# Patient Record
Sex: FEMALE | Race: WHITE | NOT HISPANIC OR LATINO
[De-identification: names, ages, dates, MRNs, and addresses within clinical notes are randomized per-mention and may not be internally consistent; named-entity substitution may affect disease eponyms.]

---

## 2017-07-16 ENCOUNTER — TRANSCRIPTION ENCOUNTER (OUTPATIENT)
Age: 72
End: 2017-07-16

## 2017-09-16 ENCOUNTER — TRANSCRIPTION ENCOUNTER (OUTPATIENT)
Age: 72
End: 2017-09-16

## 2017-12-26 ENCOUNTER — TRANSCRIPTION ENCOUNTER (OUTPATIENT)
Age: 72
End: 2017-12-26

## 2018-01-10 ENCOUNTER — TRANSCRIPTION ENCOUNTER (OUTPATIENT)
Age: 73
End: 2018-01-10

## 2018-06-05 ENCOUNTER — APPOINTMENT (OUTPATIENT)
Dept: ORTHOPEDIC SURGERY | Facility: CLINIC | Age: 73
End: 2018-06-05
Payer: MEDICARE

## 2018-06-05 VITALS — BODY MASS INDEX: 28.52 KG/M2 | WEIGHT: 155 LBS | HEIGHT: 62 IN

## 2018-06-05 DIAGNOSIS — Z86.69 PERSONAL HISTORY OF OTHER DISEASES OF THE NERVOUS SYSTEM AND SENSE ORGANS: ICD-10-CM

## 2018-06-05 PROCEDURE — 99213 OFFICE O/P EST LOW 20 MIN: CPT

## 2018-06-05 RX ORDER — ACETAMINOPHEN AND CODEINE 300; 30 MG/1; MG/1
300-30 TABLET ORAL
Qty: 30 | Refills: 0 | Status: DISCONTINUED | COMMUNITY
Start: 2018-04-17

## 2018-06-05 RX ORDER — UBIDECARENONE/VIT E ACET 100MG-5
CAPSULE ORAL
Refills: 0 | Status: ACTIVE | COMMUNITY

## 2018-06-05 RX ORDER — SELENIUM 50 MCG
TABLET ORAL
Refills: 0 | Status: ACTIVE | COMMUNITY

## 2018-06-05 RX ORDER — MULTIVIT-MINS NO.7/FOLIC ACID 1 MG
CAPSULE ORAL
Qty: 30 | Refills: 0 | Status: DISCONTINUED | COMMUNITY
Start: 2017-10-27

## 2018-06-05 RX ORDER — CEFUROXIME AXETIL 250 MG/1
250 TABLET ORAL
Qty: 20 | Refills: 0 | Status: DISCONTINUED | COMMUNITY
Start: 2017-12-13

## 2018-06-05 RX ORDER — MULTIVITAMIN
CAPSULE ORAL
Refills: 0 | Status: ACTIVE | COMMUNITY

## 2018-06-05 RX ORDER — IBUPROFEN 600 MG/1
600 TABLET, FILM COATED ORAL
Qty: 60 | Refills: 0 | Status: DISCONTINUED | COMMUNITY
Start: 2018-04-17

## 2018-06-05 RX ORDER — MULTIVIT-MIN/IRON/FOLIC ACID/K 18-600-40
CAPSULE ORAL
Refills: 0 | Status: ACTIVE | COMMUNITY

## 2018-06-05 RX ORDER — COLD-HOT PACK
EACH MISCELLANEOUS
Refills: 0 | Status: ACTIVE | COMMUNITY

## 2018-06-05 RX ORDER — ROSUVASTATIN CALCIUM 10 MG/1
10 TABLET, FILM COATED ORAL
Qty: 90 | Refills: 0 | Status: DISCONTINUED | COMMUNITY
Start: 2018-03-20

## 2018-06-05 RX ORDER — CARBIDOPA AND LEVODOPA 25; 100 MG/1; MG/1
25-100 TABLET ORAL
Qty: 90 | Refills: 0 | Status: ACTIVE | COMMUNITY
Start: 2018-05-24

## 2018-06-05 RX ORDER — ONDANSETRON 4 MG/1
4 TABLET ORAL
Qty: 30 | Refills: 0 | Status: DISCONTINUED | COMMUNITY
Start: 2018-03-20

## 2018-06-05 RX ORDER — CHROMIUM 200 MCG
TABLET ORAL
Refills: 0 | Status: ACTIVE | COMMUNITY

## 2018-06-05 RX ORDER — DOXYCYCLINE HYCLATE 100 MG/1
100 CAPSULE ORAL
Qty: 14 | Refills: 0 | Status: DISCONTINUED | COMMUNITY
Start: 2017-12-26

## 2018-06-05 RX ORDER — FINASTERIDE 1 MG/1
1 TABLET ORAL
Qty: 30 | Refills: 0 | Status: DISCONTINUED | COMMUNITY
Start: 2018-02-16

## 2018-06-05 RX ORDER — CEPHALEXIN 500 MG/1
500 CAPSULE ORAL
Qty: 20 | Refills: 0 | Status: DISCONTINUED | COMMUNITY
Start: 2018-04-17

## 2018-06-05 RX ORDER — IRON PS COMPLEX/B12/FOLIC ACID 150-25-1
150-25-1 CAPSULE ORAL
Qty: 30 | Refills: 0 | Status: DISCONTINUED | COMMUNITY
Start: 2018-02-09

## 2018-06-05 RX ORDER — BIOTIN 10 MG
TABLET ORAL
Refills: 0 | Status: ACTIVE | COMMUNITY

## 2018-06-05 RX ORDER — SILVER SULFADIAZINE 10 MG/G
1 CREAM TOPICAL
Qty: 50 | Refills: 0 | Status: DISCONTINUED | COMMUNITY
Start: 2018-03-12

## 2018-06-05 RX ORDER — NITROFURANTOIN (MONOHYDRATE/MACROCRYSTALS) 25; 75 MG/1; MG/1
100 CAPSULE ORAL
Qty: 10 | Refills: 0 | Status: DISCONTINUED | COMMUNITY
Start: 2018-02-13

## 2018-12-20 ENCOUNTER — OUTPATIENT (OUTPATIENT)
Dept: OUTPATIENT SERVICES | Facility: HOSPITAL | Age: 73
LOS: 1 days | Discharge: HOME | End: 2018-12-20
Payer: MEDICARE

## 2018-12-20 VITALS
OXYGEN SATURATION: 97 % | HEART RATE: 77 BPM | RESPIRATION RATE: 17 BRPM | HEIGHT: 62 IN | DIASTOLIC BLOOD PRESSURE: 79 MMHG | SYSTOLIC BLOOD PRESSURE: 151 MMHG | WEIGHT: 154.32 LBS | TEMPERATURE: 99 F

## 2018-12-20 VITALS — HEIGHT: 62 IN | WEIGHT: 154.32 LBS

## 2018-12-20 DIAGNOSIS — M17.12 UNILATERAL PRIMARY OSTEOARTHRITIS, LEFT KNEE: ICD-10-CM

## 2018-12-20 DIAGNOSIS — Z01.818 ENCOUNTER FOR OTHER PREPROCEDURAL EXAMINATION: ICD-10-CM

## 2018-12-20 DIAGNOSIS — Z98.890 OTHER SPECIFIED POSTPROCEDURAL STATES: Chronic | ICD-10-CM

## 2018-12-20 DIAGNOSIS — E78.5 HYPERLIPIDEMIA, UNSPECIFIED: ICD-10-CM

## 2018-12-20 DIAGNOSIS — Z98.82 BREAST IMPLANT STATUS: Chronic | ICD-10-CM

## 2018-12-20 DIAGNOSIS — E05.90 THYROTOXICOSIS, UNSPECIFIED WITHOUT THYROTOXIC CRISIS OR STORM: ICD-10-CM

## 2018-12-20 DIAGNOSIS — Z29.9 ENCOUNTER FOR PROPHYLACTIC MEASURES, UNSPECIFIED: ICD-10-CM

## 2018-12-20 LAB
ANION GAP SERPL CALC-SCNC: 11 MMOL/L — SIGNIFICANT CHANGE UP (ref 5–17)
APTT BLD: 27.2 SEC — LOW (ref 28.5–37)
BLD GP AB SCN SERPL QL: SIGNIFICANT CHANGE UP
BUN SERPL-MCNC: 22 MG/DL — SIGNIFICANT CHANGE UP (ref 7–23)
CALCIUM SERPL-MCNC: 9.3 MG/DL — SIGNIFICANT CHANGE UP (ref 8.5–10.1)
CHLORIDE SERPL-SCNC: 106 MMOL/L — SIGNIFICANT CHANGE UP (ref 96–108)
CO2 SERPL-SCNC: 24 MMOL/L — SIGNIFICANT CHANGE UP (ref 22–31)
CREAT SERPL-MCNC: 0.7 MG/DL — SIGNIFICANT CHANGE UP (ref 0.5–1.3)
GLUCOSE SERPL-MCNC: 135 MG/DL — HIGH (ref 70–99)
HBA1C BLD-MCNC: 6.7 % — HIGH (ref 4–5.6)
HCT VFR BLD CALC: 41.2 % — SIGNIFICANT CHANGE UP (ref 34.5–45)
HGB BLD-MCNC: 13.3 G/DL — SIGNIFICANT CHANGE UP (ref 11.5–15.5)
INR BLD: 0.96 RATIO — SIGNIFICANT CHANGE UP (ref 0.88–1.16)
MCHC RBC-ENTMCNC: 30.3 PG — SIGNIFICANT CHANGE UP (ref 27–34)
MCHC RBC-ENTMCNC: 32.3 GM/DL — SIGNIFICANT CHANGE UP (ref 32–36)
MCV RBC AUTO: 93.8 FL — SIGNIFICANT CHANGE UP (ref 80–100)
MRSA PCR RESULT.: SIGNIFICANT CHANGE UP
NRBC # BLD: 0 /100 WBCS — SIGNIFICANT CHANGE UP (ref 0–0)
PLATELET # BLD AUTO: 174 K/UL — SIGNIFICANT CHANGE UP (ref 150–400)
POTASSIUM SERPL-MCNC: 3.6 MMOL/L — SIGNIFICANT CHANGE UP (ref 3.5–5.3)
POTASSIUM SERPL-SCNC: 3.6 MMOL/L — SIGNIFICANT CHANGE UP (ref 3.5–5.3)
PROTHROM AB SERPL-ACNC: 10.7 SEC — SIGNIFICANT CHANGE UP (ref 10–12.9)
RBC # BLD: 4.39 M/UL — SIGNIFICANT CHANGE UP (ref 3.8–5.2)
RBC # FLD: 14.3 % — SIGNIFICANT CHANGE UP (ref 10.3–14.5)
S AUREUS DNA NOSE QL NAA+PROBE: DETECTED
SODIUM SERPL-SCNC: 141 MMOL/L — SIGNIFICANT CHANGE UP (ref 135–145)
TSH SERPL-MCNC: 1.74 UU/ML — SIGNIFICANT CHANGE UP (ref 0.36–3.74)
WBC # BLD: 8.44 K/UL — SIGNIFICANT CHANGE UP (ref 3.8–10.5)
WBC # FLD AUTO: 8.44 K/UL — SIGNIFICANT CHANGE UP (ref 3.8–10.5)

## 2018-12-20 PROCEDURE — 93010 ELECTROCARDIOGRAM REPORT: CPT | Mod: NC

## 2018-12-20 RX ORDER — SODIUM CHLORIDE 9 MG/ML
3 INJECTION INTRAMUSCULAR; INTRAVENOUS; SUBCUTANEOUS EVERY 8 HOURS
Qty: 0 | Refills: 0 | Status: DISCONTINUED | OUTPATIENT
Start: 2019-01-02 | End: 2019-01-03

## 2018-12-20 NOTE — H&P PST ADULT - ASSESSMENT
unilateral primary osteoarthritis, left unilateral primary osteoarthritis, left  CAPRINI SCORE [CLOT]    AGE RELATED RISK FACTORS                                                       MOBILITY RELATED FACTORS  [ ] Age 41-60 years                                            (1 Point)                  [ ] Bed rest                                                        (1 Point)  [x ] Age: 61-74 years                                           (2 Points)                 [ ] Plaster cast                                                   (2 Points)  [ ] Age= 75 years                                              (3 Points)                 [ ] Bed bound for more than 72 hours                 (2 Points)    DISEASE RELATED RISK FACTORS                                               GENDER SPECIFIC FACTORS  [ ] Edema in the lower extremities                       (1 Point)                  [ ] Pregnancy                                                     (1 Point)  [ ] Varicose veins                                               (1 Point)                  [ ] Post-partum < 6 weeks                                   (1 Point)             [x ] BMI > 25 Kg/m2                                            (1 Point)                  [ ] Hormonal therapy  or oral contraception          (1 Point)                 [ ] Sepsis (in the previous month)                        (1 Point)                  [ ] History of pregnancy complications                 (1 point)  [ ] Pneumonia or serious lung disease                                               [ ] Unexplained or recurrent                     (1 Point)           (in the previous month)                               (1 Point)  [ ] Abnormal pulmonary function test                     (1 Point)                 SURGERY RELATED RISK FACTORS  [ ] Acute myocardial infarction                              (1 Point)                 [ ]  Section                                             (1 Point)  [ ] Congestive heart failure (in the previous month)  (1 Point)               [ ] Minor surgery                                                  (1 Point)   [ ] Inflammatory bowel disease                             (1 Point)                 [ ] Arthroscopic surgery                                        (2 Points)  [ ] Central venous access                                      (2 Points)                [ ] General surgery lasting more than 45 minutes   (2 Points)       [ ] Stroke (in the previous month)                          (5 Points)               [x ] Elective arthroplasty                                         (5 Points)                                                                                                                                               HEMATOLOGY RELATED FACTORS                                                 TRAUMA RELATED RISK FACTORS  [ ] Prior episodes of VTE                                     (3 Points)                 [ ] Fracture of the hip, pelvis, or leg                       (5 Points)  [ ] Positive family history for VTE                         (3 Points)                 [ ] Acute spinal cord injury (in the previous month)  (5 Points)  [ ] Prothrombin 12283 A                                     (3 Points)                 [ ] Paralysis  (less than 1 month)                             (5 Points)  [ ] Factor V Leiden                                             (3 Points)                  [ ] Multiple Trauma within 1 month                        (5 Points)  [ ] Lupus anticoagulants                                     (3 Points)                                                           [ ] Anticardiolipin antibodies                               (3 Points)                                                       [ ] High homocysteine in the blood                      (3 Points)                                             [ ] Other congenital or acquired thrombophilia      (3 Points)                                                [ ] Heparin induced thrombocytopenia                  (3 Points)                                          Total Score [    8    ]

## 2018-12-20 NOTE — H&P PST ADULT - HISTORY OF PRESENT ILLNESS
This is a 74 y/o female This is a 74 y/o female c/o left knee pain associated with difficulty walking, seen orthopedics for evaluation, recommended surgery, she presents today for left total knee replacement

## 2018-12-20 NOTE — H&P PST ADULT - FAMILY HISTORY
Father  Still living? No  Family history of heart attack, Age at diagnosis: Age Unknown     Mother  Still living? No  Family history of hypertension, Age at diagnosis: Age Unknown     Sibling  Still living? Yes, Estimated age: Age Unknown  Family history of diabetes mellitus, Age at diagnosis: Age Unknown

## 2018-12-20 NOTE — PHYSICAL THERAPY INITIAL EVALUATION ADULT - ADDITIONAL COMMENTS
Pt lives in an apartment c no steps to get into the building from the front entrance and c 2 steps, matilde rails,  close and able to be reached simultaneously, from the garage. There is no step to negotiate in the building.  Pt has a tub/shower combo and regular toilet seat in . Pt is R handed and drives. Pt has a narrow base quad cane, straight cane and a shower bench. Sister will be available to assist pt in post -op care upon discharge home.

## 2018-12-20 NOTE — PHYSICAL THERAPY INITIAL EVALUATION ADULT - CRITERIA FOR SKILLED THERAPEUTIC INTERVENTIONS
rehab potential/functional limitations in following categories/risk reduction/prevention/anticipated equipment needs at discharge/anticipated discharge recommendation/impairments found/therapy frequency

## 2018-12-20 NOTE — H&P PST ADULT - NSANTHOSAYNRD_GEN_A_CORE
No. JESÚS screening performed.  STOP BANG Legend: 0-2 = LOW Risk; 3-4 = INTERMEDIATE Risk; 5-8 = HIGH Risk

## 2018-12-20 NOTE — OCCUPATIONAL THERAPY INITIAL EVALUATION ADULT - FINE MOTOR COORDINATION, FINE MOTOR COORDINATION TESTS, OT EVAL
Patient-specific activity scoring scheme (Point to one number): 0 -------5-------- 10 (0) =Unable to perform activity (10) -- Able to perform activity at the same level as before injury or problem. Activity: Everything in general_____5______

## 2018-12-20 NOTE — H&P PST ADULT - PMH
GERD (gastroesophageal reflux disease)    HTN (hypertension)    Hyperlipidemia    Hyperthyroidism  monitored  by endocrinologist  Parkinson disease    Spinal stenosis GERD (gastroesophageal reflux disease)    HTN (hypertension)    Hyperlipidemia    Hyperthyroidism  monitored  by endocrinologist  Parkinson disease    Spinal stenosis  pain relieved with steroid injection GERD (gastroesophageal reflux disease)    HTN (hypertension)    Hyperlipidemia    Hyperthyroidism  monitored  by endocrinologist  MVP (mitral valve prolapse)  echo 2018  Parkinson disease    Spinal stenosis  pain relieved with steroid injection

## 2018-12-20 NOTE — OCCUPATIONAL THERAPY INITIAL EVALUATION ADULT - NS ASR OT EQUIP NEEDS DISCH
Recommending raised toilet seat with bars and rolling walker. Patient owns a narrow based cane and a straight cane.

## 2018-12-20 NOTE — H&P PST ADULT - PSH
History of bowel resection  2012  S/P breast augmentation  2014 saline implants  S/P foot surgery, left  5/2/2018  S/P hernia repair  2013

## 2018-12-20 NOTE — PHYSICAL THERAPY INITIAL EVALUATION ADULT - GAIT DEVIATIONS NOTED, PT EVAL
decreased weight-shifting ability/decreased christiano/decreased step length/decreased stride length

## 2018-12-20 NOTE — OCCUPATIONAL THERAPY INITIAL EVALUATION ADULT - ADDITIONAL COMMENTS
Patient lives in a apartment with no steps to enter. Once inside, the patients bedroom and bathroom is on that main level when entering. The patients bathroom has a tub/shower combination with a regular toilet. The patient reports owning a tub bench. The patient ambulates with no device and owns a straight cane and a narrow based quad cane. The patient is R handed, drives and wears glasses for reading. The patient daily pain is 10/10 and the patient takes advil, tylenol with codeine and tramadol for pain management.

## 2018-12-20 NOTE — PHYSICAL THERAPY INITIAL EVALUATION ADULT - ASR EQUIP NEEDS DISCH PT EVAL
rolling walker (5 inch wheels)/raised toilet seat c arms due to small bath room.  Pt has a narrow base quad cane, straight cane and a shower bench.

## 2018-12-21 RX ORDER — MUPIROCIN 20 MG/G
1 OINTMENT TOPICAL
Qty: 1 | Refills: 0
Start: 2018-12-21 | End: 2018-12-25

## 2018-12-31 RX ORDER — DEXTROSE 50 % IN WATER 50 %
12.5 SYRINGE (ML) INTRAVENOUS ONCE
Qty: 0 | Refills: 0 | Status: DISCONTINUED | OUTPATIENT
Start: 2019-01-02 | End: 2019-01-03

## 2018-12-31 RX ORDER — LOSARTAN POTASSIUM 100 MG/1
100 TABLET, FILM COATED ORAL DAILY
Qty: 0 | Refills: 0 | Status: DISCONTINUED | OUTPATIENT
Start: 2019-01-02 | End: 2019-01-03

## 2018-12-31 RX ORDER — SODIUM CHLORIDE 9 MG/ML
1000 INJECTION, SOLUTION INTRAVENOUS
Qty: 0 | Refills: 0 | Status: DISCONTINUED | OUTPATIENT
Start: 2019-01-02 | End: 2019-01-03

## 2018-12-31 RX ORDER — ONDANSETRON 8 MG/1
4 TABLET, FILM COATED ORAL EVERY 6 HOURS
Qty: 0 | Refills: 0 | Status: DISCONTINUED | OUTPATIENT
Start: 2019-01-02 | End: 2019-01-03

## 2018-12-31 RX ORDER — DOCUSATE SODIUM 100 MG
100 CAPSULE ORAL THREE TIMES A DAY
Qty: 0 | Refills: 0 | Status: DISCONTINUED | OUTPATIENT
Start: 2019-01-02 | End: 2019-01-03

## 2018-12-31 RX ORDER — MAGNESIUM HYDROXIDE 400 MG/1
30 TABLET, CHEWABLE ORAL DAILY
Qty: 0 | Refills: 0 | Status: DISCONTINUED | OUTPATIENT
Start: 2019-01-02 | End: 2019-01-03

## 2018-12-31 RX ORDER — HYDROCHLOROTHIAZIDE 25 MG
12.5 TABLET ORAL DAILY
Qty: 0 | Refills: 0 | Status: DISCONTINUED | OUTPATIENT
Start: 2019-01-02 | End: 2019-01-03

## 2018-12-31 RX ORDER — DEXTROSE 50 % IN WATER 50 %
25 SYRINGE (ML) INTRAVENOUS ONCE
Qty: 0 | Refills: 0 | Status: DISCONTINUED | OUTPATIENT
Start: 2019-01-02 | End: 2019-01-03

## 2018-12-31 RX ORDER — DEXTROSE 50 % IN WATER 50 %
15 SYRINGE (ML) INTRAVENOUS ONCE
Qty: 0 | Refills: 0 | Status: DISCONTINUED | OUTPATIENT
Start: 2019-01-02 | End: 2019-01-03

## 2018-12-31 RX ORDER — PANTOPRAZOLE SODIUM 20 MG/1
40 TABLET, DELAYED RELEASE ORAL
Qty: 0 | Refills: 0 | Status: DISCONTINUED | OUTPATIENT
Start: 2019-01-02 | End: 2019-01-03

## 2018-12-31 RX ORDER — ATORVASTATIN CALCIUM 80 MG/1
40 TABLET, FILM COATED ORAL AT BEDTIME
Qty: 0 | Refills: 0 | Status: DISCONTINUED | OUTPATIENT
Start: 2019-01-02 | End: 2019-01-03

## 2018-12-31 RX ORDER — PRAMIPEXOLE DIHYDROCHLORIDE 0.12 MG/1
0.12 TABLET ORAL THREE TIMES A DAY
Qty: 0 | Refills: 0 | Status: DISCONTINUED | OUTPATIENT
Start: 2019-01-02 | End: 2019-01-03

## 2018-12-31 RX ORDER — SENNA PLUS 8.6 MG/1
2 TABLET ORAL AT BEDTIME
Qty: 0 | Refills: 0 | Status: DISCONTINUED | OUTPATIENT
Start: 2019-01-02 | End: 2019-01-03

## 2018-12-31 RX ORDER — METOPROLOL TARTRATE 50 MG
25 TABLET ORAL DAILY
Qty: 0 | Refills: 0 | Status: DISCONTINUED | OUTPATIENT
Start: 2019-01-02 | End: 2019-01-03

## 2018-12-31 RX ORDER — GLUCAGON INJECTION, SOLUTION 0.5 MG/.1ML
1 INJECTION, SOLUTION SUBCUTANEOUS ONCE
Qty: 0 | Refills: 0 | Status: DISCONTINUED | OUTPATIENT
Start: 2019-01-02 | End: 2019-01-03

## 2018-12-31 RX ORDER — ACETAMINOPHEN 500 MG
650 TABLET ORAL EVERY 6 HOURS
Qty: 0 | Refills: 0 | Status: DISCONTINUED | OUTPATIENT
Start: 2019-01-02 | End: 2019-01-03

## 2018-12-31 RX ORDER — POLYETHYLENE GLYCOL 3350 17 G/17G
17 POWDER, FOR SOLUTION ORAL DAILY
Qty: 0 | Refills: 0 | Status: DISCONTINUED | OUTPATIENT
Start: 2019-01-02 | End: 2019-01-03

## 2018-12-31 RX ORDER — MORPHINE SULFATE 50 MG/1
4 CAPSULE, EXTENDED RELEASE ORAL ONCE
Qty: 0 | Refills: 0 | Status: DISCONTINUED | OUTPATIENT
Start: 2019-01-02 | End: 2019-01-03

## 2018-12-31 RX ORDER — CELECOXIB 200 MG/1
200 CAPSULE ORAL
Qty: 0 | Refills: 0 | Status: DISCONTINUED | OUTPATIENT
Start: 2019-01-03 | End: 2019-01-03

## 2018-12-31 RX ORDER — ASPIRIN/CALCIUM CARB/MAGNESIUM 324 MG
325 TABLET ORAL
Qty: 0 | Refills: 0 | Status: DISCONTINUED | OUTPATIENT
Start: 2019-01-03 | End: 2019-01-03

## 2018-12-31 RX ORDER — INSULIN LISPRO 100/ML
VIAL (ML) SUBCUTANEOUS
Qty: 0 | Refills: 0 | Status: DISCONTINUED | OUTPATIENT
Start: 2019-01-02 | End: 2019-01-03

## 2018-12-31 RX ORDER — ASCORBIC ACID 60 MG
500 TABLET,CHEWABLE ORAL
Qty: 0 | Refills: 0 | Status: DISCONTINUED | OUTPATIENT
Start: 2019-01-02 | End: 2019-01-03

## 2018-12-31 RX ORDER — CARBIDOPA AND LEVODOPA 25; 100 MG/1; MG/1
1 TABLET ORAL THREE TIMES A DAY
Qty: 0 | Refills: 0 | Status: DISCONTINUED | OUTPATIENT
Start: 2019-01-02 | End: 2019-01-03

## 2018-12-31 RX ORDER — INSULIN LISPRO 100/ML
VIAL (ML) SUBCUTANEOUS AT BEDTIME
Qty: 0 | Refills: 0 | Status: DISCONTINUED | OUTPATIENT
Start: 2019-01-02 | End: 2019-01-03

## 2019-01-01 ENCOUNTER — TRANSCRIPTION ENCOUNTER (OUTPATIENT)
Age: 74
End: 2019-01-01

## 2019-01-02 ENCOUNTER — RESULT REVIEW (OUTPATIENT)
Age: 74
End: 2019-01-02

## 2019-01-02 ENCOUNTER — TRANSCRIPTION ENCOUNTER (OUTPATIENT)
Age: 74
End: 2019-01-02

## 2019-01-02 ENCOUNTER — INPATIENT (INPATIENT)
Facility: HOSPITAL | Age: 74
LOS: 0 days | Discharge: HOME HEALTH | End: 2019-01-03
Attending: ORTHOPAEDIC SURGERY | Admitting: ORTHOPAEDIC SURGERY
Payer: MEDICARE

## 2019-01-02 VITALS
RESPIRATION RATE: 13 BRPM | OXYGEN SATURATION: 99 % | WEIGHT: 154.32 LBS | HEIGHT: 62 IN | SYSTOLIC BLOOD PRESSURE: 139 MMHG | DIASTOLIC BLOOD PRESSURE: 63 MMHG | HEART RATE: 67 BPM | TEMPERATURE: 99 F

## 2019-01-02 DIAGNOSIS — Z98.890 OTHER SPECIFIED POSTPROCEDURAL STATES: Chronic | ICD-10-CM

## 2019-01-02 DIAGNOSIS — Z98.82 BREAST IMPLANT STATUS: Chronic | ICD-10-CM

## 2019-01-02 LAB
GLUCOSE BLDC GLUCOMTR-MCNC: 119 MG/DL — HIGH (ref 70–99)
GLUCOSE BLDC GLUCOMTR-MCNC: 199 MG/DL — HIGH (ref 70–99)
GLUCOSE BLDC GLUCOMTR-MCNC: 233 MG/DL — HIGH (ref 70–99)
GLUCOSE BLDC GLUCOMTR-MCNC: 259 MG/DL — HIGH (ref 70–99)
HCT VFR BLD CALC: 46.8 % — HIGH (ref 34.5–45)
HGB BLD-MCNC: 14.8 G/DL — SIGNIFICANT CHANGE UP (ref 11.5–15.5)
MCHC RBC-ENTMCNC: 29.9 PG — SIGNIFICANT CHANGE UP (ref 27–34)
MCHC RBC-ENTMCNC: 31.6 GM/DL — LOW (ref 32–36)
MCV RBC AUTO: 94.5 FL — SIGNIFICANT CHANGE UP (ref 80–100)
NRBC # BLD: 0 /100 WBCS — SIGNIFICANT CHANGE UP (ref 0–0)
PLATELET # BLD AUTO: 178 K/UL — SIGNIFICANT CHANGE UP (ref 150–400)
RBC # BLD: 4.95 M/UL — SIGNIFICANT CHANGE UP (ref 3.8–5.2)
RBC # FLD: 14.1 % — SIGNIFICANT CHANGE UP (ref 10.3–14.5)
WBC # BLD: 9.24 K/UL — SIGNIFICANT CHANGE UP (ref 3.8–10.5)
WBC # FLD AUTO: 9.24 K/UL — SIGNIFICANT CHANGE UP (ref 3.8–10.5)

## 2019-01-02 PROCEDURE — 88311 DECALCIFY TISSUE: CPT | Mod: 26

## 2019-01-02 PROCEDURE — 73560 X-RAY EXAM OF KNEE 1 OR 2: CPT | Mod: 26,LT

## 2019-01-02 PROCEDURE — 88305 TISSUE EXAM BY PATHOLOGIST: CPT | Mod: 26

## 2019-01-02 RX ORDER — ACETAMINOPHEN 500 MG
650 TABLET ORAL ONCE
Qty: 0 | Refills: 0 | Status: COMPLETED | OUTPATIENT
Start: 2019-01-02 | End: 2019-01-02

## 2019-01-02 RX ORDER — CELECOXIB 200 MG/1
200 CAPSULE ORAL ONCE
Qty: 0 | Refills: 0 | Status: COMPLETED | OUTPATIENT
Start: 2019-01-02 | End: 2019-01-02

## 2019-01-02 RX ORDER — OXYCODONE HYDROCHLORIDE 5 MG/1
10 TABLET ORAL EVERY 4 HOURS
Qty: 0 | Refills: 0 | Status: DISCONTINUED | OUTPATIENT
Start: 2019-01-02 | End: 2019-01-02

## 2019-01-02 RX ORDER — CEFAZOLIN SODIUM 1 G
2000 VIAL (EA) INJECTION EVERY 8 HOURS
Qty: 0 | Refills: 0 | Status: COMPLETED | OUTPATIENT
Start: 2019-01-02 | End: 2019-01-03

## 2019-01-02 RX ORDER — ACETAMINOPHEN WITH CODEINE 300MG-30MG
1 TABLET ORAL EVERY 4 HOURS
Qty: 0 | Refills: 0 | Status: DISCONTINUED | OUTPATIENT
Start: 2019-01-02 | End: 2019-01-03

## 2019-01-02 RX ORDER — OXYCODONE HYDROCHLORIDE 5 MG/1
5 TABLET ORAL EVERY 4 HOURS
Qty: 0 | Refills: 0 | Status: DISCONTINUED | OUTPATIENT
Start: 2019-01-02 | End: 2019-01-02

## 2019-01-02 RX ORDER — FENTANYL CITRATE 50 UG/ML
25 INJECTION INTRAVENOUS
Qty: 0 | Refills: 0 | Status: DISCONTINUED | OUTPATIENT
Start: 2019-01-02 | End: 2019-01-02

## 2019-01-02 RX ORDER — FENTANYL CITRATE 50 UG/ML
50 INJECTION INTRAVENOUS
Qty: 0 | Refills: 0 | Status: DISCONTINUED | OUTPATIENT
Start: 2019-01-02 | End: 2019-01-02

## 2019-01-02 RX ORDER — OXYCODONE HYDROCHLORIDE 5 MG/1
20 TABLET ORAL ONCE
Qty: 0 | Refills: 0 | Status: DISCONTINUED | OUTPATIENT
Start: 2019-01-02 | End: 2019-01-02

## 2019-01-02 RX ORDER — TRANEXAMIC ACID 100 MG/ML
1000 INJECTION, SOLUTION INTRAVENOUS ONCE
Qty: 0 | Refills: 0 | Status: COMPLETED | OUTPATIENT
Start: 2019-01-02 | End: 2019-01-02

## 2019-01-02 RX ORDER — ONDANSETRON 8 MG/1
4 TABLET, FILM COATED ORAL ONCE
Qty: 0 | Refills: 0 | Status: DISCONTINUED | OUTPATIENT
Start: 2019-01-02 | End: 2019-01-02

## 2019-01-02 RX ADMIN — Medication 1 TABLET(S): at 22:32

## 2019-01-02 RX ADMIN — Medication 3: at 17:14

## 2019-01-02 RX ADMIN — CELECOXIB 200 MILLIGRAM(S): 200 CAPSULE ORAL at 08:21

## 2019-01-02 RX ADMIN — ATORVASTATIN CALCIUM 40 MILLIGRAM(S): 80 TABLET, FILM COATED ORAL at 21:46

## 2019-01-02 RX ADMIN — CARBIDOPA AND LEVODOPA 1 TABLET(S): 25; 100 TABLET ORAL at 21:46

## 2019-01-02 RX ADMIN — SODIUM CHLORIDE 3 MILLILITER(S): 9 INJECTION INTRAMUSCULAR; INTRAVENOUS; SUBCUTANEOUS at 21:46

## 2019-01-02 RX ADMIN — TRANEXAMIC ACID 220 MILLIGRAM(S): 100 INJECTION, SOLUTION INTRAVENOUS at 12:28

## 2019-01-02 RX ADMIN — ONDANSETRON 4 MILLIGRAM(S): 8 TABLET, FILM COATED ORAL at 16:12

## 2019-01-02 RX ADMIN — Medication 500 MILLIGRAM(S): at 17:29

## 2019-01-02 RX ADMIN — PRAMIPEXOLE DIHYDROCHLORIDE 0.12 MILLIGRAM(S): 0.12 TABLET ORAL at 21:45

## 2019-01-02 RX ADMIN — Medication 650 MILLIGRAM(S): at 08:21

## 2019-01-02 RX ADMIN — Medication 1 TABLET(S): at 23:14

## 2019-01-02 RX ADMIN — Medication 1 TABLET(S): at 19:24

## 2019-01-02 RX ADMIN — SODIUM CHLORIDE 100 MILLILITER(S): 9 INJECTION, SOLUTION INTRAVENOUS at 16:50

## 2019-01-02 RX ADMIN — Medication 1 TABLET(S): at 18:24

## 2019-01-02 RX ADMIN — Medication 100 MILLIGRAM(S): at 21:45

## 2019-01-02 RX ADMIN — Medication 100 MILLIGRAM(S): at 18:25

## 2019-01-02 RX ADMIN — SODIUM CHLORIDE 100 MILLILITER(S): 9 INJECTION, SOLUTION INTRAVENOUS at 21:45

## 2019-01-02 RX ADMIN — OXYCODONE HYDROCHLORIDE 20 MILLIGRAM(S): 5 TABLET ORAL at 08:21

## 2019-01-02 RX ADMIN — SODIUM CHLORIDE 3 MILLILITER(S): 9 INJECTION INTRAMUSCULAR; INTRAVENOUS; SUBCUTANEOUS at 14:17

## 2019-01-02 NOTE — DISCHARGE NOTE ADULT - NS AS ACTIVITY OBS
Walking-Indoors allowed/Showering allowed/No Heavy lifting/straining/Do not drive or operate machinery/Stairs allowed/Walking-Outdoors allowed

## 2019-01-02 NOTE — PHYSICAL THERAPY INITIAL EVALUATION ADULT - ADDITIONAL COMMENTS
As per pre-op: Pt lives in an apartment c no steps to get into the building from the front entrance and c 2 steps, matilde rails,  close and able to be reached simultaneously, from the garage. There is no step to negotiate in the building.  Pt has a tub/shower combo and regular toilet seat in . Pt is R handed and drives. Pt has a narrow base quad cane, straight cane and a shower bench. Sister will be available to assist pt in post -op care upon discharge home.

## 2019-01-02 NOTE — DISCHARGE NOTE ADULT - ADDITIONAL INSTRUCTIONS
Please call your MD, if you have new onset of fevers, increased drainage, increased pain or increased redness around the incision site. Please return to the Emergency Department if you have chest pain or shortness of breath, F/U with Labs with your PMD within 1 week

## 2019-01-02 NOTE — PHYSICAL THERAPY INITIAL EVALUATION ADULT - CRITERIA FOR SKILLED THERAPEUTIC INTERVENTIONS
functional limitations in following categories/anticipated discharge recommendation/risk reduction/prevention/rehab potential/therapy frequency/predicted duration of therapy intervention/anticipated equipment needs at discharge/impairments found

## 2019-01-02 NOTE — DISCHARGE NOTE ADULT - CARE PROVIDER_API CALL
Enrico Sanchez), Orthopaedic Surgery  [address] [place] [address]  Phone: [phone]  Fax: [phone]

## 2019-01-02 NOTE — DISCHARGE NOTE ADULT - MEDICATION SUMMARY - MEDICATIONS TO TAKE
I will START or STAY ON the medications listed below when I get home from the hospital:    celecoxib 200 mg oral capsule  -- 1 cap(s) by mouth 2 times a day MDD:2 Tabs  -- Indication: For LEFT TOTAL KNEE REPLACEMENT    acetaminophen-codeine 300 mg-30 mg oral tablet  -- 1 tab(s) by mouth every 4 hours, As needed, Pain 1-10 MDD:6 Tabs  -- Indication: For LEFT TOTAL KNEE REPLACEMENT    aspirin 325 mg oral delayed release tablet  -- 1 tab(s) by mouth 2 times a day MDD:2 Tabs  -- Indication: For LEFT TOTAL KNEE REPLACEMENT    losartan 100 mg oral tablet  -- 1 tab(s) by mouth once a day  -- Indication: For LEFT TOTAL KNEE REPLACEMENT    metFORMIN 1000 mg oral tablet, extended release  -- 1 tab(s) by mouth 2 times a day  -- Indication: For LEFT TOTAL KNEE REPLACEMENT    Victoza  -- 1 dose(s) subcutaneous once a day  -- Indication: For LEFT TOTAL KNEE REPLACEMENT    Crestor 10 mg oral tablet  -- 1 tab(s) by mouth once a day (at bedtime)  -- Indication: For LEFT TOTAL KNEE REPLACEMENT    pramipexole 0.125 mg oral tablet  -- 1 tab(s) by mouth 3 times a day  -- Indication: For LEFT TOTAL KNEE REPLACEMENT    carbidopa-levodopa 25 mg-100 mg oral tablet  -- 1 tab(s) by mouth 3 times a day  -- Indication: For LEFT TOTAL KNEE REPLACEMENT    methIMAzole 5 mg oral tablet  -- 1 tab(s) by mouth once a day  -- Indication: For LEFT TOTAL KNEE REPLACEMENT    Metoprolol Succinate ER 25 mg oral tablet, extended release  -- 1 tab(s) by mouth once a day  -- Indication: For LEFT TOTAL KNEE REPLACEMENT    hydroCHLOROthiazide 12.5 mg oral tablet  -- 1 tab(s) by mouth once a day  -- Indication: For LEFT TOTAL KNEE REPLACEMENT    docusate sodium 100 mg oral capsule  -- 1 cap(s) by mouth 3 times a day  -- Indication: For LEFT TOTAL KNEE REPLACEMENT    omeprazole 40 mg oral delayed release capsule  -- 1 cap(s) by mouth once a day  -- Indication: For LEFT TOTAL KNEE REPLACEMENT    Prempro 0.3 mg-1.5 mg oral tablet  -- 1 tab(s) by mouth once a day (at bedtime)  -- Indication: For LEFT TOTAL KNEE REPLACEMENT    Multiple Vitamins oral tablet  -- 1 tab(s) by mouth once a day  -- Indication: For LEFT TOTAL KNEE REPLACEMENT    ascorbic acid 500 mg oral tablet  -- 1 tab(s) by mouth 2 times a day  -- Indication: For LEFT TOTAL KNEE REPLACEMENT

## 2019-01-02 NOTE — DISCHARGE NOTE ADULT - MEDICATION SUMMARY - MEDICATIONS TO STOP TAKING
I will STOP taking the medications listed below when I get home from the hospital:    aspirin 81 mg oral tablet  -- 1 tab(s) by mouth once a day (at bedtime)    Advil 200 mg oral tablet  -- 1 dose(s) by mouth prn    mupirocin 2% topical ointment  -- Apply on skin to affected area 2 times a day MDD:2  -- For external use only.

## 2019-01-02 NOTE — CONSULT NOTE ADULT - SUBJECTIVE AND OBJECTIVE BOX
CANDIDO IVERSON is a 73y Female s/p LEFT TOTAL KNEE REPLACEMENT    w/ h/o MVP (mitral valve prolapse)  Spinal stenosis  Parkinson disease  GERD (gastroesophageal reflux disease)  Hyperlipidemia  HTN (hypertension)  Hyperthyroidism    denies any chest pain shortness of breath palpitation dizziness lightheadedness nausea vomiting fever or chills    H/O abdominal surgery  S/P breast augmentation  S/P hernia repair  History of bowel resection  S/P foot surgery, left    Family history of diabetes mellitus (Sibling)  Family history of hypertension (Mother)  Family history of heart attack (Father)    SH: doesnot smoke or drink at this time    No Known Allergies    acetaminophen   Tablet .. 650 milliGRAM(s) Oral every 6 hours PRN  acetaminophen 300 mG/codeine 30 mG 1 Tablet(s) Oral every 4 hours PRN  aluminum hydroxide/magnesium hydroxide/simethicone Suspension 30 milliLiter(s) Oral four times a day PRN  ascorbic acid 500 milliGRAM(s) Oral two times a day  atorvastatin 40 milliGRAM(s) Oral at bedtime  carbidopa/levodopa  25/100 1 Tablet(s) Oral three times a day  ceFAZolin   IVPB 2000 milliGRAM(s) IV Intermittent every 8 hours  dextrose 40% Gel 15 Gram(s) Oral once PRN  dextrose 5%. 1000 milliLiter(s) IV Continuous <Continuous>  dextrose 50% Injectable 12.5 Gram(s) IV Push once  dextrose 50% Injectable 25 Gram(s) IV Push once  dextrose 50% Injectable 25 Gram(s) IV Push once  docusate sodium 100 milliGRAM(s) Oral three times a day  glucagon  Injectable 1 milliGRAM(s) IntraMuscular once PRN  hydrochlorothiazide 12.5 milliGRAM(s) Oral daily  insulin lispro (HumaLOG) corrective regimen sliding scale   SubCutaneous three times a day before meals  insulin lispro (HumaLOG) corrective regimen sliding scale   SubCutaneous at bedtime  lactated ringers. 1000 milliLiter(s) IV Continuous <Continuous>  losartan 100 milliGRAM(s) Oral daily  magnesium hydroxide Suspension 30 milliLiter(s) Oral daily PRN  methimazole 5 milliGRAM(s) Oral daily  metoprolol succinate ER 25 milliGRAM(s) Oral daily  morphine  - Injectable 4 milliGRAM(s) IV Push once  multivitamin 1 Tablet(s) Oral daily  ondansetron Injectable 4 milliGRAM(s) IV Push every 6 hours PRN  pantoprazole    Tablet 40 milliGRAM(s) Oral before breakfast  polyethylene glycol 3350 17 Gram(s) Oral daily  pramipexole 0.125 milliGRAM(s) Oral three times a day  senna 2 Tablet(s) Oral at bedtime PRN  sodium chloride 0.9% lock flush 3 milliLiter(s) IV Push every 8 hours    T(C): 36 (01-02-19 @ 15:49), Max: 37 (01-02-19 @ 08:11)  HR: 68 (01-02-19 @ 16:59) (56 - 74)  BP: 138/81 (01-02-19 @ 16:59) (116/68 - 151/93)  RR: 16 (01-02-19 @ 16:59) (13 - 17)  SpO2: 95% (01-02-19 @ 16:59) (94% - 100%)  HEENT unremarkable  neck no JVD or bruit  heart normal S1 S2 RRR no gallops or rubs  chest clear to auscultation  abd sof nontender non distended +bs  ext no calf tenderness    A/P   DVT PX  pain control  bowel regimen   wound care as per ortho  GI PX  antiemetics prn  incentive spirometer  bp control  chol control  sugar control  cont pd meds

## 2019-01-02 NOTE — OCCUPATIONAL THERAPY INITIAL EVALUATION ADULT - ADDITIONAL COMMENTS
Pre op assessment- Patient lives in a apartment with no steps to enter. Once inside, the patients bedroom and bathroom is on that main level when entering. The patients bathroom has a tub/shower combination with a regular toilet. The patient reports owning a tub bench. The patient ambulates with no device and owns a straight cane and a narrow based quad cane. The patient is R handed, drives and wears glasses for reading. The patient daily pain is 10/10 and the patient takes advil, tylenol with codeine and tramadol for pain management

## 2019-01-02 NOTE — DISCHARGE NOTE ADULT - NS AS DC FOLLOWUP STROKE INST
Influenza vaccination (VIS Pub Date: August 7, 2015) Influenza vaccination (VIS Pub Date: 2015)/Take your medications exactly as prescribed. Having your pain under control will help increase activity, improve deep breathing and coughing and prevent complications like pneumonia and blood clots in your legs. Some of the common side effects of pain medications are nausea, vomiting, itching, rash and upset stomach. Contact your doctor if you develop these or any other unusual systoms. Eat a diet rich in fiber and drink plenty of oral fluids. Use other pain management methods like, cold and warm applications, elevation of affected body part, listening to music, watching TV, yoga, etc.Do not take any other medications unless approved by your doctor. Do not drive, operate machinery, drink alcohol, or make any important decisions while taking narcotic pain medications. Store medication in its original bottle, in a locked cabinet away from the reach of children. Dispose of any unused and  medication safely.

## 2019-01-02 NOTE — PHYSICAL THERAPY INITIAL EVALUATION ADULT - IMPAIRMENTS FOUND, PT EVAL
muscle strength/posture/aerobic capacity/endurance/gait, locomotion, and balance/ergonomics and body mechanics

## 2019-01-02 NOTE — PHYSICAL THERAPY INITIAL EVALUATION ADULT - LEVEL OF INDEPENDENCE: GAIT, REHAB EVAL
Your Diagnosis is: COPD exacerbation.    Return to the Emergency Department for worsening cough or shortness of breath, fever greater than 100.4 F, chest pain, feeling dizzy/light-headed, if symptoms worsen or for any other concerns.    Medications:  These are your new prescriptions: prednisone, zpak  These medicines that you take now have been changed: none  Please refer to the medication section for instructions on how to take them.    Major procedures performed during your ED visit: N/A    Additional instructions:    Use your albuterol nebulizer every 4 hours while awake over the next 24-48 hours.  After that, take your albuterol nebulizer every 4 hours just as needed.        supervision

## 2019-01-02 NOTE — DISCHARGE NOTE ADULT - HOSPITAL COURSE
73yFemale with history of Left Knee Pain presenting for Left TKA by Dr. Sanchez on 1/2/19. Risk and benefits of surgery were explained to the patient. The patient understood and agreed to proceed with surgery. Patient underwent the procedure with no intraoperative complications. Pt was brought in stable condition to the PACU. Once stable in PACU, pt was brought to the floor. During hospital stay pt was followed by Medicine, Pt had an uneventful hospital course. Pt is stable for discharge to Home with Home Care Services and PT

## 2019-01-02 NOTE — DISCHARGE NOTE ADULT - CARE PLAN
Principal Discharge DX:	Primary osteoarthritis of left knee  Goal:	Improve Function, Decrease Pain  Assessment and plan of treatment:	Keep INOCENCIO Dressing Clean, Dry and Intact. May shower with INOCENCIO Dressing. Please do not scrub, soak, peel or pick at the INOCENCIO dressing. No creams, lotions, or oils over dressing. May shower and let water run over dressing, no baths. Pat dry once out of shower. Dressing to be removed in 7 days. If dressing is saturated from border to border - may remove and replace with clean dry dressing.

## 2019-01-02 NOTE — DISCHARGE NOTE ADULT - PATIENT PORTAL LINK FT
You can access the [facility] Portal, offered by [facility], by registering with the following website: [email or web address]

## 2019-01-02 NOTE — DISCHARGE NOTE ADULT - PLAN OF CARE
Improve Function, Decrease Pain Keep INOCENCIO Dressing Clean, Dry and Intact. May shower with INOCENCIO Dressing. Please do not scrub, soak, peel or pick at the INOCENCIO dressing. No creams, lotions, or oils over dressing. May shower and let water run over dressing, no baths. Pat dry once out of shower. Dressing to be removed in 7 days. If dressing is saturated from border to border - may remove and replace with clean dry dressing.

## 2019-01-02 NOTE — BRIEF OPERATIVE NOTE - PROCEDURE
<<-----Click on this checkbox to enter Procedure Left total knee arthroplasty  01/02/2019    Active  LALA

## 2019-01-02 NOTE — PROGRESS NOTE ADULT - SUBJECTIVE AND OBJECTIVE BOX
POD#0 S/P Left TKA   73yFemale Patient seen and examined, Pain controlled  Patient Denies SOB, CP, N/V/D       PE: Left Knee/LE: Dressing C/D/I, Sensation/motor intact, DP 2+, FROM ankle/toes   B/L LE: Skin intact. +ROM hip/knee/ankle/toes. Ankle Dorsi/plantarflexion: 5/5. Calf: soft, compressible and nontender. DP/PT 2+ NVI                          14.8   9.24  )-----------( 178      ( 02 Jan 2019 12:32 )             46.8         A: As above   P: Pain Control       DVT Prophylaxis      Incentive spirometry      PT WBAT LLE      Isometric exercises      Discharge Planning      All the above discussed and understood by pt       Ortho to F/U

## 2019-01-03 VITALS
SYSTOLIC BLOOD PRESSURE: 124 MMHG | OXYGEN SATURATION: 97 % | DIASTOLIC BLOOD PRESSURE: 53 MMHG | HEART RATE: 70 BPM | RESPIRATION RATE: 16 BRPM

## 2019-01-03 LAB
ANION GAP SERPL CALC-SCNC: 9 MMOL/L — SIGNIFICANT CHANGE UP (ref 5–17)
BUN SERPL-MCNC: 17 MG/DL — SIGNIFICANT CHANGE UP (ref 7–23)
CALCIUM SERPL-MCNC: 8.4 MG/DL — LOW (ref 8.5–10.1)
CHLORIDE SERPL-SCNC: 108 MMOL/L — SIGNIFICANT CHANGE UP (ref 96–108)
CO2 SERPL-SCNC: 23 MMOL/L — SIGNIFICANT CHANGE UP (ref 22–31)
CREAT SERPL-MCNC: 0.64 MG/DL — SIGNIFICANT CHANGE UP (ref 0.5–1.3)
GLUCOSE BLDC GLUCOMTR-MCNC: 157 MG/DL — HIGH (ref 70–99)
GLUCOSE BLDC GLUCOMTR-MCNC: 159 MG/DL — HIGH (ref 70–99)
GLUCOSE SERPL-MCNC: 149 MG/DL — HIGH (ref 70–99)
HCT VFR BLD CALC: 37.4 % — SIGNIFICANT CHANGE UP (ref 34.5–45)
HGB BLD-MCNC: 12.1 G/DL — SIGNIFICANT CHANGE UP (ref 11.5–15.5)
MCHC RBC-ENTMCNC: 30 PG — SIGNIFICANT CHANGE UP (ref 27–34)
MCHC RBC-ENTMCNC: 32.4 GM/DL — SIGNIFICANT CHANGE UP (ref 32–36)
MCV RBC AUTO: 92.6 FL — SIGNIFICANT CHANGE UP (ref 80–100)
NRBC # BLD: 0 /100 WBCS — SIGNIFICANT CHANGE UP (ref 0–0)
PLATELET # BLD AUTO: 171 K/UL — SIGNIFICANT CHANGE UP (ref 150–400)
POTASSIUM SERPL-MCNC: 3.9 MMOL/L — SIGNIFICANT CHANGE UP (ref 3.5–5.3)
POTASSIUM SERPL-SCNC: 3.9 MMOL/L — SIGNIFICANT CHANGE UP (ref 3.5–5.3)
RBC # BLD: 4.04 M/UL — SIGNIFICANT CHANGE UP (ref 3.8–5.2)
RBC # FLD: 13.9 % — SIGNIFICANT CHANGE UP (ref 10.3–14.5)
SODIUM SERPL-SCNC: 140 MMOL/L — SIGNIFICANT CHANGE UP (ref 135–145)
WBC # BLD: 10.93 K/UL — HIGH (ref 3.8–10.5)
WBC # FLD AUTO: 10.93 K/UL — HIGH (ref 3.8–10.5)

## 2019-01-03 RX ORDER — ASPIRIN/CALCIUM CARB/MAGNESIUM 324 MG
1 TABLET ORAL
Qty: 0 | Refills: 0 | DISCHARGE
Start: 2019-01-03 | End: 2019-02-01

## 2019-01-03 RX ORDER — DOCUSATE SODIUM 100 MG
1 CAPSULE ORAL
Qty: 0 | Refills: 0 | DISCHARGE
Start: 2019-01-03

## 2019-01-03 RX ORDER — ASCORBIC ACID 60 MG
1 TABLET,CHEWABLE ORAL
Qty: 0 | Refills: 0 | DISCHARGE
Start: 2019-01-03

## 2019-01-03 RX ORDER — CELECOXIB 200 MG/1
1 CAPSULE ORAL
Qty: 60 | Refills: 0
Start: 2019-01-03 | End: 2019-02-01

## 2019-01-03 RX ORDER — ASPIRIN/CALCIUM CARB/MAGNESIUM 324 MG
1 TABLET ORAL
Qty: 60 | Refills: 0
Start: 2019-01-03 | End: 2019-02-01

## 2019-01-03 RX ORDER — ACETAMINOPHEN WITH CODEINE 300MG-30MG
1 TABLET ORAL
Qty: 42 | Refills: 0
Start: 2019-01-03 | End: 2019-01-09

## 2019-01-03 RX ORDER — CELECOXIB 200 MG/1
1 CAPSULE ORAL
Qty: 0 | Refills: 0 | DISCHARGE
Start: 2019-01-03 | End: 2019-02-01

## 2019-01-03 RX ADMIN — CELECOXIB 200 MILLIGRAM(S): 200 CAPSULE ORAL at 06:21

## 2019-01-03 RX ADMIN — SODIUM CHLORIDE 3 MILLILITER(S): 9 INJECTION INTRAMUSCULAR; INTRAVENOUS; SUBCUTANEOUS at 06:21

## 2019-01-03 RX ADMIN — Medication 1: at 11:09

## 2019-01-03 RX ADMIN — SODIUM CHLORIDE 100 MILLILITER(S): 9 INJECTION, SOLUTION INTRAVENOUS at 06:19

## 2019-01-03 RX ADMIN — CELECOXIB 200 MILLIGRAM(S): 200 CAPSULE ORAL at 07:23

## 2019-01-03 RX ADMIN — PRAMIPEXOLE DIHYDROCHLORIDE 0.12 MILLIGRAM(S): 0.12 TABLET ORAL at 06:19

## 2019-01-03 RX ADMIN — CARBIDOPA AND LEVODOPA 1 TABLET(S): 25; 100 TABLET ORAL at 06:19

## 2019-01-03 RX ADMIN — Medication 100 MILLIGRAM(S): at 01:55

## 2019-01-03 RX ADMIN — PANTOPRAZOLE SODIUM 40 MILLIGRAM(S): 20 TABLET, DELAYED RELEASE ORAL at 07:29

## 2019-01-03 RX ADMIN — Medication 500 MILLIGRAM(S): at 06:19

## 2019-01-03 RX ADMIN — Medication 12.5 MILLIGRAM(S): at 06:19

## 2019-01-03 RX ADMIN — Medication 1 TABLET(S): at 02:49

## 2019-01-03 RX ADMIN — Medication 100 MILLIGRAM(S): at 06:19

## 2019-01-03 RX ADMIN — Medication 1 TABLET(S): at 11:09

## 2019-01-03 RX ADMIN — POLYETHYLENE GLYCOL 3350 17 GRAM(S): 17 POWDER, FOR SOLUTION ORAL at 11:09

## 2019-01-03 RX ADMIN — LOSARTAN POTASSIUM 100 MILLIGRAM(S): 100 TABLET, FILM COATED ORAL at 06:19

## 2019-01-03 RX ADMIN — Medication 1 TABLET(S): at 08:45

## 2019-01-03 RX ADMIN — Medication 1 TABLET(S): at 03:49

## 2019-01-03 RX ADMIN — Medication 1 TABLET(S): at 07:46

## 2019-01-03 RX ADMIN — Medication 25 MILLIGRAM(S): at 06:19

## 2019-01-03 RX ADMIN — Medication 325 MILLIGRAM(S): at 06:18

## 2019-01-03 NOTE — PROGRESS NOTE ADULT - SUBJECTIVE AND OBJECTIVE BOX
POD#1 S/P Left TKA   73yFemale Patient seen and examined with Dr. Sanchez, Pain controlled  Patient Denies SOB, CP, N/V/D       PE: Left Knee/LE: Dressing C/D/I, Sensation/motor intact, DP 2+, FROM ankle/toes   B/L LE: Skin intact. +ROM hip/knee/ankle/toes. Ankle Dorsi/plantarflexion: 5/5. Calf: soft, compressible and nontender. DP/PT 2+ NVI                          12.1   10.93 )-----------( 171      ( 03 Jan 2019 07:29 )             37.4       01-03    140  |  108  |  17  ----------------------------<  149<H>  3.9   |  23  |  0.64    Ca    8.4<L>      03 Jan 2019 07:29          A: As above   P: Pain Control       DVT Prophylaxis      Incentive spirometry      PT WBAT LLE      Isometric exercises      Discharge Planning      All the above discussed and understood by pt       Ortho to F/U

## 2019-01-03 NOTE — PROGRESS NOTE ADULT - SUBJECTIVE AND OBJECTIVE BOX
CANDIDO IVERSON is a 73y Female s/p LEFT TOTAL KNEE REPLACEMENT      denies any chest pain shortness of breath palpitation dizziness lightheadedness nausea vomiting fever or chills    T(C): 36.8 (01-03-19 @ 07:17), Max: 36.8 (01-03-19 @ 07:17)  HR: 67 (01-03-19 @ 10:00) (56 - 74)  BP: 138/82 (01-03-19 @ 10:00) (116/68 - 151/93)  RR: 16 (01-03-19 @ 07:17) (13 - 17)  SpO2: 97% (01-03-19 @ 10:00) (94% - 100%)  no jvd/bruit  s1 s2 rrr  cta  s/nt/nd  no calf tend                          12.1   10.93 )-----------( 171      ( 03 Jan 2019 07:29 )             37.4   01-03    140  |  108  |  17  ----------------------------<  149<H>  3.9   |  23  |  0.64    Ca    8.4<L>      03 Jan 2019 07:29      cont dvt px  pain control  bowel regimen  antiemetics  incentive spirometer

## 2019-01-04 LAB — SURGICAL PATHOLOGY STUDY: SIGNIFICANT CHANGE UP

## 2019-01-17 DIAGNOSIS — E05.90 THYROTOXICOSIS, UNSPECIFIED WITHOUT THYROTOXIC CRISIS OR STORM: ICD-10-CM

## 2019-01-17 DIAGNOSIS — M17.12 UNILATERAL PRIMARY OSTEOARTHRITIS, LEFT KNEE: ICD-10-CM

## 2019-01-17 DIAGNOSIS — I10 ESSENTIAL (PRIMARY) HYPERTENSION: ICD-10-CM

## 2019-01-17 DIAGNOSIS — G20 PARKINSON'S DISEASE: ICD-10-CM

## 2019-01-17 DIAGNOSIS — Z90.49 ACQUIRED ABSENCE OF OTHER SPECIFIED PARTS OF DIGESTIVE TRACT: ICD-10-CM

## 2019-01-17 DIAGNOSIS — M48.00 SPINAL STENOSIS, SITE UNSPECIFIED: ICD-10-CM

## 2019-01-17 DIAGNOSIS — K21.9 GASTRO-ESOPHAGEAL REFLUX DISEASE WITHOUT ESOPHAGITIS: ICD-10-CM

## 2019-01-17 DIAGNOSIS — E78.5 HYPERLIPIDEMIA, UNSPECIFIED: ICD-10-CM

## 2019-01-28 PROBLEM — E05.90 THYROTOXICOSIS, UNSPECIFIED WITHOUT THYROTOXIC CRISIS OR STORM: Chronic | Status: ACTIVE | Noted: 2018-12-20

## 2019-01-28 PROBLEM — K21.9 GASTRO-ESOPHAGEAL REFLUX DISEASE WITHOUT ESOPHAGITIS: Chronic | Status: ACTIVE | Noted: 2018-12-20

## 2019-01-28 PROBLEM — E78.5 HYPERLIPIDEMIA, UNSPECIFIED: Chronic | Status: ACTIVE | Noted: 2018-12-20

## 2019-01-28 PROBLEM — G20 PARKINSON'S DISEASE: Chronic | Status: ACTIVE | Noted: 2018-12-20

## 2019-01-28 PROBLEM — I10 ESSENTIAL (PRIMARY) HYPERTENSION: Chronic | Status: ACTIVE | Noted: 2018-12-20

## 2019-01-28 PROBLEM — I34.1 NONRHEUMATIC MITRAL (VALVE) PROLAPSE: Chronic | Status: ACTIVE | Noted: 2018-12-21

## 2019-01-28 PROBLEM — M48.00 SPINAL STENOSIS, SITE UNSPECIFIED: Chronic | Status: ACTIVE | Noted: 2018-12-20

## 2019-01-31 ENCOUNTER — OUTPATIENT (OUTPATIENT)
Dept: OUTPATIENT SERVICES | Facility: HOSPITAL | Age: 74
LOS: 1 days | Discharge: HOME | End: 2019-01-31

## 2019-01-31 VITALS
RESPIRATION RATE: 18 BRPM | HEIGHT: 62 IN | HEART RATE: 79 BPM | SYSTOLIC BLOOD PRESSURE: 111 MMHG | OXYGEN SATURATION: 97 % | DIASTOLIC BLOOD PRESSURE: 67 MMHG | WEIGHT: 148.15 LBS | TEMPERATURE: 98 F

## 2019-01-31 DIAGNOSIS — E05.90 THYROTOXICOSIS, UNSPECIFIED WITHOUT THYROTOXIC CRISIS OR STORM: ICD-10-CM

## 2019-01-31 DIAGNOSIS — Z98.82 BREAST IMPLANT STATUS: Chronic | ICD-10-CM

## 2019-01-31 DIAGNOSIS — Z01.818 ENCOUNTER FOR OTHER PREPROCEDURAL EXAMINATION: ICD-10-CM

## 2019-01-31 DIAGNOSIS — Z98.890 OTHER SPECIFIED POSTPROCEDURAL STATES: Chronic | ICD-10-CM

## 2019-01-31 DIAGNOSIS — Z96.659 PRESENCE OF UNSPECIFIED ARTIFICIAL KNEE JOINT: Chronic | ICD-10-CM

## 2019-01-31 DIAGNOSIS — E78.5 HYPERLIPIDEMIA, UNSPECIFIED: ICD-10-CM

## 2019-01-31 DIAGNOSIS — M17.11 UNILATERAL PRIMARY OSTEOARTHRITIS, RIGHT KNEE: ICD-10-CM

## 2019-01-31 DIAGNOSIS — I10 ESSENTIAL (PRIMARY) HYPERTENSION: ICD-10-CM

## 2019-01-31 DIAGNOSIS — G20 PARKINSON'S DISEASE: ICD-10-CM

## 2019-01-31 DIAGNOSIS — Z01.812 ENCOUNTER FOR PREPROCEDURAL LABORATORY EXAMINATION: ICD-10-CM

## 2019-01-31 LAB
ANION GAP SERPL CALC-SCNC: 13 MMOL/L — SIGNIFICANT CHANGE UP (ref 5–17)
APTT BLD: 29.6 SEC — SIGNIFICANT CHANGE UP (ref 27.5–36.3)
BASOPHILS # BLD AUTO: 0.02 K/UL — SIGNIFICANT CHANGE UP (ref 0–0.2)
BASOPHILS NFR BLD AUTO: 0.3 % — SIGNIFICANT CHANGE UP (ref 0–2)
BLD GP AB SCN SERPL QL: SIGNIFICANT CHANGE UP
BUN SERPL-MCNC: 22 MG/DL — SIGNIFICANT CHANGE UP (ref 7–23)
CALCIUM SERPL-MCNC: 9.8 MG/DL — SIGNIFICANT CHANGE UP (ref 8.5–10.1)
CHLORIDE SERPL-SCNC: 103 MMOL/L — SIGNIFICANT CHANGE UP (ref 96–108)
CO2 SERPL-SCNC: 26 MMOL/L — SIGNIFICANT CHANGE UP (ref 22–31)
CREAT SERPL-MCNC: 0.8 MG/DL — SIGNIFICANT CHANGE UP (ref 0.5–1.3)
EOSINOPHIL # BLD AUTO: 0.04 K/UL — SIGNIFICANT CHANGE UP (ref 0–0.5)
EOSINOPHIL NFR BLD AUTO: 0.6 % — SIGNIFICANT CHANGE UP (ref 0–6)
GLUCOSE SERPL-MCNC: 83 MG/DL — SIGNIFICANT CHANGE UP (ref 70–99)
HBA1C BLD-MCNC: 6.5 % — HIGH (ref 4–5.6)
HCT VFR BLD CALC: 43.6 % — SIGNIFICANT CHANGE UP (ref 34.5–45)
HGB BLD-MCNC: 13.9 G/DL — SIGNIFICANT CHANGE UP (ref 11.5–15.5)
IMM GRANULOCYTES NFR BLD AUTO: 0.3 % — SIGNIFICANT CHANGE UP (ref 0–1.5)
INR BLD: 1.03 RATIO — SIGNIFICANT CHANGE UP (ref 0.88–1.16)
LYMPHOCYTES # BLD AUTO: 1.88 K/UL — SIGNIFICANT CHANGE UP (ref 1–3.3)
LYMPHOCYTES # BLD AUTO: 29.4 % — SIGNIFICANT CHANGE UP (ref 13–44)
MCHC RBC-ENTMCNC: 29.9 PG — SIGNIFICANT CHANGE UP (ref 27–34)
MCHC RBC-ENTMCNC: 31.9 GM/DL — LOW (ref 32–36)
MCV RBC AUTO: 93.8 FL — SIGNIFICANT CHANGE UP (ref 80–100)
MONOCYTES # BLD AUTO: 0.56 K/UL — SIGNIFICANT CHANGE UP (ref 0–0.9)
MONOCYTES NFR BLD AUTO: 8.8 % — SIGNIFICANT CHANGE UP (ref 2–14)
NEUTROPHILS # BLD AUTO: 3.88 K/UL — SIGNIFICANT CHANGE UP (ref 1.8–7.4)
NEUTROPHILS NFR BLD AUTO: 60.6 % — SIGNIFICANT CHANGE UP (ref 43–77)
NRBC # BLD: 0 /100 WBCS — SIGNIFICANT CHANGE UP (ref 0–0)
PLATELET # BLD AUTO: 224 K/UL — SIGNIFICANT CHANGE UP (ref 150–400)
POTASSIUM SERPL-MCNC: 3.3 MMOL/L — LOW (ref 3.5–5.3)
POTASSIUM SERPL-SCNC: 3.3 MMOL/L — LOW (ref 3.5–5.3)
PROTHROM AB SERPL-ACNC: 11.5 SEC — SIGNIFICANT CHANGE UP (ref 10–12.9)
RBC # BLD: 4.65 M/UL — SIGNIFICANT CHANGE UP (ref 3.8–5.2)
RBC # FLD: 14.3 % — SIGNIFICANT CHANGE UP (ref 10.3–14.5)
SODIUM SERPL-SCNC: 142 MMOL/L — SIGNIFICANT CHANGE UP (ref 135–145)
WBC # BLD: 6.4 K/UL — SIGNIFICANT CHANGE UP (ref 3.8–10.5)
WBC # FLD AUTO: 6.4 K/UL — SIGNIFICANT CHANGE UP (ref 3.8–10.5)

## 2019-01-31 RX ORDER — SODIUM CHLORIDE 9 MG/ML
3 INJECTION INTRAMUSCULAR; INTRAVENOUS; SUBCUTANEOUS EVERY 8 HOURS
Qty: 0 | Refills: 0 | Status: DISCONTINUED | OUTPATIENT
Start: 2019-02-13 | End: 2019-02-13

## 2019-01-31 NOTE — H&P PST ADULT - HISTORY OF PRESENT ILLNESS
73F pmh htn, hl, hyperthyroid, parkinson's disease, spinal stenosis c/o right knee pain 2/2 primary bilateral osteoarthritis of knee s/p left total knee replacement 12/2018 here today for PST for scheduled right total knee replacement 73F pmh htn, DM, hl, hyperthyroid, parkinson's disease, spinal stenosis c/o right knee pain 2/2 primary bilateral osteoarthritis of knee s/p left total knee replacement 12/2018 here today for PST for scheduled right total knee replacement

## 2019-01-31 NOTE — H&P PST ADULT - PSH
History of bowel resection  2012  S/P breast augmentation  2014 saline implants  S/P foot surgery, left  5/2/2018  S/P hernia repair  2013 H/O abdominal surgery  1999  History of bowel resection  2012  S/P breast augmentation  2014 saline implants  S/P foot surgery, left  5/2/2018  S/P hernia repair  2013  S/P knee replacement  Left ( 1/2/2019 )

## 2019-01-31 NOTE — H&P PST ADULT - PMH
GERD (gastroesophageal reflux disease)    HTN (hypertension)    Hyperlipidemia    Hyperthyroidism  monitored  by endocrinologist  MVP (mitral valve prolapse)  echo 2018  Parkinson disease    Spinal stenosis  pain relieved with steroid injection

## 2019-01-31 NOTE — PHYSICAL THERAPY INITIAL EVALUATION ADULT - CRITERIA FOR SKILLED THERAPEUTIC INTERVENTIONS
functional limitations in following categories/rehab potential/risk reduction/prevention/:CH,:CH,:CH/impairments found

## 2019-01-31 NOTE — H&P PST ADULT - ASSESSMENT
CAPRINI SCORE    AGE RELATED RISK FACTORS                                                       MOBILITY RELATED FACTORS  [ ] Age 41-60 years                                            (1 Point)                  [ ] Bed rest                                                        (1 Point)  [ ] Age: 61-74 years                                           (2 Points)                [ ] Plaster cast                                                   (2 Points)  [ ] Age= 75 years                                              (3 Points)                 [ ] Bed bound for more than 72 hours                   (2 Points)    DISEASE RELATED RISK FACTORS                                               GENDER SPECIFIC FACTORS  [ ] Edema in the lower extremities                       (1 Point)                  [ ] Pregnancy                                                     (1 Point)  [ ] Varicose veins                                               (1 Point)                  [ ] Post-partum < 6 weeks                                   (1 Point)             [ ] BMI > 25 Kg/m2                                            (1 Point)                  [ ] Hormonal therapy  or oral contraception            (1 Point)                 [ ] Sepsis (in the previous month)                        (1 Point)                  [ ] History of pregnancy complications  [ ] Pneumonia or serious lung disease                                               [ ] Unexplained or recurrent                       (1 Point)           (in the previous month)                               (1 Point)  [ ] Abnormal pulmonary function test                     (1 Point)                 SURGERY RELATED RISK FACTORS  [ ] Acute myocardial infarction                              (1 Point)                 [ ]  Section                                            (1 Point)  [ ] Congestive heart failure (in the previous month)  (1 Point)                 [ ] Minor surgery                                                 (1 Point)   [ ] Inflammatory bowel disease                             (1 Point)                 [ ] Arthroscopic surgery                                        (2 Points)  [ ] Central venous access                                    (2 Points)                [ ] General surgery lasting more than 45 minutes   (2 Points)       [ ] Stroke (in the previous month)                          (5 Points)               [ ] Elective arthroplasty                                        (5 Points)                                                                                                                                               HEMATOLOGY RELATED FACTORS                                                 TRAUMA RELATED RISK FACTORS  [ ] Prior episodes of VTE                                     (3 Points)                 [ ] Fracture of the hip, pelvis, or leg                       (5 Points)  [ ] Positive family history for VTE                         (3 Points)                 [ ] Acute spinal cord injury (in the previous month)  (5 Points)  [ ] Prothrombin 26903 A                                      (3 Points)                 [ ] Paralysis  (less than 1 month)                          (5 Points)  [ ] Factor V Leiden                                             (3 Points)                 [ ] Multiple Trauma within 1 month                         (5 Points)  [ ] Lupus anticoagulants                                     (3 Points)                                                           [ ] Anticardiolipin antibodies                                (3 Points)                                                       [ ] High homocysteine in the blood                      (3 Points)                                             [ ] Other congenital or acquired thrombophilia       (3 Points)                                                [ ] Heparin induced thrombocytopenia                  (3 Points)                                          Total Score [          ] 73F pmh htn, DM, hl, hyperthyroid, parkinson's disease, spinal stenosis c/o right knee pain 2/2 primary bilateral osteoarthritis of knee s/p left total knee replacement 2018 here today for PST for scheduled right total knee replacement  CAPRINI SCORE    AGE RELATED RISK FACTORS                                                       MOBILITY RELATED FACTORS  [ ] Age 41-60 years                                            (1 Point)                  [ ] Bed rest                                                        (1 Point)  [ x] Age: 61-74 years                                           (2 Points)                [ ] Plaster cast                                                   (2 Points)  [ ] Age= 75 years                                              (3 Points)                 [ ] Bed bound for more than 72 hours                   (2 Points)    DISEASE RELATED RISK FACTORS                                               GENDER SPECIFIC FACTORS  [x ] Edema in the lower extremities                       (1 Point)                  [ ] Pregnancy                                                     (1 Point)  [ ] Varicose veins                                               (1 Point)                  [ ] Post-partum < 6 weeks                                   (1 Point)             [x ] BMI > 25 Kg/m2                                            (1 Point)                  [ ] Hormonal therapy  or oral contraception            (1 Point)                 [ ] Sepsis (in the previous month)                        (1 Point)                  [ ] History of pregnancy complications  [ ] Pneumonia or serious lung disease                                               [ ] Unexplained or recurrent                       (1 Point)           (in the previous month)                               (1 Point)  [ ] Abnormal pulmonary function test                     (1 Point)                 SURGERY RELATED RISK FACTORS  [ ] Acute myocardial infarction                              (1 Point)                 [ ]  Section                                            (1 Point)  [ ] Congestive heart failure (in the previous month)  (1 Point)                 [ ] Minor surgery                                                 (1 Point)   [ ] Inflammatory bowel disease                             (1 Point)                 [ ] Arthroscopic surgery                                        (2 Points)  [ ] Central venous access                                    (2 Points)                [ ] General surgery lasting more than 45 minutes   (2 Points)       [ ] Stroke (in the previous month)                          (5 Points)               [x] Elective arthroplasty                                        (5 Points)                                                                                                                                               HEMATOLOGY RELATED FACTORS                                                 TRAUMA RELATED RISK FACTORS  [ ] Prior episodes of VTE                                     (3 Points)                 [ ] Fracture of the hip, pelvis, or leg                       (5 Points)  [ ] Positive family history for VTE                         (3 Points)                 [ ] Acute spinal cord injury (in the previous month)  (5 Points)  [ ] Prothrombin 57400 A                                      (3 Points)                 [ ] Paralysis  (less than 1 month)                          (5 Points)  [ ] Factor V Leiden                                             (3 Points)                 [ ] Multiple Trauma within 1 month                         (5 Points)  [ ] Lupus anticoagulants                                     (3 Points)                                                           [ ] Anticardiolipin antibodies                                (3 Points)                                                       [ ] High homocysteine in the blood                      (3 Points)                                             [ ] Other congenital or acquired thrombophilia       (3 Points)                                                [ ] Heparin induced thrombocytopenia                  (3 Points)                                          Total Score [      9    ]

## 2019-01-31 NOTE — H&P PST ADULT - PROBLEM SELECTOR PLAN 1
right total knee replacement  Pre-op instructions given by RN, patient verbalized understanding  Chlorhexidine wash instructions given   Pending: Medical Clearance  Pending: Cardiac Clearance

## 2019-01-31 NOTE — OCCUPATIONAL THERAPY INITIAL EVALUATION ADULT - ADDITIONAL COMMENTS
Patient had L TKR done on January 2nd, 2019 at [facility]. As per patient and previous OT evaluation, Patient lives in a apartment with no steps to enter. Once inside, the patients bedroom and bathroom is on that main level when entering. The patients bathroom has a tub/shower combination with a regular toilet. The patient reports owning a tub bench and a raised toilet seat with arms. The patient ambulates with a quad cane and owns a straight cane and rolling walker. The patient is R handed, drives and wears glasses for reading. The patient daily pain in R knee is 0/10 and 5/10 in L knee.

## 2019-01-31 NOTE — PHYSICAL THERAPY INITIAL EVALUATION ADULT - MODIFIED CLINICAL TEST OF SENSORY INTEGRATION IN BALANCE TEST
5x Sit to Stand Test = unable, indicating significant impairment c functional mobility & strength  ; 2 Minute Walk Test = 316 feet without devices or rest stops, Pain rating 5/10, measured for baseline recording

## 2019-01-31 NOTE — PHYSICAL THERAPY INITIAL EVALUATION ADULT - GENERAL OBSERVATIONS, REHAB EVAL
Patient encountered sitting in [facility] waiting area, agreeable to PT pre-op evaluation. Patient is for elective right total knee arthroplasty at a later date from now.

## 2019-01-31 NOTE — PHYSICAL THERAPY INITIAL EVALUATION ADULT - PLANNED THERAPY INTERVENTIONS, PT EVAL
gait training/strengthening/joint mobilization/postural re-education/ROM/stretching/transfer training/balance training

## 2019-01-31 NOTE — OCCUPATIONAL THERAPY INITIAL EVALUATION ADULT - FINE MOTOR COORDINATION, FINE MOTOR COORDINATION TESTS, OT EVAL
Patient-specific activity scoring scheme (Point to one number): 0 -------5-------- 10 (0) =Unable to perform activity (10) -- Able to perform activity at the same level as before injury or problem. Activity: Walking___6_____

## 2019-02-01 LAB
MRSA PCR RESULT.: SIGNIFICANT CHANGE UP
S AUREUS DNA NOSE QL NAA+PROBE: DETECTED

## 2019-02-01 RX ORDER — MUPIROCIN 20 MG/G
1 OINTMENT TOPICAL
Qty: 22 | Refills: 0
Start: 2019-02-01

## 2019-02-12 ENCOUNTER — TRANSCRIPTION ENCOUNTER (OUTPATIENT)
Age: 74
End: 2019-02-12

## 2019-02-12 RX ORDER — HYDROCHLOROTHIAZIDE 25 MG
12.5 TABLET ORAL DAILY
Qty: 0 | Refills: 0 | Status: DISCONTINUED | OUTPATIENT
Start: 2019-02-13 | End: 2019-02-14

## 2019-02-12 RX ORDER — ASCORBIC ACID 60 MG
500 TABLET,CHEWABLE ORAL
Qty: 0 | Refills: 0 | Status: DISCONTINUED | OUTPATIENT
Start: 2019-02-13 | End: 2019-02-14

## 2019-02-12 RX ORDER — INSULIN LISPRO 100/ML
VIAL (ML) SUBCUTANEOUS
Qty: 0 | Refills: 0 | Status: DISCONTINUED | OUTPATIENT
Start: 2019-02-13 | End: 2019-02-14

## 2019-02-12 RX ORDER — DEXTROSE 50 % IN WATER 50 %
25 SYRINGE (ML) INTRAVENOUS ONCE
Qty: 0 | Refills: 0 | Status: DISCONTINUED | OUTPATIENT
Start: 2019-02-13 | End: 2019-02-14

## 2019-02-12 RX ORDER — ATORVASTATIN CALCIUM 80 MG/1
40 TABLET, FILM COATED ORAL AT BEDTIME
Qty: 0 | Refills: 0 | Status: DISCONTINUED | OUTPATIENT
Start: 2019-02-13 | End: 2019-02-14

## 2019-02-12 RX ORDER — METOPROLOL TARTRATE 50 MG
25 TABLET ORAL DAILY
Qty: 0 | Refills: 0 | Status: DISCONTINUED | OUTPATIENT
Start: 2019-02-13 | End: 2019-02-14

## 2019-02-12 RX ORDER — SODIUM CHLORIDE 9 MG/ML
1000 INJECTION, SOLUTION INTRAVENOUS
Qty: 0 | Refills: 0 | Status: DISCONTINUED | OUTPATIENT
Start: 2019-02-13 | End: 2019-02-14

## 2019-02-12 RX ORDER — PANTOPRAZOLE SODIUM 20 MG/1
40 TABLET, DELAYED RELEASE ORAL
Qty: 0 | Refills: 0 | Status: DISCONTINUED | OUTPATIENT
Start: 2019-02-13 | End: 2019-02-14

## 2019-02-12 RX ORDER — DEXTROSE 50 % IN WATER 50 %
15 SYRINGE (ML) INTRAVENOUS ONCE
Qty: 0 | Refills: 0 | Status: DISCONTINUED | OUTPATIENT
Start: 2019-02-13 | End: 2019-02-14

## 2019-02-12 RX ORDER — PRAMIPEXOLE DIHYDROCHLORIDE 0.12 MG/1
0.12 TABLET ORAL THREE TIMES A DAY
Qty: 0 | Refills: 0 | Status: DISCONTINUED | OUTPATIENT
Start: 2019-02-13 | End: 2019-02-14

## 2019-02-12 RX ORDER — CELECOXIB 200 MG/1
200 CAPSULE ORAL
Qty: 0 | Refills: 0 | Status: DISCONTINUED | OUTPATIENT
Start: 2019-02-14 | End: 2019-02-14

## 2019-02-12 RX ORDER — MAGNESIUM HYDROXIDE 400 MG/1
30 TABLET, CHEWABLE ORAL DAILY
Qty: 0 | Refills: 0 | Status: DISCONTINUED | OUTPATIENT
Start: 2019-02-13 | End: 2019-02-14

## 2019-02-12 RX ORDER — ONDANSETRON 8 MG/1
4 TABLET, FILM COATED ORAL EVERY 6 HOURS
Qty: 0 | Refills: 0 | Status: DISCONTINUED | OUTPATIENT
Start: 2019-02-13 | End: 2019-02-14

## 2019-02-12 RX ORDER — LOSARTAN POTASSIUM 100 MG/1
100 TABLET, FILM COATED ORAL DAILY
Qty: 0 | Refills: 0 | Status: DISCONTINUED | OUTPATIENT
Start: 2019-02-13 | End: 2019-02-14

## 2019-02-12 RX ORDER — SENNA PLUS 8.6 MG/1
2 TABLET ORAL AT BEDTIME
Qty: 0 | Refills: 0 | Status: DISCONTINUED | OUTPATIENT
Start: 2019-02-13 | End: 2019-02-14

## 2019-02-12 RX ORDER — ASPIRIN/CALCIUM CARB/MAGNESIUM 324 MG
325 TABLET ORAL
Qty: 0 | Refills: 0 | Status: DISCONTINUED | OUTPATIENT
Start: 2019-02-14 | End: 2019-02-14

## 2019-02-12 RX ORDER — INSULIN LISPRO 100/ML
VIAL (ML) SUBCUTANEOUS AT BEDTIME
Qty: 0 | Refills: 0 | Status: DISCONTINUED | OUTPATIENT
Start: 2019-02-13 | End: 2019-02-14

## 2019-02-12 RX ORDER — ACETAMINOPHEN 500 MG
650 TABLET ORAL EVERY 6 HOURS
Qty: 0 | Refills: 0 | Status: DISCONTINUED | OUTPATIENT
Start: 2019-02-13 | End: 2019-02-14

## 2019-02-12 RX ORDER — CARBIDOPA AND LEVODOPA 25; 100 MG/1; MG/1
1 TABLET ORAL THREE TIMES A DAY
Qty: 0 | Refills: 0 | Status: DISCONTINUED | OUTPATIENT
Start: 2019-02-13 | End: 2019-02-14

## 2019-02-12 RX ORDER — DEXTROSE 50 % IN WATER 50 %
12.5 SYRINGE (ML) INTRAVENOUS ONCE
Qty: 0 | Refills: 0 | Status: DISCONTINUED | OUTPATIENT
Start: 2019-02-13 | End: 2019-02-14

## 2019-02-12 RX ORDER — POLYETHYLENE GLYCOL 3350 17 G/17G
17 POWDER, FOR SOLUTION ORAL DAILY
Qty: 0 | Refills: 0 | Status: DISCONTINUED | OUTPATIENT
Start: 2019-02-13 | End: 2019-02-14

## 2019-02-12 RX ORDER — GLUCAGON INJECTION, SOLUTION 0.5 MG/.1ML
1 INJECTION, SOLUTION SUBCUTANEOUS ONCE
Qty: 0 | Refills: 0 | Status: DISCONTINUED | OUTPATIENT
Start: 2019-02-13 | End: 2019-02-14

## 2019-02-12 RX ORDER — DOCUSATE SODIUM 100 MG
100 CAPSULE ORAL THREE TIMES A DAY
Qty: 0 | Refills: 0 | Status: DISCONTINUED | OUTPATIENT
Start: 2019-02-13 | End: 2019-02-14

## 2019-02-13 ENCOUNTER — TRANSCRIPTION ENCOUNTER (OUTPATIENT)
Age: 74
End: 2019-02-13

## 2019-02-13 ENCOUNTER — INPATIENT (INPATIENT)
Facility: HOSPITAL | Age: 74
LOS: 0 days | Discharge: HOME HEALTH | End: 2019-02-14
Attending: ORTHOPAEDIC SURGERY | Admitting: ORTHOPAEDIC SURGERY
Payer: MEDICARE

## 2019-02-13 ENCOUNTER — RESULT REVIEW (OUTPATIENT)
Age: 74
End: 2019-02-13

## 2019-02-13 VITALS
RESPIRATION RATE: 18 BRPM | WEIGHT: 149.91 LBS | HEIGHT: 62 IN | SYSTOLIC BLOOD PRESSURE: 103 MMHG | HEART RATE: 78 BPM | DIASTOLIC BLOOD PRESSURE: 63 MMHG | OXYGEN SATURATION: 98 % | TEMPERATURE: 99 F

## 2019-02-13 DIAGNOSIS — Z96.659 PRESENCE OF UNSPECIFIED ARTIFICIAL KNEE JOINT: Chronic | ICD-10-CM

## 2019-02-13 DIAGNOSIS — Z98.890 OTHER SPECIFIED POSTPROCEDURAL STATES: Chronic | ICD-10-CM

## 2019-02-13 DIAGNOSIS — Z98.82 BREAST IMPLANT STATUS: Chronic | ICD-10-CM

## 2019-02-13 LAB
BASOPHILS # BLD AUTO: 0.02 K/UL — SIGNIFICANT CHANGE UP (ref 0–0.2)
BASOPHILS NFR BLD AUTO: 0.3 % — SIGNIFICANT CHANGE UP (ref 0–2)
EOSINOPHIL # BLD AUTO: 0.02 K/UL — SIGNIFICANT CHANGE UP (ref 0–0.5)
EOSINOPHIL NFR BLD AUTO: 0.3 % — SIGNIFICANT CHANGE UP (ref 0–6)
GLUCOSE BLDC GLUCOMTR-MCNC: 130 MG/DL — HIGH (ref 70–99)
GLUCOSE BLDC GLUCOMTR-MCNC: 239 MG/DL — HIGH (ref 70–99)
GLUCOSE BLDC GLUCOMTR-MCNC: 264 MG/DL — HIGH (ref 70–99)
GLUCOSE BLDC GLUCOMTR-MCNC: 278 MG/DL — HIGH (ref 70–99)
HCT VFR BLD CALC: 38.2 % — SIGNIFICANT CHANGE UP (ref 34.5–45)
HGB BLD-MCNC: 11.9 G/DL — SIGNIFICANT CHANGE UP (ref 11.5–15.5)
IMM GRANULOCYTES NFR BLD AUTO: 0.4 % — SIGNIFICANT CHANGE UP (ref 0–1.5)
LYMPHOCYTES # BLD AUTO: 1.16 K/UL — SIGNIFICANT CHANGE UP (ref 1–3.3)
LYMPHOCYTES # BLD AUTO: 15.8 % — SIGNIFICANT CHANGE UP (ref 13–44)
MCHC RBC-ENTMCNC: 30.1 PG — SIGNIFICANT CHANGE UP (ref 27–34)
MCHC RBC-ENTMCNC: 31.2 GM/DL — LOW (ref 32–36)
MCV RBC AUTO: 96.5 FL — SIGNIFICANT CHANGE UP (ref 80–100)
MONOCYTES # BLD AUTO: 0.39 K/UL — SIGNIFICANT CHANGE UP (ref 0–0.9)
MONOCYTES NFR BLD AUTO: 5.3 % — SIGNIFICANT CHANGE UP (ref 2–14)
NEUTROPHILS # BLD AUTO: 5.73 K/UL — SIGNIFICANT CHANGE UP (ref 1.8–7.4)
NEUTROPHILS NFR BLD AUTO: 77.9 % — HIGH (ref 43–77)
NRBC # BLD: 0 /100 WBCS — SIGNIFICANT CHANGE UP (ref 0–0)
PLATELET # BLD AUTO: 167 K/UL — SIGNIFICANT CHANGE UP (ref 150–400)
RBC # BLD: 3.96 M/UL — SIGNIFICANT CHANGE UP (ref 3.8–5.2)
RBC # FLD: 14.3 % — SIGNIFICANT CHANGE UP (ref 10.3–14.5)
WBC # BLD: 7.35 K/UL — SIGNIFICANT CHANGE UP (ref 3.8–10.5)
WBC # FLD AUTO: 7.35 K/UL — SIGNIFICANT CHANGE UP (ref 3.8–10.5)

## 2019-02-13 PROCEDURE — 73560 X-RAY EXAM OF KNEE 1 OR 2: CPT | Mod: 26,RT

## 2019-02-13 PROCEDURE — 88305 TISSUE EXAM BY PATHOLOGIST: CPT | Mod: 26

## 2019-02-13 PROCEDURE — 88311 DECALCIFY TISSUE: CPT | Mod: 26

## 2019-02-13 RX ORDER — HYDROMORPHONE HYDROCHLORIDE 2 MG/ML
2 INJECTION INTRAMUSCULAR; INTRAVENOUS; SUBCUTANEOUS
Qty: 0 | Refills: 0 | Status: DISCONTINUED | OUTPATIENT
Start: 2019-02-13 | End: 2019-02-14

## 2019-02-13 RX ORDER — ACETAMINOPHEN 500 MG
650 TABLET ORAL ONCE
Qty: 0 | Refills: 0 | Status: COMPLETED | OUTPATIENT
Start: 2019-02-13 | End: 2019-02-13

## 2019-02-13 RX ORDER — CEFAZOLIN SODIUM 1 G
2000 VIAL (EA) INJECTION EVERY 8 HOURS
Qty: 0 | Refills: 0 | Status: COMPLETED | OUTPATIENT
Start: 2019-02-13 | End: 2019-02-13

## 2019-02-13 RX ORDER — TRANEXAMIC ACID 100 MG/ML
1000 INJECTION, SOLUTION INTRAVENOUS ONCE
Qty: 0 | Refills: 0 | Status: COMPLETED | OUTPATIENT
Start: 2019-02-13 | End: 2019-02-13

## 2019-02-13 RX ORDER — CELECOXIB 200 MG/1
200 CAPSULE ORAL ONCE
Qty: 0 | Refills: 0 | Status: COMPLETED | OUTPATIENT
Start: 2019-02-13 | End: 2019-02-13

## 2019-02-13 RX ORDER — TRAMADOL HYDROCHLORIDE 50 MG/1
50 TABLET ORAL EVERY 4 HOURS
Qty: 0 | Refills: 0 | Status: DISCONTINUED | OUTPATIENT
Start: 2019-02-13 | End: 2019-02-13

## 2019-02-13 RX ADMIN — CELECOXIB 200 MILLIGRAM(S): 200 CAPSULE ORAL at 07:22

## 2019-02-13 RX ADMIN — HYDROMORPHONE HYDROCHLORIDE 2 MILLIGRAM(S): 2 INJECTION INTRAMUSCULAR; INTRAVENOUS; SUBCUTANEOUS at 19:01

## 2019-02-13 RX ADMIN — HYDROMORPHONE HYDROCHLORIDE 2 MILLIGRAM(S): 2 INJECTION INTRAMUSCULAR; INTRAVENOUS; SUBCUTANEOUS at 23:33

## 2019-02-13 RX ADMIN — Medication 100 MILLIGRAM(S): at 14:05

## 2019-02-13 RX ADMIN — Medication 1: at 21:19

## 2019-02-13 RX ADMIN — PRAMIPEXOLE DIHYDROCHLORIDE 0.12 MILLIGRAM(S): 0.12 TABLET ORAL at 16:08

## 2019-02-13 RX ADMIN — CARBIDOPA AND LEVODOPA 1 TABLET(S): 25; 100 TABLET ORAL at 21:04

## 2019-02-13 RX ADMIN — Medication 100 MILLIGRAM(S): at 23:54

## 2019-02-13 RX ADMIN — Medication 100 MILLIGRAM(S): at 16:11

## 2019-02-13 RX ADMIN — CARBIDOPA AND LEVODOPA 1 TABLET(S): 25; 100 TABLET ORAL at 16:07

## 2019-02-13 RX ADMIN — TRAMADOL HYDROCHLORIDE 50 MILLIGRAM(S): 50 TABLET ORAL at 14:04

## 2019-02-13 RX ADMIN — SODIUM CHLORIDE 3 MILLILITER(S): 9 INJECTION INTRAMUSCULAR; INTRAVENOUS; SUBCUTANEOUS at 07:04

## 2019-02-13 RX ADMIN — Medication 500 MILLIGRAM(S): at 19:01

## 2019-02-13 RX ADMIN — PRAMIPEXOLE DIHYDROCHLORIDE 0.12 MILLIGRAM(S): 0.12 TABLET ORAL at 21:03

## 2019-02-13 RX ADMIN — HYDROMORPHONE HYDROCHLORIDE 2 MILLIGRAM(S): 2 INJECTION INTRAMUSCULAR; INTRAVENOUS; SUBCUTANEOUS at 19:54

## 2019-02-13 RX ADMIN — Medication 3: at 15:58

## 2019-02-13 RX ADMIN — TRANEXAMIC ACID 220 MILLIGRAM(S): 100 INJECTION, SOLUTION INTRAVENOUS at 10:58

## 2019-02-13 RX ADMIN — Medication 100 MILLIGRAM(S): at 21:04

## 2019-02-13 RX ADMIN — Medication 650 MILLIGRAM(S): at 07:22

## 2019-02-13 RX ADMIN — SODIUM CHLORIDE 100 MILLILITER(S): 9 INJECTION, SOLUTION INTRAVENOUS at 14:12

## 2019-02-13 RX ADMIN — TRAMADOL HYDROCHLORIDE 50 MILLIGRAM(S): 50 TABLET ORAL at 14:35

## 2019-02-13 RX ADMIN — HYDROMORPHONE HYDROCHLORIDE 2 MILLIGRAM(S): 2 INJECTION INTRAMUSCULAR; INTRAVENOUS; SUBCUTANEOUS at 22:54

## 2019-02-13 NOTE — DISCHARGE NOTE PROVIDER - CARE PROVIDER_API CALL
Enrico Sanchez)  Orthopaedic Surgery  [address] [place], [place] [address]  Phone: [phone]  Fax: [phone]  Follow Up Time:

## 2019-02-13 NOTE — PHYSICAL THERAPY INITIAL EVALUATION ADULT - PERTINENT HX OF CURRENT PROBLEM, REHAB EVAL
s/p right total knee replacement (had L TKR 6 weeks ago). Pt almost fell Monday 2/11/2019 and states her L knee feels like ripping sensation

## 2019-02-13 NOTE — PROGRESS NOTE ADULT - SUBJECTIVE AND OBJECTIVE BOX
Post-op Check   POD#0 S/P Right TKA   73yFemale Patient seen and examined, Pain controlled  Patient Denies SOB, CP, N/V/D       PE: Right Knee/LE: Dressing C/D/I, Sensation/motor intact, DP 2+, FROM ankle/toes    B/L LE: Skin intact. +ROM hip/knee/ankle/toes. Ankle Dorsi/plantarflexion: 5/5. Calf: soft, compressible and nontender. DP/PT 2+ NVI                          11.9   7.35  )-----------( 167      ( 13 Feb 2019 11:08 )             38.2                 A: As above   P: Pain Control       DVT Prophylaxis      Incentive spirometry      PT WBAT RLE      Isometric exercises      Discharge Planning      All the above discussed and understood by pt       Ortho to F/U

## 2019-02-13 NOTE — PHYSICAL THERAPY INITIAL EVALUATION ADULT - PLANNED THERAPY INTERVENTIONS, PT EVAL
transfer training/gait training/strengthening/balance training/joint mobilization/postural re-education/stretching/ROM

## 2019-02-13 NOTE — DISCHARGE NOTE PROVIDER - HOSPITAL COURSE
73yFemale with history of Right Knee Pain presenting for Right TKA by Dr. Sanchez on 2/13/19 Risk and benefits of surgery were explained to the patient. The patient understood and agreed to proceed with surgery. Patient underwent the procedure with no intraoperative complications. Pt was brought in stable condition to the PACU. Once stable in PACU, pt was brought to the floor. Pt had an uneventful hospital course. Pt is stable for discharge to Home with Home Care Services and PT

## 2019-02-13 NOTE — PHYSICAL THERAPY INITIAL EVALUATION ADULT - ADDITIONAL COMMENTS
Pt lives in an apartment c no steps to get into the building from the front entrance and c 2 steps, matilde rails,  close and able to be reached simultaneously, from the garage. There is no step to negotiate in the building.  Pt has a tub/shower combo and regular toilet seat in . Pt is R handed and drives. Pt has a narrow base quad cane, straight cane and a shower bench. Pt states she has rolling walker and commode from prior services. Sister will be available to assist pt in post -op care upon discharge home.

## 2019-02-13 NOTE — DISCHARGE NOTE NURSING/CASE MANAGEMENT/SOCIAL WORK - NSDCDPATPORTLINK_GEN_ALL_CORE
You can access the [facility] Patient Portal, offered by [facility], by registering with the following website: [email or web address]

## 2019-02-13 NOTE — DISCHARGE NOTE PROVIDER - NSDCCPCAREPLAN_GEN_ALL_CORE_FT
PRINCIPAL DISCHARGE DIAGNOSIS  Problem: Osteoarthritis of right knee  Assessment and Plan of Treatment:

## 2019-02-13 NOTE — DISCHARGE NOTE PROVIDER - NSDCFUADDINST_GEN_ALL_CORE_FT
Please call your MD, if you have new onset of fevers, increased drainage, increased pain or increased redness around the incision site. Please return to the Emergency Department if you have chest pain or shortness of breath, F/U with Labs with your PMD within 1 week    Keep INOCENCIO Dressing Clean, Dry and Intact. May shower with INOCENCIO Dressing. Please do not scrub, soak, peel or pick at the INOCENCIO dressing. No creams, lotions, or oils over dressing. May shower and let water run over dressing, no baths. Pat dry once out of shower. Dressing to be removed in 7 days. If dressing is saturated from border to border - may remove and replace with clean dry dressing.

## 2019-02-13 NOTE — CONSULT NOTE ADULT - SUBJECTIVE AND OBJECTIVE BOX
CANDIDO IVERSON is a 73y Female s/p RIGHT TOTAL KNEE REPLACEMENT    w/ h/o MVP (mitral valve prolapse)  Spinal stenosis  Parkinson disease  GERD (gastroesophageal reflux disease)  Hyperlipidemia  HTN (hypertension)  Hyperthyroidism    denies any chest pain shortness of breath palpitation dizziness lightheadedness nausea vomiting fever or chills    S/P knee replacement  H/O abdominal surgery  S/P breast augmentation  S/P hernia repair  History of bowel resection  S/P foot surgery, left    Family history of diabetes mellitus (Sibling)  Family history of hypertension (Mother)  Family history of heart attack (Father)    SH: doesnot smoke or drink at this time    hydrocodone (Other; Nausea)  No Known Allergies  OxyContin (Nausea)  Percocet 5/325 (Other; Nausea)    acetaminophen   Tablet .. 650 milliGRAM(s) Oral every 6 hours PRN  aluminum hydroxide/magnesium hydroxide/simethicone Suspension 30 milliLiter(s) Oral four times a day PRN  ascorbic acid 500 milliGRAM(s) Oral two times a day  atorvastatin 40 milliGRAM(s) Oral at bedtime  carbidopa/levodopa  25/100 1 Tablet(s) Oral three times a day  ceFAZolin   IVPB 2000 milliGRAM(s) IV Intermittent every 8 hours  dextrose 40% Gel 15 Gram(s) Oral once PRN  dextrose 5%. 1000 milliLiter(s) IV Continuous <Continuous>  dextrose 50% Injectable 12.5 Gram(s) IV Push once  dextrose 50% Injectable 25 Gram(s) IV Push once  dextrose 50% Injectable 25 Gram(s) IV Push once  docusate sodium 100 milliGRAM(s) Oral three times a day  glucagon  Injectable 1 milliGRAM(s) IntraMuscular once PRN  hydrochlorothiazide 12.5 milliGRAM(s) Oral daily  HYDROmorphone   Tablet 2 milliGRAM(s) Oral every 3 hours PRN  insulin lispro (HumaLOG) corrective regimen sliding scale   SubCutaneous three times a day before meals  insulin lispro (HumaLOG) corrective regimen sliding scale   SubCutaneous at bedtime  lactated ringers. 1000 milliLiter(s) IV Continuous <Continuous>  losartan 100 milliGRAM(s) Oral daily  magnesium hydroxide Suspension 30 milliLiter(s) Oral daily PRN  methimazole 5 milliGRAM(s) Oral daily  metoprolol succinate ER 25 milliGRAM(s) Oral daily  multivitamin 1 Tablet(s) Oral daily  ondansetron Injectable 4 milliGRAM(s) IV Push every 6 hours PRN  pantoprazole    Tablet 40 milliGRAM(s) Oral before breakfast  polyethylene glycol 3350 17 Gram(s) Oral daily  pramipexole 0.125 milliGRAM(s) Oral three times a day  senna 2 Tablet(s) Oral at bedtime PRN    T(C): 36.1 (02-13-19 @ 13:40), Max: 37.2 (02-13-19 @ 06:39)  HR: 84 (02-13-19 @ 16:01) (66 - 89)  BP: 126/62 (02-13-19 @ 16:01) (103/63 - 138/65)  RR: 17 (02-13-19 @ 16:01) (14 - 20)  SpO2: 97% (02-13-19 @ 16:01) (95% - 100%)  HEENT unremarkable  neck no JVD or bruit  heart normal S1 S2 RRR no gallops or rubs  chest clear to auscultation  abd sof nontender non distended +bs  ext no calf tenderness    A/P   DVT PX  pain control  bowel regimen   wound care as per ortho  GI PX  antiemetics prn  incentive spirometer  chol control bp control  sugar control cont mirapex

## 2019-02-13 NOTE — PHYSICAL THERAPY INITIAL EVALUATION ADULT - IMPAIRMENTS FOUND, PT EVAL
neuromotor development and sensory integration/gait, locomotion, and balance/muscle strength/ROM/aerobic capacity/endurance

## 2019-02-13 NOTE — BRIEF OPERATIVE NOTE - PROCEDURE
<<-----Click on this checkbox to enter Procedure Right total knee arthroplasty  02/13/2019    Active  LALA

## 2019-02-13 NOTE — DISCHARGE NOTE PROVIDER - NSDCACTIVITY_GEN_ALL_CORE
Stairs allowed/Do not drive or operate machinery/Walking - Indoors allowed/No heavy lifting/straining/Showering allowed/Walking - Outdoors allowed

## 2019-02-13 NOTE — OCCUPATIONAL THERAPY INITIAL EVALUATION ADULT - ADDITIONAL COMMENTS
Pre op assessment (confirmed at evaluation)- Patient had L TKR done on January 2nd, 2019 at [facility]. As per patient and previous OT evaluation, Patient lives in a apartment with no steps to enter. Once inside, the patients bedroom and bathroom is on that main level when entering. The patients bathroom has a tub/shower combination with a regular toilet. The patient reports owning a tub bench and a raised toilet seat with arms. The patient ambulates with a quad cane and owns a straight cane and rolling walker. The patient is R handed, drives and wears glasses for reading. The patient daily pain in R knee is 0/10 and 5/10 in L knee.

## 2019-02-14 VITALS — TEMPERATURE: 98 F

## 2019-02-14 LAB
ANION GAP SERPL CALC-SCNC: 12 MMOL/L — SIGNIFICANT CHANGE UP (ref 5–17)
BUN SERPL-MCNC: 27 MG/DL — HIGH (ref 7–23)
CALCIUM SERPL-MCNC: 8.4 MG/DL — LOW (ref 8.5–10.1)
CHLORIDE SERPL-SCNC: 106 MMOL/L — SIGNIFICANT CHANGE UP (ref 96–108)
CO2 SERPL-SCNC: 27 MMOL/L — SIGNIFICANT CHANGE UP (ref 22–31)
CREAT SERPL-MCNC: 0.67 MG/DL — SIGNIFICANT CHANGE UP (ref 0.5–1.3)
GLUCOSE BLDC GLUCOMTR-MCNC: 174 MG/DL — HIGH (ref 70–99)
GLUCOSE BLDC GLUCOMTR-MCNC: 181 MG/DL — HIGH (ref 70–99)
GLUCOSE SERPL-MCNC: 154 MG/DL — HIGH (ref 70–99)
HCT VFR BLD CALC: 31.2 % — LOW (ref 34.5–45)
HGB BLD-MCNC: 9.7 G/DL — LOW (ref 11.5–15.5)
MCHC RBC-ENTMCNC: 29.7 PG — SIGNIFICANT CHANGE UP (ref 27–34)
MCHC RBC-ENTMCNC: 31.1 GM/DL — LOW (ref 32–36)
MCV RBC AUTO: 95.4 FL — SIGNIFICANT CHANGE UP (ref 80–100)
NRBC # BLD: 0 /100 WBCS — SIGNIFICANT CHANGE UP (ref 0–0)
PLATELET # BLD AUTO: 158 K/UL — SIGNIFICANT CHANGE UP (ref 150–400)
POTASSIUM SERPL-MCNC: 3.6 MMOL/L — SIGNIFICANT CHANGE UP (ref 3.5–5.3)
POTASSIUM SERPL-SCNC: 3.6 MMOL/L — SIGNIFICANT CHANGE UP (ref 3.5–5.3)
RBC # BLD: 3.27 M/UL — LOW (ref 3.8–5.2)
RBC # FLD: 14.5 % — SIGNIFICANT CHANGE UP (ref 10.3–14.5)
SODIUM SERPL-SCNC: 145 MMOL/L — SIGNIFICANT CHANGE UP (ref 135–145)
WBC # BLD: 8.16 K/UL — SIGNIFICANT CHANGE UP (ref 3.8–10.5)
WBC # FLD AUTO: 8.16 K/UL — SIGNIFICANT CHANGE UP (ref 3.8–10.5)

## 2019-02-14 RX ORDER — ACETAMINOPHEN 500 MG
1000 TABLET ORAL ONCE
Qty: 0 | Refills: 0 | Status: COMPLETED | OUTPATIENT
Start: 2019-02-14 | End: 2019-02-14

## 2019-02-14 RX ORDER — ASCORBIC ACID 60 MG
1 TABLET,CHEWABLE ORAL
Qty: 0 | Refills: 0 | DISCHARGE
Start: 2019-02-14

## 2019-02-14 RX ORDER — ASPIRIN/CALCIUM CARB/MAGNESIUM 324 MG
1 TABLET ORAL
Qty: 60 | Refills: 0
Start: 2019-02-14 | End: 2019-03-15

## 2019-02-14 RX ORDER — CELECOXIB 200 MG/1
1 CAPSULE ORAL
Qty: 0 | Refills: 0 | DISCHARGE
Start: 2019-02-14

## 2019-02-14 RX ORDER — DOCUSATE SODIUM 100 MG
1 CAPSULE ORAL
Qty: 0 | Refills: 0 | DISCHARGE
Start: 2019-02-14

## 2019-02-14 RX ORDER — HYDROMORPHONE HYDROCHLORIDE 2 MG/ML
1 INJECTION INTRAMUSCULAR; INTRAVENOUS; SUBCUTANEOUS
Qty: 56 | Refills: 0
Start: 2019-02-14 | End: 2019-02-20

## 2019-02-14 RX ADMIN — SODIUM CHLORIDE 100 MILLILITER(S): 9 INJECTION, SOLUTION INTRAVENOUS at 07:56

## 2019-02-14 RX ADMIN — Medication 500 MILLIGRAM(S): at 05:28

## 2019-02-14 RX ADMIN — PANTOPRAZOLE SODIUM 40 MILLIGRAM(S): 20 TABLET, DELAYED RELEASE ORAL at 07:51

## 2019-02-14 RX ADMIN — CARBIDOPA AND LEVODOPA 1 TABLET(S): 25; 100 TABLET ORAL at 05:28

## 2019-02-14 RX ADMIN — HYDROMORPHONE HYDROCHLORIDE 2 MILLIGRAM(S): 2 INJECTION INTRAMUSCULAR; INTRAVENOUS; SUBCUTANEOUS at 13:10

## 2019-02-14 RX ADMIN — Medication 100 MILLIGRAM(S): at 13:39

## 2019-02-14 RX ADMIN — LOSARTAN POTASSIUM 100 MILLIGRAM(S): 100 TABLET, FILM COATED ORAL at 05:28

## 2019-02-14 RX ADMIN — Medication 1 TABLET(S): at 11:04

## 2019-02-14 RX ADMIN — Medication 325 MILLIGRAM(S): at 05:28

## 2019-02-14 RX ADMIN — Medication 400 MILLIGRAM(S): at 04:50

## 2019-02-14 RX ADMIN — HYDROMORPHONE HYDROCHLORIDE 2 MILLIGRAM(S): 2 INJECTION INTRAMUSCULAR; INTRAVENOUS; SUBCUTANEOUS at 08:48

## 2019-02-14 RX ADMIN — Medication 1000 MILLIGRAM(S): at 05:05

## 2019-02-14 RX ADMIN — HYDROMORPHONE HYDROCHLORIDE 2 MILLIGRAM(S): 2 INJECTION INTRAMUSCULAR; INTRAVENOUS; SUBCUTANEOUS at 12:13

## 2019-02-14 RX ADMIN — CARBIDOPA AND LEVODOPA 1 TABLET(S): 25; 100 TABLET ORAL at 13:39

## 2019-02-14 RX ADMIN — POLYETHYLENE GLYCOL 3350 17 GRAM(S): 17 POWDER, FOR SOLUTION ORAL at 11:04

## 2019-02-14 RX ADMIN — Medication 12.5 MILLIGRAM(S): at 05:28

## 2019-02-14 RX ADMIN — CELECOXIB 200 MILLIGRAM(S): 200 CAPSULE ORAL at 06:01

## 2019-02-14 RX ADMIN — HYDROMORPHONE HYDROCHLORIDE 2 MILLIGRAM(S): 2 INJECTION INTRAMUSCULAR; INTRAVENOUS; SUBCUTANEOUS at 05:28

## 2019-02-14 RX ADMIN — CELECOXIB 200 MILLIGRAM(S): 200 CAPSULE ORAL at 05:28

## 2019-02-14 RX ADMIN — HYDROMORPHONE HYDROCHLORIDE 2 MILLIGRAM(S): 2 INJECTION INTRAMUSCULAR; INTRAVENOUS; SUBCUTANEOUS at 02:18

## 2019-02-14 RX ADMIN — Medication 1: at 11:04

## 2019-02-14 RX ADMIN — Medication 1: at 07:51

## 2019-02-14 RX ADMIN — PRAMIPEXOLE DIHYDROCHLORIDE 0.12 MILLIGRAM(S): 0.12 TABLET ORAL at 05:28

## 2019-02-14 RX ADMIN — HYDROMORPHONE HYDROCHLORIDE 2 MILLIGRAM(S): 2 INJECTION INTRAMUSCULAR; INTRAVENOUS; SUBCUTANEOUS at 06:02

## 2019-02-14 RX ADMIN — HYDROMORPHONE HYDROCHLORIDE 2 MILLIGRAM(S): 2 INJECTION INTRAMUSCULAR; INTRAVENOUS; SUBCUTANEOUS at 09:39

## 2019-02-14 RX ADMIN — PRAMIPEXOLE DIHYDROCHLORIDE 0.12 MILLIGRAM(S): 0.12 TABLET ORAL at 13:39

## 2019-02-14 RX ADMIN — HYDROMORPHONE HYDROCHLORIDE 2 MILLIGRAM(S): 2 INJECTION INTRAMUSCULAR; INTRAVENOUS; SUBCUTANEOUS at 03:18

## 2019-02-14 RX ADMIN — Medication 100 MILLIGRAM(S): at 05:31

## 2019-02-14 RX ADMIN — Medication 25 MILLIGRAM(S): at 05:28

## 2019-02-14 NOTE — PROGRESS NOTE ADULT - SUBJECTIVE AND OBJECTIVE BOX
POD#1 S/P Right TKA  73yFemale Patient seen and examined, Pain controlled  Patient Denies SOB, CP, N/V/D       PE: Right Knee/LE: Dressing C/D/I, Sensation/motor intact, DP 2+, FROM ankle/toes .   B/L LE: Skin intact. +ROM hip/knee/ankle/toes. Ankle Dorsi/plantarflexion: 5/5. Calf: soft, compressible and nontender. DP/PT 2+ NVI                          9.7    8.16  )-----------( 158      ( 14 Feb 2019 06:18 )             31.2       02-14    145  |  106  |  27<H>  ----------------------------<  154<H>  3.6   |  27  |  0.67    Ca    8.4<L>      14 Feb 2019 06:18          A: As above   P: Pain Control       DVT Prophylaxis      Incentive spirometry      PT WBAT RLE      Isometric exercises      Discharge Planning      All the above discussed and understood by pt       Ortho to F/U

## 2019-02-14 NOTE — PROGRESS NOTE ADULT - SUBJECTIVE AND OBJECTIVE BOX
Post Anesthesia Evaluation    POD 1 Right TKA    Patient doing well, NAD, VSS, Pain adequately controlled.  No apparent anesthetic complications

## 2019-02-14 NOTE — PROGRESS NOTE ADULT - SUBJECTIVE AND OBJECTIVE BOX
CANDIDO IVERSON is a 73y Female s/p RIGHT TOTAL KNEE REPLACEMENT      denies any chest pain shortness of breath palpitation dizziness lightheadedness nausea vomiting fever or chills    T(C): 36 (02-14-19 @ 08:00), Max: 36.8 (02-13-19 @ 11:15)  HR: 56 (02-14-19 @ 08:00) (56 - 89)  BP: 107/60 (02-14-19 @ 08:00) (100/55 - 138/65)  RR: 18 (02-14-19 @ 08:00) (16 - 18)  SpO2: 98% (02-14-19 @ 08:00) (95% - 98%)  no jvd/bruit  s1 s2 rrr  cta  s/nt/nd  no calf tend                          9.7    8.16  )-----------( 158      ( 14 Feb 2019 06:18 )             31.2   02-14    145  |  106  |  27<H>  ----------------------------<  154<H>  3.6   |  27  |  0.67    Ca    8.4<L>      14 Feb 2019 06:18      cont dvt px  pain control  bowel regimen  antiemetics  incentive spirometer

## 2019-02-15 LAB — SURGICAL PATHOLOGY STUDY: SIGNIFICANT CHANGE UP

## 2019-02-25 DIAGNOSIS — M17.11 UNILATERAL PRIMARY OSTEOARTHRITIS, RIGHT KNEE: ICD-10-CM

## 2019-02-25 DIAGNOSIS — K21.9 GASTRO-ESOPHAGEAL REFLUX DISEASE WITHOUT ESOPHAGITIS: ICD-10-CM

## 2019-02-25 DIAGNOSIS — E05.90 THYROTOXICOSIS, UNSPECIFIED WITHOUT THYROTOXIC CRISIS OR STORM: ICD-10-CM

## 2019-02-25 DIAGNOSIS — Z96.652 PRESENCE OF LEFT ARTIFICIAL KNEE JOINT: ICD-10-CM

## 2019-02-25 DIAGNOSIS — G20 PARKINSON'S DISEASE: ICD-10-CM

## 2019-02-25 DIAGNOSIS — M48.00 SPINAL STENOSIS, SITE UNSPECIFIED: ICD-10-CM

## 2019-02-25 DIAGNOSIS — I10 ESSENTIAL (PRIMARY) HYPERTENSION: ICD-10-CM

## 2019-02-25 DIAGNOSIS — E78.5 HYPERLIPIDEMIA, UNSPECIFIED: ICD-10-CM

## 2019-02-25 DIAGNOSIS — Z82.49 FAMILY HISTORY OF ISCHEMIC HEART DISEASE AND OTHER DISEASES OF THE CIRCULATORY SYSTEM: ICD-10-CM

## 2019-02-25 DIAGNOSIS — I34.1 NONRHEUMATIC MITRAL (VALVE) PROLAPSE: ICD-10-CM

## 2019-02-26 ENCOUNTER — EMERGENCY (EMERGENCY)
Facility: HOSPITAL | Age: 74
LOS: 0 days | Discharge: HOME | End: 2019-02-26
Attending: EMERGENCY MEDICINE
Payer: MEDICARE

## 2019-02-26 VITALS
DIASTOLIC BLOOD PRESSURE: 77 MMHG | HEIGHT: 62 IN | WEIGHT: 151.9 LBS | RESPIRATION RATE: 18 BRPM | SYSTOLIC BLOOD PRESSURE: 146 MMHG | HEART RATE: 80 BPM | TEMPERATURE: 99 F | OXYGEN SATURATION: 99 %

## 2019-02-26 VITALS
OXYGEN SATURATION: 99 % | TEMPERATURE: 98 F | SYSTOLIC BLOOD PRESSURE: 144 MMHG | HEART RATE: 88 BPM | RESPIRATION RATE: 19 BRPM | DIASTOLIC BLOOD PRESSURE: 79 MMHG

## 2019-02-26 DIAGNOSIS — Z98.82 BREAST IMPLANT STATUS: Chronic | ICD-10-CM

## 2019-02-26 DIAGNOSIS — Z98.890 OTHER SPECIFIED POSTPROCEDURAL STATES: Chronic | ICD-10-CM

## 2019-02-26 DIAGNOSIS — Z79.899 OTHER LONG TERM (CURRENT) DRUG THERAPY: ICD-10-CM

## 2019-02-26 DIAGNOSIS — K21.9 GASTRO-ESOPHAGEAL REFLUX DISEASE WITHOUT ESOPHAGITIS: ICD-10-CM

## 2019-02-26 DIAGNOSIS — E05.90 THYROTOXICOSIS, UNSPECIFIED WITHOUT THYROTOXIC CRISIS OR STORM: ICD-10-CM

## 2019-02-26 DIAGNOSIS — I34.1 NONRHEUMATIC MITRAL (VALVE) PROLAPSE: ICD-10-CM

## 2019-02-26 DIAGNOSIS — Z79.82 LONG TERM (CURRENT) USE OF ASPIRIN: ICD-10-CM

## 2019-02-26 DIAGNOSIS — I10 ESSENTIAL (PRIMARY) HYPERTENSION: ICD-10-CM

## 2019-02-26 DIAGNOSIS — M79.89 OTHER SPECIFIED SOFT TISSUE DISORDERS: ICD-10-CM

## 2019-02-26 DIAGNOSIS — E78.5 HYPERLIPIDEMIA, UNSPECIFIED: ICD-10-CM

## 2019-02-26 DIAGNOSIS — I82.401 ACUTE EMBOLISM AND THROMBOSIS OF UNSPECIFIED DEEP VEINS OF RIGHT LOWER EXTREMITY: ICD-10-CM

## 2019-02-26 DIAGNOSIS — G20 PARKINSON'S DISEASE: ICD-10-CM

## 2019-02-26 DIAGNOSIS — Z96.659 PRESENCE OF UNSPECIFIED ARTIFICIAL KNEE JOINT: Chronic | ICD-10-CM

## 2019-02-26 PROCEDURE — 99283 EMERGENCY DEPT VISIT LOW MDM: CPT

## 2019-02-26 RX ORDER — RIVAROXABAN 15 MG-20MG
15 KIT ORAL ONCE
Qty: 0 | Refills: 0 | Status: COMPLETED | OUTPATIENT
Start: 2019-02-26 | End: 2019-02-26

## 2019-02-26 RX ORDER — RIVAROXABAN 15 MG-20MG
1 KIT ORAL
Qty: 42 | Refills: 0
Start: 2019-02-26 | End: 2019-03-18

## 2019-02-26 RX ADMIN — RIVAROXABAN 15 MILLIGRAM(S): KIT at 16:25

## 2019-02-26 NOTE — ED ADULT NURSE NOTE - NSIMPLEMENTINTERV_GEN_ALL_ED
Implemented All Fall Risk Interventions:  [place] to call system. Call bell, personal items and telephone within reach. Instruct patient to call for assistance. Room bathroom lighting operational. Non-slip footwear when patient is off stretcher. Physically safe environment: no spills, clutter or unnecessary equipment. Stretcher in lowest position, wheels locked, appropriate side rails in place. Provide visual cue, wrist band, yellow gown, etc. Monitor gait and stability. Monitor for mental status changes and reorient to person, place, and time. Review medications for side effects contributing to fall risk. Reinforce activity limits and safety measures with patient and family.

## 2019-02-26 NOTE — ED PROVIDER NOTE - NONTENDER LOCATION
left costovertebral angle/left upper quadrant/left lower quadrant/umbilical/right costovertebral angle/right upper quadrant/periumbilical/suprapubic/right lower quadrant

## 2019-02-26 NOTE — ED PROVIDER NOTE - CONSTITUTIONAL, MLM
normal... Well appearing, well nourished, awake, alert, oriented to person, place, time/situation and in no apparent distress. Speaking in clear full sentences no nasal flaring no shoulders retractions not holding her head/chest/abdomen, smiling laughing appears very comfortable sitting up in the stretcher in a bright light room

## 2019-02-26 NOTE — ED PROVIDER NOTE - CLINICAL SUMMARY MEDICAL DECISION MAKING FREE TEXT BOX
hx, exam, pt is sent here with us showed right leg dvt. pt has no associated symptoms of palpitations, dizziness, headache, cough, sob, chest pain, back pain, nausea vomiting, abd pain, likely [place] for pulmonary embolism is unlikely. Pt and her sister were explained and advised to start Xarelto 15 mg twice a day and follow up with her orthopedist as soon as possible and return immediately if associated symptoms occur.

## 2019-02-26 NOTE — ED ADULT TRIAGE NOTE - CHIEF COMPLAINT QUOTE
c/o r leg + dvt sono done this morning r leg swelling x 2 weeks s/p r TKR 2 weeks ago denies chest pain or shortness of breath

## 2019-02-26 NOTE — ED CLERICAL - NS ED CLERK NOTE PRE-ARRIVAL INFORMATION; ADDITIONAL PRE-ARRIVAL INFORMATION
This patient is enrolled in the comprehensive joint replacement (CJR) program and has active care navigation.  This patient can be followed up by the care navigation team within 24 hours. To arrange close follow-up or to obtain additional clinical information about this patient, please call the care navigator contact number above. Please page the Orthopedic Resident ([phone]) or Ortho PA at [phone] for input and/or consultation PRIOR to admission. For patients previously on the Hospitalist Service please page the Admitting Hospitalist as listed or the Covering Hospitalist at [phone] for input and/or consultation PRIOR to admission. If the patient was previously on a Voluntary Physician’s service please contact that physician for input and/or consultation PRIOR to admission.

## 2019-02-26 NOTE — ED PROVIDER NOTE - OBJECTIVE STATEMENT
73 years old female  with her sister sent here by Dr. Barrow orthopedist for starting pt on Xarelto due to dvt of right leg. Pt had left knee surgery 2/13/19 and swelling and pain  since 2/13/19. Pt had out pt duplex of right leg which showed dvt  today. Pt however denies headache, dizziness, palpitations, dry cough, sob, chest pain, back pain, nausea, vomiting, fever, chills, abd pain.

## 2019-04-30 NOTE — PHYSICAL THERAPY INITIAL EVALUATION ADULT - PERTINENT HX OF CURRENT PROBLEM, REHAB EVAL
none Patient attends Pre-Op Testing today following consult with Dr. Sanchez due to chronic pain to. Significant surgical/medical histories of LTKR. Elective RTKR is now scheduled in this facility for2/13/19.

## 2019-05-31 NOTE — H&P PST ADULT - TOBACCO USE
I called mom and dad and indicate that the report indicates left hydronephrosis and we may need to undertake isotope renal scan to look for UPJ obstruction. Unfortunately, I cannot view images right now through the intrinsic viewer or the other viewer. I'm not quite sure why. I told the parents that I will look at the images first thing Monday morning and do my best to call them back by noon on Monday. I told them that we may be headed towards a laparoscopic pyeloplasty which is typically a one night stay, typically we would undertake this in [place], and typically, it has 95% or better long-term success rates. Mom and dad were both appreciative of receiving information. Never smoker

## 2019-06-05 ENCOUNTER — APPOINTMENT (OUTPATIENT)
Dept: ENDOCRINOLOGY | Facility: CLINIC | Age: 74
End: 2019-06-05
Payer: MEDICARE

## 2019-06-05 VITALS
BODY MASS INDEX: 25.76 KG/M2 | DIASTOLIC BLOOD PRESSURE: 76 MMHG | OXYGEN SATURATION: 95 % | WEIGHT: 140 LBS | SYSTOLIC BLOOD PRESSURE: 130 MMHG | HEIGHT: 62 IN | RESPIRATION RATE: 17 BRPM | HEART RATE: 78 BPM

## 2019-06-05 LAB — GLUCOSE BLDC GLUCOMTR-MCNC: 146

## 2019-06-05 PROCEDURE — 82962 GLUCOSE BLOOD TEST: CPT

## 2019-06-05 PROCEDURE — 99214 OFFICE O/P EST MOD 30 MIN: CPT

## 2019-06-05 NOTE — ASSESSMENT
[FreeTextEntry1] : - rpt FNAB of the RMP 1.1cm nodule is benign\par - thyr US in 1 yr (10/19)\par - In regards of subclinical hyperthyroidism, TSH worsened despite being off biotin. improved on mmi\par - decr  MMI 5mg 4/wk\par advised to hold biotin  3-4 days prior to the blood work\par -  Osteopenia at hip area,  prev some improvement on Boniva.   On b/phos x 4 yrs.   \par - cont Ca+ vit D + weight-bearing exercises.  \par - DXA 3 sites in 07/20\par  In regards of DM2-- well controlled.  can cont present regimen with metformin + victoza. She's requesting switch to trulicity. Will do it at the next visit, but will start with the lower dose\par RTC 3 mos

## 2019-06-05 NOTE — HISTORY OF PRESENT ILLNESS
[FreeTextEntry1] : 73 yo Female fu for MNG and subclinical hyperthyroidism.  \par \par s/p another knee replacement in 02/19. Had a post-op complication- DVT\par \par bloating is less, staying on metformin and victoza (prev tried to stop both, no change)\par Taking mmi 5mg qd. \par denies hyperthyroid symptoms.  \par a1c- 6.4\par TSH -  3.38\par \par back on biotin.\par reports fbs - , ppg - 130's- 140's\par \par Thyr US (10/25/18)- multiple b/l stable nodules, incl dominant RUMP complex 3.3cm\par DXA (7/18/18)- FN (-1.7), artifically elev BMD at LS, 1/3 left radius- (-2.2)\par \par echo (6/14/18)- mild LVH, ef- 60%, mild MVP. no pulm HTN\par a1c- 6.0 <--6.1\par \par TSH- 2.6 <--  2.2, FT4- 0.95, T3- 202\par LDL- 71\par 25D- 56\par \par taking medical marijuana in gel caps- improved tremor significantly\par \par labs while off biotin from 6/1/18- TSH- 0.057, FT4- 1.29\par neg tpo/tg ab, \par neg urine m/alb\par uNTx- 19\par \par dx'ed w/parkinsons- on sinemet\par \par was taking biotin 10K for hair loss . also on victoza 1.2 qd, metformin 1g bid\par \par labs (4/18) TSH- 0.135, T4- 6.6, a1c- 6.6\par \par \par \par stopped Boniva ~ 2015\par \par \par \par labs (3/29/16)- TSH- 0.07, FT4- 1.1, a1c- 6.4, wbc- wnl, 25D- 58\par prev on a [email or web address] diet. Came off byetta ~ 5 wks ago ("got stressed out and became careless")- regained 16 lbs since 10/13\par labs (6/11/14)- a1c- 6.2, TSH- 0.248, FT4- 1.08, neg tpo/tg ab, 25D- 64.9\par s/p UG-FNAB (9/11/13) of the RMP 0.9 calcified nodule- benign, colloid-rish adenomatous nodule\par \par Labs (8/5/13)- TSH- 0.32, FT4- 1.2,  25-D- 80, a1c- 6.1. PPG BS in the office -98\par Labs (5/23/13)- TSH- 0.4, T4- 6.9.   a1c- 6.0, 25-D- 61.   TSH- 0.19, FT4- 1.13, T3 - 1.25.   Neg TPO/Tg/ TSI ab.   TSH recep ab- 2.1 ( NL < 1.75).  \par \par \par Thyr US (10/25/17)- RUMP 3.2 complex- stable, Rt meidal 1.0 complex, multiple b/l cysts\par Thyr US (8/7/13) - Dominant RUMP 3.4cm; RMP 0.9 cm w/ punctate calcifications; LLP 0.5 and 0.3 hypo; LUP 0.4\par  S/p FNAB at [facility] on 02/08/13  of the RMP 1.1 calcified nodule.   Path: non-diagnostic (acellular).   \par Thyroid US (12/13/12): RMP dominant large dominant complex 3.5x2.2x1.6 (stable), RMP 1.1.x 0.7 solid (incr from 0.6cm), RLP 0.6.   and 0.5; LUP 0.9 cystic (incr from 0.6), LUP 0.5cm colloid cyst; LLP 0.5cm hypoechoic.   \par Thyroid US (1/2009): RMP 3.5x2.6.1.4cm; RMP 0.6, RLP 03.   and 0.7; LUP and LLP 0.3 and 0.6.  \par Thyroid US (07/08): RMP dominant 3.2x2.2x.13.   Thyroid US (2006): RMP dominant 2.8x.18.   \par Thyroid US (2004): RMP dominant 2.8cm.   Thyroid US (10/2003): RMP dominant 2.9cm with an additional smaller 1.1cm nodule anteromedial to the larger nodule.   \par  Also, with osteopenia - on Boniva monthly (oral)  x past 2 yrs (since 2011).   \par \par DXA (2009)- LS (+0.4), FN (-1.2).   DXA (4/25/2011)-  LS (0.1), FN (-1.5).   DXA (06/03/13) - LS (+1.0), FN ( -1.4).   done at [facility].   Saw .   s/p  hernia sx, feeling better.  \par \par HPI:  \par Per patient, was diagnosed w/ MNG many yrs ago, reports a benign FNAB done ~ in 2003 ([facility]); has been having routine Thyroid US after that.   No history of neck surgery or radiation exposure to the neck area other than radiologic studies.   Family history is negative for thyroid illness.   Also, with DM2 x since 2006, on metformin 1g bid + byetta 10mg bid.   Lat A1c- 6.0 <--5.8.   Managed by PCP.   \par \par

## 2019-08-01 ENCOUNTER — APPOINTMENT (OUTPATIENT)
Dept: ENDOCRINOLOGY | Facility: CLINIC | Age: 74
End: 2019-08-01

## 2019-08-06 ENCOUNTER — APPOINTMENT (OUTPATIENT)
Dept: ENDOCRINOLOGY | Facility: CLINIC | Age: 74
End: 2019-08-06
Payer: MEDICARE

## 2019-08-06 VITALS
SYSTOLIC BLOOD PRESSURE: 120 MMHG | DIASTOLIC BLOOD PRESSURE: 80 MMHG | WEIGHT: 140 LBS | BODY MASS INDEX: 25.76 KG/M2 | HEIGHT: 62 IN

## 2019-08-06 LAB — GLUCOSE BLDC GLUCOMTR-MCNC: 105

## 2019-08-06 PROCEDURE — 99214 OFFICE O/P EST MOD 30 MIN: CPT | Mod: 25

## 2019-08-06 PROCEDURE — 82962 GLUCOSE BLOOD TEST: CPT

## 2019-08-06 NOTE — HISTORY OF PRESENT ILLNESS
[FreeTextEntry1] : 75 yo Female fu for MNG and subclinical hyperthyroidism.  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 taking folic acid\par \par \par *** June 05, 2019 ***\par \par s/p another knee replacement in 02/19. Had a post-op complication- DVT\par bloating is less, staying on metformin and victoza (prev tried to stop both, no change)\par Taking mmi 5mg qd. \par denies hyperthyroid symptoms.  \par a1c- 6.4\par TSH -  3.38\par \par back on biotin.\par reports fbs - , ppg - 130's- 140's\par \par Thyr US (10/25/18)- multiple b/l stable nodules, incl dominant RUMP complex 3.3cm\par DXA (7/18/18)- FN (-1.7), artifically elev BMD at LS, 1/3 left radius- (-2.2)\par \par echo (6/14/18)- mild LVH, ef- 60%, mild MVP. no pulm HTN\par a1c- 6.0 <--6.1\par \par TSH- 2.6 <--  2.2, FT4- 0.95, T3- 202\par LDL- 71\par 25D- 56\par \par taking medical marijuana in gel caps- improved tremor significantly\par \par labs while off biotin from 6/1/18- TSH- 0.057, FT4- 1.29\par neg tpo/tg ab, \par neg urine m/alb\par uNTx- 19\par \par dx'ed w/parkinsons- on sinemet\par \par was taking biotin 10K for hair loss . also on victoza 1.2 qd, metformin 1g bid\par \par labs (4/18) TSH- 0.135, T4- 6.6, a1c- 6.6\par \par \par \par stopped Boniva ~ 2015\par \par \par \par labs (3/29/16)- TSH- 0.07, FT4- 1.1, a1c- 6.4, wbc- wnl, 25D- 58\par prev on a [email or web address] diet. Came off byetta ~ 5 wks ago ("got stressed out and became careless")- regained 16 lbs since 10/13\par labs (6/11/14)- a1c- 6.2, TSH- 0.248, FT4- 1.08, neg tpo/tg ab, 25D- 64.9\par s/p UG-FNAB (9/11/13) of the RMP 0.9 calcified nodule- benign, colloid-rish adenomatous nodule\par \par Labs (8/5/13)- TSH- 0.32, FT4- 1.2,  25-D- 80, a1c- 6.1. PPG BS in the office -98\par Labs (5/23/13)- TSH- 0.4, T4- 6.9.   a1c- 6.0, 25-D- 61.   TSH- 0.19, FT4- 1.13, T3 - 1.25.   Neg TPO/Tg/ TSI ab.   TSH recep ab- 2.1 ( NL < 1.75).  \par \par \par Thyr US (10/25/17)- RUMP 3.2 complex- stable, Rt meidal 1.0 complex, multiple b/l cysts\par Thyr US (8/7/13) - Dominant RUMP 3.4cm; RMP 0.9 cm w/ punctate calcifications; LLP 0.5 and 0.3 hypo; LUP 0.4\par  S/p FNAB at [facility] on 02/08/13  of the RMP 1.1 calcified nodule.   Path: non-diagnostic (acellular).   \par Thyroid US (12/13/12): RMP dominant large dominant complex 3.5x2.2x1.6 (stable), RMP 1.1.x 0.7 solid (incr from 0.6cm), RLP 0.6.   and 0.5; LUP 0.9 cystic (incr from 0.6), LUP 0.5cm colloid cyst; LLP 0.5cm hypoechoic.   \par Thyroid US (1/2009): RMP 3.5x2.6.1.4cm; RMP 0.6, RLP 03.   and 0.7; LUP and LLP 0.3 and 0.6.  \par Thyroid US (07/08): RMP dominant 3.2x2.2x.13.   Thyroid US (2006): RMP dominant 2.8x.18.   \par Thyroid US (2004): RMP dominant 2.8cm.   Thyroid US (10/2003): RMP dominant 2.9cm with an additional smaller 1.1cm nodule anteromedial to the larger nodule.   \par  Also, with osteopenia - on Boniva monthly (oral)  x past 2 yrs (since 2011).   \par \par DXA (2009)- LS (+0.4), FN (-1.2).   DXA (4/25/2011)-  LS (0.1), FN (-1.5).   DXA (06/03/13) - LS (+1.0), FN ( -1.4).   done at [facility].   Saw .   s/p  hernia sx, feeling better.  \par \par HPI:  \par Per patient, was diagnosed w/ MNG many yrs ago, reports a benign FNAB done ~ in 2003 ([facility]); has been having routine Thyroid US after that.   No history of neck surgery or radiation exposure to the neck area other than radiologic studies.   Family history is negative for thyroid illness.   Also, with DM2 x since 2006, on metformin 1g bid + byetta 10mg bid.   Lat A1c- 6.0 <--5.8.   Managed by PCP.   \par \par

## 2019-08-06 NOTE — ASSESSMENT
[FreeTextEntry1] : - rpt FNAB of the RMP 1.1cm nodule is benign\par - thyr US in 1 yr (10/19)\par - In regards of subclinical hyperthyroidism, TSH worsened despite being off biotin. improved on mmi\par - decr  MMI 5mg 3/wk\par advised to hold biotin  3-4 days prior to the blood work\par -  Osteopenia at hip area,  prev some improvement on Boniva.   On b/phos x 4 yrs.   \par - cont Ca+ vit D + weight-bearing exercises.  \par - DXA 3 sites in 07/20\par  In regards of DM2-- reasonably controlled, but apparently with worsening trend in a1c. Patient requesting switch to trulicity. will start at 0.75 mg qw. R+B. continue metformin\par RTC 3 mos

## 2019-11-19 ENCOUNTER — APPOINTMENT (OUTPATIENT)
Dept: ORTHOPEDIC SURGERY | Facility: CLINIC | Age: 74
End: 2019-11-19
Payer: MEDICARE

## 2019-11-19 VITALS — BODY MASS INDEX: 26.68 KG/M2 | WEIGHT: 145 LBS | HEIGHT: 62 IN

## 2019-11-19 DIAGNOSIS — M43.16 SPONDYLOLISTHESIS, LUMBAR REGION: ICD-10-CM

## 2019-11-19 DIAGNOSIS — M48.07 SPINAL STENOSIS, LUMBOSACRAL REGION: ICD-10-CM

## 2019-11-19 PROCEDURE — 99213 OFFICE O/P EST LOW 20 MIN: CPT

## 2019-11-19 RX ORDER — OMEGA-3/DHA/EPA/FISH OIL 300-1000MG
CAPSULE ORAL
Refills: 0 | Status: DISCONTINUED | COMMUNITY
End: 2019-11-19

## 2019-11-19 RX ORDER — PANTOPRAZOLE 40 MG/1
40 TABLET, DELAYED RELEASE ORAL
Refills: 0 | Status: ACTIVE | COMMUNITY

## 2019-11-19 RX ORDER — MIRABEGRON 25 MG/1
25 TABLET, FILM COATED, EXTENDED RELEASE ORAL
Qty: 90 | Refills: 0 | Status: DISCONTINUED | COMMUNITY
Start: 2018-05-22 | End: 2019-11-19

## 2019-11-19 RX ORDER — LORATADINE 5 MG
17 TABLET,CHEWABLE ORAL
Refills: 0 | Status: ACTIVE | COMMUNITY

## 2019-11-19 RX ORDER — ASPIRIN 81 MG
81 TABLET, DELAYED RELEASE (ENTERIC COATED) ORAL
Refills: 0 | Status: ACTIVE | COMMUNITY

## 2019-11-19 RX ORDER — ZINC OXIDE 13 %
CREAM (GRAM) TOPICAL
Refills: 0 | Status: DISCONTINUED | COMMUNITY
End: 2019-11-19

## 2020-02-14 ENCOUNTER — APPOINTMENT (OUTPATIENT)
Dept: ENDOCRINOLOGY | Facility: CLINIC | Age: 75
End: 2020-02-14
Payer: MEDICARE

## 2020-02-14 VITALS
HEART RATE: 85 BPM | BODY MASS INDEX: 27.42 KG/M2 | DIASTOLIC BLOOD PRESSURE: 70 MMHG | HEIGHT: 62 IN | WEIGHT: 149 LBS | SYSTOLIC BLOOD PRESSURE: 130 MMHG | RESPIRATION RATE: 17 BRPM | OXYGEN SATURATION: 91 %

## 2020-02-14 DIAGNOSIS — E11.9 TYPE 2 DIABETES MELLITUS W/OUT COMPLICATIONS: ICD-10-CM

## 2020-02-14 DIAGNOSIS — E04.2 NONTOXIC MULTINODULAR GOITER: ICD-10-CM

## 2020-02-14 DIAGNOSIS — L65.9 NONSCARRING HAIR LOSS, UNSPECIFIED: ICD-10-CM

## 2020-02-14 DIAGNOSIS — E05.90 THYROTOXICOSIS, UNSPECIFIED W/OUT THYROTOXIC CRISIS OR STORM: ICD-10-CM

## 2020-02-14 LAB — GLUCOSE BLDC GLUCOMTR-MCNC: 179

## 2020-02-14 PROCEDURE — 82962 GLUCOSE BLOOD TEST: CPT

## 2020-02-14 PROCEDURE — 99215 OFFICE O/P EST HI 40 MIN: CPT | Mod: 25

## 2020-02-14 NOTE — ASSESSMENT
[FreeTextEntry1] : - rpt FNAB of the RMP 1.1cm nodule is benign\par - thyr US this month\par - In regards of subclinical hyperthyroidism, TSH worsened despite being off biotin. improved on mmi\par - cont MMI 5mg 3/wk\par advised to hold biotin  3-4 days prior to the blood work\par -  Osteopenia at hip area,  prev some improvement on Boniva.   On b/phos x 4 yrs.   \par - cont Ca+ vit D + weight-bearing exercises.  \par - DXA 3 sites in 07/20\par  In regards of DM2-- reasonably controlled. Continue metformin 1g bid, trulicity 0.75 mg qw for now\par - in regards of hair loss- check T, dheas, 17OHP/preg\par - labs with PCP next week and send the report\par RTC 3 mos

## 2020-02-14 NOTE — HISTORY OF PRESENT ILLNESS
[FreeTextEntry1] : 75 yo Female fu for MNG and subclinical hyperthyroidism.  \par \par *** Feb 14, 2020 ***\par \par under stress. losing hair- wearing a wig. started finasteride about 3 mos ago with help\par on  MMI 5mg 3/wk\par on metformin 1000 mg bid , trulicity 0.75 mg qw, B12 injections\par feels fine on trulicity. 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 taking folic acid\par \par \par *** June 05, 2019 ***\par \par s/p another knee replacement in 02/19. Had a post-op complication- DVT\par bloating is less, staying on metformin and victoza (prev tried to stop both, no change)\par Taking mmi 5mg qd. \par denies hyperthyroid symptoms.  \par a1c- 6.4\par TSH -  3.38\par \par back on biotin.\par reports fbs - , ppg - 130's- 140's\par \par Thyr US (10/25/18)- multiple b/l stable nodules, incl dominant RUMP complex 3.3cm\par DXA (7/18/18)- FN (-1.7), artifically elev BMD at LS, 1/3 left radius- (-2.2)\par \par echo (6/14/18)- mild LVH, ef- 60%, mild MVP. no pulm HTN\par a1c- 6.0 <--6.1\par \par TSH- 2.6 <--  2.2, FT4- 0.95, T3- 202\par LDL- 71\par 25D- 56\par \par taking medical marijuana in gel caps- improved tremor significantly\par \par labs while off biotin from 6/1/18- TSH- 0.057, FT4- 1.29\par neg tpo/tg ab, \par neg urine m/alb\par uNTx- 19\par \par dx'ed w/parkinsons- on sinemet\par \par was taking biotin 10K for hair loss . also on victoza 1.2 qd, metformin 1g bid\par \par labs (4/18) TSH- 0.135, T4- 6.6, a1c- 6.6\par \par \par \par stopped Boniva ~ 2015\par \par \par \par labs (3/29/16)- TSH- 0.07, FT4- 1.1, a1c- 6.4, wbc- wnl, 25D- 58\par prev on a [email or web address] diet. Came off byetta ~ 5 wks ago ("got stressed out and became careless")- regained 16 lbs since 10/13\par labs (6/11/14)- a1c- 6.2, TSH- 0.248, FT4- 1.08, neg tpo/tg ab, 25D- 64.9\par s/p UG-FNAB (9/11/13) of the RMP 0.9 calcified nodule- benign, colloid-rish adenomatous nodule\par \par Labs (8/5/13)- TSH- 0.32, FT4- 1.2,  25-D- 80, a1c- 6.1. PPG BS in the office -98\par Labs (5/23/13)- TSH- 0.4, T4- 6.9.   a1c- 6.0, 25-D- 61.   TSH- 0.19, FT4- 1.13, T3 - 1.25.   Neg TPO/Tg/ TSI ab.   TSH recep ab- 2.1 ( NL < 1.75).  \par \par \par Thyr US (10/25/17)- RUMP 3.2 complex- stable, Rt meidal 1.0 complex, multiple b/l cysts\par Thyr US (8/7/13) - Dominant RUMP 3.4cm; RMP 0.9 cm w/ punctate calcifications; LLP 0.5 and 0.3 hypo; LUP 0.4\par  [facility]  of the RMP 1.1 calcified nodule.   Path: non-diagnostic (acellular).   \par Thyroid US (12/13/12): RMP dominant large dominant complex 3.5x2.2x1.6 (stable), RMP 1.1.x 0.7 solid (incr from 0.6cm), RLP 0.6.   and 0.5; LUP 0.9 cystic (incr from 0.6), LUP 0.5cm colloid cyst; LLP 0.5cm hypoechoic.   \par Thyroid US (1/2009): RMP 3.5x2.6.1.4cm; RMP 0.6, RLP 03.   and 0.7; LUP and LLP 0.3 and 0.6.  \par Thyroid US (07/08): RMP dominant 3.2x2.2x.13.   Thyroid US (2006): RMP dominant 2.8x.18.   \par Thyroid US (2004): RMP dominant 2.8cm.   Thyroid US (10/2003): RMP dominant 2.9cm with an additional smaller 1.1cm nodule anteromedial to the larger nodule.   \par  Also, with osteopenia - on Boniva monthly (oral)  x past 2 yrs (since 2011).   \par \par DXA (2009)- LS (+0.4), FN (-1.2).   DXA (4/25/2011)-  LS (0.1), FN (-1.5).   DXA (06/03/13) - LS (+1.0), FN ( -1.4).   done at [facility].   Saw .   s/p  hernia sx, feeling better.  \par \par HPI:  \par Per patient, was diagnosed w/ MNG many yrs ago, reports a benign FNAB done ~ in 2003 ([facility]); has been having routine Thyroid US after that.   No history of neck surgery or radiation exposure to the neck area other than radiologic studies.   Family history is negative for thyroid illness.   Also, with DM2 x since 2006, on metformin 1g bid + byetta 10mg bid.   Lat A1c- 6.0 <--5.8.   Managed by PCP.   \par \par

## 2020-02-27 NOTE — H&P PST ADULT - VTE RISK COMMENTS
Patient needs a refill on humalog pen sent to the pharmacy on file. The pharmacy sent us a request and it was faxed back stating she is not a patient of [facility]. Patient was seen on 1/16/2020.   please see caprini scores

## 2020-04-07 RX ORDER — DULAGLUTIDE 0.75 MG/.5ML
0.75 INJECTION, SOLUTION SUBCUTANEOUS
Qty: 12 | Refills: 3 | Status: ACTIVE | COMMUNITY
Start: 2019-08-06 | End: 1900-01-01

## 2020-08-27 ENCOUNTER — TRANSCRIPTION ENCOUNTER (OUTPATIENT)
Age: 75
End: 2020-08-27

## 2020-09-11 ENCOUNTER — TRANSCRIPTION ENCOUNTER (OUTPATIENT)
Age: 75
End: 2020-09-11

## 2020-09-21 ENCOUNTER — TRANSCRIPTION ENCOUNTER (OUTPATIENT)
Age: 75
End: 2020-09-21

## 2020-09-25 ENCOUNTER — TRANSCRIPTION ENCOUNTER (OUTPATIENT)
Age: 75
End: 2020-09-25

## 2020-10-28 ENCOUNTER — TRANSCRIPTION ENCOUNTER (OUTPATIENT)
Age: 75
End: 2020-10-28

## 2020-11-18 ENCOUNTER — RX RENEWAL (OUTPATIENT)
Age: 75
End: 2020-11-18

## 2021-01-02 ENCOUNTER — TRANSCRIPTION ENCOUNTER (OUTPATIENT)
Age: 76
End: 2021-01-02

## 2021-04-20 NOTE — OCCUPATIONAL THERAPY INITIAL EVALUATION ADULT - PERSONAL SAFETY AND JUDGMENT, REHAB EVAL
Adri Acevedo  : 1951  [place] / [place] / Product Type: Medicare /  [address]  [facility], [facility], [address]  [phone]   [phone]      OUTPATIENT PHYSICAL THERAPY: Daily Treatment Note 2021  Visit Count:  55 visits    ICD-10: Treatment Diagnosis:   M54.5 Low Back Pain   R26.2 Difficulty in Walking, Not Elsewhere Classified    Precautions/Allergies:   Patient has no known allergies. TREATMENT PLAN:  Effective Dates:  1-2X per month from 2021 until 2021 (90 days). Frequency/Duration: 1-2X per month for 90 Days     PRE-TREATMENT SYMPTOMS/COMPLAINTS: Pt reports back pain is about the same today. 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 MEDICATIONS REVIEWED:  2021   TREATMENT:   (In addition to Assessment/Re-Assessment sessions the following treatments were rendered)    MANUAL THERAPY: (40 minutes):   Joint mobilization, Soft tissue mobilization and Manipulation was utilized and necessary because of the patient's restricted joint motion, painful spasm, loss of articular motion and restricted motion of soft tissue. +PA L1-L5, B SI joints, Sacrum Grade III  + STM B lumbar paraspinals and QL  +Manual stretching B HS, glutes, piriformis, hip flexor, quads      (Used abbreviations: MET - muscle energy technique; PNF - proprioceptive neuromuscular facilitation; NMR - neuromuscular re-education; AP - anterior to posterior; PA - posterior to anterior)    MODALITIES: (15 minutes)  Estim level 46 with MHP for pain relief and promote mm relaxation      Assessment: Pt continues with hypomobility noted at L/S segments. Finds fair relief with manual techniques. RECOMMENDATIONS/INTENT FOR NEXT TREATMENT SESSION: \"Next visit will focus on advancements to more challenging activities\".     PAIN: Initial: 2/10 Post Session: 2/10     Kimmy Portal    Total Treatment Billable Duration:   PT Patient Time In/Time Out  Time In: 0933  Time Out: 56  Rosas Malloy, PT, DPT    Future Appointments   Date Time Provider Elisabeth Gissel   4/28/2021  9:30 AM [facility]   5/12/2021  9:30 AM Nimo BAKER [facility]   3/21/2022  2:10 PM Sanam Merino MD [id number]        Visit Approval Visit # Therapist initials Date A NS Cx Comments    31 SHAISTA 7/51/60 [x]  [] [] Recert/progress note on 7/27 - visit 4 today    28 SHAISTA 8/20/20 [x] [] []     35 SHAISTA 8/27/20 [x] [] [] Progress note today. [facility] 9/9/20 [x] [] [] 1    37 SHAISTA 4/06/25 [x] [] [] Recert today-maintenance program 2    38 SHAISTA 10/7/20 [x] [] [] 3    39 SHAISTA 10/14/20 [x] [] [] 4    40 SHAISTA 10/21/20 [x] [] [] 5    41 SHAISTA 10/28/20 [x] [] [] 6    42 SHAISTA 11/4/20 [x] [] [] 7    43 SHAISTA 11/11/20 [x] [] [] 8, PN due over next 2 visits. [facility] 11/18/20 [x] [] [] 9, PN due next visit. [facility] 11/25/20 [x] [] [] PN today.     Jose [address] 12/9/20 [x] [] [] 1    47 SHAISTA 12/22/20 [x] [] [] 2 (pt will need recert next session POC expires 12/30/20)    48 SHAISTA 1/13/21 [x] [] [] PN and recert today    49 SHAISTA 1/27/21 [x] [] [] 1    50 SHAISTA 2/10/21 [x] [] [] 2    51 SHAISTA 2/23/21 X   3    52 SHAISTA 3/10/21 x   4    53 SHAISTA 3/24/21 x   5    54 SHAISTA 3/3/53 x   6, Recert/PN today    55 SHAISTA 4/20/21 x   1 intact

## 2021-07-21 NOTE — ED ADULT NURSE NOTE - CHPI ED NUR SEVERITY2
FIRST PROVIDER CONTACT ASSESSMENT NOTE                                                                                                Department of Emergency Medicine                                                      First Provider Note  21  11:20 AM EDT  NAME: Sophie Clifton  : 1959  MRN: [id number]    Chief Complaint: Loss of Consciousness (near syncope this am while sitting)      History of Present Illness:   Sophie Clifton is a 58 y.o. male who presents to the ED for near syncope     Focused Physical Exam:  VS:    ED Triage Vitals   BP Temp Temp src Pulse Resp SpO2 Height Weight   -- -- -- -- -- -- -- --        General: Alert and in no apparent distress. Medical History:  has no past medical history on file. Surgical History:  has no past surgical history on file. Social History:      Family History: family history is not on file. Allergies: Patient has no allergy information on record.      Initial Plan of Care:  Initiate Treatment-Testing, Proceed toTreatment Area When Bed Available for ED Attending/MLP to Continue Care    -------------------------------------------------[address] ASSESSMENT NOTE--------------------------------------------------------  Electronically signed by YAMILA Atkinson   DD: 21       YAMILA Alba  21 1121 PAIN SCALE 5 OF 10.

## 2021-10-20 ENCOUNTER — RX RENEWAL (OUTPATIENT)
Age: 76
End: 2021-10-20

## 2021-10-20 RX ORDER — METHIMAZOLE 5 MG/1
5 TABLET ORAL
Qty: 40 | Refills: 3 | Status: ACTIVE | COMMUNITY
Start: 2019-09-26 | End: 1900-01-01

## 2021-10-22 NOTE — PHYSICAL THERAPY INITIAL EVALUATION ADULT - FOLLOWS COMMANDS/ANSWERS QUESTIONS, REHAB EVAL
100% of the time
1) Monitor PO intake, GI tolerance, skin integrity, labs, weight. 2) Honor food preferences as feasible. 3) RD remains available upon request and will follow-up per protocol. 4) Would recommend Regular diet to optimize PO intake

## 2021-11-17 ENCOUNTER — APPOINTMENT (OUTPATIENT)
Dept: ENDOCRINOLOGY | Facility: CLINIC | Age: 76
End: 2021-11-17

## 2022-03-03 ENCOUNTER — APPOINTMENT (OUTPATIENT)
Dept: OPHTHALMOLOGY | Facility: CLINIC | Age: 77
End: 2022-03-03
Payer: MEDICARE

## 2022-03-03 ENCOUNTER — NON-APPOINTMENT (OUTPATIENT)
Age: 77
End: 2022-03-03

## 2022-03-03 PROCEDURE — 95060 OPH MUCOUS MEMBRANE TESTS: CPT

## 2022-03-03 PROCEDURE — 92004 COMPRE OPH EXAM NEW PT 1/>: CPT | Mod: 25

## 2022-03-03 PROCEDURE — 68761 CLOSE TEAR DUCT OPENING: CPT | Mod: E2,E4

## 2022-03-28 ENCOUNTER — APPOINTMENT (OUTPATIENT)
Dept: OPHTHALMOLOGY | Facility: CLINIC | Age: 77
End: 2022-03-28
Payer: MEDICARE

## 2022-03-28 ENCOUNTER — NON-APPOINTMENT (OUTPATIENT)
Age: 77
End: 2022-03-28

## 2022-03-28 PROCEDURE — 92012 INTRM OPH EXAM EST PATIENT: CPT

## 2022-05-03 ENCOUNTER — NON-APPOINTMENT (OUTPATIENT)
Age: 77
End: 2022-05-03

## 2022-05-03 ENCOUNTER — APPOINTMENT (OUTPATIENT)
Dept: OPHTHALMOLOGY | Facility: CLINIC | Age: 77
End: 2022-05-03
Payer: MEDICARE

## 2022-05-03 PROCEDURE — 92012 INTRM OPH EXAM EST PATIENT: CPT | Mod: 25

## 2022-05-03 PROCEDURE — 92285 EXTERNAL OCULAR PHOTOGRAPHY: CPT

## 2022-05-03 PROCEDURE — 65430 CORNEAL SMEAR: CPT | Mod: RT

## 2022-05-04 ENCOUNTER — APPOINTMENT (OUTPATIENT)
Dept: OPHTHALMOLOGY | Facility: CLINIC | Age: 77
End: 2022-05-04
Payer: MEDICARE

## 2022-05-04 ENCOUNTER — NON-APPOINTMENT (OUTPATIENT)
Age: 77
End: 2022-05-04

## 2022-05-04 PROCEDURE — 92012 INTRM OPH EXAM EST PATIENT: CPT

## 2022-05-05 ENCOUNTER — APPOINTMENT (OUTPATIENT)
Dept: OPHTHALMOLOGY | Facility: CLINIC | Age: 77
End: 2022-05-05
Payer: MEDICARE

## 2022-05-05 ENCOUNTER — NON-APPOINTMENT (OUTPATIENT)
Age: 77
End: 2022-05-05

## 2022-05-05 PROCEDURE — 92012 INTRM OPH EXAM EST PATIENT: CPT

## 2022-05-06 ENCOUNTER — APPOINTMENT (OUTPATIENT)
Dept: OPHTHALMOLOGY | Facility: CLINIC | Age: 77
End: 2022-05-06
Payer: MEDICARE

## 2022-05-06 ENCOUNTER — NON-APPOINTMENT (OUTPATIENT)
Age: 77
End: 2022-05-06

## 2022-05-06 PROCEDURE — 92012 INTRM OPH EXAM EST PATIENT: CPT

## 2022-05-09 ENCOUNTER — APPOINTMENT (OUTPATIENT)
Dept: OPHTHALMOLOGY | Facility: CLINIC | Age: 77
End: 2022-05-09
Payer: MEDICARE

## 2022-05-09 ENCOUNTER — NON-APPOINTMENT (OUTPATIENT)
Age: 77
End: 2022-05-09

## 2022-05-09 PROCEDURE — 92012 INTRM OPH EXAM EST PATIENT: CPT

## 2022-05-13 ENCOUNTER — NON-APPOINTMENT (OUTPATIENT)
Age: 77
End: 2022-05-13

## 2022-05-13 ENCOUNTER — APPOINTMENT (OUTPATIENT)
Dept: OPHTHALMOLOGY | Facility: CLINIC | Age: 77
End: 2022-05-13
Payer: MEDICARE

## 2022-05-13 PROCEDURE — 92012 INTRM OPH EXAM EST PATIENT: CPT

## 2022-06-15 NOTE — H&P PST ADULT - NSANTHBMIRD_ENT_A_CORE
Unknown if ever smoked No Ivermectin Counseling:  Patient instructed to take medication on an empty stomach with a full glass of water.  Patient informed of potential adverse effects including but not limited to nausea, diarrhea, dizziness, itching, and swelling of the extremities or lymph nodes.  The patient verbalized understanding of the proper use and possible adverse effects of ivermectin.  All of the patient's questions and concerns were addressed.

## 2022-10-14 ENCOUNTER — RX RENEWAL (OUTPATIENT)
Age: 77
End: 2022-10-14

## 2023-03-20 NOTE — PATIENT PROFILE ADULT - NSTRANSFERBELONGINGSRESP_GEN_A_NUR
Pt calm and cooperative. Supervised release of R arm and L leg restraints. CATHERINE Acevedo tech at bedside for safety and monitoring   yes

## 2023-12-09 NOTE — ED PROVIDER NOTE - BOWEL SOUNDS
You came to the Emergency Room because of cold symptoms.     Your symptoms are likely due to a viral infection.     Please take the prescribed medication Tessalon Peerles 100 mg once daily for cough.     You may take Tylenol 1000 mg every 8 hours (no more than 4000 mg per 24 hours) as needed for pain.   You may take Ibuprofen 400 mg every 6 hours as needed for pain.     Please follow up with your Primary Medical Doctor within the next 7 days to monitor your symptoms.     Please come back to the Emergency Room if your symptoms get worse. present x 4 quadrants You came to the Emergency Room because of cold symptoms.     Your symptoms are due to a viral infection (influenza).     Please take the prescribed medication Tessalon Peerles 100 mg once daily for cough.     Please take the prescribed medication Tamiflu 75 mg twice daily for a total of 3 days for flu.     You may take Tylenol 1000 mg every 8 hours (no more than 4000 mg per 24 hours) as needed for fever.   You may take Ibuprofen 400 mg every 6 hours as needed for fever.      Please follow up with your Primary Medical Doctor within the next 7 days to monitor your symptoms.     Please come back to the Emergency Room if your symptoms get worse.

## 2024-01-05 RX ORDER — OMEPRAZOLE 10 MG/1
1 CAPSULE, DELAYED RELEASE ORAL
Qty: 0 | Refills: 0 | DISCHARGE

## 2024-01-05 RX ORDER — IBUPROFEN 200 MG
1 TABLET ORAL
Qty: 0 | Refills: 0 | DISCHARGE

## 2024-01-05 RX ORDER — ESTROGEN,CON/M-PROGEST ACET 0.625 (14)
1 TABLET ORAL
Qty: 0 | Refills: 0 | DISCHARGE

## 2024-01-05 RX ORDER — DULOXETINE HYDROCHLORIDE 30 MG/1
1 CAPSULE, DELAYED RELEASE ORAL
Refills: 0 | DISCHARGE

## 2024-01-05 RX ORDER — GABAPENTIN 400 MG/1
0 CAPSULE ORAL
Refills: 0 | DISCHARGE

## 2024-01-05 RX ORDER — ASPIRIN/CALCIUM CARB/MAGNESIUM 324 MG
1 TABLET ORAL
Qty: 0 | Refills: 0 | DISCHARGE

## 2024-01-05 RX ORDER — PRAMIPEXOLE DIHYDROCHLORIDE 0.12 MG/1
1 TABLET ORAL
Qty: 0 | Refills: 0 | DISCHARGE

## 2024-01-05 RX ORDER — LIRAGLUTIDE 6 MG/ML
1 INJECTION SUBCUTANEOUS
Qty: 0 | Refills: 0 | DISCHARGE

## 2024-01-05 NOTE — PATIENT PROFILE ADULT - FALL HARM RISK - HARM RISK INTERVENTIONS
Assistance with ambulation/Assistance OOB with selected safe patient handling equipment/Communicate Risk of Fall with Harm to all staff/Discuss with provider need for PT consult/Monitor gait and stability/Provide patient with walking aids - walker, cane, crutches/Reinforce activity limits and safety measures with patient and family/Tailored Fall Risk Interventions/Visual Cue: Yellow wristband and red socks/Bed in lowest position, wheels locked, appropriate side rails in place/Call bell, personal items and telephone in reach/Instruct patient to call for assistance before getting out of bed or chair/Non-slip footwear when patient is out of bed/[place] to call system/Physically safe environment - no spills, clutter or unnecessary equipment/Purposeful Proactive Rounding/Room/bathroom lighting operational, light cord in reach Assistance with ambulation/Assistance OOB with selected safe patient handling equipment/Communicate Risk of Fall with Harm to all staff/Discuss with provider need for PT consult/Monitor gait and stability/Provide patient with walking aids - walker, cane, crutches/Reinforce activity limits and safety measures with patient and family/Tailored Fall Risk Interventions/Visual Cue: Yellow wristband and red socks/Bed in lowest position, wheels locked, appropriate side rails in place/Call bell, personal items and telephone in reach/Instruct patient to call for assistance before getting out of bed or chair/Non-slip footwear when patient is out of bed/[place] to call system/Physically safe environment - no spills, clutter or unnecessary equipment/Purposeful Proactive Rounding/Room/bathroom lighting operational, light cord in reach

## 2024-01-07 ENCOUNTER — TRANSCRIPTION ENCOUNTER (OUTPATIENT)
Age: 79
End: 2024-01-07

## 2024-01-08 ENCOUNTER — INPATIENT (INPATIENT)
Facility: HOSPITAL | Age: 79
LOS: 4 days | Discharge: NURSING, REHAB OR LONG-TERM CARE | DRG: 453 | End: 2024-01-13
Payer: MEDICARE

## 2024-01-08 ENCOUNTER — APPOINTMENT (OUTPATIENT)
Dept: ORTHOPEDIC SURGERY | Facility: HOSPITAL | Age: 79
End: 2024-01-08
Payer: MEDICARE

## 2024-01-08 VITALS
SYSTOLIC BLOOD PRESSURE: 130 MMHG | WEIGHT: 141.98 LBS | TEMPERATURE: 99 F | RESPIRATION RATE: 16 BRPM | HEIGHT: 61 IN | HEART RATE: 74 BPM | DIASTOLIC BLOOD PRESSURE: 77 MMHG | OXYGEN SATURATION: 98 %

## 2024-01-08 DIAGNOSIS — Z98.890 OTHER SPECIFIED POSTPROCEDURAL STATES: Chronic | ICD-10-CM

## 2024-01-08 DIAGNOSIS — I10 ESSENTIAL (PRIMARY) HYPERTENSION: ICD-10-CM

## 2024-01-08 DIAGNOSIS — K21.9 GASTRO-ESOPHAGEAL REFLUX DISEASE WITHOUT ESOPHAGITIS: ICD-10-CM

## 2024-01-08 DIAGNOSIS — Z98.82 BREAST IMPLANT STATUS: Chronic | ICD-10-CM

## 2024-01-08 DIAGNOSIS — Z96.659 PRESENCE OF UNSPECIFIED ARTIFICIAL KNEE JOINT: Chronic | ICD-10-CM

## 2024-01-08 DIAGNOSIS — M48.00 SPINAL STENOSIS, SITE UNSPECIFIED: ICD-10-CM

## 2024-01-08 DIAGNOSIS — I34.1 NONRHEUMATIC MITRAL (VALVE) PROLAPSE: ICD-10-CM

## 2024-01-08 DIAGNOSIS — G20 PARKINSON'S DISEASE: ICD-10-CM

## 2024-01-08 DIAGNOSIS — E05.90 THYROTOXICOSIS, UNSPECIFIED WITHOUT THYROTOXIC CRISIS OR STORM: ICD-10-CM

## 2024-01-08 DIAGNOSIS — I82.409 ACUTE EMBOLISM AND THROMBOSIS OF UNSPECIFIED DEEP VEINS OF UNSPECIFIED LOWER EXTREMITY: ICD-10-CM

## 2024-01-08 LAB
BLD GP AB SCN SERPL QL: NEGATIVE — SIGNIFICANT CHANGE UP
BLD GP AB SCN SERPL QL: NEGATIVE — SIGNIFICANT CHANGE UP
GLUCOSE BLDC GLUCOMTR-MCNC: 157 MG/DL — HIGH (ref 70–99)
GLUCOSE BLDC GLUCOMTR-MCNC: 157 MG/DL — HIGH (ref 70–99)
GLUCOSE BLDC GLUCOMTR-MCNC: 253 MG/DL — HIGH (ref 70–99)
GLUCOSE BLDC GLUCOMTR-MCNC: 253 MG/DL — HIGH (ref 70–99)
GLUCOSE BLDC GLUCOMTR-MCNC: 257 MG/DL — HIGH (ref 70–99)
GLUCOSE BLDC GLUCOMTR-MCNC: 257 MG/DL — HIGH (ref 70–99)
GLUCOSE BLDC GLUCOMTR-MCNC: 290 MG/DL — HIGH (ref 70–99)
GLUCOSE BLDC GLUCOMTR-MCNC: 290 MG/DL — HIGH (ref 70–99)
RH IG SCN BLD-IMP: NEGATIVE — SIGNIFICANT CHANGE UP
RH IG SCN BLD-IMP: NEGATIVE — SIGNIFICANT CHANGE UP

## 2024-01-08 PROCEDURE — 63047 LAM FACETEC & FORAMOT LUMBAR: CPT | Mod: 59

## 2024-01-08 PROCEDURE — 63048 LAM FACETEC &FORAMOT EA ADDL: CPT | Mod: 59

## 2024-01-08 PROCEDURE — 22633 ARTHRD CMBN 1NTRSPC LUMBAR: CPT

## 2024-01-08 PROCEDURE — 22514 PERQ VERTEBRAL AUGMENTATION: CPT

## 2024-01-08 PROCEDURE — 22853 INSJ BIOMECHANICAL DEVICE: CPT | Mod: 59

## 2024-01-08 PROCEDURE — 22614 ARTHRD PST TQ 1NTRSPC EA ADD: CPT

## 2024-01-08 PROCEDURE — 22842 INSERT SPINE FIXATION DEVICE: CPT

## 2024-01-08 PROCEDURE — 22848 INSERT PELV FIXATION DEVICE: CPT

## 2024-01-08 PROCEDURE — 63052 LAM FACETC/FRMT ARTHRD LUM 1: CPT | Mod: 59

## 2024-01-08 RX ORDER — OXYCODONE HYDROCHLORIDE 5 MG/1
5 TABLET ORAL EVERY 4 HOURS
Refills: 0 | Status: DISCONTINUED | OUTPATIENT
Start: 2024-01-08 | End: 2024-01-08

## 2024-01-08 RX ORDER — HYDROMORPHONE HYDROCHLORIDE 2 MG/ML
0.5 INJECTION INTRAMUSCULAR; INTRAVENOUS; SUBCUTANEOUS
Refills: 0 | Status: DISCONTINUED | OUTPATIENT
Start: 2024-01-08 | End: 2024-01-13

## 2024-01-08 RX ORDER — CARBIDOPA AND LEVODOPA 25; 100 MG/1; MG/1
1 TABLET ORAL THREE TIMES A DAY
Refills: 0 | Status: DISCONTINUED | OUTPATIENT
Start: 2024-01-08 | End: 2024-01-13

## 2024-01-08 RX ORDER — SENNA PLUS 8.6 MG/1
2 TABLET ORAL AT BEDTIME
Refills: 0 | Status: DISCONTINUED | OUTPATIENT
Start: 2024-01-08 | End: 2024-01-13

## 2024-01-08 RX ORDER — DEXTROSE 50 % IN WATER 50 %
25 SYRINGE (ML) INTRAVENOUS ONCE
Refills: 0 | Status: DISCONTINUED | OUTPATIENT
Start: 2024-01-08 | End: 2024-01-13

## 2024-01-08 RX ORDER — INSULIN LISPRO 100/ML
VIAL (ML) SUBCUTANEOUS
Refills: 0 | Status: DISCONTINUED | OUTPATIENT
Start: 2024-01-08 | End: 2024-01-13

## 2024-01-08 RX ORDER — ATORVASTATIN CALCIUM 80 MG/1
40 TABLET, FILM COATED ORAL AT BEDTIME
Refills: 0 | Status: DISCONTINUED | OUTPATIENT
Start: 2024-01-08 | End: 2024-01-13

## 2024-01-08 RX ORDER — POVIDONE-IODINE 5 %
1 AEROSOL (ML) TOPICAL ONCE
Refills: 0 | Status: COMPLETED | OUTPATIENT
Start: 2024-01-08 | End: 2024-01-08

## 2024-01-08 RX ORDER — METHOCARBAMOL 500 MG/1
500 TABLET, FILM COATED ORAL EVERY 8 HOURS
Refills: 0 | Status: DISCONTINUED | OUTPATIENT
Start: 2024-01-08 | End: 2024-01-13

## 2024-01-08 RX ORDER — METOPROLOL TARTRATE 50 MG
25 TABLET ORAL DAILY
Refills: 0 | Status: DISCONTINUED | OUTPATIENT
Start: 2024-01-08 | End: 2024-01-13

## 2024-01-08 RX ORDER — CHLORHEXIDINE GLUCONATE 213 G/1000ML
1 SOLUTION TOPICAL EVERY 12 HOURS
Refills: 0 | Status: COMPLETED | OUTPATIENT
Start: 2024-01-08 | End: 2024-01-08

## 2024-01-08 RX ORDER — GLUCAGON INJECTION, SOLUTION 0.5 MG/.1ML
1 INJECTION, SOLUTION SUBCUTANEOUS ONCE
Refills: 0 | Status: DISCONTINUED | OUTPATIENT
Start: 2024-01-08 | End: 2024-01-13

## 2024-01-08 RX ORDER — POLYETHYLENE GLYCOL 3350 17 G/17G
17 POWDER, FOR SOLUTION ORAL DAILY
Refills: 0 | Status: DISCONTINUED | OUTPATIENT
Start: 2024-01-08 | End: 2024-01-13

## 2024-01-08 RX ORDER — SODIUM CHLORIDE 9 MG/ML
1000 INJECTION, SOLUTION INTRAVENOUS
Refills: 0 | Status: DISCONTINUED | OUTPATIENT
Start: 2024-01-08 | End: 2024-01-13

## 2024-01-08 RX ORDER — APREPITANT 80 MG/1
40 CAPSULE ORAL ONCE
Refills: 0 | Status: COMPLETED | OUTPATIENT
Start: 2024-01-08 | End: 2024-01-08

## 2024-01-08 RX ORDER — HYDROMORPHONE HYDROCHLORIDE 2 MG/ML
0.5 INJECTION INTRAMUSCULAR; INTRAVENOUS; SUBCUTANEOUS EVERY 4 HOURS
Refills: 0 | Status: DISCONTINUED | OUTPATIENT
Start: 2024-01-08 | End: 2024-01-13

## 2024-01-08 RX ORDER — ACETAMINOPHEN 500 MG
1000 TABLET ORAL EVERY 8 HOURS
Refills: 0 | Status: DISCONTINUED | OUTPATIENT
Start: 2024-01-08 | End: 2024-01-13

## 2024-01-08 RX ORDER — ONDANSETRON 8 MG/1
4 TABLET, FILM COATED ORAL EVERY 6 HOURS
Refills: 0 | Status: DISCONTINUED | OUTPATIENT
Start: 2024-01-08 | End: 2024-01-13

## 2024-01-08 RX ORDER — CALCIUM CARBONATE 500(1250)
1 TABLET ORAL ONCE
Refills: 0 | Status: COMPLETED | OUTPATIENT
Start: 2024-01-08 | End: 2024-01-08

## 2024-01-08 RX ORDER — DEXTROSE 50 % IN WATER 50 %
12.5 SYRINGE (ML) INTRAVENOUS ONCE
Refills: 0 | Status: DISCONTINUED | OUTPATIENT
Start: 2024-01-08 | End: 2024-01-13

## 2024-01-08 RX ORDER — MAGNESIUM HYDROXIDE 400 MG/1
30 TABLET, CHEWABLE ORAL EVERY 12 HOURS
Refills: 0 | Status: DISCONTINUED | OUTPATIENT
Start: 2024-01-08 | End: 2024-01-13

## 2024-01-08 RX ORDER — HYDROMORPHONE HYDROCHLORIDE 2 MG/ML
2 INJECTION INTRAMUSCULAR; INTRAVENOUS; SUBCUTANEOUS EVERY 4 HOURS
Refills: 0 | Status: DISCONTINUED | OUTPATIENT
Start: 2024-01-08 | End: 2024-01-13

## 2024-01-08 RX ORDER — CEFAZOLIN SODIUM 1 G
2000 VIAL (EA) INJECTION EVERY 8 HOURS
Refills: 0 | Status: COMPLETED | OUTPATIENT
Start: 2024-01-08 | End: 2024-01-09

## 2024-01-08 RX ORDER — DEXTROSE 50 % IN WATER 50 %
15 SYRINGE (ML) INTRAVENOUS ONCE
Refills: 0 | Status: DISCONTINUED | OUTPATIENT
Start: 2024-01-08 | End: 2024-01-13

## 2024-01-08 RX ORDER — PANTOPRAZOLE SODIUM 20 MG/1
40 TABLET, DELAYED RELEASE ORAL
Refills: 0 | Status: DISCONTINUED | OUTPATIENT
Start: 2024-01-08 | End: 2024-01-13

## 2024-01-08 RX ORDER — AMLODIPINE BESYLATE 2.5 MG/1
5 TABLET ORAL DAILY
Refills: 0 | Status: DISCONTINUED | OUTPATIENT
Start: 2024-01-09 | End: 2024-01-09

## 2024-01-08 RX ORDER — SODIUM CHLORIDE 9 MG/ML
1000 INJECTION, SOLUTION INTRAVENOUS
Refills: 0 | Status: DISCONTINUED | OUTPATIENT
Start: 2024-01-08 | End: 2024-01-09

## 2024-01-08 RX ADMIN — HYDROMORPHONE HYDROCHLORIDE 2 MILLIGRAM(S): 2 INJECTION INTRAMUSCULAR; INTRAVENOUS; SUBCUTANEOUS at 16:52

## 2024-01-08 RX ADMIN — CHLORHEXIDINE GLUCONATE 1 APPLICATION(S): 213 SOLUTION TOPICAL at 07:03

## 2024-01-08 RX ADMIN — SENNA PLUS 2 TABLET(S): 8.6 TABLET ORAL at 22:10

## 2024-01-08 RX ADMIN — Medication 50 MILLIGRAM(S): at 22:10

## 2024-01-08 RX ADMIN — Medication 1000 MILLIGRAM(S): at 14:31

## 2024-01-08 RX ADMIN — HYDROMORPHONE HYDROCHLORIDE 2 MILLIGRAM(S): 2 INJECTION INTRAMUSCULAR; INTRAVENOUS; SUBCUTANEOUS at 22:10

## 2024-01-08 RX ADMIN — Medication 50 MILLIGRAM(S): at 14:13

## 2024-01-08 RX ADMIN — ONDANSETRON 4 MILLIGRAM(S): 8 TABLET, FILM COATED ORAL at 17:13

## 2024-01-08 RX ADMIN — CARBIDOPA AND LEVODOPA 1 TABLET(S): 25; 100 TABLET ORAL at 22:10

## 2024-01-08 RX ADMIN — HYDROMORPHONE HYDROCHLORIDE 2 MILLIGRAM(S): 2 INJECTION INTRAMUSCULAR; INTRAVENOUS; SUBCUTANEOUS at 23:00

## 2024-01-08 RX ADMIN — METHOCARBAMOL 500 MILLIGRAM(S): 500 TABLET, FILM COATED ORAL at 14:12

## 2024-01-08 RX ADMIN — METHOCARBAMOL 500 MILLIGRAM(S): 500 TABLET, FILM COATED ORAL at 22:10

## 2024-01-08 RX ADMIN — APREPITANT 40 MILLIGRAM(S): 80 CAPSULE ORAL at 07:00

## 2024-01-08 RX ADMIN — Medication 6: at 17:11

## 2024-01-08 RX ADMIN — HYDROMORPHONE HYDROCHLORIDE 2 MILLIGRAM(S): 2 INJECTION INTRAMUSCULAR; INTRAVENOUS; SUBCUTANEOUS at 17:50

## 2024-01-08 RX ADMIN — Medication 6: at 22:15

## 2024-01-08 RX ADMIN — CARBIDOPA AND LEVODOPA 1 TABLET(S): 25; 100 TABLET ORAL at 14:13

## 2024-01-08 RX ADMIN — Medication 1000 MILLIGRAM(S): at 22:10

## 2024-01-08 RX ADMIN — ATORVASTATIN CALCIUM 40 MILLIGRAM(S): 80 TABLET, FILM COATED ORAL at 22:10

## 2024-01-08 RX ADMIN — Medication 6: at 13:23

## 2024-01-08 RX ADMIN — Medication 1 TABLET(S): at 19:02

## 2024-01-08 RX ADMIN — Medication 1 APPLICATION(S): at 07:03

## 2024-01-08 RX ADMIN — HYDROMORPHONE HYDROCHLORIDE 0.5 MILLIGRAM(S): 2 INJECTION INTRAMUSCULAR; INTRAVENOUS; SUBCUTANEOUS at 13:27

## 2024-01-08 RX ADMIN — Medication 1000 MILLIGRAM(S): at 14:12

## 2024-01-08 RX ADMIN — Medication 1000 MILLIGRAM(S): at 23:00

## 2024-01-08 RX ADMIN — Medication 100 MILLIGRAM(S): at 19:54

## 2024-01-08 RX ADMIN — SODIUM CHLORIDE 100 MILLILITER(S): 9 INJECTION, SOLUTION INTRAVENOUS at 22:10

## 2024-01-08 RX ADMIN — HYDROMORPHONE HYDROCHLORIDE 0.5 MILLIGRAM(S): 2 INJECTION INTRAMUSCULAR; INTRAVENOUS; SUBCUTANEOUS at 13:47

## 2024-01-08 NOTE — H&P ADULT - NSHPLABSRESULTS_GEN_ALL_CORE
Preop CBC, BMP, PT/INR, PTT within normal range and reviewed per medical clearance  H/H: 11.6/36.5  Cr: 0.68  A1C: 7.0  UA: within normal limits and reviewed per med clearance  Preop EKG 83bpm with occasional PVC, within normal limits and reviewed per medical clearance  3M: DOS  T + S: DOS  Iron 43L, Ferritin 7 L, Iron Saturation 10L

## 2024-01-08 NOTE — H&P ADULT - NSICDXPASTMEDICALHX_GEN_ALL_CORE_FT
PAST MEDICAL HISTORY:  Deep vein thrombosis (DVT)     DM (diabetes mellitus)     GERD (gastroesophageal reflux disease)     HTN (hypertension)     Hyperlipidemia     Hyperthyroidism monitored  by endocrinologist    MR (mitral regurgitation)     MVP (mitral valve prolapse) echo 2018    Parkinson disease     Spinal stenosis pain relieved with steroid injection

## 2024-01-08 NOTE — PHYSICAL THERAPY INITIAL EVALUATION ADULT - ADDITIONAL COMMENTS
Pt lives alone in an apt with elevator, denies stairs. Prior to admission, pt ambulated without AD, sometimes ambulated with cane/rolling walker.

## 2024-01-08 NOTE — H&P ADULT - NSHPPHYSICALEXAM_GEN_ALL_CORE
Gen: 78F NAD  MSK: Decreased lumbar spine ROM secondary to pain  Skin without erythema, ecchymosis, abrasions or lesions  Calves soft, nontender bilaterally   Sensation intact to light touch bilateral lower extremities  Pulses: +2DP palpable, brisk capillary refill  EHL/FHL/TA/GS 5/5 bilaterally     Rest of PE per MD clearance

## 2024-01-08 NOTE — H&P ADULT - PROBLEM SELECTOR PROBLEM 6
MVP (mitral valve prolapse)
PAST MEDICAL HISTORY:  Afib     BPH with elevated PSA     History of stroke     HTN (hypertension)     Vertigo

## 2024-01-08 NOTE — PHYSICAL THERAPY INITIAL EVALUATION ADULT - MD/RN NOTIFIED
yes H Plasty Text: Given the location of the defect, shape of the defect and the proximity to free margins a H-plasty was deemed most appropriate for repair.  Using a sterile surgical marker, the appropriate advancement arms of the H-plasty were drawn incorporating the defect and placing the expected incisions within the relaxed skin tension lines where possible. The area thus outlined was incised deep to adipose tissue with a #15c scalpel blade. The skin margins were undermined to an appropriate distance in all directions utilizing iris scissors.  The opposing advancement arms were then advanced into place in opposite direction and anchored with interrupted buried subcutaneous sutures.

## 2024-01-08 NOTE — PHYSICAL THERAPY INITIAL EVALUATION ADULT - GAIT DEVIATIONS NOTED, PT EVAL
slightly unsteady gait, decreased heel strike and hip flexion bilaterally, increased lateral sway./decreased christiano/increased time in double stance/decreased step length

## 2024-01-08 NOTE — PHYSICAL THERAPY INITIAL EVALUATION ADULT - IMPAIRMENTS CONTRIBUTING TO GAIT DEVIATIONS, PT EVAL
requires VCs from PT for sequencing and increase step length during ambulation./impaired balance/pain/impaired postural control/decreased strength

## 2024-01-08 NOTE — H&P ADULT - NSICDXFAMILYHX_GEN_ALL_CORE_FT
FAMILY HISTORY:  Father  Still living? No  Family history of heart attack, Age at diagnosis: Age Unknown    Mother  Still living? No  Family history of hypertension, Age at diagnosis: Age Unknown    Sibling  Still living? Yes, Estimated age: Age Unknown  Family history of diabetes mellitus, Age at diagnosis: Age Unknown

## 2024-01-08 NOTE — PHYSICAL THERAPY INITIAL EVALUATION ADULT - GENERAL OBSERVATIONS, REHAB EVAL
Pt received semi supine in bed with +HV x 1, +hep-lock, +NC~2L, +EKG, +b/l SCDs, NAD, friend present. Pt left as found, NAD, call bell in reach, drain intact, friend present, ANN MARIE jama.

## 2024-01-08 NOTE — PROGRESS NOTE ADULT - SUBJECTIVE AND OBJECTIVE BOX
Orthopedics Post Op Check    Procedure: L2-L4 PSF, lami, decompression   Surgeon: Dr Bahena     Pt comfortable, without complaints. Pt c/o pain in PACU and   Denies CP, SOB, N/V, numbness/tingling     Vital Signs Last 24 Hrs  T(C): 36.7 (08 Jan 2024 13:00), Max: 37 (08 Jan 2024 06:34)  T(F): 98.1 (08 Jan 2024 13:00), Max: 98.6 (08 Jan 2024 06:34)  HR: 88 (08 Jan 2024 13:45) (74 - 94)  BP: 124/59 (08 Jan 2024 13:45) (105/50 - 136/63)  BP(mean): 83 (08 Jan 2024 13:45) (75 - 87)  RR: 12 (08 Jan 2024 13:45) (12 - 18)  SpO2: 96% (08 Jan 2024 13:45) (95% - 98%)    Parameters below as of 08 Jan 2024 13:15  Patient On (Oxygen Delivery Method): nasal cannula  O2 Flow (L/min): 2    AVSS, NAD  General: Pt Alert and oriented, comfortable  Dressing C/D/I and HV x 1 in place   Sensation intact and equal BLE   Motor ehl/ta/gs/fhl 5/5 BLE; negative hanny bilaterally   Pulses dp palpable, skin warm and well perfused; cap refill brisk     A/P: 78yFemale POD#0 s/p L2-L4 PSF, lami, decompression   - Stable, monitor labs and VS; pt has hx of provoked DVT in past, will monitor   - Pain Control  - DVT ppx: SCDs  - Kirsten-op abx: Ancef  - PT, WBS: WBAT   - F/U AM Labs

## 2024-01-08 NOTE — PHYSICAL THERAPY INITIAL EVALUATION ADULT - IMPAIRED TRANSFERS: SIT/STAND, REHAB EVAL
requires VCs from PT for proper hands placement during transfer./impaired balance/pain/impaired postural control/decreased strength

## 2024-01-08 NOTE — H&P ADULT - PROBLEM SELECTOR PLAN 1
Admit to Orthopedic Service for elective PSF, Lami, decompression L2-4    Medically cleared and optimized for surgery by Dr. Bermudez.

## 2024-01-08 NOTE — H&P ADULT - NSICDXPASTSURGICALHX_GEN_ALL_CORE_FT
PAST SURGICAL HISTORY:  H/O abdominal surgery 1999    History of bowel resection 2012    S/P breast augmentation 2014 saline implants    S/P foot surgery, left 5/2/2018    S/P hernia repair 2013    S/P knee replacement Left ( 1/2/2019 ), right TKR, 2019

## 2024-01-08 NOTE — PHYSICAL THERAPY INITIAL EVALUATION ADULT - PERTINENT HX OF CURRENT PROBLEM, REHAB EVAL
Pt is a 79 yo female c/o acute on chronic lower back pain x 6 years. She said that the pain began spontaneously and without accident or trauma. She had spine surgery 1 year ago in [place]. Pt reports that her pain has progressively worsened without improvement and has become increasingly unresponsive to conservative management. s/p L2-L4 PSF, lami, decompression on 1/8/24. Pt is a 79 yo female c/o acute on chronic lower back pain x 6 years. She said that the pain began spontaneously and without accident or trauma. She had spine surgery 1 year ago in [place]. Pt reports that her pain has progressively worsened without improvement and has become increasingly unresponsive to conservative management. s/p L2-L4 PSF, lami, decompression on 1/8/24.

## 2024-01-08 NOTE — H&P ADULT - HISTORY OF PRESENT ILLNESS
78F c/o acute on chronic lower back pain x 6 years. She said that the pain began spontaneously and without accident or trauma. She had spine surgery 1 year ago in [place]. Pt reports that her pain has progressively worsened without improvement and has become increasingly unresponsive to conservative management. Pt ambulates with a cane and walker at home. Denies numbness/tingling of extremities,    Reports she had a provoked blood clot in her right lower extremity after a right TKR. She was put on a course of Xarelto.     Denies history of seizures. Denies chest pain, shortness of breath, nausea or vomiting today.      Presents today for a PSF, Lami, decompression L2-L4.  78F c/o acute on chronic lower back pain x 6 years. She said that the pain began spontaneously and without accident or trauma. She had spine surgery 1 year ago in [place]. Pt reports that her pain has progressively worsened without improvement and has become increasingly unresponsive to conservative management. Pt ambulates with a cane and walker at home. Denies numbness/tingling of extremities,    Reports she had a provoked blood clot in her right lower extremity after a right TKR. She was put on a course of Xarelto.     Denies history of seizures. Denies chest pain, shortness of breath, nausea or vomiting today.      Presents today for a PSF, Lami, decompression L2-L4.

## 2024-01-09 LAB
A1C WITH ESTIMATED AVERAGE GLUCOSE RESULT: 7.3 % — HIGH (ref 4–5.6)
A1C WITH ESTIMATED AVERAGE GLUCOSE RESULT: 7.3 % — HIGH (ref 4–5.6)
ANION GAP SERPL CALC-SCNC: 8 MMOL/L — SIGNIFICANT CHANGE UP (ref 5–17)
ANION GAP SERPL CALC-SCNC: 8 MMOL/L — SIGNIFICANT CHANGE UP (ref 5–17)
ANISOCYTOSIS BLD QL: SLIGHT — SIGNIFICANT CHANGE UP
ANISOCYTOSIS BLD QL: SLIGHT — SIGNIFICANT CHANGE UP
BASOPHILS # BLD AUTO: 0 K/UL — SIGNIFICANT CHANGE UP (ref 0–0.2)
BASOPHILS # BLD AUTO: 0 K/UL — SIGNIFICANT CHANGE UP (ref 0–0.2)
BASOPHILS NFR BLD AUTO: 0 % — SIGNIFICANT CHANGE UP (ref 0–2)
BASOPHILS NFR BLD AUTO: 0 % — SIGNIFICANT CHANGE UP (ref 0–2)
BLD GP AB SCN SERPL QL: NEGATIVE — SIGNIFICANT CHANGE UP
BLD GP AB SCN SERPL QL: NEGATIVE — SIGNIFICANT CHANGE UP
BUN SERPL-MCNC: 18 MG/DL — SIGNIFICANT CHANGE UP (ref 7–23)
BUN SERPL-MCNC: 18 MG/DL — SIGNIFICANT CHANGE UP (ref 7–23)
CALCIUM SERPL-MCNC: 8.4 MG/DL — SIGNIFICANT CHANGE UP (ref 8.4–10.5)
CALCIUM SERPL-MCNC: 8.4 MG/DL — SIGNIFICANT CHANGE UP (ref 8.4–10.5)
CHLORIDE SERPL-SCNC: 104 MMOL/L — SIGNIFICANT CHANGE UP (ref 96–108)
CHLORIDE SERPL-SCNC: 104 MMOL/L — SIGNIFICANT CHANGE UP (ref 96–108)
CO2 SERPL-SCNC: 26 MMOL/L — SIGNIFICANT CHANGE UP (ref 22–31)
CO2 SERPL-SCNC: 26 MMOL/L — SIGNIFICANT CHANGE UP (ref 22–31)
CREAT SERPL-MCNC: 0.68 MG/DL — SIGNIFICANT CHANGE UP (ref 0.5–1.3)
CREAT SERPL-MCNC: 0.68 MG/DL — SIGNIFICANT CHANGE UP (ref 0.5–1.3)
EGFR: 89 ML/MIN/1.73M2 — SIGNIFICANT CHANGE UP
EGFR: 89 ML/MIN/1.73M2 — SIGNIFICANT CHANGE UP
EOSINOPHIL # BLD AUTO: 0 K/UL — SIGNIFICANT CHANGE UP (ref 0–0.5)
EOSINOPHIL # BLD AUTO: 0 K/UL — SIGNIFICANT CHANGE UP (ref 0–0.5)
EOSINOPHIL NFR BLD AUTO: 0 % — SIGNIFICANT CHANGE UP (ref 0–6)
EOSINOPHIL NFR BLD AUTO: 0 % — SIGNIFICANT CHANGE UP (ref 0–6)
ESTIMATED AVERAGE GLUCOSE: 163 MG/DL — HIGH (ref 68–114)
ESTIMATED AVERAGE GLUCOSE: 163 MG/DL — HIGH (ref 68–114)
GLUCOSE BLDC GLUCOMTR-MCNC: 148 MG/DL — HIGH (ref 70–99)
GLUCOSE BLDC GLUCOMTR-MCNC: 148 MG/DL — HIGH (ref 70–99)
GLUCOSE BLDC GLUCOMTR-MCNC: 196 MG/DL — HIGH (ref 70–99)
GLUCOSE BLDC GLUCOMTR-MCNC: 196 MG/DL — HIGH (ref 70–99)
GLUCOSE BLDC GLUCOMTR-MCNC: 200 MG/DL — HIGH (ref 70–99)
GLUCOSE BLDC GLUCOMTR-MCNC: 200 MG/DL — HIGH (ref 70–99)
GLUCOSE BLDC GLUCOMTR-MCNC: 233 MG/DL — HIGH (ref 70–99)
GLUCOSE BLDC GLUCOMTR-MCNC: 233 MG/DL — HIGH (ref 70–99)
GLUCOSE SERPL-MCNC: 191 MG/DL — HIGH (ref 70–99)
GLUCOSE SERPL-MCNC: 191 MG/DL — HIGH (ref 70–99)
HCT VFR BLD CALC: 23.2 % — LOW (ref 34.5–45)
HCT VFR BLD CALC: 23.2 % — LOW (ref 34.5–45)
HCT VFR BLD CALC: 26.4 % — LOW (ref 34.5–45)
HCT VFR BLD CALC: 26.4 % — LOW (ref 34.5–45)
HCV AB S/CO SERPL IA: 0.04 S/CO — SIGNIFICANT CHANGE UP
HCV AB S/CO SERPL IA: 0.04 S/CO — SIGNIFICANT CHANGE UP
HCV AB SERPL-IMP: SIGNIFICANT CHANGE UP
HCV AB SERPL-IMP: SIGNIFICANT CHANGE UP
HGB BLD-MCNC: 7.4 G/DL — LOW (ref 11.5–15.5)
HGB BLD-MCNC: 7.4 G/DL — LOW (ref 11.5–15.5)
HGB BLD-MCNC: 8.5 G/DL — LOW (ref 11.5–15.5)
HGB BLD-MCNC: 8.5 G/DL — LOW (ref 11.5–15.5)
HYPOCHROMIA BLD QL: SLIGHT — SIGNIFICANT CHANGE UP
HYPOCHROMIA BLD QL: SLIGHT — SIGNIFICANT CHANGE UP
LYMPHOCYTES # BLD AUTO: 0.18 K/UL — LOW (ref 1–3.3)
LYMPHOCYTES # BLD AUTO: 0.18 K/UL — LOW (ref 1–3.3)
LYMPHOCYTES # BLD AUTO: 2.6 % — LOW (ref 13–44)
LYMPHOCYTES # BLD AUTO: 2.6 % — LOW (ref 13–44)
MANUAL SMEAR VERIFICATION: SIGNIFICANT CHANGE UP
MANUAL SMEAR VERIFICATION: SIGNIFICANT CHANGE UP
MCHC RBC-ENTMCNC: 28.8 PG — SIGNIFICANT CHANGE UP (ref 27–34)
MCHC RBC-ENTMCNC: 28.8 PG — SIGNIFICANT CHANGE UP (ref 27–34)
MCHC RBC-ENTMCNC: 29.3 PG — SIGNIFICANT CHANGE UP (ref 27–34)
MCHC RBC-ENTMCNC: 29.3 PG — SIGNIFICANT CHANGE UP (ref 27–34)
MCHC RBC-ENTMCNC: 31.9 GM/DL — LOW (ref 32–36)
MCHC RBC-ENTMCNC: 31.9 GM/DL — LOW (ref 32–36)
MCHC RBC-ENTMCNC: 32.2 GM/DL — SIGNIFICANT CHANGE UP (ref 32–36)
MCHC RBC-ENTMCNC: 32.2 GM/DL — SIGNIFICANT CHANGE UP (ref 32–36)
MCV RBC AUTO: 90.3 FL — SIGNIFICANT CHANGE UP (ref 80–100)
MCV RBC AUTO: 90.3 FL — SIGNIFICANT CHANGE UP (ref 80–100)
MCV RBC AUTO: 91 FL — SIGNIFICANT CHANGE UP (ref 80–100)
MCV RBC AUTO: 91 FL — SIGNIFICANT CHANGE UP (ref 80–100)
METAMYELOCYTES # FLD: 0.9 % — HIGH (ref 0–0)
METAMYELOCYTES # FLD: 0.9 % — HIGH (ref 0–0)
MICROCYTES BLD QL: SLIGHT — SIGNIFICANT CHANGE UP
MICROCYTES BLD QL: SLIGHT — SIGNIFICANT CHANGE UP
MONOCYTES # BLD AUTO: 0.18 K/UL — SIGNIFICANT CHANGE UP (ref 0–0.9)
MONOCYTES # BLD AUTO: 0.18 K/UL — SIGNIFICANT CHANGE UP (ref 0–0.9)
MONOCYTES NFR BLD AUTO: 2.6 % — SIGNIFICANT CHANGE UP (ref 2–14)
MONOCYTES NFR BLD AUTO: 2.6 % — SIGNIFICANT CHANGE UP (ref 2–14)
NEUTROPHILS # BLD AUTO: 6.54 K/UL — SIGNIFICANT CHANGE UP (ref 1.8–7.4)
NEUTROPHILS # BLD AUTO: 6.54 K/UL — SIGNIFICANT CHANGE UP (ref 1.8–7.4)
NEUTROPHILS NFR BLD AUTO: 93.9 % — HIGH (ref 43–77)
NEUTROPHILS NFR BLD AUTO: 93.9 % — HIGH (ref 43–77)
NRBC # BLD: 0 /100 WBCS — SIGNIFICANT CHANGE UP (ref 0–0)
NRBC # BLD: 0 /100 WBCS — SIGNIFICANT CHANGE UP (ref 0–0)
OVALOCYTES BLD QL SMEAR: SLIGHT — SIGNIFICANT CHANGE UP
OVALOCYTES BLD QL SMEAR: SLIGHT — SIGNIFICANT CHANGE UP
PLAT MORPH BLD: ABNORMAL
PLAT MORPH BLD: ABNORMAL
PLATELET # BLD AUTO: 117 K/UL — LOW (ref 150–400)
PLATELET # BLD AUTO: 117 K/UL — LOW (ref 150–400)
PLATELET # BLD AUTO: 131 K/UL — LOW (ref 150–400)
PLATELET # BLD AUTO: 131 K/UL — LOW (ref 150–400)
POTASSIUM SERPL-MCNC: 3.7 MMOL/L — SIGNIFICANT CHANGE UP (ref 3.5–5.3)
POTASSIUM SERPL-MCNC: 3.7 MMOL/L — SIGNIFICANT CHANGE UP (ref 3.5–5.3)
POTASSIUM SERPL-SCNC: 3.7 MMOL/L — SIGNIFICANT CHANGE UP (ref 3.5–5.3)
POTASSIUM SERPL-SCNC: 3.7 MMOL/L — SIGNIFICANT CHANGE UP (ref 3.5–5.3)
RBC # BLD: 2.57 M/UL — LOW (ref 3.8–5.2)
RBC # BLD: 2.57 M/UL — LOW (ref 3.8–5.2)
RBC # BLD: 2.9 M/UL — LOW (ref 3.8–5.2)
RBC # BLD: 2.9 M/UL — LOW (ref 3.8–5.2)
RBC # FLD: 16.2 % — HIGH (ref 10.3–14.5)
RBC # FLD: 16.2 % — HIGH (ref 10.3–14.5)
RBC # FLD: 16.6 % — HIGH (ref 10.3–14.5)
RBC # FLD: 16.6 % — HIGH (ref 10.3–14.5)
RBC BLD AUTO: ABNORMAL
RBC BLD AUTO: ABNORMAL
RH IG SCN BLD-IMP: NEGATIVE — SIGNIFICANT CHANGE UP
RH IG SCN BLD-IMP: NEGATIVE — SIGNIFICANT CHANGE UP
SODIUM SERPL-SCNC: 138 MMOL/L — SIGNIFICANT CHANGE UP (ref 135–145)
SODIUM SERPL-SCNC: 138 MMOL/L — SIGNIFICANT CHANGE UP (ref 135–145)
WBC # BLD: 6.75 K/UL — SIGNIFICANT CHANGE UP (ref 3.8–10.5)
WBC # BLD: 6.75 K/UL — SIGNIFICANT CHANGE UP (ref 3.8–10.5)
WBC # BLD: 6.97 K/UL — SIGNIFICANT CHANGE UP (ref 3.8–10.5)
WBC # BLD: 6.97 K/UL — SIGNIFICANT CHANGE UP (ref 3.8–10.5)
WBC # FLD AUTO: 6.75 K/UL — SIGNIFICANT CHANGE UP (ref 3.8–10.5)
WBC # FLD AUTO: 6.75 K/UL — SIGNIFICANT CHANGE UP (ref 3.8–10.5)
WBC # FLD AUTO: 6.97 K/UL — SIGNIFICANT CHANGE UP (ref 3.8–10.5)
WBC # FLD AUTO: 6.97 K/UL — SIGNIFICANT CHANGE UP (ref 3.8–10.5)

## 2024-01-09 PROCEDURE — 99223 1ST HOSP IP/OBS HIGH 75: CPT

## 2024-01-09 RX ORDER — ENOXAPARIN SODIUM 100 MG/ML
40 INJECTION SUBCUTANEOUS EVERY 24 HOURS
Refills: 0 | Status: DISCONTINUED | OUTPATIENT
Start: 2024-01-09 | End: 2024-01-13

## 2024-01-09 RX ORDER — IRON SUCROSE 20 MG/ML
300 INJECTION, SOLUTION INTRAVENOUS EVERY 24 HOURS
Refills: 0 | Status: COMPLETED | OUTPATIENT
Start: 2024-01-09 | End: 2024-01-11

## 2024-01-09 RX ORDER — LINAGLIPTIN 5 MG/1
5 TABLET, FILM COATED ORAL DAILY
Refills: 0 | Status: DISCONTINUED | OUTPATIENT
Start: 2024-01-10 | End: 2024-01-13

## 2024-01-09 RX ORDER — METFORMIN HYDROCHLORIDE 850 MG/1
1000 TABLET ORAL
Refills: 0 | Status: DISCONTINUED | OUTPATIENT
Start: 2024-01-09 | End: 2024-01-13

## 2024-01-09 RX ORDER — LINAGLIPTIN 5 MG/1
5 TABLET, FILM COATED ORAL DAILY
Refills: 0 | Status: DISCONTINUED | OUTPATIENT
Start: 2024-01-09 | End: 2024-01-09

## 2024-01-09 RX ADMIN — HYDROMORPHONE HYDROCHLORIDE 2 MILLIGRAM(S): 2 INJECTION INTRAMUSCULAR; INTRAVENOUS; SUBCUTANEOUS at 13:10

## 2024-01-09 RX ADMIN — CARBIDOPA AND LEVODOPA 1 TABLET(S): 25; 100 TABLET ORAL at 05:54

## 2024-01-09 RX ADMIN — ATORVASTATIN CALCIUM 40 MILLIGRAM(S): 80 TABLET, FILM COATED ORAL at 22:15

## 2024-01-09 RX ADMIN — ONDANSETRON 4 MILLIGRAM(S): 8 TABLET, FILM COATED ORAL at 00:31

## 2024-01-09 RX ADMIN — Medication 50 MILLIGRAM(S): at 05:56

## 2024-01-09 RX ADMIN — HYDROMORPHONE HYDROCHLORIDE 2 MILLIGRAM(S): 2 INJECTION INTRAMUSCULAR; INTRAVENOUS; SUBCUTANEOUS at 12:13

## 2024-01-09 RX ADMIN — Medication 100 MILLIGRAM(S): at 03:40

## 2024-01-09 RX ADMIN — METHOCARBAMOL 500 MILLIGRAM(S): 500 TABLET, FILM COATED ORAL at 14:16

## 2024-01-09 RX ADMIN — METFORMIN HYDROCHLORIDE 1000 MILLIGRAM(S): 850 TABLET ORAL at 18:03

## 2024-01-09 RX ADMIN — Medication 1000 MILLIGRAM(S): at 22:15

## 2024-01-09 RX ADMIN — Medication 50 MILLIGRAM(S): at 22:15

## 2024-01-09 RX ADMIN — METHOCARBAMOL 500 MILLIGRAM(S): 500 TABLET, FILM COATED ORAL at 22:15

## 2024-01-09 RX ADMIN — IRON SUCROSE 176.67 MILLIGRAM(S): 20 INJECTION, SOLUTION INTRAVENOUS at 16:58

## 2024-01-09 RX ADMIN — POLYETHYLENE GLYCOL 3350 17 GRAM(S): 17 POWDER, FOR SOLUTION ORAL at 12:10

## 2024-01-09 RX ADMIN — CARBIDOPA AND LEVODOPA 1 TABLET(S): 25; 100 TABLET ORAL at 22:15

## 2024-01-09 RX ADMIN — Medication 1000 MILLIGRAM(S): at 14:15

## 2024-01-09 RX ADMIN — LINAGLIPTIN 5 MILLIGRAM(S): 5 TABLET, FILM COATED ORAL at 12:10

## 2024-01-09 RX ADMIN — Medication 2: at 22:19

## 2024-01-09 RX ADMIN — PANTOPRAZOLE SODIUM 40 MILLIGRAM(S): 20 TABLET, DELAYED RELEASE ORAL at 06:05

## 2024-01-09 RX ADMIN — SENNA PLUS 2 TABLET(S): 8.6 TABLET ORAL at 22:13

## 2024-01-09 RX ADMIN — ENOXAPARIN SODIUM 40 MILLIGRAM(S): 100 INJECTION SUBCUTANEOUS at 22:15

## 2024-01-09 RX ADMIN — Medication 2: at 08:27

## 2024-01-09 RX ADMIN — Medication 50 MILLIGRAM(S): at 14:15

## 2024-01-09 RX ADMIN — ONDANSETRON 4 MILLIGRAM(S): 8 TABLET, FILM COATED ORAL at 12:11

## 2024-01-09 RX ADMIN — ONDANSETRON 4 MILLIGRAM(S): 8 TABLET, FILM COATED ORAL at 18:04

## 2024-01-09 RX ADMIN — ONDANSETRON 4 MILLIGRAM(S): 8 TABLET, FILM COATED ORAL at 05:55

## 2024-01-09 RX ADMIN — Medication 1000 MILLIGRAM(S): at 05:55

## 2024-01-09 RX ADMIN — CARBIDOPA AND LEVODOPA 1 TABLET(S): 25; 100 TABLET ORAL at 14:15

## 2024-01-09 RX ADMIN — Medication 4: at 12:06

## 2024-01-09 RX ADMIN — Medication 5 MILLIGRAM(S): at 12:21

## 2024-01-09 RX ADMIN — METHOCARBAMOL 500 MILLIGRAM(S): 500 TABLET, FILM COATED ORAL at 05:55

## 2024-01-09 NOTE — OCCUPATIONAL THERAPY INITIAL EVALUATION ADULT - LEVEL OF INDEPENDENCE: DRESS LOWER BODY, OT EVAL
doffing/donning b/l socks while seated at EOB utilizing cross legged method to completed- Mod A for R leg donning/moderate assist (50% patients effort)

## 2024-01-09 NOTE — CONSULT NOTE ADULT - SUBJECTIVE AND OBJECTIVE BOX
78y old  Female who presents with a chief complaint of back pain (09 Jan 2024 13:27)      HPI:  78F c/o acute on chronic lower back pain x 6 years. She said that the pain began spontaneously and without accident or trauma. She had spine surgery 1 year ago in [place]. Pt reports that her pain has progressively worsened without improvement and has become increasingly unresponsive to conservative management. Pt ambulates with a cane and walker at home. Denies numbness/tingling of extremities,    Reports she had a provoked blood clot in her right lower extremity after a right TKR. She was put on a course of Xarelto.     Denies history of seizures. Denies chest pain, shortness of breath, nausea or vomiting today.      s/p  PSF, Lami, decompression L2-L4.  (08 Jan 2024 08:45)    Resting in bed, does not have any new complaints       PAST MEDICAL & SURGICAL HISTORY:  Hyperthyroidism  monitored  by endocrinologist      HTN (hypertension)      Hyperlipidemia      GERD (gastroesophageal reflux disease)      Parkinson disease      Spinal stenosis  pain relieved with steroid injection      MVP (mitral valve prolapse)  echo 2018      MR (mitral regurgitation)      Deep vein thrombosis (DVT)      DM (diabetes mellitus)      S/P foot surgery, left  5/2/2018      History of bowel resection  2012      S/P hernia repair  2013      S/P breast augmentation  2014 saline implants      H/O abdominal surgery  1999      S/P knee replacement  Left ( 1/2/2019 ), right TKR, 2019            Allergies    No Known Allergies    Intolerances    OxyContin (Nausea)  hydrocodone (Other; Nausea)  Percocet 5/325 (Other; Nausea)      FAMILY HISTORY:  Family history of heart attack (Father)    Family history of hypertension (Mother)    Family history of diabetes mellitus (Sibling)        Social History:  denies history of smoking. (08 Jan 2024 08:45)      Home Medications:  amLODIPine 2.5 mg oral tablet: 1 tab(s) orally once a day (08 Jan 2024 06:46)  carbidopa-levodopa 25 mg-100 mg oral tablet: 1 tab(s) orally 3 times a day (08 Jan 2024 06:46)  Crestor 10 mg oral tablet: 1 tab(s) orally once a day (at bedtime) (08 Jan 2024 06:46)  hydroCHLOROthiazide 12.5 mg oral tablet: 1 tab(s) orally once a day (08 Jan 2024 06:46)  losartan 100 mg oral tablet: 1 tab(s) orally once a day (05 Jan 2024 09:58)  losartan 100 mg oral tablet: 1 tab(s) orally once a day (08 Jan 2024 06:46)  metFORMIN 1000 mg oral tablet, extended release: 1 tab(s) orally 2 times a day (08 Jan 2024 06:46)  methIMAzole 5 mg oral tablet: 1 tab(s) orally once a day (08 Jan 2024 06:46)  Metoprolol Succinate ER 25 mg oral tablet, extended release: 1 tab(s) orally once a day (08 Jan 2024 06:46)  pantoprazole 40 mg oral delayed release tablet: 1 tab(s) orally once a day (08 Jan 2024 06:46)  Trulicity Pen 0.75 mg/0.5 mL subcutaneous solution: 0.75 milligram(s) subcutaneously once a week (05 Jan 2024 10:19)          CURRENT  MEDICATIONS:   acetaminophen     Tablet .. 1000 milliGRAM(s) Oral every 8 hours  amLODIPine   Tablet 5 milliGRAM(s) Oral daily  atorvastatin 40 milliGRAM(s) Oral at bedtime  bisacodyl 5 milliGRAM(s) Oral every 12 hours PRN  bisacodyl Suppository 10 milliGRAM(s) Rectal daily PRN  carbidopa/levodopa  25/100 1 Tablet(s) Oral three times a day  dextrose 5%. 1000 milliLiter(s) IV Continuous <Continuous>  dextrose 5%. 1000 milliLiter(s) IV Continuous <Continuous>  dextrose 50% Injectable 25 Gram(s) IV Push once  dextrose 50% Injectable 25 Gram(s) IV Push once  dextrose 50% Injectable 12.5 Gram(s) IV Push once  dextrose Oral Gel 15 Gram(s) Oral once PRN  enoxaparin Injectable 40 milliGRAM(s) SubCutaneous every 24 hours  glucagon  Injectable 1 milliGRAM(s) IntraMuscular once  HYDROmorphone   Tablet 2 milliGRAM(s) Oral every 4 hours PRN  HYDROmorphone  Injectable 0.5 milliGRAM(s) IV Push every 15 minutes PRN  HYDROmorphone  Injectable 0.5 milliGRAM(s) IV Push every 4 hours PRN  insulin lispro (ADMELOG) corrective regimen sliding scale   SubCutaneous Before meals and at bedtime  lactated ringers. 1000 milliLiter(s) IV Continuous <Continuous>  magnesium hydroxide Suspension 30 milliLiter(s) Oral every 12 hours PRN  metFORMIN 1000 milliGRAM(s) Oral two times a day  methimazole 5 milliGRAM(s) Oral daily  methocarbamol 500 milliGRAM(s) Oral every 8 hours  metoprolol succinate ER 25 milliGRAM(s) Oral daily  ondansetron   Disintegrating Tablet 4 milliGRAM(s) Oral every 6 hours  pantoprazole    Tablet 40 milliGRAM(s) Oral before breakfast  polyethylene glycol 3350 17 Gram(s) Oral daily  pregabalin 50 milliGRAM(s) Oral three times a day  senna 2 Tablet(s) Oral at bedtime       Diet, Consistent Carbohydrate/No Snacks (01-09-24 @ 09:49) [Active]          VITAL SIGNS, INS/OUTS (last 24 hours):  Vital Signs Last 24 Hrs  T(C): 37.1 (09 Jan 2024 13:25), Max: 37.1 (09 Jan 2024 13:25)  T(F): 98.7 (09 Jan 2024 13:25), Max: 98.7 (09 Jan 2024 13:25)  HR: 76 (09 Jan 2024 13:25) (74 - 98)  BP: 92/52 (09 Jan 2024 13:25) (92/52 - 129/60)  BP(mean): 89 (08 Jan 2024 14:30) (84 - 89)  RR: 16 (09 Jan 2024 13:25) (12 - 20)  SpO2: 97% (09 Jan 2024 13:25) (94% - 99%)    Parameters below as of 09 Jan 2024 13:25  Patient On (Oxygen Delivery Method): room air      I&O's Summary    08 Jan 2024 07:01  -  09 Jan 2024 07:00  --------------------------------------------------------  IN: 2350 mL / OUT: 2750 mL / NET: -400 mL    09 Jan 2024 07:01  -  09 Jan 2024 13:50  --------------------------------------------------------  IN: 700 mL / OUT: 790 mL / NET: -90 mL        Physical Exam   GENERAL: NAD, lying in bed comfortably  EYES: EOMI, PERRLA, conjunctiva and sclera clear  ENT: Moist mucous membranes, no mucosal lesions, normal dentition   NECK: Supple, No JVD  CHEST/LUNG: Clear to auscultation bilaterally; No rales, rhonchi, wheezing, or rubs. Unlabored respirations  HEART: Regular rate and rhythm; No murmurs, rubs, or gallops  ABDOMEN: Bowel sounds present; Soft, Nontender, Nondistended. No hepatomegaly  EXTREMITIES:  2+ Peripheral Pulses,  No clubbing, cyanosis, or edema  NERVOUS SYSTEM:  Alert & Oriented X3, speech clear. No deficits   MSK: FROM all 4 extremities, full and equal strength  SKIN: No rashes or lesions    LABS:                        7.4    6.97  )-----------( 131      ( 09 Jan 2024 05:30 )             23.2     01-09    138  |  104  |  18  ----------------------------<  191<H>  3.7   |  26  |  0.68    Ca    8.4      09 Jan 2024 05:30        Urinalysis Basic - ( 09 Jan 2024 05:30 )    Color: x / Appearance: x / SG: x / pH: x  Gluc: 191 mg/dL / Ketone: x  / Bili: x / Urobili: x   Blood: x / Protein: x / Nitrite: x   Leuk Esterase: x / RBC: x / WBC x   Sq Epi: x / Non Sq Epi: x / Bacteria: x      CAPILLARY BLOOD GLUCOSE      POCT Blood Glucose.: 233 mg/dL (09 Jan 2024 12:01)  POCT Blood Glucose.: 200 mg/dL (09 Jan 2024 07:48)  POCT Blood Glucose.: 257 mg/dL (08 Jan 2024 22:05)  POCT Blood Glucose.: 253 mg/dL (08 Jan 2024 17:06)      OTHER LABS:        MICRODATA:      IMAGING:    EKG:            Patient is a 78y old  Female who presents with a chief complaint of back pain (09 Jan 2024 13:27)      Impression and Plan     #DVT PPx -  #Dispo -      78y old  Female who presents with a chief complaint of back pain (09 Jan 2024 13:27)      HPI:  78F c/o acute on chronic lower back pain x 6 years. She said that the pain began spontaneously and without accident or trauma. She had spine surgery 1 year ago in [place]. Pt reports that her pain has progressively worsened without improvement and has become increasingly unresponsive to conservative management. Pt ambulates with a cane and walker at home. Denies numbness/tingling of extremities,    Reports she had a provoked blood clot in her right lower extremity after a right TKR. She was put on a course of Xarelto.     Denies history of seizures. Denies chest pain, shortness of breath, nausea or vomiting today.      s/p  PSF, Lami, decompression L2-L4.  (08 Jan 2024 08:45)    Resting in bed, does not have any new complaints       PAST MEDICAL & SURGICAL HISTORY:  Hyperthyroidism  monitored  by endocrinologist      HTN (hypertension)      Hyperlipidemia      GERD (gastroesophageal reflux disease)      Parkinson disease      Spinal stenosis  pain relieved with steroid injection      MVP (mitral valve prolapse)  echo 2018      MR (mitral regurgitation)      Deep vein thrombosis (DVT)      DM (diabetes mellitus)      S/P foot surgery, left  5/2/2018      History of bowel resection  2012      S/P hernia repair  2013      S/P breast augmentation  2014 saline implants      H/O abdominal surgery  1999      S/P knee replacement  Left ( 1/2/2019 ), right TKR, 2019            Allergies    No Known Allergies    Intolerances    OxyContin (Nausea)  hydrocodone (Other; Nausea)  Percocet 5/325 (Other; Nausea)      FAMILY HISTORY:  Family history of heart attack (Father)    Family history of hypertension (Mother)    Family history of diabetes mellitus (Sibling)        Social History:  denies history of smoking. (08 Jan 2024 08:45)      Home Medications:  amLODIPine 2.5 mg oral tablet: 1 tab(s) orally once a day (08 Jan 2024 06:46)  carbidopa-levodopa 25 mg-100 mg oral tablet: 1 tab(s) orally 3 times a day (08 Jan 2024 06:46)  Crestor 10 mg oral tablet: 1 tab(s) orally once a day (at bedtime) (08 Jan 2024 06:46)  hydroCHLOROthiazide 12.5 mg oral tablet: 1 tab(s) orally once a day (08 Jan 2024 06:46)  losartan 100 mg oral tablet: 1 tab(s) orally once a day (05 Jan 2024 09:58)  losartan 100 mg oral tablet: 1 tab(s) orally once a day (08 Jan 2024 06:46)  metFORMIN 1000 mg oral tablet, extended release: 1 tab(s) orally 2 times a day (08 Jan 2024 06:46)  methIMAzole 5 mg oral tablet: 1 tab(s) orally once a day (08 Jan 2024 06:46)  Metoprolol Succinate ER 25 mg oral tablet, extended release: 1 tab(s) orally once a day (08 Jan 2024 06:46)  pantoprazole 40 mg oral delayed release tablet: 1 tab(s) orally once a day (08 Jan 2024 06:46)  Trulicity Pen 0.75 mg/0.5 mL subcutaneous solution: 0.75 milligram(s) subcutaneously once a week (05 Jan 2024 10:19)          CURRENT  MEDICATIONS:   acetaminophen     Tablet .. 1000 milliGRAM(s) Oral every 8 hours  amLODIPine   Tablet 5 milliGRAM(s) Oral daily  atorvastatin 40 milliGRAM(s) Oral at bedtime  bisacodyl 5 milliGRAM(s) Oral every 12 hours PRN  bisacodyl Suppository 10 milliGRAM(s) Rectal daily PRN  carbidopa/levodopa  25/100 1 Tablet(s) Oral three times a day  dextrose 5%. 1000 milliLiter(s) IV Continuous <Continuous>  dextrose 5%. 1000 milliLiter(s) IV Continuous <Continuous>  dextrose 50% Injectable 25 Gram(s) IV Push once  dextrose 50% Injectable 25 Gram(s) IV Push once  dextrose 50% Injectable 12.5 Gram(s) IV Push once  dextrose Oral Gel 15 Gram(s) Oral once PRN  enoxaparin Injectable 40 milliGRAM(s) SubCutaneous every 24 hours  glucagon  Injectable 1 milliGRAM(s) IntraMuscular once  HYDROmorphone   Tablet 2 milliGRAM(s) Oral every 4 hours PRN  HYDROmorphone  Injectable 0.5 milliGRAM(s) IV Push every 15 minutes PRN  HYDROmorphone  Injectable 0.5 milliGRAM(s) IV Push every 4 hours PRN  insulin lispro (ADMELOG) corrective regimen sliding scale   SubCutaneous Before meals and at bedtime  lactated ringers. 1000 milliLiter(s) IV Continuous <Continuous>  magnesium hydroxide Suspension 30 milliLiter(s) Oral every 12 hours PRN  metFORMIN 1000 milliGRAM(s) Oral two times a day  methimazole 5 milliGRAM(s) Oral daily  methocarbamol 500 milliGRAM(s) Oral every 8 hours  metoprolol succinate ER 25 milliGRAM(s) Oral daily  ondansetron   Disintegrating Tablet 4 milliGRAM(s) Oral every 6 hours  pantoprazole    Tablet 40 milliGRAM(s) Oral before breakfast  polyethylene glycol 3350 17 Gram(s) Oral daily  pregabalin 50 milliGRAM(s) Oral three times a day  senna 2 Tablet(s) Oral at bedtime       Diet, Consistent Carbohydrate/No Snacks (01-09-24 @ 09:49) [Active]          VITAL SIGNS, INS/OUTS (last 24 hours):  Vital Signs Last 24 Hrs  T(C): 37.1 (09 Jan 2024 13:25), Max: 37.1 (09 Jan 2024 13:25)  T(F): 98.7 (09 Jan 2024 13:25), Max: 98.7 (09 Jan 2024 13:25)  HR: 76 (09 Jan 2024 13:25) (74 - 98)  BP: 92/52 (09 Jan 2024 13:25) (92/52 - 129/60)  BP(mean): 89 (08 Jan 2024 14:30) (84 - 89)  RR: 16 (09 Jan 2024 13:25) (12 - 20)  SpO2: 97% (09 Jan 2024 13:25) (94% - 99%)    Parameters below as of 09 Jan 2024 13:25  Patient On (Oxygen Delivery Method): room air      I&O's Summary    08 Jan 2024 07:01  -  09 Jan 2024 07:00  --------------------------------------------------------  IN: 2350 mL / OUT: 2750 mL / NET: -400 mL    09 Jan 2024 07:01  -  09 Jan 2024 13:50  --------------------------------------------------------  IN: 700 mL / OUT: 790 mL / NET: -90 mL        Physical Exam   GENERAL: NAD, lying in bed comfortably  EYES: EOMI, PERRLA, conjunctiva and sclera clear  ENT: Moist mucous membranes, no mucosal lesions, normal dentition   NECK: Supple, No JVD  CHEST/LUNG: Clear to auscultation bilaterally; No rales, rhonchi, wheezing, or rubs. Unlabored respirations  HEART: Regular rate and rhythm; No murmurs, rubs, or gallops  ABDOMEN: Bowel sounds present; Soft, Nontender, Nondistended. No hepatomegaly  EXTREMITIES:  2+ Peripheral Pulses,  No clubbing, cyanosis, or edema  NERVOUS SYSTEM:  Alert & Oriented X3, speech clear. No deficits   MSK: FROM all 4 extremities, full and equal strength  SKIN: No rashes or lesions    LABS:                        7.4    6.97  )-----------( 131      ( 09 Jan 2024 05:30 )             23.2     01-09    138  |  104  |  18  ----------------------------<  191<H>  3.7   |  26  |  0.68    Ca    8.4      09 Jan 2024 05:30        Urinalysis Basic - ( 09 Jan 2024 05:30 )    Color: x / Appearance: x / SG: x / pH: x  Gluc: 191 mg/dL / Ketone: x  / Bili: x / Urobili: x   Blood: x / Protein: x / Nitrite: x   Leuk Esterase: x / RBC: x / WBC x   Sq Epi: x / Non Sq Epi: x / Bacteria: x      CAPILLARY BLOOD GLUCOSE      POCT Blood Glucose.: 233 mg/dL (09 Jan 2024 12:01)  POCT Blood Glucose.: 200 mg/dL (09 Jan 2024 07:48)  POCT Blood Glucose.: 257 mg/dL (08 Jan 2024 22:05)  POCT Blood Glucose.: 253 mg/dL (08 Jan 2024 17:06)      OTHER LABS:        MICRODATA:      IMAGING:    EKG:            Patient is a 78y old  Female who presents with a chief complaint of back pain (09 Jan 2024 13:27)      Impression and Plan     #DVT PPx -  #Dispo -      78y old  Female who presents with a chief complaint of back pain (09 Jan 2024 13:27)      HPI:  78F c/o acute on chronic lower back pain x 6 years. She said that the pain began spontaneously and without accident or trauma. She had spine surgery 1 year ago in [place]. Pt reports that her pain has progressively worsened without improvement and has become increasingly unresponsive to conservative management. Pt ambulates with a cane and walker at home. Denies numbness/tingling of extremities,    Reports she had a provoked blood clot in her right lower extremity after a right TKR. She was put on a course of Xarelto.     Denies history of seizures. Denies chest pain, shortness of breath, nausea or vomiting today.      s/p  PSF, Lami, decompression L2-L4.  (08 Jan 2024 08:45)    Resting in bed, does not have any new complaints       PAST MEDICAL & SURGICAL HISTORY:  Hyperthyroidism  monitored  by endocrinologist      HTN (hypertension)      Hyperlipidemia      GERD (gastroesophageal reflux disease)      Parkinson disease      Spinal stenosis  pain relieved with steroid injection      MVP (mitral valve prolapse)  echo 2018      MR (mitral regurgitation)      Deep vein thrombosis (DVT)      DM (diabetes mellitus)      S/P foot surgery, left  5/2/2018      History of bowel resection  2012      S/P hernia repair  2013      S/P breast augmentation  2014 saline implants      H/O abdominal surgery  1999      S/P knee replacement  Left ( 1/2/2019 ), right TKR, 2019            Allergies    No Known Allergies    Intolerances    OxyContin (Nausea)  hydrocodone (Other; Nausea)  Percocet 5/325 (Other; Nausea)      FAMILY HISTORY:  Family history of heart attack (Father)    Family history of hypertension (Mother)    Family history of diabetes mellitus (Sibling)        Social History:  denies history of smoking. (08 Jan 2024 08:45)      Home Medications:  amLODIPine 2.5 mg oral tablet: 1 tab(s) orally once a day (08 Jan 2024 06:46)  carbidopa-levodopa 25 mg-100 mg oral tablet: 1 tab(s) orally 3 times a day (08 Jan 2024 06:46)  Crestor 10 mg oral tablet: 1 tab(s) orally once a day (at bedtime) (08 Jan 2024 06:46)  hydroCHLOROthiazide 12.5 mg oral tablet: 1 tab(s) orally once a day (08 Jan 2024 06:46)  losartan 100 mg oral tablet: 1 tab(s) orally once a day (05 Jan 2024 09:58)  losartan 100 mg oral tablet: 1 tab(s) orally once a day (08 Jan 2024 06:46)  metFORMIN 1000 mg oral tablet, extended release: 1 tab(s) orally 2 times a day (08 Jan 2024 06:46)  methIMAzole 5 mg oral tablet: 1 tab(s) orally once a day (08 Jan 2024 06:46)  Metoprolol Succinate ER 25 mg oral tablet, extended release: 1 tab(s) orally once a day (08 Jan 2024 06:46)  pantoprazole 40 mg oral delayed release tablet: 1 tab(s) orally once a day (08 Jan 2024 06:46)  Trulicity Pen 0.75 mg/0.5 mL subcutaneous solution: 0.75 milligram(s) subcutaneously once a week (05 Jan 2024 10:19)          CURRENT  MEDICATIONS:   acetaminophen     Tablet .. 1000 milliGRAM(s) Oral every 8 hours  amLODIPine   Tablet 5 milliGRAM(s) Oral daily  atorvastatin 40 milliGRAM(s) Oral at bedtime  bisacodyl 5 milliGRAM(s) Oral every 12 hours PRN  bisacodyl Suppository 10 milliGRAM(s) Rectal daily PRN  carbidopa/levodopa  25/100 1 Tablet(s) Oral three times a day  dextrose 5%. 1000 milliLiter(s) IV Continuous <Continuous>  dextrose 5%. 1000 milliLiter(s) IV Continuous <Continuous>  dextrose 50% Injectable 25 Gram(s) IV Push once  dextrose 50% Injectable 25 Gram(s) IV Push once  dextrose 50% Injectable 12.5 Gram(s) IV Push once  dextrose Oral Gel 15 Gram(s) Oral once PRN  enoxaparin Injectable 40 milliGRAM(s) SubCutaneous every 24 hours  glucagon  Injectable 1 milliGRAM(s) IntraMuscular once  HYDROmorphone   Tablet 2 milliGRAM(s) Oral every 4 hours PRN  HYDROmorphone  Injectable 0.5 milliGRAM(s) IV Push every 15 minutes PRN  HYDROmorphone  Injectable 0.5 milliGRAM(s) IV Push every 4 hours PRN  insulin lispro (ADMELOG) corrective regimen sliding scale   SubCutaneous Before meals and at bedtime  lactated ringers. 1000 milliLiter(s) IV Continuous <Continuous>  magnesium hydroxide Suspension 30 milliLiter(s) Oral every 12 hours PRN  metFORMIN 1000 milliGRAM(s) Oral two times a day  methimazole 5 milliGRAM(s) Oral daily  methocarbamol 500 milliGRAM(s) Oral every 8 hours  metoprolol succinate ER 25 milliGRAM(s) Oral daily  ondansetron   Disintegrating Tablet 4 milliGRAM(s) Oral every 6 hours  pantoprazole    Tablet 40 milliGRAM(s) Oral before breakfast  polyethylene glycol 3350 17 Gram(s) Oral daily  pregabalin 50 milliGRAM(s) Oral three times a day  senna 2 Tablet(s) Oral at bedtime       Diet, Consistent Carbohydrate/No Snacks (01-09-24 @ 09:49) [Active]          VITAL SIGNS, INS/OUTS (last 24 hours):  Vital Signs Last 24 Hrs  T(C): 37.1 (09 Jan 2024 13:25), Max: 37.1 (09 Jan 2024 13:25)  T(F): 98.7 (09 Jan 2024 13:25), Max: 98.7 (09 Jan 2024 13:25)  HR: 76 (09 Jan 2024 13:25) (74 - 98)  BP: 92/52 (09 Jan 2024 13:25) (92/52 - 129/60)  BP(mean): 89 (08 Jan 2024 14:30) (84 - 89)  RR: 16 (09 Jan 2024 13:25) (12 - 20)  SpO2: 97% (09 Jan 2024 13:25) (94% - 99%)    Parameters below as of 09 Jan 2024 13:25  Patient On (Oxygen Delivery Method): room air      I&O's Summary    08 Jan 2024 07:01  -  09 Jan 2024 07:00  --------------------------------------------------------  IN: 2350 mL / OUT: 2750 mL / NET: -400 mL    09 Jan 2024 07:01  -  09 Jan 2024 13:50  --------------------------------------------------------  IN: 700 mL / OUT: 790 mL / NET: -90 mL        Physical Exam   GENERAL: NAD, lying in bed comfortably  EYES: EOMI, PERRLA, conjunctiva and sclera clear  ENT: Moist mucous membranes, no mucosal lesions, normal dentition   NECK: Supple, No JVD  CHEST/LUNG: Clear to auscultation bilaterally; No rales, rhonchi, wheezing, or rubs. Unlabored respirations  HEART: Regular rate and rhythm; No murmurs, rubs, or gallops  ABDOMEN: Bowel sounds present; Soft, Nontender, Nondistended. No hepatomegaly  EXTREMITIES:  2+ Peripheral Pulses,  No clubbing, cyanosis, or edema  NERVOUS SYSTEM:  Alert & Oriented X3, speech clear. No deficits   MSK: FROM all 4 extremities, full and equal strength  SKIN: No rashes or lesions    LABS:                        7.4    6.97  )-----------( 131      ( 09 Jan 2024 05:30 )             23.2     01-09    138  |  104  |  18  ----------------------------<  191<H>  3.7   |  26  |  0.68    Ca    8.4      09 Jan 2024 05:30        Urinalysis Basic - ( 09 Jan 2024 05:30 )    Color: x / Appearance: x / SG: x / pH: x  Gluc: 191 mg/dL / Ketone: x  / Bili: x / Urobili: x   Blood: x / Protein: x / Nitrite: x   Leuk Esterase: x / RBC: x / WBC x   Sq Epi: x / Non Sq Epi: x / Bacteria: x      CAPILLARY BLOOD GLUCOSE      POCT Blood Glucose.: 233 mg/dL (09 Jan 2024 12:01)  POCT Blood Glucose.: 200 mg/dL (09 Jan 2024 07:48)  POCT Blood Glucose.: 257 mg/dL (08 Jan 2024 22:05)  POCT Blood Glucose.: 253 mg/dL (08 Jan 2024 17:06)                       78y old  Female who presents with a chief complaint of back pain (09 Jan 2024 13:27)      HPI:  78F c/o acute on chronic lower back pain x 6 years. She said that the pain began spontaneously and without accident or trauma. She had spine surgery 1 year ago in [place]. Pt reports that her pain has progressively worsened without improvement and has become increasingly unresponsive to conservative management. Pt ambulates with a cane and walker at home. Denies numbness/tingling of extremities,    Reports she had a provoked blood clot in her right lower extremity after a right TKR. She was put on a course of Xarelto.     Denies history of seizures. Denies chest pain, shortness of breath, nausea or vomiting today.      s/p  PSF, Lami, decompression L2-L4.  (08 Jan 2024 08:45)    Resting in bed, does not have any new complaints       PAST MEDICAL & SURGICAL HISTORY:  Hyperthyroidism  monitored  by endocrinologist      HTN (hypertension)      Hyperlipidemia      GERD (gastroesophageal reflux disease)      Parkinson disease      Spinal stenosis  pain relieved with steroid injection      MVP (mitral valve prolapse)  echo 2018      MR (mitral regurgitation)      Deep vein thrombosis (DVT)      DM (diabetes mellitus)      S/P foot surgery, left  5/2/2018      History of bowel resection  2012      S/P hernia repair  2013      S/P breast augmentation  2014 saline implants      H/O abdominal surgery  1999      S/P knee replacement  Left ( 1/2/2019 ), right TKR, 2019            Allergies    No Known Allergies    Intolerances    OxyContin (Nausea)  hydrocodone (Other; Nausea)  Percocet 5/325 (Other; Nausea)      FAMILY HISTORY:  Family history of heart attack (Father)    Family history of hypertension (Mother)    Family history of diabetes mellitus (Sibling)        Social History:  denies history of smoking. (08 Jan 2024 08:45)      Home Medications:  amLODIPine 2.5 mg oral tablet: 1 tab(s) orally once a day (08 Jan 2024 06:46)  carbidopa-levodopa 25 mg-100 mg oral tablet: 1 tab(s) orally 3 times a day (08 Jan 2024 06:46)  Crestor 10 mg oral tablet: 1 tab(s) orally once a day (at bedtime) (08 Jan 2024 06:46)  hydroCHLOROthiazide 12.5 mg oral tablet: 1 tab(s) orally once a day (08 Jan 2024 06:46)  losartan 100 mg oral tablet: 1 tab(s) orally once a day (05 Jan 2024 09:58)  losartan 100 mg oral tablet: 1 tab(s) orally once a day (08 Jan 2024 06:46)  metFORMIN 1000 mg oral tablet, extended release: 1 tab(s) orally 2 times a day (08 Jan 2024 06:46)  methIMAzole 5 mg oral tablet: 1 tab(s) orally once a day (08 Jan 2024 06:46)  Metoprolol Succinate ER 25 mg oral tablet, extended release: 1 tab(s) orally once a day (08 Jan 2024 06:46)  pantoprazole 40 mg oral delayed release tablet: 1 tab(s) orally once a day (08 Jan 2024 06:46)  Trulicity Pen 0.75 mg/0.5 mL subcutaneous solution: 0.75 milligram(s) subcutaneously once a week (05 Jan 2024 10:19)          CURRENT  MEDICATIONS:   acetaminophen     Tablet .. 1000 milliGRAM(s) Oral every 8 hours  amLODIPine   Tablet 5 milliGRAM(s) Oral daily  atorvastatin 40 milliGRAM(s) Oral at bedtime  bisacodyl 5 milliGRAM(s) Oral every 12 hours PRN  bisacodyl Suppository 10 milliGRAM(s) Rectal daily PRN  carbidopa/levodopa  25/100 1 Tablet(s) Oral three times a day  dextrose 5%. 1000 milliLiter(s) IV Continuous <Continuous>  dextrose 5%. 1000 milliLiter(s) IV Continuous <Continuous>  dextrose 50% Injectable 25 Gram(s) IV Push once  dextrose 50% Injectable 25 Gram(s) IV Push once  dextrose 50% Injectable 12.5 Gram(s) IV Push once  dextrose Oral Gel 15 Gram(s) Oral once PRN  enoxaparin Injectable 40 milliGRAM(s) SubCutaneous every 24 hours  glucagon  Injectable 1 milliGRAM(s) IntraMuscular once  HYDROmorphone   Tablet 2 milliGRAM(s) Oral every 4 hours PRN  HYDROmorphone  Injectable 0.5 milliGRAM(s) IV Push every 15 minutes PRN  HYDROmorphone  Injectable 0.5 milliGRAM(s) IV Push every 4 hours PRN  insulin lispro (ADMELOG) corrective regimen sliding scale   SubCutaneous Before meals and at bedtime  lactated ringers. 1000 milliLiter(s) IV Continuous <Continuous>  magnesium hydroxide Suspension 30 milliLiter(s) Oral every 12 hours PRN  metFORMIN 1000 milliGRAM(s) Oral two times a day  methimazole 5 milliGRAM(s) Oral daily  methocarbamol 500 milliGRAM(s) Oral every 8 hours  metoprolol succinate ER 25 milliGRAM(s) Oral daily  ondansetron   Disintegrating Tablet 4 milliGRAM(s) Oral every 6 hours  pantoprazole    Tablet 40 milliGRAM(s) Oral before breakfast  polyethylene glycol 3350 17 Gram(s) Oral daily  pregabalin 50 milliGRAM(s) Oral three times a day  senna 2 Tablet(s) Oral at bedtime       Diet, Consistent Carbohydrate/No Snacks (01-09-24 @ 09:49) [Active]          VITAL SIGNS, INS/OUTS (last 24 hours):  Vital Signs Last 24 Hrs  T(C): 37.1 (09 Jan 2024 13:25), Max: 37.1 (09 Jan 2024 13:25)  T(F): 98.7 (09 Jan 2024 13:25), Max: 98.7 (09 Jan 2024 13:25)  HR: 76 (09 Jan 2024 13:25) (74 - 98)  BP: 92/52 (09 Jan 2024 13:25) (92/52 - 129/60)  BP(mean): 89 (08 Jan 2024 14:30) (84 - 89)  RR: 16 (09 Jan 2024 13:25) (12 - 20)  SpO2: 97% (09 Jan 2024 13:25) (94% - 99%)    Parameters below as of 09 Jan 2024 13:25  Patient On (Oxygen Delivery Method): room air      I&O's Summary    08 Jan 2024 07:01  -  09 Jan 2024 07:00  --------------------------------------------------------  IN: 2350 mL / OUT: 2750 mL / NET: -400 mL    09 Jan 2024 07:01  -  09 Jan 2024 13:50  --------------------------------------------------------  IN: 700 mL / OUT: 790 mL / NET: -90 mL        Physical Exam   GENERAL: NAD, lying in bed comfortably  EYES: EOMI, PERRLA, conjunctiva and sclera clear  ENT: Moist mucous membranes, no mucosal lesions, normal dentition   NECK: Supple, No JVD  CHEST/LUNG: Clear to auscultation bilaterally; No rales, rhonchi, wheezing, or rubs. Unlabored respirations  HEART: Regular rate and rhythm; No murmurs, rubs, or gallops  ABDOMEN: Bowel sounds present; Soft, Nontender, Nondistended. No hepatomegaly  EXTREMITIES:  2+ Peripheral Pulses,  No clubbing, cyanosis, or edema  NERVOUS SYSTEM:  Alert & Oriented X3, speech clear. No deficits   MSK: FROM all 4 extremities, full and equal strength  SKIN: No rashes or lesions    LABS:                        7.4    6.97  )-----------( 131      ( 09 Jan 2024 05:30 )             23.2     01-09    138  |  104  |  18  ----------------------------<  191<H>  3.7   |  26  |  0.68    Ca    8.4      09 Jan 2024 05:30        Urinalysis Basic - ( 09 Jan 2024 05:30 )    Color: x / Appearance: x / SG: x / pH: x  Gluc: 191 mg/dL / Ketone: x  / Bili: x / Urobili: x   Blood: x / Protein: x / Nitrite: x   Leuk Esterase: x / RBC: x / WBC x   Sq Epi: x / Non Sq Epi: x / Bacteria: x      CAPILLARY BLOOD GLUCOSE      POCT Blood Glucose.: 233 mg/dL (09 Jan 2024 12:01)  POCT Blood Glucose.: 200 mg/dL (09 Jan 2024 07:48)  POCT Blood Glucose.: 257 mg/dL (08 Jan 2024 22:05)  POCT Blood Glucose.: 253 mg/dL (08 Jan 2024 17:06)

## 2024-01-09 NOTE — PROGRESS NOTE ADULT - SUBJECTIVE AND OBJECTIVE BOX
Ortho Note    Pt seen and examined. Comfortable without complaints, pain controlled  Denies CP, SOB, N/V, numbness/tingling. Reports feeling well today and is happy about her results of the procedure so far.     Vital Signs Last 24 Hrs  T(C): 36.9 (01-09-24 @ 12:10), Max: 36.9 (01-09-24 @ 12:10)  T(F): 98.5 (01-09-24 @ 12:10), Max: 98.5 (01-09-24 @ 12:10)  HR: 74 (01-09-24 @ 12:10) (74 - 80)  BP: 97/56 (01-09-24 @ 12:10) (97/56 - 122/60)  BP(mean): --  RR: 17 (01-09-24 @ 12:10) (16 - 17)  SpO2: 97% (01-09-24 @ 12:10) (94% - 97%)  AVSS    General: Pt Alert and oriented, NAD  DSG- gauze/ abd/ paper tape C/D/I, HV x 1 in place  Pulses: +2DP, WWP feet  Sensation: SILT BLE  Motor: 5/5 EHL/FHL/TA/GS BLE                          7.4    6.97  )-----------( 131      ( 09 Jan 2024 05:30 )             23.2     01-09    138  |  104  |  18  ----------------------------<  191<H>  3.7   |  26  |  0.68    Ca    8.4      09 Jan 2024 05:30        A/P: 78yFemale POD#1 s/p L2-pelvis PSF, L5-S1 TLIF  - chronic iron deficiency anemia (diagnosed with outpatient labs) with acute blood loss anemia- Hgb 7.4. Will transfuse 1u PRBCs today, f/u repeat CBC this evening  - hyperglycemia- likely r/t steroids given in OR + having been off home meds; as per endocrinology verbal recs- will resume her metformin today, as well as tradjenta in place of trulicity; diet changed to consistent carb no snacks  - Pain Control  - DVT ppx: LVX 40mg subq daily to start tonight  - PT, WBS: WBAT, OT consult  - OOB for meals, I/S  - bowel regimen  - dispo: [facility] pending acceptance and auth    Ortho Pager [phone] Ortho Note    Pt seen and examined. Comfortable without complaints, pain controlled  Denies CP, SOB, N/V, numbness/tingling. Reports feeling well today and is happy about her results of the procedure so far.     Vital Signs Last 24 Hrs  T(C): 36.9 (01-09-24 @ 12:10), Max: 36.9 (01-09-24 @ 12:10)  T(F): 98.5 (01-09-24 @ 12:10), Max: 98.5 (01-09-24 @ 12:10)  HR: 74 (01-09-24 @ 12:10) (74 - 80)  BP: 97/56 (01-09-24 @ 12:10) (97/56 - 122/60)  BP(mean): --  RR: 17 (01-09-24 @ 12:10) (16 - 17)  SpO2: 97% (01-09-24 @ 12:10) (94% - 97%)  AVSS    General: Pt Alert and oriented, NAD  DSG- gauze/ abd/ paper tape C/D/I, HV x 1 in place  Pulses: +2DP, WWP feet  Sensation: SILT BLE  Motor: 5/5 EHL/FHL/TA/GS BLE                          7.4    6.97  )-----------( 131      ( 09 Jan 2024 05:30 )             23.2     01-09    138  |  104  |  18  ----------------------------<  191<H>  3.7   |  26  |  0.68    Ca    8.4      09 Jan 2024 05:30        A/P: 78yFemale POD#1 s/p L2-pelvis PSF, L5-S1 TLIF  - chronic iron deficiency anemia (diagnosed with outpatient labs) with acute blood loss anemia- Hgb 7.4. Will transfuse 1u PRBCs today, f/u repeat CBC this evening  - hyperglycemia- likely r/t steroids given in OR + having been off home meds; as per endocrinology verbal recs- will resume her metformin today, as well as tradjenta in place of trulicity; diet changed to consistent carb no snacks  - Pain Control  - DVT ppx: LVX 40mg subq daily to start tonight  - PT, WBS: WBAT, OT consult  - OOB for meals, I/S  - bowel regimen  - dispo: [facility] pending acceptance and auth    Ortho Pager [phone] Ortho Note    Pt seen and examined. Comfortable without complaints, pain controlled  Denies CP, SOB, N/V, numbness/tingling. Reports feeling well today and is happy about her results of the procedure so far.     Vital Signs Last 24 Hrs  T(C): 36.9 (01-09-24 @ 12:10), Max: 36.9 (01-09-24 @ 12:10)  T(F): 98.5 (01-09-24 @ 12:10), Max: 98.5 (01-09-24 @ 12:10)  HR: 74 (01-09-24 @ 12:10) (74 - 80)  BP: 97/56 (01-09-24 @ 12:10) (97/56 - 122/60)  BP(mean): --  RR: 17 (01-09-24 @ 12:10) (16 - 17)  SpO2: 97% (01-09-24 @ 12:10) (94% - 97%)  AVSS    General: Pt Alert and oriented, NAD  DSG- gauze/ abd/ paper tape C/D/I, HV x 1 in place  Pulses: +2DP, WWP feet  Sensation: SILT BLE  Motor: 5/5 EHL/FHL/TA/GS BLE                          7.4    6.97  )-----------( 131      ( 09 Jan 2024 05:30 )             23.2     01-09    138  |  104  |  18  ----------------------------<  191<H>  3.7   |  26  |  0.68    Ca    8.4      09 Jan 2024 05:30        A/P: 78yFemale POD#1 s/p L2-pelvis PSF, L5-S1 TLIF  - chronic iron deficiency anemia (diagnosed with outpatient labs) with acute blood loss anemia- Hgb 7.4. Will transfuse 1u PRBCs today, f/u repeat CBC this evening  - hyperglycemia- likely r/t steroids given in OR + having been off home meds; as per endocrinology verbal recs- will resume her metformin today, as well as tradjenta in place of trulicity; diet changed to consistent carb no snacks  - Pain Control  - DVT ppx: LVX 40mg subq daily to start tonight  - PT, WBS: WBAT, OT consult  - OOB for meals, I/S  - bowel regimen  - remove aquino for TOV  - monitor HV x 1 output  - dispo: [facility] pending acceptance and auth    Ortho Pager [phone] Ortho Note    Pt seen and examined. Comfortable without complaints, pain controlled  Denies CP, SOB, N/V, numbness/tingling. Reports feeling well today and is happy about her results of the procedure so far.     Vital Signs Last 24 Hrs  T(C): 36.9 (01-09-24 @ 12:10), Max: 36.9 (01-09-24 @ 12:10)  T(F): 98.5 (01-09-24 @ 12:10), Max: 98.5 (01-09-24 @ 12:10)  HR: 74 (01-09-24 @ 12:10) (74 - 80)  BP: 97/56 (01-09-24 @ 12:10) (97/56 - 122/60)  BP(mean): --  RR: 17 (01-09-24 @ 12:10) (16 - 17)  SpO2: 97% (01-09-24 @ 12:10) (94% - 97%)  AVSS    General: Pt Alert and oriented, NAD  DSG- gauze/ abd/ paper tape C/D/I, HV x 1 in place  Pulses: +2DP, WWP feet  Sensation: SILT BLE  Motor: 5/5 EHL/FHL/TA/GS BLE                          7.4    6.97  )-----------( 131      ( 09 Jan 2024 05:30 )             23.2     01-09    138  |  104  |  18  ----------------------------<  191<H>  3.7   |  26  |  0.68    Ca    8.4      09 Jan 2024 05:30        A/P: 78yFemale POD#1 s/p L2-pelvis PSF, L5-S1 TLIF  - chronic iron deficiency anemia (diagnosed with outpatient labs) with acute blood loss anemia- Hgb 7.4. Will transfuse 1u PRBCs today, f/u repeat CBC this evening  - hyperglycemia- likely r/t steroids given in OR + having been off home meds; as per endocrinology verbal recs- will resume her metformin today, as well as tradjenta in place of trulicity; diet changed to consistent carb no snacks  - Pain Control  - DVT ppx: LVX 40mg subq daily to start tonight  - PT, WBS: WBAT, OT consult  - OOB for meals, I/S  - bowel regimen  - remove aquino for TOV  - monitor HV x 1 output  - dispo: [facility] pending acceptance and auth    Ortho Pager [phone] Ortho Note    Pt seen and examined. Comfortable without complaints, pain controlled  Denies CP, SOB, N/V, numbness/tingling. Reports feeling well today and is happy about her results of the procedure so far.     Vital Signs Last 24 Hrs  T(C): 36.9 (01-09-24 @ 12:10), Max: 36.9 (01-09-24 @ 12:10)  T(F): 98.5 (01-09-24 @ 12:10), Max: 98.5 (01-09-24 @ 12:10)  HR: 74 (01-09-24 @ 12:10) (74 - 80)  BP: 97/56 (01-09-24 @ 12:10) (97/56 - 122/60)  BP(mean): --  RR: 17 (01-09-24 @ 12:10) (16 - 17)  SpO2: 97% (01-09-24 @ 12:10) (94% - 97%)  AVSS    General: Pt Alert and oriented, NAD  DSG- gauze/ abd/ paper tape C/D/I, HV x 1 in place  Pulses: +2DP, WWP feet  Sensation: SILT BLE  Motor: 5/5 EHL/FHL/TA/GS BLE                          7.4    6.97  )-----------( 131      ( 09 Jan 2024 05:30 )             23.2     01-09    138  |  104  |  18  ----------------------------<  191<H>  3.7   |  26  |  0.68    Ca    8.4      09 Jan 2024 05:30        A/P: 78yFemale POD#1 s/p L2-pelvis PSF, L5-S1 TLIF  - chronic iron deficiency anemia (diagnosed with outpatient labs) with acute blood loss anemia- Hgb 7.4. Will transfuse 1u PRBCs today, f/u repeat CBC this evening; IV iron q24h x 3 doses as per medicine recs  - hyperglycemia- likely r/t steroids given in OR + having been off home meds; as per endocrinology verbal recs- will resume her metformin today, as well as tradjenta in place of trulicity; diet changed to consistent carb no snacks  - Pain Control  - DVT ppx: LVX 40mg subq daily to start tonight  - PT, WBS: WBAT, OT consult  - OOB for meals, I/S  - bowel regimen  - remove aquino for TOV  - monitor HV x 1 output  - dispo: [facility] pending acceptance and auth    Ortho Pager [phone] Ortho Note    Pt seen and examined. Comfortable without complaints, pain controlled  Denies CP, SOB, N/V, numbness/tingling. Reports feeling well today and is happy about her results of the procedure so far.     Vital Signs Last 24 Hrs  T(C): 36.9 (01-09-24 @ 12:10), Max: 36.9 (01-09-24 @ 12:10)  T(F): 98.5 (01-09-24 @ 12:10), Max: 98.5 (01-09-24 @ 12:10)  HR: 74 (01-09-24 @ 12:10) (74 - 80)  BP: 97/56 (01-09-24 @ 12:10) (97/56 - 122/60)  BP(mean): --  RR: 17 (01-09-24 @ 12:10) (16 - 17)  SpO2: 97% (01-09-24 @ 12:10) (94% - 97%)  AVSS    General: Pt Alert and oriented, NAD  DSG- gauze/ abd/ paper tape C/D/I, HV x 1 in place  Pulses: +2DP, WWP feet  Sensation: SILT BLE  Motor: 5/5 EHL/FHL/TA/GS BLE                          7.4    6.97  )-----------( 131      ( 09 Jan 2024 05:30 )             23.2     01-09    138  |  104  |  18  ----------------------------<  191<H>  3.7   |  26  |  0.68    Ca    8.4      09 Jan 2024 05:30        A/P: 78yFemale POD#1 s/p L2-pelvis PSF, L5-S1 TLIF  - chronic iron deficiency anemia (diagnosed with outpatient labs) with acute blood loss anemia- Hgb 7.4. Will transfuse 1u PRBCs today, f/u repeat CBC this evening; IV iron q24h x 3 doses as per medicine recs  - hyperglycemia- likely r/t steroids given in OR + having been off home meds; as per endocrinology verbal recs- will resume her metformin today, as well as tradjenta in place of trulicity; diet changed to consistent carb no snacks  - Pain Control  - DVT ppx: LVX 40mg subq daily to start tonight  - PT, WBS: WBAT, OT consult  - OOB for meals, I/S  - bowel regimen  - remove aquino for TOV  - monitor HV x 1 output  - dispo: [facility] pending acceptance and auth    Ortho Pager [phone]

## 2024-01-09 NOTE — OCCUPATIONAL THERAPY INITIAL EVALUATION ADULT - DIAGNOSIS, OT EVAL
Patient POD #1 s/p L2-Pelvis PSF, L5-S1 TLIF presents with spinal precautions, presents with BUE intention tremors, decreased overall strength, balance, postural control and activity tolerance impacting independence with functional activities and mobility.

## 2024-01-09 NOTE — OCCUPATIONAL THERAPY INITIAL EVALUATION ADULT - ADDITIONAL COMMENTS
Patient reports living alone in an elevator access apartment building with 3 FLORENTINO from the garage she comes in from. Patient was independent with all ADL's, IADL's and functional mobility with no AD. Patient is R hand dominant and wears glasses. Patient has a bathtub shower with shower chair and grab bars. Patient also owns a SC, raised toilet seat, rollator and electric bed. The last couple of weeks patient has been using the SC 2/2 weakness.

## 2024-01-09 NOTE — OCCUPATIONAL THERAPY INITIAL EVALUATION ADULT - GENERAL OBSERVATIONS, REHAB EVAL
Patient A&Ox4, agreeable to OT evaluation. Patient received semisupine in bed +tele, +heplock IV, +b/l SCD's, +back dressing C/D/I, +hemovac, room air, NAD.

## 2024-01-09 NOTE — PROGRESS NOTE ADULT - SUBJECTIVE AND OBJECTIVE BOX
Orthopaedic Spine Resident Progress Note    S: no acute overnight events.  Pain controlled.  No fevers/chills/shortness of breath/chest pain/new neurologic complaints.    O:  T(C): 36.7 (01-09-24 @ 01:57), Max: 36.8 (01-08-24 @ 21:59)  HR: 75 (01-09-24 @ 01:57) (75 - 98)  BP: 101/54 (01-09-24 @ 01:57) (101/54 - 136/63)  RR: 16 (01-09-24 @ 01:57) (12 - 20)  SpO2: 95% (01-09-24 @ 01:57) (95% - 99%)  Wt(kg): --    Physical Exam:    GEN: NAD, A and O x 3  Incision: Dressing c/d/i  HV x 1   Cartwright in place     Motor:  RUE:   Delt 5/5; bicep 5/5; tricep 5/5; WF 5/5; WE 5/5;  5/5; EPL 5/5; palmar intrinsics 5/5    RLE:  Q 5/5; HS 5/5; TA 5/5; GS 5/5; EHL 5/5; FHL 5/5    LUE:  Delt 5/5; bicep 5/5; tricep 5/5; WF 5/5; WE 5/5;  5/5; EPL 5/5; palmar intrinsics 5/5    LLE:  Q 5/5; HS 5/5; TA 5/5; GS 5/5; EHL 5/5; FHL 5/5    Sensory:  Sensation intact to light touch in C5-T1 dermatomes bilaterally    Sensation intact to light touch in L2-S1 dermatomes bilaterally    Vascular:  No edema, calf tenderness, wwp, peripherap pulses +2 bilaterally        Labs:                        7.4    6.97  )-----------( 131      ( 09 Jan 2024 05:30 )             23.2                   78yFemale s/p L2-Pelvis PSF, L5-S1 TLIF by Dr Bahena  -mobilize, OOB  -PT, WBS: WBAT  -DVT ppx: SCDs  -Abx  -Drains - 1 x HV - maintain on 1/9   - Cartwright - d/c once ambulatory today 1/9   -nausea control/bowel regimen  -c/w home meds  -Pain control  -Diet  -Dispo planning - pending PT eval  - monitor sugars i/s/o DM, f/u med recs     Ortho pager [phone] Orthopaedic Spine Resident Progress Note    S: no acute overnight events.  Pain controlled.  No fevers/chills/shortness of breath/chest pain/new neurologic complaints.    O:  T(C): 36.7 (01-09-24 @ 01:57), Max: 36.8 (01-08-24 @ 21:59)  HR: 75 (01-09-24 @ 01:57) (75 - 98)  BP: 101/54 (01-09-24 @ 01:57) (101/54 - 136/63)  RR: 16 (01-09-24 @ 01:57) (12 - 20)  SpO2: 95% (01-09-24 @ 01:57) (95% - 99%)  Wt(kg): --    Physical Exam:    GEN: NAD, A and O x 3  Incision: Dressing c/d/i  HV x 1   Cartwright in place     Motor:  RUE:   Delt 5/5; bicep 5/5; tricep 5/5; WF 5/5; WE 5/5;  5/5; EPL 5/5; palmar intrinsics 5/5    RLE:  Q 5/5; HS 5/5; TA 5/5; GS 5/5; EHL 5/5; FHL 5/5    LUE:  Delt 5/5; bicep 5/5; tricep 5/5; WF 5/5; WE 5/5;  5/5; EPL 5/5; palmar intrinsics 5/5    LLE:  Q 5/5; HS 5/5; TA 5/5; GS 5/5; EHL 5/5; FHL 5/5    Sensory:  Sensation intact to light touch in C5-T1 dermatomes bilaterally    Sensation intact to light touch in L2-S1 dermatomes bilaterally    Vascular:  No edema, calf tenderness, wwp, peripherap pulses +2 bilaterally        Labs:                        7.4    6.97  )-----------( 131      ( 09 Jan 2024 05:30 )             23.2                   78yFemale s/p L2-Pelvis PSF, L5-S1 TLIF by Dr Bahena  -mobilize, OOB  -PT, WBS: WBAT  -DVT ppx: SCDs  -Abx  -Drains - 1 x HV - maintain on 1/9   - Cartwright - d/c once ambulatory today 1/9   -nausea control/bowel regimen  -c/w home meds  -Pain control  -Diet  -Dispo planning - pending PT eval  - monitor sugars i/s/o DM, f/u med recs     Ortho pager [phone]

## 2024-01-09 NOTE — OCCUPATIONAL THERAPY INITIAL EVALUATION ADULT - MODIFIED CLINICAL TEST OF SENSORY INTEGRATION IN BALANCE TEST
Patient functionally ambulated in the hallway with RW and CGA, patient noted with decreased weight shifting, step length, kyphotic posture throughout

## 2024-01-09 NOTE — CHART NOTE - NSCHARTNOTEFT_GEN_A_CORE
Patient flagged on orthopedics hyperglycemia report.  ·  PROCEDURES:  Fusion, spine, lumbar, PLIF 08-Jan-2024 13:10:15  Brandan Tate.  Received dexamethasone 10mg intraop. No standing steroids ordered at this time.  Kidney function is normal.  Consistent diet with evening snack ordered.    A1C 7.3%. Per surescripts: Metformin ER 1000mg BID and Trulicity 0.75mg weekly.    If patient is stable and eating, consider restarting metformin 1000mg BID and starting tradjenta 5mg daily before breakfast. Tradjenta would substitute for Trulicity in hospital, and should be discontinued on discharge.  Remove evening snack from diet.

## 2024-01-09 NOTE — CONSULT NOTE ADULT - ASSESSMENT
Patient is a 78y old  Female who presents with a chief complaint of back pain (09 Jan 2024 13:27)      Impression and Plan   #  Low back Pain s/p PSF, Lami, Decompression L2-L4 on 1/8/24  - pain control , DVT prophylaxis , Weightbearing status , Dressing changes and drain care per ortho team    # HTN   - currently low normal blood pressures , patient asymptomatic , however hold amlodipine ,lisinopril and hctz     # Acute Blood loss Anemia Complicating Iron Deficiency Anemia   - Baseline HGB 11.3 prior to surgery   - Pre op  Iron studies -  Iron 43 , Ferritin 7 , Iron Saturation 10  - 1 unit PRBC transfusion  - 1gm of IV Iron infusion over 2-3 days     # HLD   - Crestor 10     # Hyperthyroidism   - Methimazole     # GERD   - Pantoprazole     # DM on Metformin and Trulicity at home , currently hyperglycemia , Endocrine recommended Metformin and Tradjenta     # Parkinson's Disease - Carbidopa- Levodopa TID     #DVT PPx - Lovenox     #Dispo - PT eval

## 2024-01-09 NOTE — OCCUPATIONAL THERAPY INITIAL EVALUATION ADULT - PERTINENT HX OF CURRENT PROBLEM, REHAB EVAL
78F c/o acute on chronic lower back pain x 6 years. She said that the pain began spontaneously and without accident or trauma. She had spine surgery 1 year ago in [place]. Pt reports that her pain has progressively worsened without improvement and has become increasingly unresponsive to conservative management. Pt ambulates with a cane and walker at home. Denies numbness/tingling of extremities. 78F c/o acute on chronic lower back pain x 6 years. She said that the pain began spontaneously and without accident or trauma. She had spine surgery 1 year ago in [place]. Pt reports that her pain has progressively worsened without improvement and has become increasingly unresponsive to conservative management. Pt ambulates with a cane and walker at home. Denies numbness/tingling of extremities.

## 2024-01-10 LAB
ANION GAP SERPL CALC-SCNC: 8 MMOL/L — SIGNIFICANT CHANGE UP (ref 5–17)
ANION GAP SERPL CALC-SCNC: 8 MMOL/L — SIGNIFICANT CHANGE UP (ref 5–17)
BUN SERPL-MCNC: 21 MG/DL — SIGNIFICANT CHANGE UP (ref 7–23)
BUN SERPL-MCNC: 21 MG/DL — SIGNIFICANT CHANGE UP (ref 7–23)
CALCIUM SERPL-MCNC: 8.2 MG/DL — LOW (ref 8.4–10.5)
CALCIUM SERPL-MCNC: 8.2 MG/DL — LOW (ref 8.4–10.5)
CHLORIDE SERPL-SCNC: 108 MMOL/L — SIGNIFICANT CHANGE UP (ref 96–108)
CHLORIDE SERPL-SCNC: 108 MMOL/L — SIGNIFICANT CHANGE UP (ref 96–108)
CO2 SERPL-SCNC: 24 MMOL/L — SIGNIFICANT CHANGE UP (ref 22–31)
CO2 SERPL-SCNC: 24 MMOL/L — SIGNIFICANT CHANGE UP (ref 22–31)
CREAT SERPL-MCNC: 0.72 MG/DL — SIGNIFICANT CHANGE UP (ref 0.5–1.3)
CREAT SERPL-MCNC: 0.72 MG/DL — SIGNIFICANT CHANGE UP (ref 0.5–1.3)
EGFR: 86 ML/MIN/1.73M2 — SIGNIFICANT CHANGE UP
EGFR: 86 ML/MIN/1.73M2 — SIGNIFICANT CHANGE UP
GLUCOSE BLDC GLUCOMTR-MCNC: 121 MG/DL — HIGH (ref 70–99)
GLUCOSE BLDC GLUCOMTR-MCNC: 121 MG/DL — HIGH (ref 70–99)
GLUCOSE BLDC GLUCOMTR-MCNC: 122 MG/DL — HIGH (ref 70–99)
GLUCOSE BLDC GLUCOMTR-MCNC: 122 MG/DL — HIGH (ref 70–99)
GLUCOSE BLDC GLUCOMTR-MCNC: 144 MG/DL — HIGH (ref 70–99)
GLUCOSE BLDC GLUCOMTR-MCNC: 144 MG/DL — HIGH (ref 70–99)
GLUCOSE BLDC GLUCOMTR-MCNC: 155 MG/DL — HIGH (ref 70–99)
GLUCOSE BLDC GLUCOMTR-MCNC: 155 MG/DL — HIGH (ref 70–99)
GLUCOSE SERPL-MCNC: 104 MG/DL — HIGH (ref 70–99)
GLUCOSE SERPL-MCNC: 104 MG/DL — HIGH (ref 70–99)
HCT VFR BLD CALC: 29.4 % — LOW (ref 34.5–45)
HCT VFR BLD CALC: 29.4 % — LOW (ref 34.5–45)
HGB BLD-MCNC: 8.9 G/DL — LOW (ref 11.5–15.5)
HGB BLD-MCNC: 8.9 G/DL — LOW (ref 11.5–15.5)
MCHC RBC-ENTMCNC: 28.7 PG — SIGNIFICANT CHANGE UP (ref 27–34)
MCHC RBC-ENTMCNC: 28.7 PG — SIGNIFICANT CHANGE UP (ref 27–34)
MCHC RBC-ENTMCNC: 30.3 GM/DL — LOW (ref 32–36)
MCHC RBC-ENTMCNC: 30.3 GM/DL — LOW (ref 32–36)
MCV RBC AUTO: 94.8 FL — SIGNIFICANT CHANGE UP (ref 80–100)
MCV RBC AUTO: 94.8 FL — SIGNIFICANT CHANGE UP (ref 80–100)
NRBC # BLD: 0 /100 WBCS — SIGNIFICANT CHANGE UP (ref 0–0)
NRBC # BLD: 0 /100 WBCS — SIGNIFICANT CHANGE UP (ref 0–0)
PLATELET # BLD AUTO: 114 K/UL — LOW (ref 150–400)
PLATELET # BLD AUTO: 114 K/UL — LOW (ref 150–400)
POTASSIUM SERPL-MCNC: 3.9 MMOL/L — SIGNIFICANT CHANGE UP (ref 3.5–5.3)
POTASSIUM SERPL-MCNC: 3.9 MMOL/L — SIGNIFICANT CHANGE UP (ref 3.5–5.3)
POTASSIUM SERPL-SCNC: 3.9 MMOL/L — SIGNIFICANT CHANGE UP (ref 3.5–5.3)
POTASSIUM SERPL-SCNC: 3.9 MMOL/L — SIGNIFICANT CHANGE UP (ref 3.5–5.3)
RBC # BLD: 3.1 M/UL — LOW (ref 3.8–5.2)
RBC # BLD: 3.1 M/UL — LOW (ref 3.8–5.2)
RBC # FLD: 17.1 % — HIGH (ref 10.3–14.5)
RBC # FLD: 17.1 % — HIGH (ref 10.3–14.5)
SODIUM SERPL-SCNC: 140 MMOL/L — SIGNIFICANT CHANGE UP (ref 135–145)
SODIUM SERPL-SCNC: 140 MMOL/L — SIGNIFICANT CHANGE UP (ref 135–145)
WBC # BLD: 6.15 K/UL — SIGNIFICANT CHANGE UP (ref 3.8–10.5)
WBC # BLD: 6.15 K/UL — SIGNIFICANT CHANGE UP (ref 3.8–10.5)
WBC # FLD AUTO: 6.15 K/UL — SIGNIFICANT CHANGE UP (ref 3.8–10.5)
WBC # FLD AUTO: 6.15 K/UL — SIGNIFICANT CHANGE UP (ref 3.8–10.5)

## 2024-01-10 PROCEDURE — 99233 SBSQ HOSP IP/OBS HIGH 50: CPT

## 2024-01-10 RX ADMIN — POLYETHYLENE GLYCOL 3350 17 GRAM(S): 17 POWDER, FOR SOLUTION ORAL at 11:48

## 2024-01-10 RX ADMIN — CARBIDOPA AND LEVODOPA 1 TABLET(S): 25; 100 TABLET ORAL at 21:16

## 2024-01-10 RX ADMIN — Medication 50 MILLIGRAM(S): at 06:15

## 2024-01-10 RX ADMIN — METHOCARBAMOL 500 MILLIGRAM(S): 500 TABLET, FILM COATED ORAL at 06:16

## 2024-01-10 RX ADMIN — Medication 1000 MILLIGRAM(S): at 21:46

## 2024-01-10 RX ADMIN — IRON SUCROSE 176.67 MILLIGRAM(S): 20 INJECTION, SOLUTION INTRAVENOUS at 13:55

## 2024-01-10 RX ADMIN — LINAGLIPTIN 5 MILLIGRAM(S): 5 TABLET, FILM COATED ORAL at 11:49

## 2024-01-10 RX ADMIN — Medication 2: at 17:44

## 2024-01-10 RX ADMIN — CARBIDOPA AND LEVODOPA 1 TABLET(S): 25; 100 TABLET ORAL at 06:16

## 2024-01-10 RX ADMIN — Medication 50 MILLIGRAM(S): at 13:56

## 2024-01-10 RX ADMIN — SENNA PLUS 2 TABLET(S): 8.6 TABLET ORAL at 21:16

## 2024-01-10 RX ADMIN — METFORMIN HYDROCHLORIDE 1000 MILLIGRAM(S): 850 TABLET ORAL at 17:51

## 2024-01-10 RX ADMIN — ATORVASTATIN CALCIUM 40 MILLIGRAM(S): 80 TABLET, FILM COATED ORAL at 21:16

## 2024-01-10 RX ADMIN — ENOXAPARIN SODIUM 40 MILLIGRAM(S): 100 INJECTION SUBCUTANEOUS at 21:16

## 2024-01-10 RX ADMIN — PANTOPRAZOLE SODIUM 40 MILLIGRAM(S): 20 TABLET, DELAYED RELEASE ORAL at 06:16

## 2024-01-10 RX ADMIN — METHOCARBAMOL 500 MILLIGRAM(S): 500 TABLET, FILM COATED ORAL at 21:16

## 2024-01-10 RX ADMIN — Medication 1000 MILLIGRAM(S): at 14:00

## 2024-01-10 RX ADMIN — CARBIDOPA AND LEVODOPA 1 TABLET(S): 25; 100 TABLET ORAL at 14:00

## 2024-01-10 RX ADMIN — Medication 1000 MILLIGRAM(S): at 21:16

## 2024-01-10 RX ADMIN — METFORMIN HYDROCHLORIDE 1000 MILLIGRAM(S): 850 TABLET ORAL at 09:00

## 2024-01-10 RX ADMIN — Medication 1000 MILLIGRAM(S): at 06:16

## 2024-01-10 RX ADMIN — ONDANSETRON 4 MILLIGRAM(S): 8 TABLET, FILM COATED ORAL at 06:18

## 2024-01-10 RX ADMIN — METHOCARBAMOL 500 MILLIGRAM(S): 500 TABLET, FILM COATED ORAL at 13:56

## 2024-01-10 NOTE — PROVIDER CONTACT NOTE (OTHER) - ASSESSMENT
Lung CTA
Denied urge to void. Bladder scan was 443 ml. Encouraged to get up to try going to the bathroom. As soon she stood up, urine started dripping down. Container placed hurriedly and 150 ml urine was measured.

## 2024-01-10 NOTE — PROVIDER CONTACT NOTE (OTHER) - SITUATION
Due to void at 1:00 am. Awakened at 1:46 am for VS. Temp=100.7F. Other VS=WNL.
Patient was noted with desaturation to as low as 84% on RA while asleep. With O2 2 L NC , O2 sat gradually up to upper 90s%.

## 2024-01-10 NOTE — PROGRESS NOTE ADULT - SUBJECTIVE AND OBJECTIVE BOX
Orthopaedic Spine Resident Progress Note    S: no acute overnight events.  Pain controlled.  No fevers/chills/shortness of breath/chest pain/new neurologic complaints. aquino d/c yday and she has been voiding     O:  Vital Signs Last 24 Hrs  T(C): 37.1 (10 Magdaleno 2024 05:58), Max: 38.2 (10 Magdaleno 2024 01:46)  T(F): 98.8 (10 Magdaleno 2024 05:58), Max: 100.7 (10 Magdaleno 2024 01:46)  HR: 88 (10 Magdaleno 2024 05:58) (68 - 88)  BP: 133/62 (10 Magdaleno 2024 05:58) (92/52 - 133/62)  BP(mean): --  RR: 17 (10 Magdaleno 2024 01:46) (15 - 17)  SpO2: 99% (10 Magdaleno 2024 05:58) (91% - 99%)    Parameters below as of 10 Magdaleno 2024 05:58  Patient On (Oxygen Delivery Method): nasal cannula      Physical Exam:    GEN: NAD, A and O x 3  Incision: Dressing c/d/i  HV x 1     Motor:  RUE:   Delt 5/5; bicep 5/5; tricep 5/5; WF 5/5; WE 5/5;  5/5; EPL 5/5; palmar intrinsics 5/5    RLE:  Q 5/5; HS 5/5; TA 5/5; GS 5/5; EHL 5/5; FHL 5/5    LUE:  Delt 5/5; bicep 5/5; tricep 5/5; WF 5/5; WE 5/5;  5/5; EPL 5/5; palmar intrinsics 5/5    LLE:  Q 5/5; HS 5/5; TA 5/5; GS 5/5; EHL 5/5; FHL 5/5    Sensory:  Sensation intact to light touch in C5-T1 dermatomes bilaterally    Sensation intact to light touch in L2-S1 dermatomes bilaterally    Vascular:  No edema, calf tenderness, wwp, peripherap pulses +2 bilaterally        Labs:             LABS/RADIOLOGY RESULTS:                          8.9    6.15  )-----------( 114      ( 10 Magdaleno 2024 05:30 )             29.4   01-10    140  |  108  |  21  ----------------------------<  104<H>  3.9   |  24  |  0.72    Ca    8.4      09 Jan 2024 05:30    Blood Cultures    Urinalysis Basic - ( 10 Magdaleno 2024 05:30 )    Color:  / Appearance:  / SG:  / pH:   Gluc: 104 mg/dL / Ketone:   / Bili:  / Urobili:    Blood:  / Protein:  / Nitrite:    Leuk Esterase:  / RBC:  / WBC    Sq Epi:  / Non Sq Epi:  / Bacteria:           78yFemale s/p L2-Pelvis PSF, L5-S1 TLIF by Dr Bahena  -mobilize, OOB  -PT, WBS: WBAT  -DVT ppx: SCDs  -Abx  -Drains - 1 x HV - maintain on 1/10  - Aquino - tov passed on 1/10   -nausea control/bowel regimen  -c/w home meds  -Pain control  -Diet  -Dispo planning - [place]  - monitor sugars i/s/o DM, f/u med recs     Ortho pager [phone] Orthopaedic Spine Resident Progress Note    S: no acute overnight events.  Pain controlled.  No fevers/chills/shortness of breath/chest pain/new neurologic complaints. aquino d/c yday and she has been voiding     O:  Vital Signs Last 24 Hrs  T(C): 37.1 (10 Magdaleno 2024 05:58), Max: 38.2 (10 Magdaleno 2024 01:46)  T(F): 98.8 (10 Magdaleno 2024 05:58), Max: 100.7 (10 Magdaleno 2024 01:46)  HR: 88 (10 Magdaleno 2024 05:58) (68 - 88)  BP: 133/62 (10 Magdaleno 2024 05:58) (92/52 - 133/62)  BP(mean): --  RR: 17 (10 Magdaleno 2024 01:46) (15 - 17)  SpO2: 99% (10 Magdaleno 2024 05:58) (91% - 99%)    Parameters below as of 10 Magdaleno 2024 05:58  Patient On (Oxygen Delivery Method): nasal cannula      Physical Exam:    GEN: NAD, A and O x 3  Incision: Dressing c/d/i  HV x 1     Motor:  RUE:   Delt 5/5; bicep 5/5; tricep 5/5; WF 5/5; WE 5/5;  5/5; EPL 5/5; palmar intrinsics 5/5    RLE:  Q 5/5; HS 5/5; TA 5/5; GS 5/5; EHL 5/5; FHL 5/5    LUE:  Delt 5/5; bicep 5/5; tricep 5/5; WF 5/5; WE 5/5;  5/5; EPL 5/5; palmar intrinsics 5/5    LLE:  Q 5/5; HS 5/5; TA 5/5; GS 5/5; EHL 5/5; FHL 5/5    Sensory:  Sensation intact to light touch in C5-T1 dermatomes bilaterally    Sensation intact to light touch in L2-S1 dermatomes bilaterally    Vascular:  No edema, calf tenderness, wwp, peripherap pulses +2 bilaterally        Labs:             LABS/RADIOLOGY RESULTS:                          8.9    6.15  )-----------( 114      ( 10 Magdaleno 2024 05:30 )             29.4   01-10    140  |  108  |  21  ----------------------------<  104<H>  3.9   |  24  |  0.72    Ca    8.4      09 Jan 2024 05:30    Blood Cultures    Urinalysis Basic - ( 10 Magdaleno 2024 05:30 )    Color:  / Appearance:  / SG:  / pH:   Gluc: 104 mg/dL / Ketone:   / Bili:  / Urobili:    Blood:  / Protein:  / Nitrite:    Leuk Esterase:  / RBC:  / WBC    Sq Epi:  / Non Sq Epi:  / Bacteria:           78yFemale s/p L2-Pelvis PSF, L5-S1 TLIF by Dr Bahena  -mobilize, OOB  -PT, WBS: WBAT  -DVT ppx: SCDs  -Abx  -Drains - 1 x HV - maintain on 1/10  - Aquino - tov passed on 1/10   -nausea control/bowel regimen  -c/w home meds  -Pain control  -Diet  -Dispo planning - [place]  - monitor sugars i/s/o DM, f/u med recs     Ortho pager [phone]

## 2024-01-10 NOTE — PROGRESS NOTE ADULT - SUBJECTIVE AND OBJECTIVE BOX
Patient is a 78y old  Female who presents with a chief complaint of back pain (10 Magdaleno 2024 10:00)        SUBJECTIVE:  Patient was seen and examined at bedside.    Overnight Events : Received Blood transfusion around 1pm -4 pm on 1/9/24, Fever 100.7  1 AM 1/10/24  chronic cough , no new sputum production , no chills       Review of systems: 12 point Review of systems negative unless otherwise documented elsewhere in note.     Diet, Consistent Carbohydrate/No Snacks (01-09-24 @ 09:49) [Active]      MEDICATIONS:  MEDICATIONS  (STANDING):  acetaminophen     Tablet .. 1000 milliGRAM(s) Oral every 8 hours  atorvastatin 40 milliGRAM(s) Oral at bedtime  carbidopa/levodopa  25/100 1 Tablet(s) Oral three times a day  dextrose 5%. 1000 milliLiter(s) (50 mL/Hr) IV Continuous <Continuous>  dextrose 5%. 1000 milliLiter(s) (100 mL/Hr) IV Continuous <Continuous>  dextrose 50% Injectable 25 Gram(s) IV Push once  dextrose 50% Injectable 12.5 Gram(s) IV Push once  dextrose 50% Injectable 25 Gram(s) IV Push once  enoxaparin Injectable 40 milliGRAM(s) SubCutaneous every 24 hours  glucagon  Injectable 1 milliGRAM(s) IntraMuscular once  insulin lispro (ADMELOG) corrective regimen sliding scale   SubCutaneous Before meals and at bedtime  iron sucrose IVPB 300 milliGRAM(s) IV Intermittent every 24 hours  linagliptin 5 milliGRAM(s) Oral daily  metFORMIN 1000 milliGRAM(s) Oral two times a day  methimazole 5 milliGRAM(s) Oral daily  methocarbamol 500 milliGRAM(s) Oral every 8 hours  metoprolol succinate ER 25 milliGRAM(s) Oral daily  ondansetron   Disintegrating Tablet 4 milliGRAM(s) Oral every 6 hours  pantoprazole    Tablet 40 milliGRAM(s) Oral before breakfast  polyethylene glycol 3350 17 Gram(s) Oral daily  pregabalin 50 milliGRAM(s) Oral three times a day  senna 2 Tablet(s) Oral at bedtime    MEDICATIONS  (PRN):  bisacodyl 5 milliGRAM(s) Oral every 12 hours PRN Constipation  bisacodyl Suppository 10 milliGRAM(s) Rectal daily PRN Constipation  dextrose Oral Gel 15 Gram(s) Oral once PRN Blood Glucose LESS THAN 70 milliGRAM(s)/deciliter  HYDROmorphone   Tablet 2 milliGRAM(s) Oral every 4 hours PRN Severe Pain (7 - 10)  HYDROmorphone  Injectable 0.5 milliGRAM(s) IV Push every 15 minutes PRN breakthrough  HYDROmorphone  Injectable 0.5 milliGRAM(s) IV Push every 4 hours PRN breakthrough  magnesium hydroxide Suspension 30 milliLiter(s) Oral every 12 hours PRN Constipation      Allergies    No Known Allergies    Intolerances    OxyContin (Nausea)  hydrocodone (Other; Nausea)  Percocet 5/325 (Other; Nausea)      OBJECTIVE:  Vital Signs Last 24 Hrs  T(C): 36.7 (10 Magdaleno 2024 12:40), Max: 38.2 (10 Magdaleno 2024 01:46)  T(F): 98 (10 Magdaleno 2024 12:40), Max: 100.7 (10 Magdaleno 2024 01:46)  HR: 80 (10 Magdaleno 2024 12:40) (68 - 90)  BP: 144/68 (10 Magdaleno 2024 12:40) (106/53 - 144/68)  BP(mean): --  RR: 17 (10 Magdaleno 2024 12:40) (15 - 18)  SpO2: 96% (10 Magdaleno 2024 12:40) (91% - 99%)    Parameters below as of 10 Magdaleno 2024 12:40  Patient On (Oxygen Delivery Method): room air      I&O's Summary    09 Jan 2024 07:01  -  10 Magdaleno 2024 07:00  --------------------------------------------------------  IN: 1643 mL / OUT: 2660 mL / NET: -1017 mL        PHYSICAL EXAM:  Gen: Resting in bed at time of exam, not in distress   HEENT: moist mucosa, no lesions   Neck: supple, trachea at midline  CV: RRR, +S1/S2  Pulm: no wheezing , no crackles  no increase in work of breathing  Abd: soft, NTND  Skin: warm and dry, no new rashes   Ext: moving all 4 extremities spontaneously , no edema  ,  Neuro: AOx3, no gross focal neurological deficits  Psych: affect and behavior appropriate, pleasant at time of interview    LABS:                        8.9    6.15  )-----------( 114      ( 10 Magdaleno 2024 05:30 )             29.4     01-10    140  |  108  |  21  ----------------------------<  104<H>  3.9   |  24  |  0.72    Ca    8.2<L>      10 Magdaleno 2024 05:30          CAPILLARY BLOOD GLUCOSE      POCT Blood Glucose.: 122 mg/dL (10 Magdaleno 2024 11:50)  POCT Blood Glucose.: 121 mg/dL (10 Magdaleno 2024 07:23)  POCT Blood Glucose.: 196 mg/dL (09 Jan 2024 22:09)  POCT Blood Glucose.: 148 mg/dL (09 Jan 2024 17:05)    Urinalysis Basic - ( 10 Magdaleno 2024 05:30 )    Color: x / Appearance: x / SG: x / pH: x  Gluc: 104 mg/dL / Ketone: x  / Bili: x / Urobili: x   Blood: x / Protein: x / Nitrite: x   Leuk Esterase: x / RBC: x / WBC x   Sq Epi: x / Non Sq Epi: x / Bacteria: x        MICRODATA:      RADIOLOGY/OTHER STUDIES:

## 2024-01-11 LAB
ANION GAP SERPL CALC-SCNC: 8 MMOL/L — SIGNIFICANT CHANGE UP (ref 5–17)
ANION GAP SERPL CALC-SCNC: 8 MMOL/L — SIGNIFICANT CHANGE UP (ref 5–17)
APPEARANCE UR: CLEAR — SIGNIFICANT CHANGE UP
APPEARANCE UR: CLEAR — SIGNIFICANT CHANGE UP
BACTERIA # UR AUTO: ABNORMAL /HPF
BACTERIA # UR AUTO: ABNORMAL /HPF
BILIRUB UR-MCNC: NEGATIVE — SIGNIFICANT CHANGE UP
BILIRUB UR-MCNC: NEGATIVE — SIGNIFICANT CHANGE UP
BUN SERPL-MCNC: 17 MG/DL — SIGNIFICANT CHANGE UP (ref 7–23)
BUN SERPL-MCNC: 17 MG/DL — SIGNIFICANT CHANGE UP (ref 7–23)
CALCIUM SERPL-MCNC: 8.3 MG/DL — LOW (ref 8.4–10.5)
CALCIUM SERPL-MCNC: 8.3 MG/DL — LOW (ref 8.4–10.5)
CAST: 1 /LPF — SIGNIFICANT CHANGE UP (ref 0–4)
CAST: 1 /LPF — SIGNIFICANT CHANGE UP (ref 0–4)
CHLORIDE SERPL-SCNC: 108 MMOL/L — SIGNIFICANT CHANGE UP (ref 96–108)
CHLORIDE SERPL-SCNC: 108 MMOL/L — SIGNIFICANT CHANGE UP (ref 96–108)
CO2 SERPL-SCNC: 24 MMOL/L — SIGNIFICANT CHANGE UP (ref 22–31)
CO2 SERPL-SCNC: 24 MMOL/L — SIGNIFICANT CHANGE UP (ref 22–31)
COLOR SPEC: YELLOW — SIGNIFICANT CHANGE UP
COLOR SPEC: YELLOW — SIGNIFICANT CHANGE UP
CREAT SERPL-MCNC: 0.63 MG/DL — SIGNIFICANT CHANGE UP (ref 0.5–1.3)
CREAT SERPL-MCNC: 0.63 MG/DL — SIGNIFICANT CHANGE UP (ref 0.5–1.3)
DIFF PNL FLD: NEGATIVE — SIGNIFICANT CHANGE UP
DIFF PNL FLD: NEGATIVE — SIGNIFICANT CHANGE UP
EGFR: 91 ML/MIN/1.73M2 — SIGNIFICANT CHANGE UP
EGFR: 91 ML/MIN/1.73M2 — SIGNIFICANT CHANGE UP
GLUCOSE BLDC GLUCOMTR-MCNC: 101 MG/DL — HIGH (ref 70–99)
GLUCOSE BLDC GLUCOMTR-MCNC: 101 MG/DL — HIGH (ref 70–99)
GLUCOSE BLDC GLUCOMTR-MCNC: 120 MG/DL — HIGH (ref 70–99)
GLUCOSE BLDC GLUCOMTR-MCNC: 120 MG/DL — HIGH (ref 70–99)
GLUCOSE BLDC GLUCOMTR-MCNC: 147 MG/DL — HIGH (ref 70–99)
GLUCOSE BLDC GLUCOMTR-MCNC: 147 MG/DL — HIGH (ref 70–99)
GLUCOSE BLDC GLUCOMTR-MCNC: 156 MG/DL — HIGH (ref 70–99)
GLUCOSE BLDC GLUCOMTR-MCNC: 156 MG/DL — HIGH (ref 70–99)
GLUCOSE SERPL-MCNC: 127 MG/DL — HIGH (ref 70–99)
GLUCOSE SERPL-MCNC: 127 MG/DL — HIGH (ref 70–99)
GLUCOSE UR QL: NEGATIVE MG/DL — SIGNIFICANT CHANGE UP
GLUCOSE UR QL: NEGATIVE MG/DL — SIGNIFICANT CHANGE UP
HCT VFR BLD CALC: 30 % — LOW (ref 34.5–45)
HCT VFR BLD CALC: 30 % — LOW (ref 34.5–45)
HGB BLD-MCNC: 9.1 G/DL — LOW (ref 11.5–15.5)
HGB BLD-MCNC: 9.1 G/DL — LOW (ref 11.5–15.5)
KETONES UR-MCNC: NEGATIVE MG/DL — SIGNIFICANT CHANGE UP
KETONES UR-MCNC: NEGATIVE MG/DL — SIGNIFICANT CHANGE UP
LEUKOCYTE ESTERASE UR-ACNC: ABNORMAL
LEUKOCYTE ESTERASE UR-ACNC: ABNORMAL
MCHC RBC-ENTMCNC: 28.7 PG — SIGNIFICANT CHANGE UP (ref 27–34)
MCHC RBC-ENTMCNC: 28.7 PG — SIGNIFICANT CHANGE UP (ref 27–34)
MCHC RBC-ENTMCNC: 30.3 GM/DL — LOW (ref 32–36)
MCHC RBC-ENTMCNC: 30.3 GM/DL — LOW (ref 32–36)
MCV RBC AUTO: 94.6 FL — SIGNIFICANT CHANGE UP (ref 80–100)
MCV RBC AUTO: 94.6 FL — SIGNIFICANT CHANGE UP (ref 80–100)
NITRITE UR-MCNC: POSITIVE
NITRITE UR-MCNC: POSITIVE
NRBC # BLD: 0 /100 WBCS — SIGNIFICANT CHANGE UP (ref 0–0)
NRBC # BLD: 0 /100 WBCS — SIGNIFICANT CHANGE UP (ref 0–0)
PH UR: 6.5 — SIGNIFICANT CHANGE UP (ref 5–8)
PH UR: 6.5 — SIGNIFICANT CHANGE UP (ref 5–8)
PLATELET # BLD AUTO: 114 K/UL — LOW (ref 150–400)
PLATELET # BLD AUTO: 114 K/UL — LOW (ref 150–400)
POTASSIUM SERPL-MCNC: 4.2 MMOL/L — SIGNIFICANT CHANGE UP (ref 3.5–5.3)
POTASSIUM SERPL-MCNC: 4.2 MMOL/L — SIGNIFICANT CHANGE UP (ref 3.5–5.3)
POTASSIUM SERPL-SCNC: 4.2 MMOL/L — SIGNIFICANT CHANGE UP (ref 3.5–5.3)
POTASSIUM SERPL-SCNC: 4.2 MMOL/L — SIGNIFICANT CHANGE UP (ref 3.5–5.3)
PROT UR-MCNC: SIGNIFICANT CHANGE UP MG/DL
PROT UR-MCNC: SIGNIFICANT CHANGE UP MG/DL
RBC # BLD: 3.17 M/UL — LOW (ref 3.8–5.2)
RBC # BLD: 3.17 M/UL — LOW (ref 3.8–5.2)
RBC # FLD: 17.2 % — HIGH (ref 10.3–14.5)
RBC # FLD: 17.2 % — HIGH (ref 10.3–14.5)
RBC CASTS # UR COMP ASSIST: 2 /HPF — SIGNIFICANT CHANGE UP (ref 0–4)
RBC CASTS # UR COMP ASSIST: 2 /HPF — SIGNIFICANT CHANGE UP (ref 0–4)
SODIUM SERPL-SCNC: 140 MMOL/L — SIGNIFICANT CHANGE UP (ref 135–145)
SODIUM SERPL-SCNC: 140 MMOL/L — SIGNIFICANT CHANGE UP (ref 135–145)
SP GR SPEC: 1.02 — SIGNIFICANT CHANGE UP (ref 1–1.03)
SP GR SPEC: 1.02 — SIGNIFICANT CHANGE UP (ref 1–1.03)
SQUAMOUS # UR AUTO: 2 /HPF — SIGNIFICANT CHANGE UP (ref 0–5)
SQUAMOUS # UR AUTO: 2 /HPF — SIGNIFICANT CHANGE UP (ref 0–5)
UROBILINOGEN FLD QL: 0.2 MG/DL — SIGNIFICANT CHANGE UP (ref 0.2–1)
UROBILINOGEN FLD QL: 0.2 MG/DL — SIGNIFICANT CHANGE UP (ref 0.2–1)
WBC # BLD: 5.47 K/UL — SIGNIFICANT CHANGE UP (ref 3.8–10.5)
WBC # BLD: 5.47 K/UL — SIGNIFICANT CHANGE UP (ref 3.8–10.5)
WBC # FLD AUTO: 5.47 K/UL — SIGNIFICANT CHANGE UP (ref 3.8–10.5)
WBC # FLD AUTO: 5.47 K/UL — SIGNIFICANT CHANGE UP (ref 3.8–10.5)
WBC UR QL: 85 /HPF — HIGH (ref 0–5)
WBC UR QL: 85 /HPF — HIGH (ref 0–5)

## 2024-01-11 PROCEDURE — 99233 SBSQ HOSP IP/OBS HIGH 50: CPT

## 2024-01-11 RX ORDER — NITROFURANTOIN MACROCRYSTAL 50 MG
100 CAPSULE ORAL
Refills: 0 | Status: DISCONTINUED | OUTPATIENT
Start: 2024-01-11 | End: 2024-01-13

## 2024-01-11 RX ORDER — PHENAZOPYRIDINE HCL 100 MG
100 TABLET ORAL THREE TIMES A DAY
Refills: 0 | Status: DISCONTINUED | OUTPATIENT
Start: 2024-01-11 | End: 2024-01-13

## 2024-01-11 RX ADMIN — Medication 1000 MILLIGRAM(S): at 21:21

## 2024-01-11 RX ADMIN — Medication 100 MILLIGRAM(S): at 14:43

## 2024-01-11 RX ADMIN — SENNA PLUS 2 TABLET(S): 8.6 TABLET ORAL at 21:21

## 2024-01-11 RX ADMIN — Medication 1000 MILLIGRAM(S): at 06:04

## 2024-01-11 RX ADMIN — METFORMIN HYDROCHLORIDE 1000 MILLIGRAM(S): 850 TABLET ORAL at 19:39

## 2024-01-11 RX ADMIN — CARBIDOPA AND LEVODOPA 1 TABLET(S): 25; 100 TABLET ORAL at 14:31

## 2024-01-11 RX ADMIN — Medication 2: at 22:45

## 2024-01-11 RX ADMIN — HYDROMORPHONE HYDROCHLORIDE 2 MILLIGRAM(S): 2 INJECTION INTRAMUSCULAR; INTRAVENOUS; SUBCUTANEOUS at 17:04

## 2024-01-11 RX ADMIN — METHOCARBAMOL 500 MILLIGRAM(S): 500 TABLET, FILM COATED ORAL at 21:21

## 2024-01-11 RX ADMIN — Medication 100 MILLIGRAM(S): at 15:34

## 2024-01-11 RX ADMIN — PANTOPRAZOLE SODIUM 40 MILLIGRAM(S): 20 TABLET, DELAYED RELEASE ORAL at 06:04

## 2024-01-11 RX ADMIN — ATORVASTATIN CALCIUM 40 MILLIGRAM(S): 80 TABLET, FILM COATED ORAL at 21:21

## 2024-01-11 RX ADMIN — CARBIDOPA AND LEVODOPA 1 TABLET(S): 25; 100 TABLET ORAL at 21:21

## 2024-01-11 RX ADMIN — CARBIDOPA AND LEVODOPA 1 TABLET(S): 25; 100 TABLET ORAL at 06:04

## 2024-01-11 RX ADMIN — LINAGLIPTIN 5 MILLIGRAM(S): 5 TABLET, FILM COATED ORAL at 12:38

## 2024-01-11 RX ADMIN — METFORMIN HYDROCHLORIDE 1000 MILLIGRAM(S): 850 TABLET ORAL at 07:46

## 2024-01-11 RX ADMIN — Medication 25 MILLIGRAM(S): at 06:04

## 2024-01-11 RX ADMIN — IRON SUCROSE 176.67 MILLIGRAM(S): 20 INJECTION, SOLUTION INTRAVENOUS at 14:31

## 2024-01-11 RX ADMIN — METHOCARBAMOL 500 MILLIGRAM(S): 500 TABLET, FILM COATED ORAL at 06:04

## 2024-01-11 RX ADMIN — Medication 50 MILLIGRAM(S): at 14:31

## 2024-01-11 RX ADMIN — HYDROMORPHONE HYDROCHLORIDE 2 MILLIGRAM(S): 2 INJECTION INTRAMUSCULAR; INTRAVENOUS; SUBCUTANEOUS at 18:00

## 2024-01-11 RX ADMIN — Medication 1000 MILLIGRAM(S): at 14:31

## 2024-01-11 RX ADMIN — METHOCARBAMOL 500 MILLIGRAM(S): 500 TABLET, FILM COATED ORAL at 14:31

## 2024-01-11 RX ADMIN — ENOXAPARIN SODIUM 40 MILLIGRAM(S): 100 INJECTION SUBCUTANEOUS at 21:20

## 2024-01-11 RX ADMIN — Medication 100 MILLIGRAM(S): at 21:31

## 2024-01-11 NOTE — PROGRESS NOTE ADULT - SUBJECTIVE AND OBJECTIVE BOX
Ortho Note    Subjective:  Pt seen and examined today   Patient reports increased urinary frequency denies burning upon urination   Denies CP, SOB, N/V, numbness/tingling   Reviewed plan of care with patient at bedside    Vital Signs Last 24 Hrs  T(C): 36.4 (01-11-24 @ 09:53), Max: 36.4 (01-11-24 @ 09:53)  T(F): 97.5 (01-11-24 @ 09:53), Max: 97.5 (01-11-24 @ 09:53)  HR: 66 (01-11-24 @ 09:53) (66 - 66)  BP: 105/59 (01-11-24 @ 09:53) (105/59 - 105/59)  BP(mean): --  RR: 16 (01-11-24 @ 09:53) (16 - 16)  SpO2: 98% (01-11-24 @ 09:53) (98% - 98%)  AVSS    Objective:    Physical Exam:  General: Pt Alert and oriented, NAD  Lumbar DSG C/D/I- HV x1  Pulses: +2 pedal pulses, DP   Sensation: silt intact bilateral lower extremities  Motor: EHL/FHL/TA/GS- 5/5 bilateral lower extremities  dry cough present      Plan of Care:  A/P: 78yFemale POD #3 s/p L2-Pelvis PSF with Dr. Bahena  - afebrile overnight 1/10/2024  - Pain Control- tylenol 1000mg PO Q8h, DIlaudid 2-4mg PO Q4h prn moderate to severe pain, Dialudid 0.5mg Q4h prn  breakthrough pain , methocarbamol 500mg PO Q8h   - DVT ppx: lovenox 40mg SUbq Q24h  - PT, WBS: WBAT  - appreciate medicine recs  - UA ordered for increased urinary frequency symptoms   - bowel regimen, IS use, PPI  - continue HV x1  - Dispo -[facility] pending auth and acceptance, medical clearance    Ortho Pager [phone] Ortho Note    Subjective:  Pt seen and examined today   Patient reports increased urinary frequency denies burning upon urination   Denies CP, SOB, N/V, numbness/tingling   Reviewed plan of care with patient at bedside    Vital Signs Last 24 Hrs  T(C): 36.4 (01-11-24 @ 09:53), Max: 36.4 (01-11-24 @ 09:53)  T(F): 97.5 (01-11-24 @ 09:53), Max: 97.5 (01-11-24 @ 09:53)  HR: 66 (01-11-24 @ 09:53) (66 - 66)  BP: 105/59 (01-11-24 @ 09:53) (105/59 - 105/59)  BP(mean): --  RR: 16 (01-11-24 @ 09:53) (16 - 16)  SpO2: 98% (01-11-24 @ 09:53) (98% - 98%)  AVSS    Objective:    Physical Exam:  General: Pt Alert and oriented, NAD  Lumbar DSG C/D/I- HV x1  Pulses: +2 pedal pulses, DP   Sensation: silt intact bilateral lower extremities  Motor: EHL/FHL/TA/GS- 5/5 bilateral lower extremities  dry cough present      Plan of Care:  A/P: 78yFemale POD #3 s/p L2-Pelvis PSF with Dr. Bahena  - afebrile overnight 1/10/2024  - Pain Control- tylenol 1000mg PO Q8h, DIlaudid 2-4mg PO Q4h prn moderate to severe pain, Dialudid 0.5mg Q4h prn  breakthrough pain , methocarbamol 500mg PO Q8h   - DVT ppx: lovenox 40mg SUbq Q24h  - PT, WBS: WBAT  - appreciate medicine recs  - UA ordered for increased urinary frequency symptoms   - bowel regimen, IS use, PPI  - continue HV x1  - Dispo -[facility] pending auth and acceptance, medical clearance    Ortho Pager [phone]

## 2024-01-11 NOTE — PROGRESS NOTE ADULT - SUBJECTIVE AND OBJECTIVE BOX
Subjective:  No acute events overnight.  Patient is doing well today and pain is well controlled.  Only complaint is urinary frequency. Denies HA, CP, SOB, abdominal pain, nausea, vomiting, fever, chills or diarrhea.     Objective:   Vital Signs Last 24 Hrs  T(C): 36.4 (11 Jan 2024 12:31), Max: 37 (10 Magdaleno 2024 20:35)  T(F): 97.6 (11 Jan 2024 12:31), Max: 98.6 (10 Magdaleno 2024 20:35)  HR: 68 (11 Jan 2024 12:31) (59 - 80)  BP: 102/50 (11 Jan 2024 12:31) (102/50 - 137/52)  BP(mean): --  RR: 16 (11 Jan 2024 12:31) (16 - 20)  SpO2: 99% (11 Jan 2024 12:31) (92% - 99%)    Parameters below as of 11 Jan 2024 12:31  Patient On (Oxygen Delivery Method): room air    Physical Exam:   Gen: Resting in bed at time of exam, not in distress   HEENT: moist mucosa, no lesions   Neck: supple, trachea at midline  CV: RRR, +S1/S2  Pulm: no wheezing , no crackles  no increase in work of breathing  Abd: soft, NTND  Skin: warm and dry, no new rashes   Ext: moving all 4 extremities spontaneously , no edema  ,  Neuro: AOx3, no gross focal neurological deficits  Psych: affect and behavior appropriate, pleasant at time of interview    Labs:                        9.1    5.47  )-----------( 114      ( 11 Jan 2024 09:01 )             30.0       01-11    140  |  108  |  17  ----------------------------<  127<H>  4.2   |  24  |  0.63    Ca    8.3<L>      11 Jan 2024 09:01      Medications:  MEDICATIONS  (STANDING):  acetaminophen     Tablet .. 1000 milliGRAM(s) Oral every 8 hours  atorvastatin 40 milliGRAM(s) Oral at bedtime  carbidopa/levodopa  25/100 1 Tablet(s) Oral three times a day  dextrose 5%. 1000 milliLiter(s) (50 mL/Hr) IV Continuous <Continuous>  dextrose 5%. 1000 milliLiter(s) (100 mL/Hr) IV Continuous <Continuous>  dextrose 50% Injectable 25 Gram(s) IV Push once  dextrose 50% Injectable 12.5 Gram(s) IV Push once  dextrose 50% Injectable 25 Gram(s) IV Push once  enoxaparin Injectable 40 milliGRAM(s) SubCutaneous every 24 hours  glucagon  Injectable 1 milliGRAM(s) IntraMuscular once  insulin lispro (ADMELOG) corrective regimen sliding scale   SubCutaneous Before meals and at bedtime  iron sucrose IVPB 300 milliGRAM(s) IV Intermittent every 24 hours  linagliptin 5 milliGRAM(s) Oral daily  metFORMIN 1000 milliGRAM(s) Oral two times a day  methimazole 5 milliGRAM(s) Oral daily  methocarbamol 500 milliGRAM(s) Oral every 8 hours  metoprolol succinate ER 25 milliGRAM(s) Oral daily  ondansetron   Disintegrating Tablet 4 milliGRAM(s) Oral every 6 hours  pantoprazole    Tablet 40 milliGRAM(s) Oral before breakfast  polyethylene glycol 3350 17 Gram(s) Oral daily  pregabalin 50 milliGRAM(s) Oral three times a day  senna 2 Tablet(s) Oral at bedtime    MEDICATIONS  (PRN):  bisacodyl 5 milliGRAM(s) Oral every 12 hours PRN Constipation  bisacodyl Suppository 10 milliGRAM(s) Rectal daily PRN Constipation  dextrose Oral Gel 15 Gram(s) Oral once PRN Blood Glucose LESS THAN 70 milliGRAM(s)/deciliter  HYDROmorphone   Tablet 2 milliGRAM(s) Oral every 4 hours PRN Severe Pain (7 - 10)  HYDROmorphone  Injectable 0.5 milliGRAM(s) IV Push every 15 minutes PRN breakthrough  HYDROmorphone  Injectable 0.5 milliGRAM(s) IV Push every 4 hours PRN breakthrough  magnesium hydroxide Suspension 30 milliLiter(s) Oral every 12 hours PRN Constipation

## 2024-01-11 NOTE — PROGRESS NOTE ADULT - SUBJECTIVE AND OBJECTIVE BOX
Orthopaedic Spine Resident Progress Note    S: no acute overnight events.  Pain controlled.  No fevers/chills/shortness of breath/chest pain/new neurologic complaints. happy with progress w/ PT and results of survery     O:  Vital Signs Last 24 Hrs  T(C): 36.8 (11 Jan 2024 05:16), Max: 37 (10 Magdaleno 2024 20:35)  T(F): 98.2 (11 Jan 2024 05:16), Max: 98.6 (10 Magdaleno 2024 20:35)  HR: 59 (11 Jan 2024 05:16) (59 - 90)  BP: 129/62 (11 Jan 2024 05:16) (109/59 - 144/68)  BP(mean): --  RR: 18 (11 Jan 2024 05:16) (17 - 20)  SpO2: 98% (11 Jan 2024 05:16) (92% - 99%)    Parameters below as of 11 Jan 2024 05:16  Patient On (Oxygen Delivery Method): nasal cannula  O2 Flow (L/min): 2      Physical Exam:    GEN: NAD, A and O x 3  Incision: Dressing c/d/i  HV x 1     Motor:  RUE:   Delt 5/5; bicep 5/5; tricep 5/5; WF 5/5; WE 5/5;  5/5; EPL 5/5; palmar intrinsics 5/5    RLE:  Q 5/5; HS 5/5; TA 5/5; GS 5/5; EHL 5/5; FHL 5/5    LUE:  Delt 5/5; bicep 5/5; tricep 5/5; WF 5/5; WE 5/5;  5/5; EPL 5/5; palmar intrinsics 5/5    LLE:  Q 5/5; HS 5/5; TA 5/5; GS 5/5; EHL 5/5; FHL 5/5    Sensory:  Sensation intact to light touch in C5-T1 dermatomes bilaterally    Sensation intact to light touch in L2-S1 dermatomes bilaterally    Vascular:  No edema, calf tenderness, wwp, peripherap pulses +2 bilaterally        Labs:             LABS/RADIOLOGY RESULTS:                          8.9    6.15  )-----------( 114      ( 10 Magdaleno 2024 05:30 )             29.4   01-10    140  |  108  |  21  ----------------------------<  104<H>  3.9   |  24  |  0.72    Ca    8.4      09 Jan 2024 05:30    Blood Cultures    Urinalysis Basic - ( 10 Magdaleno 2024 05:30 )    Color:  / Appearance:  / SG:  / pH:   Gluc: 104 mg/dL / Ketone:   / Bili:  / Urobili:    Blood:  / Protein:  / Nitrite:    Leuk Esterase:  / RBC:  / WBC    Sq Epi:  / Non Sq Epi:  / Bacteria:           78yFemale s/p L2-Pelvis PSF, L5-S1 TLIF by Dr Bahena  -mobilize, OOB  -PT, WBS: WBAT  -DVT ppx: SCDs  -Abx  -Drains - 1 x HV - maintain on 1/11  - [place] - tov passed on 1/10   -nausea control/bowel regimen  -c/w home meds  -Pain control  -Diet  -Dispo planning - [place]      Ortho pager [phone] Orthopaedic Spine Resident Progress Note    S: no acute overnight events.  Pain controlled.  No fevers/chills/shortness of breath/chest pain/new neurologic complaints. happy with progress w/ PT and results of survery     O:  Vital Signs Last 24 Hrs  T(C): 36.8 (11 Jan 2024 05:16), Max: 37 (10 Magdaleno 2024 20:35)  T(F): 98.2 (11 Jan 2024 05:16), Max: 98.6 (10 Magdaleno 2024 20:35)  HR: 59 (11 Jan 2024 05:16) (59 - 90)  BP: 129/62 (11 Jan 2024 05:16) (109/59 - 144/68)  BP(mean): --  RR: 18 (11 Jan 2024 05:16) (17 - 20)  SpO2: 98% (11 Jan 2024 05:16) (92% - 99%)    Parameters below as of 11 Jan 2024 05:16  Patient On (Oxygen Delivery Method): nasal cannula  O2 Flow (L/min): 2      Physical Exam:    GEN: NAD, A and O x 3  Incision: Dressing c/d/i  HV x 1     Motor:  RUE:   Delt 5/5; bicep 5/5; tricep 5/5; WF 5/5; WE 5/5;  5/5; EPL 5/5; palmar intrinsics 5/5    RLE:  Q 5/5; HS 5/5; TA 5/5; GS 5/5; EHL 5/5; FHL 5/5    LUE:  Delt 5/5; bicep 5/5; tricep 5/5; WF 5/5; WE 5/5;  5/5; EPL 5/5; palmar intrinsics 5/5    LLE:  Q 5/5; HS 5/5; TA 5/5; GS 5/5; EHL 5/5; FHL 5/5    Sensory:  Sensation intact to light touch in C5-T1 dermatomes bilaterally    Sensation intact to light touch in L2-S1 dermatomes bilaterally    Vascular:  No edema, calf tenderness, wwp, peripherap pulses +2 bilaterally        Labs:             LABS/RADIOLOGY RESULTS:                          8.9    6.15  )-----------( 114      ( 10 Magdaleno 2024 05:30 )             29.4   01-10    140  |  108  |  21  ----------------------------<  104<H>  3.9   |  24  |  0.72    Ca    8.4      09 Jan 2024 05:30    Blood Cultures    Urinalysis Basic - ( 10 Magdaleno 2024 05:30 )    Color:  / Appearance:  / SG:  / pH:   Gluc: 104 mg/dL / Ketone:   / Bili:  / Urobili:    Blood:  / Protein:  / Nitrite:    Leuk Esterase:  / RBC:  / WBC    Sq Epi:  / Non Sq Epi:  / Bacteria:           78yFemale s/p L2-Pelvis PSF, L5-S1 TLIF by Dr Bahena  -mobilize, OOB  -PT, WBS: WBAT  -DVT ppx: SCDs  -Abx  -Drains - 1 x HV - maintain on 1/11  - [place] - tov passed on 1/10   -nausea control/bowel regimen  -c/w home meds  -Pain control  -Diet  -Dispo planning - [place]      Ortho pager [phone] Orthopaedic Spine Resident Progress Note    S: no acute overnight events.  Pain controlled.  No fevers/chills/shortness of breath/chest pain/new neurologic complaints. happy with progress w/ PT and results of survery     O:  Vital Signs Last 24 Hrs  T(C): 36.8 (11 Jan 2024 05:16), Max: 37 (10 Magdaleno 2024 20:35)  T(F): 98.2 (11 Jan 2024 05:16), Max: 98.6 (10 Magdaleno 2024 20:35)  HR: 59 (11 Jan 2024 05:16) (59 - 90)  BP: 129/62 (11 Jan 2024 05:16) (109/59 - 144/68)  BP(mean): --  RR: 18 (11 Jan 2024 05:16) (17 - 20)  SpO2: 98% (11 Jan 2024 05:16) (92% - 99%)    Parameters below as of 11 Jan 2024 05:16  Patient On (Oxygen Delivery Method): nasal cannula  O2 Flow (L/min): 2      Physical Exam:    GEN: NAD, A and O x 3  Incision: Dressing c/d/i  HV x 1     Motor:  RUE:   Delt 5/5; bicep 5/5; tricep 5/5; WF 5/5; WE 5/5;  5/5; EPL 5/5; palmar intrinsics 5/5    RLE:  Q 5/5; HS 5/5; TA 5/5; GS 5/5; EHL 5/5; FHL 5/5    LUE:  Delt 5/5; bicep 5/5; tricep 5/5; WF 5/5; WE 5/5;  5/5; EPL 5/5; palmar intrinsics 5/5    LLE:  Q 5/5; HS 5/5; TA 5/5; GS 5/5; EHL 5/5; FHL 5/5    Sensory:  Sensation intact to light touch in C5-T1 dermatomes bilaterally    Sensation intact to light touch in L2-S1 dermatomes bilaterally    Vascular:  No edema, calf tenderness, wwp, peripherap pulses +2 bilaterally        Labs:             LABS/RADIOLOGY RESULTS:                          8.9    6.15  )-----------( 114      ( 10 Magdaleno 2024 05:30 )             29.4   01-10    140  |  108  |  21  ----------------------------<  104<H>  3.9   |  24  |  0.72    Ca    8.4      09 Jan 2024 05:30    Blood Cultures    Urinalysis Basic - ( 10 Magdaleno 2024 05:30 )    Color:  / Appearance:  / SG:  / pH:   Gluc: 104 mg/dL / Ketone:   / Bili:  / Urobili:    Blood:  / Protein:  / Nitrite:    Leuk Esterase:  / RBC:  / WBC    Sq Epi:  / Non Sq Epi:  / Bacteria:           78yFemale s/p L2-Pelvis PSF, L5-S1 TLIF by Dr Bahena on 1/6/24   -mobilize, OOB  -PT, WBS: WBAT  -DVT ppx: SCDs  -Abx  -Drains - 1 x HV - maintain on 1/11  - [place] - tov passed on 1/10   -nausea control/bowel regimen  -c/w home meds  -Pain control  -Diet  -Dispo planning - [place]      Ortho pager [phone] Orthopaedic Spine Resident Progress Note    S: no acute overnight events.  Pain controlled.  No fevers/chills/shortness of breath/chest pain/new neurologic complaints. happy with progress w/ PT and results of survery     O:  Vital Signs Last 24 Hrs  T(C): 36.8 (11 Jan 2024 05:16), Max: 37 (10 Magdaleno 2024 20:35)  T(F): 98.2 (11 Jan 2024 05:16), Max: 98.6 (10 Magdaleno 2024 20:35)  HR: 59 (11 Jan 2024 05:16) (59 - 90)  BP: 129/62 (11 Jan 2024 05:16) (109/59 - 144/68)  BP(mean): --  RR: 18 (11 Jan 2024 05:16) (17 - 20)  SpO2: 98% (11 Jan 2024 05:16) (92% - 99%)    Parameters below as of 11 Jan 2024 05:16  Patient On (Oxygen Delivery Method): nasal cannula  O2 Flow (L/min): 2      Physical Exam:    GEN: NAD, A and O x 3  Incision: Dressing c/d/i  HV x 1     Motor:  RUE:   Delt 5/5; bicep 5/5; tricep 5/5; WF 5/5; WE 5/5;  5/5; EPL 5/5; palmar intrinsics 5/5    RLE:  Q 5/5; HS 5/5; TA 5/5; GS 5/5; EHL 5/5; FHL 5/5    LUE:  Delt 5/5; bicep 5/5; tricep 5/5; WF 5/5; WE 5/5;  5/5; EPL 5/5; palmar intrinsics 5/5    LLE:  Q 5/5; HS 5/5; TA 5/5; GS 5/5; EHL 5/5; FHL 5/5    Sensory:  Sensation intact to light touch in C5-T1 dermatomes bilaterally    Sensation intact to light touch in L2-S1 dermatomes bilaterally    Vascular:  No edema, calf tenderness, wwp, peripherap pulses +2 bilaterally        Labs:             LABS/RADIOLOGY RESULTS:                          8.9    6.15  )-----------( 114      ( 10 Magdaleno 2024 05:30 )             29.4   01-10    140  |  108  |  21  ----------------------------<  104<H>  3.9   |  24  |  0.72    Ca    8.4      09 Jan 2024 05:30    Blood Cultures    Urinalysis Basic - ( 10 Magdaleno 2024 05:30 )    Color:  / Appearance:  / SG:  / pH:   Gluc: 104 mg/dL / Ketone:   / Bili:  / Urobili:    Blood:  / Protein:  / Nitrite:    Leuk Esterase:  / RBC:  / WBC    Sq Epi:  / Non Sq Epi:  / Bacteria:           78yFemale s/p L2-Pelvis PSF, L5-S1 TLIF by Dr Bahena on 1/6/24   -mobilize, OOB  -PT, WBS: WBAT  -DVT ppx: SCDs  -Abx  -Drains - 1 x HV - maintain on 1/11  - [place] - tov passed on 1/10   -nausea control/bowel regimen  -c/w home meds  -Pain control  -Diet  -Dispo planning - [place]      Ortho pager [phone]

## 2024-01-12 ENCOUNTER — TRANSCRIPTION ENCOUNTER (OUTPATIENT)
Age: 79
End: 2024-01-12

## 2024-01-12 LAB
ANION GAP SERPL CALC-SCNC: 9 MMOL/L — SIGNIFICANT CHANGE UP (ref 5–17)
ANION GAP SERPL CALC-SCNC: 9 MMOL/L — SIGNIFICANT CHANGE UP (ref 5–17)
BUN SERPL-MCNC: 13 MG/DL — SIGNIFICANT CHANGE UP (ref 7–23)
BUN SERPL-MCNC: 13 MG/DL — SIGNIFICANT CHANGE UP (ref 7–23)
CALCIUM SERPL-MCNC: 8.5 MG/DL — SIGNIFICANT CHANGE UP (ref 8.4–10.5)
CALCIUM SERPL-MCNC: 8.5 MG/DL — SIGNIFICANT CHANGE UP (ref 8.4–10.5)
CHLORIDE SERPL-SCNC: 106 MMOL/L — SIGNIFICANT CHANGE UP (ref 96–108)
CHLORIDE SERPL-SCNC: 106 MMOL/L — SIGNIFICANT CHANGE UP (ref 96–108)
CO2 SERPL-SCNC: 23 MMOL/L — SIGNIFICANT CHANGE UP (ref 22–31)
CO2 SERPL-SCNC: 23 MMOL/L — SIGNIFICANT CHANGE UP (ref 22–31)
CREAT SERPL-MCNC: 0.58 MG/DL — SIGNIFICANT CHANGE UP (ref 0.5–1.3)
CREAT SERPL-MCNC: 0.58 MG/DL — SIGNIFICANT CHANGE UP (ref 0.5–1.3)
EGFR: 93 ML/MIN/1.73M2 — SIGNIFICANT CHANGE UP
EGFR: 93 ML/MIN/1.73M2 — SIGNIFICANT CHANGE UP
GLUCOSE BLDC GLUCOMTR-MCNC: 106 MG/DL — HIGH (ref 70–99)
GLUCOSE BLDC GLUCOMTR-MCNC: 106 MG/DL — HIGH (ref 70–99)
GLUCOSE BLDC GLUCOMTR-MCNC: 118 MG/DL — HIGH (ref 70–99)
GLUCOSE BLDC GLUCOMTR-MCNC: 118 MG/DL — HIGH (ref 70–99)
GLUCOSE BLDC GLUCOMTR-MCNC: 127 MG/DL — HIGH (ref 70–99)
GLUCOSE SERPL-MCNC: 118 MG/DL — HIGH (ref 70–99)
GLUCOSE SERPL-MCNC: 118 MG/DL — HIGH (ref 70–99)
HCT VFR BLD CALC: 28.4 % — LOW (ref 34.5–45)
HCT VFR BLD CALC: 28.4 % — LOW (ref 34.5–45)
HGB BLD-MCNC: 8.8 G/DL — LOW (ref 11.5–15.5)
HGB BLD-MCNC: 8.8 G/DL — LOW (ref 11.5–15.5)
MCHC RBC-ENTMCNC: 28.9 PG — SIGNIFICANT CHANGE UP (ref 27–34)
MCHC RBC-ENTMCNC: 28.9 PG — SIGNIFICANT CHANGE UP (ref 27–34)
MCHC RBC-ENTMCNC: 31 GM/DL — LOW (ref 32–36)
MCHC RBC-ENTMCNC: 31 GM/DL — LOW (ref 32–36)
MCV RBC AUTO: 93.1 FL — SIGNIFICANT CHANGE UP (ref 80–100)
MCV RBC AUTO: 93.1 FL — SIGNIFICANT CHANGE UP (ref 80–100)
NRBC # BLD: 0 /100 WBCS — SIGNIFICANT CHANGE UP (ref 0–0)
NRBC # BLD: 0 /100 WBCS — SIGNIFICANT CHANGE UP (ref 0–0)
PLATELET # BLD AUTO: 119 K/UL — LOW (ref 150–400)
PLATELET # BLD AUTO: 119 K/UL — LOW (ref 150–400)
POTASSIUM SERPL-MCNC: 4 MMOL/L — SIGNIFICANT CHANGE UP (ref 3.5–5.3)
POTASSIUM SERPL-MCNC: 4 MMOL/L — SIGNIFICANT CHANGE UP (ref 3.5–5.3)
POTASSIUM SERPL-SCNC: 4 MMOL/L — SIGNIFICANT CHANGE UP (ref 3.5–5.3)
POTASSIUM SERPL-SCNC: 4 MMOL/L — SIGNIFICANT CHANGE UP (ref 3.5–5.3)
RBC # BLD: 3.05 M/UL — LOW (ref 3.8–5.2)
RBC # BLD: 3.05 M/UL — LOW (ref 3.8–5.2)
RBC # FLD: 16.9 % — HIGH (ref 10.3–14.5)
RBC # FLD: 16.9 % — HIGH (ref 10.3–14.5)
SARS-COV-2 RNA SPEC QL NAA+PROBE: POSITIVE
SARS-COV-2 RNA SPEC QL NAA+PROBE: POSITIVE
SODIUM SERPL-SCNC: 138 MMOL/L — SIGNIFICANT CHANGE UP (ref 135–145)
SODIUM SERPL-SCNC: 138 MMOL/L — SIGNIFICANT CHANGE UP (ref 135–145)
WBC # BLD: 4.36 K/UL — SIGNIFICANT CHANGE UP (ref 3.8–10.5)
WBC # BLD: 4.36 K/UL — SIGNIFICANT CHANGE UP (ref 3.8–10.5)
WBC # FLD AUTO: 4.36 K/UL — SIGNIFICANT CHANGE UP (ref 3.8–10.5)
WBC # FLD AUTO: 4.36 K/UL — SIGNIFICANT CHANGE UP (ref 3.8–10.5)

## 2024-01-12 PROCEDURE — 99233 SBSQ HOSP IP/OBS HIGH 50: CPT

## 2024-01-12 RX ADMIN — METHOCARBAMOL 500 MILLIGRAM(S): 500 TABLET, FILM COATED ORAL at 05:29

## 2024-01-12 RX ADMIN — Medication 100 MILLIGRAM(S): at 05:29

## 2024-01-12 RX ADMIN — Medication 1000 MILLIGRAM(S): at 05:29

## 2024-01-12 RX ADMIN — METFORMIN HYDROCHLORIDE 1000 MILLIGRAM(S): 850 TABLET ORAL at 09:35

## 2024-01-12 RX ADMIN — Medication 25 MILLIGRAM(S): at 05:30

## 2024-01-12 RX ADMIN — ENOXAPARIN SODIUM 40 MILLIGRAM(S): 100 INJECTION SUBCUTANEOUS at 21:40

## 2024-01-12 RX ADMIN — METHOCARBAMOL 500 MILLIGRAM(S): 500 TABLET, FILM COATED ORAL at 13:40

## 2024-01-12 RX ADMIN — Medication 1000 MILLIGRAM(S): at 21:40

## 2024-01-12 RX ADMIN — Medication 1000 MILLIGRAM(S): at 21:56

## 2024-01-12 RX ADMIN — CARBIDOPA AND LEVODOPA 1 TABLET(S): 25; 100 TABLET ORAL at 21:40

## 2024-01-12 RX ADMIN — Medication 1000 MILLIGRAM(S): at 13:41

## 2024-01-12 RX ADMIN — METFORMIN HYDROCHLORIDE 1000 MILLIGRAM(S): 850 TABLET ORAL at 18:29

## 2024-01-12 RX ADMIN — Medication 50 MILLIGRAM(S): at 21:39

## 2024-01-12 RX ADMIN — CARBIDOPA AND LEVODOPA 1 TABLET(S): 25; 100 TABLET ORAL at 13:40

## 2024-01-12 RX ADMIN — SENNA PLUS 2 TABLET(S): 8.6 TABLET ORAL at 21:39

## 2024-01-12 RX ADMIN — Medication 100 MILLIGRAM(S): at 13:40

## 2024-01-12 RX ADMIN — LINAGLIPTIN 5 MILLIGRAM(S): 5 TABLET, FILM COATED ORAL at 11:38

## 2024-01-12 RX ADMIN — POLYETHYLENE GLYCOL 3350 17 GRAM(S): 17 POWDER, FOR SOLUTION ORAL at 11:38

## 2024-01-12 RX ADMIN — CARBIDOPA AND LEVODOPA 1 TABLET(S): 25; 100 TABLET ORAL at 05:29

## 2024-01-12 RX ADMIN — Medication 100 MILLIGRAM(S): at 18:29

## 2024-01-12 RX ADMIN — ATORVASTATIN CALCIUM 40 MILLIGRAM(S): 80 TABLET, FILM COATED ORAL at 21:39

## 2024-01-12 RX ADMIN — Medication 100 MILLIGRAM(S): at 21:38

## 2024-01-12 RX ADMIN — METHOCARBAMOL 500 MILLIGRAM(S): 500 TABLET, FILM COATED ORAL at 21:40

## 2024-01-12 RX ADMIN — PANTOPRAZOLE SODIUM 40 MILLIGRAM(S): 20 TABLET, DELAYED RELEASE ORAL at 05:30

## 2024-01-12 NOTE — DIETITIAN INITIAL EVALUATION ADULT - ADD RECOMMEND
1. Continue with current diet order (consistent carbohydrate diet)   2. Encourage pt to meet nutritional needs as able   3. Monitor PO intakes, trend weights (biweekly), monitor skin integrity, monitor labs (electrolytes, CMP), monitor GI function  4. Encourage adherence to diet education (reinforce as able)   5. Continue insulin regimen to promote euglycemia  6. Pain and bowel regimen per team  7. Will continue to assess/honor preferences as able   8. Align nutrition interventions with goals of care at all times

## 2024-01-12 NOTE — DIETITIAN INITIAL EVALUATION ADULT - PERSON TAUGHT/METHOD
Pt amenable to education; RD provided education in regards to the importance of adequate macro and micronutrients, as well as hydration to support ADLs, maintain energy levels and overall functional/nutritional status. General healthful education provided. Nutrient-dense foods promoted. RD discussed pt's elevated nutrient needs related to postoperative demands, emphasizing the role that protein plays in the healing process. Pt's diabetes and ways to exhibit blood sugar control reviewed. Pt was receptive and verbalized understanding/verbal instruction/patient instructed

## 2024-01-12 NOTE — DISCHARGE NOTE PROVIDER - CARE PROVIDER_API CALL
Gerry Bahena  Orthopaedic Surgery  [address]  [place] [address]  Phone: [phone]  Fax: [phone]  Follow Up Time: 2 weeks   Gerry Bahena  Orthopaedic Surgery  [address]  [place] [address]  Phone: [phone]  Fax: [phone]  Follow Up Time: 2 weeks   Gerry Bahena  Orthopaedic Surgery  [address]  [place] [address]  Phone: [phone]  Fax: [phone]  Follow Up Time: 2 weeks

## 2024-01-12 NOTE — DIETITIAN INITIAL EVALUATION ADULT - OTHER INFO
78y Female s/p L2-pelvis PSF by Dr Bahena on 01-08  PMHx: MR, MVP, Parkinson's, GERD, HTN, HLD ; provoked DVT after R TKA in 2/2019 w xarelto in the past     Pt seen in room for nutrition assessment. Pt reports fair to good appetite PTA and during hospital stay. As per diet recall PTA: pt stated she eats various foods, protein, starches, vegetables, fruit, eats several meals throughout the day. Currently on consistent carbohydrate diet with no snack, tolerating well, noted with % PO intakes overall. No cultural, Worship, or ethnic food preferences noted. NKFA. Denies wt changes, reports wt stability at current wt. Dosing wt: Ideal body weight: pt is % of IBW. Denies nausea, vomiting, diarrhea, constipation, last BM. No edema. Skin: . Markel: No issues chewing or swallowing noted. Denies pain. Labs reviewed: ; RD to continue to monitor trends. Nutritionally pertinent medications: RD observed pt with no overt signs of muscle or fat wasting. Based on ASPEN guidelines, pt does not meet criteria for malnutrition at this time. Pt amenable to education; RD provided education in regards to the importance of adequate macro and micronutrients, as well as hydration to support ADLs, maintain energy levels and overall functional/nutritional status. General healthful education provided. Pt was receptive and verbalized understanding. No additional nutrition-related concerns. Will continue to follow. Additional nutrition recommendations below to follow. 78y Female s/p L2-pelvis PSF by Dr Bahena on 01-08  PMHx: MR, MVP, Parkinson's, GERD, HTN, HLD ; provoked DVT after R TKA in 2/2019 w xarelto in the past     Pt seen in room for nutrition assessment. Pt reports fair to good appetite PTA and during hospital stay. As per diet recall PTA: pt stated she eats various foods, protein, starches, vegetables, fruit, eats several meals throughout the day. Currently on consistent carbohydrate diet with no snack, tolerating well, noted with % PO intakes overall. No cultural, Voodoo, or ethnic food preferences noted. NKFA. Denies wt changes, reports wt stability at current wt. Dosing wt: Ideal body weight: pt is % of IBW. Denies nausea, vomiting, diarrhea, constipation, last BM. No edema. Skin: . Markel: No issues chewing or swallowing noted. Denies pain. Labs reviewed: ; RD to continue to monitor trends. Nutritionally pertinent medications: RD observed pt with no overt signs of muscle or fat wasting. Based on ASPEN guidelines, pt does not meet criteria for malnutrition at this time. Pt amenable to education; RD provided education in regards to the importance of adequate macro and micronutrients, as well as hydration to support ADLs, maintain energy levels and overall functional/nutritional status. General healthful education provided. Pt was receptive and verbalized understanding. No additional nutrition-related concerns. Will continue to follow. Additional nutrition recommendations below to follow. 78y Female s/p L2-pelvis PSF by Dr Bahena on 01-08  PMHx: MR, MVP, Parkinson's, GERD, HTN, HLD ; provoked DVT after R TKA in 2/2019 w xarelto in the past     Pt seen in room for nutrition assessment. Pt reports fair to good appetite PTA and during hospital stay. As per diet recall PTA: pt stated she eats various foods, protein, starches, vegetables, fruit, eats several meals throughout the day. Currently on consistent carbohydrate diet with no snack, tolerating well, noted with ~75-80% PO intakes overall. No cultural, Denominational, or ethnic food preferences noted. No known food allergies. Denies wt changes, reports wt stability at current wt. Dosing wt: 142 pounds, Ideal body weight: 105 pounds, pt is 135% of ideal body weight. Denies nausea, vomiting, diarrhea, constipation, last BM. No edema. Skin: surgical incisions to back, hemovac drain. Markel: 20. No issues chewing or swallowing noted. Denies pain. Labs reviewed: elevated POCT glucose (101-290), serum Glucose (118); RD to continue to monitor trends. Nutritionally pertinent medications/supplements: metformin, insulin, bisacodyl, carbidopa/levodopa, zofran, senna. RD observed pt with no overt signs of muscle or fat wasting. Based on ASPEN guidelines, pt does not meet criteria for malnutrition at this time. Pt amenable to education; RD provided education in regards to the importance of adequate macro and micronutrients, as well as hydration to support ADLs, maintain energy levels and overall functional/nutritional status. General healthful education provided. Nutrient-dense foods promoted. RD discussed pt's elevated nutrient needs related to postoperative demands, emphasizing the role that protein plays in the healing process. Pt's diabetes and ways to exhibit blood sugar control reviewed. Pt was receptive and verbalized understanding. No additional nutrition-related concerns. Will continue to follow. Additional nutrition recommendations below to follow. 78y Female s/p L2-pelvis PSF by Dr Bahena on 01-08  PMHx: MR, MVP, Parkinson's, GERD, HTN, HLD ; provoked DVT after R TKA in 2/2019 w xarelto in the past     Pt seen in room for nutrition assessment. Pt reports fair to good appetite PTA and during hospital stay. As per diet recall PTA: pt stated she eats various foods, protein, starches, vegetables, fruit, eats several meals throughout the day. Currently on consistent carbohydrate diet with no snack, tolerating well, noted with ~75-80% PO intakes overall. No cultural, Restoration, or ethnic food preferences noted. No known food allergies. Denies wt changes, reports wt stability at current wt. Dosing wt: 142 pounds, Ideal body weight: 105 pounds, pt is 135% of ideal body weight. Denies nausea, vomiting, diarrhea, constipation, last BM. No edema. Skin: surgical incisions to back, hemovac drain. Markel: 20. No issues chewing or swallowing noted. Denies pain. Labs reviewed: elevated POCT glucose (101-290), serum Glucose (118); RD to continue to monitor trends. Nutritionally pertinent medications/supplements: metformin, insulin, bisacodyl, carbidopa/levodopa, zofran, senna. RD observed pt with no overt signs of muscle or fat wasting. Based on ASPEN guidelines, pt does not meet criteria for malnutrition at this time. Pt amenable to education; RD provided education in regards to the importance of adequate macro and micronutrients, as well as hydration to support ADLs, maintain energy levels and overall functional/nutritional status. General healthful education provided. Nutrient-dense foods promoted. RD discussed pt's elevated nutrient needs related to postoperative demands, emphasizing the role that protein plays in the healing process. Pt's diabetes and ways to exhibit blood sugar control reviewed. Pt was receptive and verbalized understanding. No additional nutrition-related concerns. Will continue to follow. Additional nutrition recommendations below to follow.

## 2024-01-12 NOTE — DISCHARGE NOTE PROVIDER - NSDCFUADDINST_GEN_ALL_CORE_FT
ACTIVITY:   - No extreme bending, extending, turning, twisting, or straining. No strenuous activity, heavy lifting, driving or returning to work until cleared by your surgeon.     DRESSING/SHOWERING:    (PRINEO – mesh with glue)   -May shower post-op day 1, pat dry afterwards. Dressing is water-resistant. Do not soak in bathtubs. Do not need to cover with another dressing. Do not remove mesh dressing – it will be taken care of in your follow up appointment. Do not apply ointments, creams or oils to incision area.     (STAPLES/SUTURES/STERI-STRIPS)   -Change dressing daily until post-op day 5. Sponge bathe until post-op day 5 then may take full shower. Once dressing removed keep incision clean and dry. Do not pick at your incision. Do not apply creams, ointments or oils to your incision until cleared by your surgeon. Do not soak your incision in sitting water (ie tubs, pools, lakes, etc.) until cleared by your surgeon.      MEDICATION/ANTICOAGULATION:   - You have been prescribed medications for pain:     - Tylenol (Acetaminophen) for mild to moderate pain. Do not exceed 3,000mg daily.     - For more severe pain, you may continue to take the Tylenol with the addition of narcotic pain medication. Take this medication as prescribed. Do not take more than prescribed. Note that this medication may cause drowsiness or dizziness. Do not operate machinery. This medication may cause constipation.   - If you have been prescribed a muscle relaxer, take this medication as needed for muscle spasm. Follow instructions on bottle.   - Try to have regular bowel movements. Take stool softener or laxative if necessary. You may wish to take Miralax daily until you have regular bowel movements.    - Do not take antiinflammatories (Aleve, Advil, Naproxen, Ibuprofen, etc.) until cleared by your surgeon. Tylenol is not an anti-inflammatory and okay to take (see above).   - If you have a pain management physician, please follow-up with them postoperatively.    - If you experience any negative side effects of your medications, please call your surgeon's office to discuss.     FOLLOW UP:   - Call to schedule an appt with  *** for follow up.    - Please follow-up with your primary care physician or any other specialist you see postoperatively, if needed.    - Contact your doctor or go to the emergency room if you experience: fever greater than 101.5, chills, chest pain, difficulty breathing, redness or excessive drainage around the incision, other concerns.    You were transfused x1 unit of packed chase blood cells after surgery. Your hemoglobin improved to 8.8. Follow up with your PCP in 1 week to check your hemoglobin levels.,     You were treated for a uti on 1- with 5 days of nitrofurantoin PO.   ACTIVITY:   - No extreme bending, extending, turning, twisting, or straining. No strenuous activity, heavy lifting, driving or returning to work until cleared by your surgeon.     DRESSING/SHOWERING:    (STAPLES/SUTURES/STERI-STRIPS)   -Change dressing daily until post-op day 5. Sponge bathe until post-op day 5 then may take full shower. Once dressing removed keep incision clean and dry. Do not pick at your incision. Do not apply creams, ointments or oils to your incision until cleared by your surgeon. Do not soak your incision in sitting water (ie tubs, pools, lakes, etc.) until cleared by your surgeon.      MEDICATION/ANTICOAGULATION:   - You have been prescribed medications for pain:     - Tylenol (Acetaminophen) for mild to moderate pain. Do not exceed 3,000mg daily.     - For more severe pain, you may continue to take the Tylenol with the addition of narcotic pain medication. Take this medication as prescribed. Do not take more than prescribed. Note that this medication may cause drowsiness or dizziness. Do not operate machinery. This medication may cause constipation.   - If you have been prescribed a muscle relaxer, take this medication as needed for muscle spasm. Follow instructions on bottle.   - Try to have regular bowel movements. Take stool softener or laxative if necessary. You may wish to take Miralax daily until you have regular bowel movements.    - Do not take antiinflammatories (Aleve, Advil, Naproxen, Ibuprofen, etc.) until cleared by your surgeon. Tylenol is not an anti-inflammatory and okay to take (see above).   - If you have a pain management physician, please follow-up with them postoperatively.    - If you experience any negative side effects of your medications, please call your surgeon's office to discuss.     FOLLOW UP:   - Call to schedule an appt with Dr. Bahena for follow up.    - Please follow-up with your primary care physician or any other specialist you see postoperatively, if needed.    - Contact your doctor or go to the emergency room if you experience: fever greater than 101.5, chills, chest pain, difficulty breathing, redness or excessive drainage around the incision, other concerns.    You were transfused x1 unit of packed chase blood cells after surgery. Your hemoglobin improved to 8.8. Follow up with your PCP in 1 week to check your hemoglobin levels.,     You were treated for a uti on 1- with 5 days of nitrofurantoin PO.   ACTIVITY:   - No extreme bending, extending, turning, twisting, or straining. No strenuous activity, heavy lifting, driving or returning to work until cleared by your surgeon.     DRESSING/SHOWERING:    (STAPLES/SUTURES/STERI-STRIPS)   -Change dressing daily until post-op day 5. Sponge bathe until post-op day 5 then may take full shower. Once dressing removed keep incision clean and dry. Do not pick at your incision. Do not apply creams, ointments or oils to your incision until cleared by your surgeon. Do not soak your incision in sitting water (ie tubs, pools, lakes, etc.) until cleared by your surgeon.      MEDICATION/ANTICOAGULATION:   - You have been prescribed medications for pain:     - Tylenol (Acetaminophen) for mild to moderate pain. Do not exceed 3,000mg daily.     - For more severe pain, you may continue to take the Tylenol with the addition of narcotic pain medication. Take this medication as prescribed. Do not take more than prescribed. Note that this medication may cause drowsiness or dizziness. Do not operate machinery. This medication may cause constipation.   - If you have been prescribed a muscle relaxer, take this medication as needed for muscle spasm. Follow instructions on bottle.   - Try to have regular bowel movements. Take stool softener or laxative if necessary. You may wish to take Miralax daily until you have regular bowel movements.    - Do not take antiinflammatories (Aleve, Advil, Naproxen, Ibuprofen, etc.) until cleared by your surgeon. Tylenol is not an anti-inflammatory and okay to take (see above).   - If you have a pain management physician, please follow-up with them postoperatively.    - If you experience any negative side effects of your medications, please call your surgeon's office to discuss.     FOLLOW UP:   - Call to schedule an appt with Dr. Bahena for follow up.    - Please follow-up with your primary care physician or any other specialist you see postoperatively, if needed.    - Contact your doctor or go to the emergency room if you experience: fever greater than 101.5, chills, chest pain, difficulty breathing, redness or excessive drainage around the incision, other concerns.    You were transfused x1 unit of packed cahse blood cells after surgery. Your hemoglobin improved to 8.8. Follow up with your PCP in 1 week to check your hemoglobin levels.,     You were treated for a uti on 1- with 5 days of nitrofurantoin PO.

## 2024-01-12 NOTE — DIETITIAN INITIAL EVALUATION ADULT - PERTINENT LABORATORY DATA
01-12    138  |  106  |  13  ----------------------------<  118<H>  4.0   |  23  |  0.58    Ca    8.5      12 Jan 2024 05:30    POCT Blood Glucose.: 127 mg/dL (01-12-24 @ 12:02)  A1C with Estimated Average Glucose Result: 7.3 % (01-09-24 @ 05:30)

## 2024-01-12 NOTE — DISCHARGE NOTE PROVIDER - NSDCCPCAREPLAN_GEN_ALL_CORE_FT
PRINCIPAL DISCHARGE DIAGNOSIS  Diagnosis: Spinal stenosis  Assessment and Plan of Treatment: pain relieved with steroid injection

## 2024-01-12 NOTE — DISCHARGE NOTE PROVIDER - PROVIDER TOKENS
PROVIDER:[TOKEN:[[id number]],FOLLOWUP:[2 weeks]] PROVIDER:[TOKEN:[[id number]],FOLLOWUP:[2 weeks]] PROVIDER:[TOKEN:[[id number]],FOLLOWUP:[2 weeks]]

## 2024-01-12 NOTE — DISCHARGE NOTE PROVIDER - NSDCMRMEDTOKEN_GEN_ALL_CORE_FT
Problem: Contusion of right upper extremity     Referred by: Monica Burks MD           ER:[facility]         Date:  2/13/2023       Date of Injury:02.03.23     Type: Contusion      Splinted?:     Work Related: N/A    Insurance: [employer]     ? Previously seen for this problem before? Where:    ? Who:   ? When:   ? Surgery:  ? Previous Records:        Previous X-Rays:      Has patient been made aware to bring films?     PCP: José Erickson DO     Contact Phone Number:  [phone]      Ortho Provider On Call:Dr. Rodriguez          amLODIPine 2.5 mg oral tablet: 1 tab(s) orally once a day  carbidopa-levodopa 25 mg-100 mg oral tablet: 1 tab(s) orally 3 times a day  Crestor 10 mg oral tablet: 1 tab(s) orally once a day (at bedtime)  hydroCHLOROthiazide 12.5 mg oral tablet: 1 tab(s) orally once a day  losartan 100 mg oral tablet: 1 tab(s) orally once a day  losartan 100 mg oral tablet: 1 tab(s) orally once a day  metFORMIN 1000 mg oral tablet, extended release: 1 tab(s) orally 2 times a day  methIMAzole 5 mg oral tablet: 1 tab(s) orally once a day  Metoprolol Succinate ER 25 mg oral tablet, extended release: 1 tab(s) orally once a day  pantoprazole 40 mg oral delayed release tablet: 1 tab(s) orally once a day  Trulicity Pen 0.75 mg/0.5 mL subcutaneous solution: 0.75 milligram(s) subcutaneously once a week   acetaminophen 500 mg oral tablet: 2 tab(s) orally every 8 hours  amLODIPine 2.5 mg oral tablet: 1 tab(s) orally once a day  carbidopa-levodopa 25 mg-100 mg oral tablet: 1 tab(s) orally 3 times a day  Crestor 10 mg oral tablet: 1 tab(s) orally once a day (at bedtime)  hydroCHLOROthiazide 12.5 mg oral tablet: 1 tab(s) orally once a day  HYDROmorphone 2 mg oral tablet: 1 tab(s) orally every 4 hours As needed Severe Pain (7 - 10)  losartan 100 mg oral tablet: 1 tab(s) orally once a day  metFORMIN 1000 mg oral tablet, extended release: 1 tab(s) orally 2 times a day  methIMAzole 5 mg oral tablet: 1 tab(s) orally once a day  methocarbamol 500 mg oral tablet: 1 tab(s) orally every 8 hours  Metoprolol Succinate ER 25 mg oral tablet, extended release: 1 tab(s) orally once a day  nitrofurantoin macrocrystals-monohydrate 100 mg oral capsule: 1 cap(s) orally 2 times a day  ondansetron 4 mg oral tablet, disintegratin tab(s) orally every 6 hours  pantoprazole 40 mg oral delayed release tablet: 1 tab(s) orally once a day  polyethylene glycol 3350 oral powder for reconstitution: 17 gram(s) orally once a day  pregabalin 50 mg oral capsule: 1 cap(s) orally 3 times a day  senna leaf extract oral tablet: 2 tab(s) orally once a day (at bedtime)  Trulicity Pen 0.75 mg/0.5 mL subcutaneous solution: 0.75 milligram(s) subcutaneously once a week

## 2024-01-12 NOTE — DISCHARGE NOTE NURSING/CASE MANAGEMENT/SOCIAL WORK - PATIENT PORTAL LINK FT
You can access the FollowMyHealth Patient Portal offered by [facility] by registering at the following website: [email or web address]. By joining [employer]’s FollowMyHealth portal, you will also be able to view your health information using other applications (apps) compatible with our system. You can access the FollowMyHealth Patient Portal offered by [facility] by registering at the following website: [email or web address]. By joining [employer]’s FollowMyHealth portal, you will also be able to view your health information using other applications (apps) compatible with our system.

## 2024-01-12 NOTE — DIETITIAN INITIAL EVALUATION ADULT - OTHER CALCULATIONS
Based on Standards of Care pt >% ideal body weight, thus ideal body weight used for all calculations. Needs adjusted for advanced age and clinical condition/status, postoperative demands.

## 2024-01-12 NOTE — PROGRESS NOTE ADULT - SUBJECTIVE AND OBJECTIVE BOX
Orthopaedic Spine Resident Progress Note    S: no acute overnight events.  Pain controlled.  No fevers/chills/shortness of breath/chest pain/new neurologic complaints. she states that her back feels more mobile today.     Vital Signs Last 24 Hrs  T(C): 36.4 (2024 04:24), Max: 37.6 (2024 17:30)  T(F): 97.5 (2024 04:24), Max: 99.6 (2024 17:30)  HR: 61 (2024 05:27) (60 - 75)  BP: 144/67 (2024 05:27) (102/50 - 144/67)  BP(mean): --  RR: 16 (2024 04:24) (16 - 17)  SpO2: 99% (2024 04:24) (95% - 99%)    Parameters below as of 2024 04:24  Patient On (Oxygen Delivery Method): nasal cannula  O2 Flow (L/min): 2      Physical Exam:    GEN: NAD, A and O x 3  Incision: Dressing c/d/i  HV x 1     Motor:  RUE:   Delt 5/5; bicep 5/5; tricep 5/5; WF 5/5; WE 5/5;  5/5; EPL 5/5; palmar intrinsics 5/5    RLE:  Q 5/5; HS 5/5; TA 5/5; GS 5/5; EHL 5/5; FHL 5/5    LUE:  Delt 5/5; bicep 5/5; tricep 5/5; WF 5/5; WE 5/5;  5/5; EPL 5/5; palmar intrinsics 5/5    LLE:  Q 5/5; HS 5/5; TA 5/5; GS 5/5; EHL 5/5; FHL 5/5    Sensory:  Sensation intact to light touch in C5-T1 dermatomes bilaterally    Sensation intact to light touch in L2-S1 dermatomes bilaterally    Vascular:  No edema, calf tenderness, wwp, peripherap pulses +2 bilaterally      LABS/RADIOLOGY RESULTS:                          8.8    4.36  )-----------( 119      ( 2024 05:30 )             28.4   01-11    140  |  108  |  17  ----------------------------<  127<H>  4.2   |  24  |  0.63    Ca    8.3<L>      2024 09:01    Blood Cultures    Urinalysis Basic - ( 2024 12:56 )    Color: Yellow / Appearance: Clear / S.019 / pH:   Gluc:  / Ketone: Negative mg/dL  / Bili: Negative / Urobili: 0.2 mg/dL   Blood:  / Protein: Trace mg/dL / Nitrite: Positive   Leuk Esterase: Moderate / RBC: 2 /HPF / WBC 85 /HPF   Sq Epi:  / Non Sq Epi: 2 /HPF / Bacteria: Many /HPF          78yFemale s/p L2-Pelvis PSF, L5-S1 TLIF by Dr Bahnea on 24   -mobilize, OOB  -PT, WBS: WBAT  -DVT ppx: SCDs  -Abx  -Drains - 1 x HV - maintain on   - Cartwright - tov passed on 1/10   -nausea control/bowel regimen  -c/w home meds  -Pain control  -Diet  -Dispo planning - [facility]      Ortho pager [phone] Orthopaedic Spine Resident Progress Note    S: no acute overnight events.  Pain controlled.  No fevers/chills/shortness of breath/chest pain/new neurologic complaints. she states that her back feels more mobile today.     Vital Signs Last 24 Hrs  T(C): 36.4 (2024 04:24), Max: 37.6 (2024 17:30)  T(F): 97.5 (2024 04:24), Max: 99.6 (2024 17:30)  HR: 61 (2024 05:27) (60 - 75)  BP: 144/67 (2024 05:27) (102/50 - 144/67)  BP(mean): --  RR: 16 (2024 04:24) (16 - 17)  SpO2: 99% (2024 04:24) (95% - 99%)    Parameters below as of 2024 04:24  Patient On (Oxygen Delivery Method): nasal cannula  O2 Flow (L/min): 2      Physical Exam:    GEN: NAD, A and O x 3  Incision: Dressing c/d/i  HV x 1     Motor:  RUE:   Delt 5/5; bicep 5/5; tricep 5/5; WF 5/5; WE 5/5;  5/5; EPL 5/5; palmar intrinsics 5/5    RLE:  Q 5/5; HS 5/5; TA 5/5; GS 5/5; EHL 5/5; FHL 5/5    LUE:  Delt 5/5; bicep 5/5; tricep 5/5; WF 5/5; WE 5/5;  5/5; EPL 5/5; palmar intrinsics 5/5    LLE:  Q 5/5; HS 5/5; TA 5/5; GS 5/5; EHL 5/5; FHL 5/5    Sensory:  Sensation intact to light touch in C5-T1 dermatomes bilaterally    Sensation intact to light touch in L2-S1 dermatomes bilaterally    Vascular:  No edema, calf tenderness, wwp, peripherap pulses +2 bilaterally      LABS/RADIOLOGY RESULTS:                          8.8    4.36  )-----------( 119      ( 2024 05:30 )             28.4   01-11    140  |  108  |  17  ----------------------------<  127<H>  4.2   |  24  |  0.63    Ca    8.3<L>      2024 09:01    Blood Cultures    Urinalysis Basic - ( 2024 12:56 )    Color: Yellow / Appearance: Clear / S.019 / pH:   Gluc:  / Ketone: Negative mg/dL  / Bili: Negative / Urobili: 0.2 mg/dL   Blood:  / Protein: Trace mg/dL / Nitrite: Positive   Leuk Esterase: Moderate / RBC: 2 /HPF / WBC 85 /HPF   Sq Epi:  / Non Sq Epi: 2 /HPF / Bacteria: Many /HPF          78yFemale s/p L2-Pelvis PSF, L5-S1 TLIF by Dr Bahena on 24   -mobilize, OOB  -PT, WBS: WBAT  -DVT ppx: SCDs  -Abx  -Drains - 1 x HV - maintain on   - Cartwright - tov passed on 1/10   -nausea control/bowel regimen  -c/w home meds  -Pain control  -Diet  -Dispo planning - [place]      Ortho pager [phone]

## 2024-01-12 NOTE — DIETITIAN INITIAL EVALUATION ADULT - PERTINENT MEDS FT
MEDICATIONS  (STANDING):  acetaminophen     Tablet .. 1000 milliGRAM(s) Oral every 8 hours  atorvastatin 40 milliGRAM(s) Oral at bedtime  carbidopa/levodopa  25/100 1 Tablet(s) Oral three times a day  dextrose 5%. 1000 milliLiter(s) (100 mL/Hr) IV Continuous <Continuous>  dextrose 5%. 1000 milliLiter(s) (50 mL/Hr) IV Continuous <Continuous>  dextrose 50% Injectable 25 Gram(s) IV Push once  dextrose 50% Injectable 12.5 Gram(s) IV Push once  dextrose 50% Injectable 25 Gram(s) IV Push once  enoxaparin Injectable 40 milliGRAM(s) SubCutaneous every 24 hours  glucagon  Injectable 1 milliGRAM(s) IntraMuscular once  insulin lispro (ADMELOG) corrective regimen sliding scale   SubCutaneous Before meals and at bedtime  linagliptin 5 milliGRAM(s) Oral daily  metFORMIN 1000 milliGRAM(s) Oral two times a day  methimazole 5 milliGRAM(s) Oral daily  methocarbamol 500 milliGRAM(s) Oral every 8 hours  metoprolol succinate ER 25 milliGRAM(s) Oral daily  nitrofurantoin monohydrate/macrocrystals (MACROBID) 100 milliGRAM(s) Oral two times a day  ondansetron   Disintegrating Tablet 4 milliGRAM(s) Oral every 6 hours  pantoprazole    Tablet 40 milliGRAM(s) Oral before breakfast  phenazopyridine 100 milliGRAM(s) Oral three times a day  polyethylene glycol 3350 17 Gram(s) Oral daily  pregabalin 50 milliGRAM(s) Oral three times a day  senna 2 Tablet(s) Oral at bedtime    MEDICATIONS  (PRN):  bisacodyl 5 milliGRAM(s) Oral every 12 hours PRN Constipation  bisacodyl Suppository 10 milliGRAM(s) Rectal daily PRN Constipation  dextrose Oral Gel 15 Gram(s) Oral once PRN Blood Glucose LESS THAN 70 milliGRAM(s)/deciliter  HYDROmorphone   Tablet 2 milliGRAM(s) Oral every 4 hours PRN Severe Pain (7 - 10)  HYDROmorphone  Injectable 0.5 milliGRAM(s) IV Push every 4 hours PRN breakthrough  HYDROmorphone  Injectable 0.5 milliGRAM(s) IV Push every 15 minutes PRN breakthrough  magnesium hydroxide Suspension 30 milliLiter(s) Oral every 12 hours PRN Constipation

## 2024-01-12 NOTE — DIETITIAN INITIAL EVALUATION ADULT - NUTRITIONGOAL OUTCOME1
Pt to consistently meet at least 75% of EEE via tolerated route that is consistent with goals of care during hospital stay;

## 2024-01-12 NOTE — DISCHARGE NOTE PROVIDER - HOSPITAL COURSE
Admitted: 1-8-2024  Surgery: s/p L2-Pelvis PSF  Kirsten-op Antibiotics:  Pain control  DVT prophylaxis:  OOB/Physical Therapy  Consultants:  Inpatient events:  transfused on 1-9-2024, 7.4 HGB, x1 unit PRBCS given, IV iron given   1-10- fever 102. - trended temperature no   1-11 UA sent, + for UTI, nitrofurontin x5 days started Admitted: 1-8-2024  Surgery: s/p L2-Pelvis PSF  Kirsten-op Antibiotics:  Pain control  DVT prophylaxis:  OOB/Physical Therapy  Consultants: Medicine  Inpatient events:  transfused on 1-9-2024, 7.4 HGB, x1 unit PRBCS given, IV iron given   1-10: fever 102. - trended temperature no   1-11: UA sent, + for UTI, nitrofurontin x5 days started   1-13: drain removed

## 2024-01-12 NOTE — DISCHARGE NOTE PROVIDER - CARE PROVIDERS DIRECT ADDRESSES
[email or web address]

## 2024-01-12 NOTE — PROGRESS NOTE ADULT - SUBJECTIVE AND OBJECTIVE BOX
Subjective:  No acute events overnight.  Patient is doing well today without new complaints.  Denies HA, CP, SOB, abdominal pain, nausea, vomiting, fever, chills or diarrhea.     Objective:   Vital Signs:  T(C): 36.3 (12 Jan 2024 09:29), Max: 37.6 (11 Jan 2024 17:30)  T(F): 97.3 (12 Jan 2024 09:29), Max: 99.6 (11 Jan 2024 17:30)  HR: 63 (12 Jan 2024 09:29) (60 - 75)  BP: 144/72 (12 Jan 2024 09:29) (102/50 - 144/72)  BP(mean): --  RR: 18 (12 Jan 2024 09:29) (16 - 18)  SpO2: 98% (12 Jan 2024 09:29) (95% - 99%)    Parameters below as of 12 Jan 2024 09:29  Patient On (Oxygen Delivery Method): room air    Physical Exam:   -Gen: resting in bed at time of exam, not in distress   -HEENT: moist mucosa, no lesions   -Neck: supple, trachea at midline  -CV: RRR, +S1/S2  -Pulm: no wheezing , no crackles  no increase in work of breathing  -Abd: soft, NTND  -Skin: warm and dry, no new rashes   -Ext: moving all 4 extremities spontaneously , no edema  ,  -Neuro: AOx3, no gross focal neurological deficits  -Psych: affect and behavior appropriate, pleasant at time of interview    Labs:                        8.8    4.36  )-----------( 119      ( 12 Jan 2024 05:30 )             28.4       01-12    138  |  106  |  13  ----------------------------<  118<H>  4.0   |  23  |  0.58    Ca    8.5      12 Jan 2024 05:30     Medications:  MEDICATIONS  (STANDING):  acetaminophen     Tablet .. 1000 milliGRAM(s) Oral every 8 hours  atorvastatin 40 milliGRAM(s) Oral at bedtime  carbidopa/levodopa  25/100 1 Tablet(s) Oral three times a day  dextrose 5%. 1000 milliLiter(s) (100 mL/Hr) IV Continuous <Continuous>  dextrose 5%. 1000 milliLiter(s) (50 mL/Hr) IV Continuous <Continuous>  dextrose 50% Injectable 25 Gram(s) IV Push once  dextrose 50% Injectable 25 Gram(s) IV Push once  dextrose 50% Injectable 12.5 Gram(s) IV Push once  enoxaparin Injectable 40 milliGRAM(s) SubCutaneous every 24 hours  glucagon  Injectable 1 milliGRAM(s) IntraMuscular once  insulin lispro (ADMELOG) corrective regimen sliding scale   SubCutaneous Before meals and at bedtime  linagliptin 5 milliGRAM(s) Oral daily  metFORMIN 1000 milliGRAM(s) Oral two times a day  methimazole 5 milliGRAM(s) Oral daily  methocarbamol 500 milliGRAM(s) Oral every 8 hours  metoprolol succinate ER 25 milliGRAM(s) Oral daily  nitrofurantoin monohydrate/macrocrystals (MACROBID) 100 milliGRAM(s) Oral two times a day  ondansetron   Disintegrating Tablet 4 milliGRAM(s) Oral every 6 hours  pantoprazole    Tablet 40 milliGRAM(s) Oral before breakfast  phenazopyridine 100 milliGRAM(s) Oral three times a day  polyethylene glycol 3350 17 Gram(s) Oral daily  pregabalin 50 milliGRAM(s) Oral three times a day  senna 2 Tablet(s) Oral at bedtime    MEDICATIONS  (PRN):  bisacodyl 5 milliGRAM(s) Oral every 12 hours PRN Constipation  bisacodyl Suppository 10 milliGRAM(s) Rectal daily PRN Constipation  dextrose Oral Gel 15 Gram(s) Oral once PRN Blood Glucose LESS THAN 70 milliGRAM(s)/deciliter  HYDROmorphone   Tablet 2 milliGRAM(s) Oral every 4 hours PRN Severe Pain (7 - 10)  HYDROmorphone  Injectable 0.5 milliGRAM(s) IV Push every 15 minutes PRN breakthrough  HYDROmorphone  Injectable 0.5 milliGRAM(s) IV Push every 4 hours PRN breakthrough  magnesium hydroxide Suspension 30 milliLiter(s) Oral every 12 hours PRN Constipation

## 2024-01-12 NOTE — DISCHARGE NOTE NURSING/CASE MANAGEMENT/SOCIAL WORK - NSDPFAC_GEN_ALL_CORE
[facility]/ [address] [place] [address]/ Phone: [phone] [facility]/ [address] [place] [address]/ Phone: [phone]

## 2024-01-12 NOTE — PROGRESS NOTE ADULT - SUBJECTIVE AND OBJECTIVE BOX
Ortho Note    Subjective:  Pt comfortable without complaints, pain controlled with current pain medication regimen  Reports improvement in urinary frequency symptoms  Denies CP, SOB, N/V, numbness/tingling   Reviewed plan of care with patient at bedside    Vital Signs Last 24 Hrs  T(C): 36.3 (01-12-24 @ 09:29), Max: 36.3 (01-12-24 @ 09:29)  T(F): 97.3 (01-12-24 @ 09:29), Max: 97.3 (01-12-24 @ 09:29)  HR: 63 (01-12-24 @ 09:29) (63 - 63)  BP: 144/72 (01-12-24 @ 09:29) (144/72 - 144/72)  BP(mean): --  RR: 18 (01-12-24 @ 09:29) (18 - 18)  SpO2: 98% (01-12-24 @ 09:29) (98% - 98%)  AVSS    Objective:      Physical Exam:  General: Pt Alert and oriented, NAD  Lumbar DSG C/D/I- HV x1  Pulses: +2 pedal pulses, DP   Sensation: silt intact bilateral lower extremities  Motor: EHL/FHL/TA/GS- 5/5 bilateral lower extremities            Plan of Care:  A/P: 78yFemale POD #4 s/p L2-Pelvis PSF with Dr. Bahena  - afebrile   - Pain Control- tylenol 1000mg PO Q8h, DIlaudid 2-4mg PO Q4h prn moderate to severe pain, Dialudid 0.5mg Q4h prn  breakthrough pain , methocarbamol 500mg PO Q8h   - DVT ppx: lovenox 40mg SUbq Q24h  - PT, WBS: WBAT  - appreciate medicine recs  - Uti- macrobid day 1 of 5 for uti treatment    - bowel regimen, IS use, PPI  - continue HV x1   - Dispo -[facility] pending auth and acceptance, medical clearance    Ortho Pager [phone] Ortho Note    Subjective:  Pt comfortable without complaints, pain controlled with current pain medication regimen  Reports improvement in urinary frequency symptoms  Denies CP, SOB, N/V, numbness/tingling   Reviewed plan of care with patient at bedside    Vital Signs Last 24 Hrs  T(C): 36.3 (01-12-24 @ 09:29), Max: 36.3 (01-12-24 @ 09:29)  T(F): 97.3 (01-12-24 @ 09:29), Max: 97.3 (01-12-24 @ 09:29)  HR: 63 (01-12-24 @ 09:29) (63 - 63)  BP: 144/72 (01-12-24 @ 09:29) (144/72 - 144/72)  BP(mean): --  RR: 18 (01-12-24 @ 09:29) (18 - 18)  SpO2: 98% (01-12-24 @ 09:29) (98% - 98%)  AVSS    Objective:      Physical Exam:  General: Pt Alert and oriented, NAD  Lumbar DSG C/D/I- HV x1  Pulses: +2 pedal pulses, DP   Sensation: silt intact bilateral lower extremities  Motor: EHL/FHL/TA/GS- 5/5 bilateral lower extremities            Plan of Care:  A/P: 78yFemale POD #4 s/p L2-Pelvis PSF with Dr. Bahena  - afebrile   - Pain Control- tylenol 1000mg PO Q8h, DIlaudid 2-4mg PO Q4h prn moderate to severe pain, Dialudid 0.5mg Q4h prn  breakthrough pain , methocarbamol 500mg PO Q8h   - DVT ppx: lovenox 40mg SUbq Q24h  - PT, WBS: WBAT  - appreciate medicine recs  - Uti- macrobid day 1 of 5 for uti treatment    - bowel regimen, IS use, PPI  - continue HV x1   - Dispo -[facility]AND[facility] pending auth and acceptance, medical clearance    Ortho Pager [phone]

## 2024-01-12 NOTE — DISCHARGE NOTE NURSING/CASE MANAGEMENT/SOCIAL WORK - NSDCPEFALRISK_GEN_ALL_CORE
For information on Fall & Injury Prevention, visit: [email or web address] OR  [email or web address] OR  https://www.cdc.gov/steadi/patient.html For information on Fall & Injury Prevention, visit: [email or web address] OR  [email or web address] OR  https://www.cdc.gov/steadi/patient.html

## 2024-01-13 VITALS
DIASTOLIC BLOOD PRESSURE: 78 MMHG | SYSTOLIC BLOOD PRESSURE: 138 MMHG | HEART RATE: 74 BPM | OXYGEN SATURATION: 94 % | RESPIRATION RATE: 18 BRPM

## 2024-01-13 DIAGNOSIS — E78.5 HYPERLIPIDEMIA, UNSPECIFIED: ICD-10-CM

## 2024-01-13 DIAGNOSIS — U07.1 COVID-19: ICD-10-CM

## 2024-01-13 DIAGNOSIS — N39.0 URINARY TRACT INFECTION, SITE NOT SPECIFIED: ICD-10-CM

## 2024-01-13 DIAGNOSIS — E11.9 TYPE 2 DIABETES MELLITUS WITHOUT COMPLICATIONS: ICD-10-CM

## 2024-01-13 DIAGNOSIS — D53.9 NUTRITIONAL ANEMIA, UNSPECIFIED: ICD-10-CM

## 2024-01-13 LAB
ANION GAP SERPL CALC-SCNC: 10 MMOL/L — SIGNIFICANT CHANGE UP (ref 5–17)
ANION GAP SERPL CALC-SCNC: 10 MMOL/L — SIGNIFICANT CHANGE UP (ref 5–17)
BUN SERPL-MCNC: 12 MG/DL — SIGNIFICANT CHANGE UP (ref 7–23)
BUN SERPL-MCNC: 12 MG/DL — SIGNIFICANT CHANGE UP (ref 7–23)
CALCIUM SERPL-MCNC: 8.7 MG/DL — SIGNIFICANT CHANGE UP (ref 8.4–10.5)
CALCIUM SERPL-MCNC: 8.7 MG/DL — SIGNIFICANT CHANGE UP (ref 8.4–10.5)
CHLORIDE SERPL-SCNC: 105 MMOL/L — SIGNIFICANT CHANGE UP (ref 96–108)
CHLORIDE SERPL-SCNC: 105 MMOL/L — SIGNIFICANT CHANGE UP (ref 96–108)
CO2 SERPL-SCNC: 23 MMOL/L — SIGNIFICANT CHANGE UP (ref 22–31)
CO2 SERPL-SCNC: 23 MMOL/L — SIGNIFICANT CHANGE UP (ref 22–31)
CREAT SERPL-MCNC: 0.55 MG/DL — SIGNIFICANT CHANGE UP (ref 0.5–1.3)
CREAT SERPL-MCNC: 0.55 MG/DL — SIGNIFICANT CHANGE UP (ref 0.5–1.3)
EGFR: 94 ML/MIN/1.73M2 — SIGNIFICANT CHANGE UP
EGFR: 94 ML/MIN/1.73M2 — SIGNIFICANT CHANGE UP
GLUCOSE BLDC GLUCOMTR-MCNC: 123 MG/DL — HIGH (ref 70–99)
GLUCOSE BLDC GLUCOMTR-MCNC: 123 MG/DL — HIGH (ref 70–99)
GLUCOSE BLDC GLUCOMTR-MCNC: 126 MG/DL — HIGH (ref 70–99)
GLUCOSE BLDC GLUCOMTR-MCNC: 126 MG/DL — HIGH (ref 70–99)
GLUCOSE SERPL-MCNC: 120 MG/DL — HIGH (ref 70–99)
GLUCOSE SERPL-MCNC: 120 MG/DL — HIGH (ref 70–99)
HCT VFR BLD CALC: 28.9 % — LOW (ref 34.5–45)
HCT VFR BLD CALC: 28.9 % — LOW (ref 34.5–45)
HGB BLD-MCNC: 8.9 G/DL — LOW (ref 11.5–15.5)
HGB BLD-MCNC: 8.9 G/DL — LOW (ref 11.5–15.5)
MCHC RBC-ENTMCNC: 29 PG — SIGNIFICANT CHANGE UP (ref 27–34)
MCHC RBC-ENTMCNC: 29 PG — SIGNIFICANT CHANGE UP (ref 27–34)
MCHC RBC-ENTMCNC: 30.8 GM/DL — LOW (ref 32–36)
MCHC RBC-ENTMCNC: 30.8 GM/DL — LOW (ref 32–36)
MCV RBC AUTO: 94.1 FL — SIGNIFICANT CHANGE UP (ref 80–100)
MCV RBC AUTO: 94.1 FL — SIGNIFICANT CHANGE UP (ref 80–100)
NRBC # BLD: 0 /100 WBCS — SIGNIFICANT CHANGE UP (ref 0–0)
NRBC # BLD: 0 /100 WBCS — SIGNIFICANT CHANGE UP (ref 0–0)
PLATELET # BLD AUTO: 122 K/UL — LOW (ref 150–400)
PLATELET # BLD AUTO: 122 K/UL — LOW (ref 150–400)
POTASSIUM SERPL-MCNC: 4 MMOL/L — SIGNIFICANT CHANGE UP (ref 3.5–5.3)
POTASSIUM SERPL-MCNC: 4 MMOL/L — SIGNIFICANT CHANGE UP (ref 3.5–5.3)
POTASSIUM SERPL-SCNC: 4 MMOL/L — SIGNIFICANT CHANGE UP (ref 3.5–5.3)
POTASSIUM SERPL-SCNC: 4 MMOL/L — SIGNIFICANT CHANGE UP (ref 3.5–5.3)
RBC # BLD: 3.07 M/UL — LOW (ref 3.8–5.2)
RBC # BLD: 3.07 M/UL — LOW (ref 3.8–5.2)
RBC # FLD: 17 % — HIGH (ref 10.3–14.5)
RBC # FLD: 17 % — HIGH (ref 10.3–14.5)
SODIUM SERPL-SCNC: 138 MMOL/L — SIGNIFICANT CHANGE UP (ref 135–145)
SODIUM SERPL-SCNC: 138 MMOL/L — SIGNIFICANT CHANGE UP (ref 135–145)
WBC # BLD: 4.44 K/UL — SIGNIFICANT CHANGE UP (ref 3.8–10.5)
WBC # BLD: 4.44 K/UL — SIGNIFICANT CHANGE UP (ref 3.8–10.5)
WBC # FLD AUTO: 4.44 K/UL — SIGNIFICANT CHANGE UP (ref 3.8–10.5)
WBC # FLD AUTO: 4.44 K/UL — SIGNIFICANT CHANGE UP (ref 3.8–10.5)

## 2024-01-13 PROCEDURE — 86901 BLOOD TYPING SEROLOGIC RH(D): CPT

## 2024-01-13 PROCEDURE — C1769: CPT

## 2024-01-13 PROCEDURE — 36430 TRANSFUSION BLD/BLD COMPNT: CPT

## 2024-01-13 PROCEDURE — 36415 COLL VENOUS BLD VENIPUNCTURE: CPT

## 2024-01-13 PROCEDURE — 82962 GLUCOSE BLOOD TEST: CPT

## 2024-01-13 PROCEDURE — 97535 SELF CARE MNGMENT TRAINING: CPT

## 2024-01-13 PROCEDURE — 85027 COMPLETE CBC AUTOMATED: CPT

## 2024-01-13 PROCEDURE — 85025 COMPLETE CBC W/AUTO DIFF WBC: CPT

## 2024-01-13 PROCEDURE — 99232 SBSQ HOSP IP/OBS MODERATE 35: CPT

## 2024-01-13 PROCEDURE — P9016: CPT

## 2024-01-13 PROCEDURE — 76000 FLUOROSCOPY <1 HR PHYS/QHP: CPT

## 2024-01-13 PROCEDURE — 86900 BLOOD TYPING SEROLOGIC ABO: CPT

## 2024-01-13 PROCEDURE — 86850 RBC ANTIBODY SCREEN: CPT

## 2024-01-13 PROCEDURE — 81001 URINALYSIS AUTO W/SCOPE: CPT

## 2024-01-13 PROCEDURE — 87635 SARS-COV-2 COVID-19 AMP PRB: CPT

## 2024-01-13 PROCEDURE — 83036 HEMOGLOBIN GLYCOSYLATED A1C: CPT

## 2024-01-13 PROCEDURE — 80048 BASIC METABOLIC PNL TOTAL CA: CPT

## 2024-01-13 PROCEDURE — C1889: CPT

## 2024-01-13 PROCEDURE — C1713: CPT

## 2024-01-13 PROCEDURE — 97165 OT EVAL LOW COMPLEX 30 MIN: CPT

## 2024-01-13 PROCEDURE — 86803 HEPATITIS C AB TEST: CPT

## 2024-01-13 PROCEDURE — 97162 PT EVAL MOD COMPLEX 30 MIN: CPT

## 2024-01-13 PROCEDURE — 86923 COMPATIBILITY TEST ELECTRIC: CPT

## 2024-01-13 PROCEDURE — 97116 GAIT TRAINING THERAPY: CPT

## 2024-01-13 RX ORDER — METHOCARBAMOL 500 MG/1
1 TABLET, FILM COATED ORAL
Qty: 0 | Refills: 0 | DISCHARGE
Start: 2024-01-13

## 2024-01-13 RX ORDER — NITROFURANTOIN MACROCRYSTAL 50 MG
1 CAPSULE ORAL
Qty: 0 | Refills: 0 | DISCHARGE
Start: 2024-01-13

## 2024-01-13 RX ORDER — ONDANSETRON 8 MG/1
1 TABLET, FILM COATED ORAL
Qty: 0 | Refills: 0 | DISCHARGE
Start: 2024-01-13

## 2024-01-13 RX ORDER — SENNA PLUS 8.6 MG/1
2 TABLET ORAL
Qty: 0 | Refills: 0 | DISCHARGE
Start: 2024-01-13

## 2024-01-13 RX ORDER — POLYETHYLENE GLYCOL 3350 17 G/17G
17 POWDER, FOR SOLUTION ORAL
Qty: 0 | Refills: 0 | DISCHARGE
Start: 2024-01-13

## 2024-01-13 RX ORDER — LOSARTAN POTASSIUM 100 MG/1
1 TABLET, FILM COATED ORAL
Refills: 0 | DISCHARGE

## 2024-01-13 RX ORDER — ACETAMINOPHEN 500 MG
2 TABLET ORAL
Qty: 0 | Refills: 0 | DISCHARGE
Start: 2024-01-13

## 2024-01-13 RX ORDER — HYDROMORPHONE HYDROCHLORIDE 2 MG/ML
1 INJECTION INTRAMUSCULAR; INTRAVENOUS; SUBCUTANEOUS
Qty: 0 | Refills: 0 | DISCHARGE
Start: 2024-01-13

## 2024-01-13 RX ADMIN — Medication 50 MILLIGRAM(S): at 13:01

## 2024-01-13 RX ADMIN — Medication 100 MILLIGRAM(S): at 13:00

## 2024-01-13 RX ADMIN — CARBIDOPA AND LEVODOPA 1 TABLET(S): 25; 100 TABLET ORAL at 13:01

## 2024-01-13 RX ADMIN — ONDANSETRON 4 MILLIGRAM(S): 8 TABLET, FILM COATED ORAL at 11:23

## 2024-01-13 RX ADMIN — Medication 1000 MILLIGRAM(S): at 13:00

## 2024-01-13 RX ADMIN — METHOCARBAMOL 500 MILLIGRAM(S): 500 TABLET, FILM COATED ORAL at 05:21

## 2024-01-13 RX ADMIN — Medication 100 MILLIGRAM(S): at 05:20

## 2024-01-13 RX ADMIN — PANTOPRAZOLE SODIUM 40 MILLIGRAM(S): 20 TABLET, DELAYED RELEASE ORAL at 07:06

## 2024-01-13 RX ADMIN — Medication 1000 MILLIGRAM(S): at 05:20

## 2024-01-13 RX ADMIN — LINAGLIPTIN 5 MILLIGRAM(S): 5 TABLET, FILM COATED ORAL at 11:23

## 2024-01-13 RX ADMIN — Medication 50 MILLIGRAM(S): at 05:20

## 2024-01-13 RX ADMIN — METHOCARBAMOL 500 MILLIGRAM(S): 500 TABLET, FILM COATED ORAL at 13:01

## 2024-01-13 RX ADMIN — CARBIDOPA AND LEVODOPA 1 TABLET(S): 25; 100 TABLET ORAL at 05:19

## 2024-01-13 RX ADMIN — METFORMIN HYDROCHLORIDE 1000 MILLIGRAM(S): 850 TABLET ORAL at 10:34

## 2024-01-13 RX ADMIN — Medication 100 MILLIGRAM(S): at 05:21

## 2024-01-13 RX ADMIN — ONDANSETRON 4 MILLIGRAM(S): 8 TABLET, FILM COATED ORAL at 05:19

## 2024-01-13 RX ADMIN — Medication 25 MILLIGRAM(S): at 05:19

## 2024-01-13 RX ADMIN — Medication 1000 MILLIGRAM(S): at 07:06

## 2024-01-13 NOTE — PROGRESS NOTE ADULT - PROBLEM SELECTOR PLAN 5
- c/w home toprol 25mg daily  - low-normal BP  - can continue holding remaining home meds: amlodipine 2.5mg daily, HCTZ 12.5mg daily, losartan 100mg daily -- restart as indicated

## 2024-01-13 NOTE — PROGRESS NOTE ADULT - PROBLEM/PLAN-10
Plan:   - Transfuse if platelet count<10. If patient develops active bleed, transfuse for goal >50.  - Transfuse for hgb < 7  - Opportunistic infection prophylaxis with Acyclovir, Levofloxacin, and Voriconazole.          DISPLAY PLAN FREE TEXT

## 2024-01-13 NOTE — PROGRESS NOTE ADULT - SUBJECTIVE AND OBJECTIVE BOX
CANDIDO IVERSON  78y  Female    Patient is a 78y old  Female who presents with a chief complaint of back pain (13 Jan 2024 07:14)      INTERVAL HPI/OVERNIGHT EVENTS:  Pt seen and examined at bedside in no acute distress, sitting at side of bed brushing hair. Denies acute complaints. Having BMs and eating/drinking well.     VITAL SIGNS:  T(C): 36.3 (01-13-24 @ 02:10), Max: 37.5 (01-12-24 @ 16:23)  T(F): 97.4 (01-13-24 @ 02:10), Max: 99.5 (01-12-24 @ 16:23)  HR: 74 (01-13-24 @ 15:06) (68 - 78)  BP: 138/78 (01-13-24 @ 15:06) (132/66 - 152/78)  BP(mean): --  RR: 18 (01-13-24 @ 15:06) (17 - 20)  SpO2: 94% (01-13-24 @ 15:06) (93% - 99%)  Wt(kg): --    PHYSICAL EXAM:  Constitutional: resting comfortably in bed; NAD  Head: NC/AT  Eyes: PERRL, EOMI, anicteric sclera  ENT: no nasal discharge; MMM  Neck: supple; no JVD  Respiratory: CTA B/L; no W/R/R; comfortable on RA  Cardiac: +S1/S2; RRR; no M/R/G  Gastrointestinal: soft, NT/ND; no rebound or guarding; +BSx4  Extremities: WWP, no clubbing or cyanosis   Dermatologic: skin warm, dry and intact   Neurologic: AAOx3; CNII-XII grossly intact; no focal deficits  Psychiatric: affect and characteristics of appearance, verbalizations, behaviors are appropriate    Consultant(s) Notes Reviewed:  [x ] YES  [ ] NO  Care Discussed with Consultants/Other Providers [ x] YES  [ ] NO    LABS:                        8.9    4.44  )-----------( 122      ( 13 Jan 2024 05:30 )             28.9     01-13    138  |  105  |  12  ----------------------------<  120<H>  4.0   |  23  |  0.55    Ca    8.7      13 Jan 2024 05:30          Urinalysis Basic - ( 13 Jan 2024 05:30 )    Color: x / Appearance: x / SG: x / pH: x  Gluc: 120 mg/dL / Ketone: x  / Bili: x / Urobili: x   Blood: x / Protein: x / Nitrite: x   Leuk Esterase: x / RBC: x / WBC x   Sq Epi: x / Non Sq Epi: x / Bacteria: x      CAPILLARY BLOOD GLUCOSE      POCT Blood Glucose.: 123 mg/dL (13 Jan 2024 12:03)  POCT Blood Glucose.: 126 mg/dL (13 Jan 2024 07:39)  POCT Blood Glucose.: 106 mg/dL (12 Jan 2024 21:53)  POCT Blood Glucose.: 118 mg/dL (12 Jan 2024 16:29)        Urinalysis Basic - ( 13 Jan 2024 05:30 )    Color: x / Appearance: x / SG: x / pH: x  Gluc: 120 mg/dL / Ketone: x  / Bili: x / Urobili: x   Blood: x / Protein: x / Nitrite: x   Leuk Esterase: x / RBC: x / WBC x   Sq Epi: x / Non Sq Epi: x / Bacteria: x        RADIOLOGY & ADDITIONAL TESTS:    Imaging Personally Reviewed:  [ ] YES  [ ] NO    HEALTH ISSUES - PROBLEM Dx:  Hyperthyroidism  monitored  by endocrinologist    HTN (hypertension)    GERD (gastroesophageal reflux disease)    Parkinson disease    Spinal stenosis  pain relieved with steroid injection    MVP (mitral valve prolapse)  echo 2018    Deep vein thrombosis (DVT)

## 2024-01-13 NOTE — PROGRESS NOTE ADULT - SUBJECTIVE AND OBJECTIVE BOX
Ortho Note    Subjective:  no acute overnight events.  Pain controlled.  No fevers/chills/shortness of breath/chest pain/new neurologic complaints. she states that her back feels more mobile today.     Vital Signs Last 24 Hrs  T(C): 36.3 (01-13-24 @ 02:10), Max: 36.3 (01-13-24 @ 02:10)  T(F): 97.4 (01-13-24 @ 02:10), Max: 97.4 (01-13-24 @ 02:10)  HR: 72 (01-13-24 @ 06:08) (72 - 78)  BP: 132/66 (01-13-24 @ 06:08) (132/66 - 134/66)  BP(mean): --  RR: 20 (01-13-24 @ 06:08) (20 - 20)  SpO2: 95% (01-13-24 @ 06:08) (95% - 95%)  I&O's Summary    12 Jan 2024 07:01  -  13 Jan 2024 07:00  --------------------------------------------------------  IN: 800 mL / OUT: 2627 mL / NET: -1827 mL    13 Jan 2024 07:01  -  13 Jan 2024 07:15  --------------------------------------------------------  IN: 0 mL / OUT: 600 mL / NET: -600 mL        General: Pt Alert and oriented, NAD  DSG C/D/I  Pulses: 2+ DP/PT b/l  Sensation: SILT b/l  Motor: Quad/Ham/EHL/FHL/TA/GS  5/5                          8.8    4.36  )-----------( 119      ( 12 Jan 2024 05:30 )             28.4     01-12    138  |  106  |  13  ----------------------------<  118<H>  4.0   |  23  |  0.58    Ca    8.5      12 Jan 2024 05:30        A/P: 78yFemale POD# s/p   - Stable  - Pain Control  - DVT ppx:  - PT, WBS:   - Dispo:     Ortho Pager [phone] Ortho Note    Subjective:  no acute overnight events.  Pain controlled.  No fevers/chills/shortness of breath/chest pain/new neurologic complaints. she states that her back feels more mobile today.     Vital Signs Last 24 Hrs  T(C): 36.3 (01-13-24 @ 02:10), Max: 36.3 (01-13-24 @ 02:10)  T(F): 97.4 (01-13-24 @ 02:10), Max: 97.4 (01-13-24 @ 02:10)  HR: 72 (01-13-24 @ 06:08) (72 - 78)  BP: 132/66 (01-13-24 @ 06:08) (132/66 - 134/66)  BP(mean): --  RR: 20 (01-13-24 @ 06:08) (20 - 20)  SpO2: 95% (01-13-24 @ 06:08) (95% - 95%)  I&O's Summary    12 Jan 2024 07:01  -  13 Jan 2024 07:00  --------------------------------------------------------  IN: 800 mL / OUT: 2627 mL / NET: -1827 mL    13 Jan 2024 07:01  -  13 Jan 2024 07:15  --------------------------------------------------------  IN: 0 mL / OUT: 600 mL / NET: -600 mL        General: Pt Alert and oriented, NAD  DSG C/D/I  Pulses: 2+ DP/PT b/l  Sensation: SILT b/l  Motor: Quad/Ham/EHL/FHL/TA/GS  5/5                          8.8    4.36  )-----------( 119      ( 12 Jan 2024 05:30 )             28.4     01-12    138  |  106  |  13  ----------------------------<  118<H>  4.0   |  23  |  0.58    Ca    8.5      12 Jan 2024 05:30        A/P: 78yFemale POD# s/p   - Stable  - Pain Control  - DVT ppx:  - PT, WBS:   - Dispo:     Ortho Pager [phone] Ortho Note    Subjective:  Drain fell out ON, sutures removed and dressing changed. Patient found to be COVID positive but not feeling symptomatic. Pain controlled.  No fevers/chills/shortness of breath/chest pain/new neurologic complaints.     Vital Signs Last 24 Hrs  T(C): 36.3 (01-13-24 @ 02:10), Max: 36.3 (01-13-24 @ 02:10)  T(F): 97.4 (01-13-24 @ 02:10), Max: 97.4 (01-13-24 @ 02:10)  HR: 72 (01-13-24 @ 06:08) (72 - 78)  BP: 132/66 (01-13-24 @ 06:08) (132/66 - 134/66)  BP(mean): --  RR: 20 (01-13-24 @ 06:08) (20 - 20)  SpO2: 95% (01-13-24 @ 06:08) (95% - 95%)  I&O's Summary    12 Jan 2024 07:01  -  13 Jan 2024 07:00  --------------------------------------------------------  IN: 800 mL / OUT: 2627 mL / NET: -1827 mL    13 Jan 2024 07:01  -  13 Jan 2024 07:15  --------------------------------------------------------  IN: 0 mL / OUT: 600 mL / NET: -600 mL        Physical Exam:  General: Pt Alert and oriented, NAD  Lumbar DSG C/D/I- HV x1  Pulses: +2 pedal pulses, DP   Sensation: silt intact bilateral lower extremities  Motor: EHL/FHL/TA/GS- 5/5 bilateral lower extremities5/5                          8.8    4.36  )-----------( 119      ( 12 Jan 2024 05:30 )             28.4     01-12    138  |  106  |  13  ----------------------------<  118<H>  4.0   |  23  |  0.58    Ca    8.5      12 Jan 2024 05:30        A/P: 78yFemale POD #5 s/p L2-Pelvis PSF with Dr. Bahena  - Pain Control- tylenol 1000mg PO Q8h, DIlaudid 2-4mg PO Q4h prn moderate to severe pain, Dialudid 0.5mg Q4h prn  breakthrough pain , methocarbamol 500mg PO Q8h   - DVT ppx: lovenox 40mg SUbq Q24h  - PT, WBS: WBAT  - appreciate medicine recs  - bowel regimen, IS use, PPI  - Dispo -[facility] pending auth and acceptance, medical clearance      Ortho Pager [phone] Ortho Note    Subjective:  Drain fell out ON, sutures removed and dressing changed. Patient found to be COVID positive but not feeling symptomatic. Pain controlled.  No fevers/chills/shortness of breath/chest pain/new neurologic complaints.     Vital Signs Last 24 Hrs  T(C): 36.3 (01-13-24 @ 02:10), Max: 36.3 (01-13-24 @ 02:10)  T(F): 97.4 (01-13-24 @ 02:10), Max: 97.4 (01-13-24 @ 02:10)  HR: 72 (01-13-24 @ 06:08) (72 - 78)  BP: 132/66 (01-13-24 @ 06:08) (132/66 - 134/66)  BP(mean): --  RR: 20 (01-13-24 @ 06:08) (20 - 20)  SpO2: 95% (01-13-24 @ 06:08) (95% - 95%)  I&O's Summary    12 Jan 2024 07:01  -  13 Jan 2024 07:00  --------------------------------------------------------  IN: 800 mL / OUT: 2627 mL / NET: -1827 mL    13 Jan 2024 07:01  -  13 Jan 2024 07:15  --------------------------------------------------------  IN: 0 mL / OUT: 600 mL / NET: -600 mL        Physical Exam:  General: Pt Alert and oriented, NAD  Lumbar DSG C/D/I- HV x1  Pulses: +2 pedal pulses, DP   Sensation: silt intact bilateral lower extremities  Motor: EHL/FHL/TA/GS- 5/5 bilateral lower extremities5/5                          8.8    4.36  )-----------( 119      ( 12 Jan 2024 05:30 )             28.4     01-12    138  |  106  |  13  ----------------------------<  118<H>  4.0   |  23  |  0.58    Ca    8.5      12 Jan 2024 05:30        A/P: 78yFemale POD #5 s/p L2-Pelvis PSF with Dr. Bahena  - Pain Control- tylenol 1000mg PO Q8h, DIlaudid 2-4mg PO Q4h prn moderate to severe pain, Dialudid 0.5mg Q4h prn  breakthrough pain , methocarbamol 500mg PO Q8h   - DVT ppx: lovenox 40mg SUbq Q24h  - PT, WBS: WBAT  - appreciate medicine recs  - bowel regimen, IS use, PPI  - Dispo -[facility] pending auth and acceptance, medical clearance      Ortho Pager [phone]

## 2024-01-13 NOTE — PROGRESS NOTE ADULT - PROVIDER SPECIALTY LIST ADULT
Orthopedics
Hospitalist
Hospitalist
Orthopedics
Hospitalist
Hospitalist

## 2024-01-13 NOTE — PROGRESS NOTE ADULT - PROBLEM SELECTOR PLAN 7
- A1C 7.3 -- well controlled  - home meds: metformin 1g BID, trulicity  - endo c/s on admission - c/w metformin and tradjenta while inpt

## 2024-01-13 NOTE — PROGRESS NOTE ADULT - PROBLEM SELECTOR PLAN 1
- pt with hx chronic low back pain with prior spinal surgery 2023   - now s/p L2-pelvis PSF and L5-S1 TLIF 1/8   - management per ortho  - pain well controlled  - bowel regimen -- having BMs   - c/w IS and OOBAT  - PT/OT rec [facility] -- planned for DC today 1/13  - DVT ppx per primary

## 2024-01-13 NOTE — PROGRESS NOTE ADULT - PROBLEM SELECTOR PLAN 3
- incidental positive on screening -- 1/12 COVID+   - asymptomatic, comfortable on RA  - does not meet criteria for remdesivir or decadron  - supportive care

## 2024-01-13 NOTE — PROGRESS NOTE ADULT - PROBLEM SELECTOR PLAN 4
- Hb 8.9 with MCV 94  - acute drop in Hb earlier in course, suspected in setting of recent OR procedure s/p 1U RBC with appropriate response  - baseline Hb 11s, now stable in 8-9 range   - iron studies c/w TAVO -- s/p IV iron x3 (1/9-11)  - c/w PO iron supplementation 325mg every other day   - outpt f/u for further w/u

## 2024-01-13 NOTE — PROGRESS NOTE ADULT - PROBLEM SELECTOR PLAN 2
- pt with c/o increased urinary frequency   - UA+, no UCx  - empirically started on macrobid 100mg BID -- plan for 5d course   - symptoms improved

## 2024-01-13 NOTE — PROGRESS NOTE ADULT - ASSESSMENT
78-year-old female with a PMHx of HTN, HLD, hyperthyroidism, GERD, PD and DMII who presented with back pain.     #Low Back Pain   -further management as per ortho, s/p L2-Pelvis PSF and L5-S1 TLIF on 1/8/24    -pain control bowel regimen, IS and OOB as tolerated   -PT recommends [facility]   -DVT PPx: Lovenox     #UTI   -positive UA with increased frequency, follow up UCx   -continue with Macrobid 100mg BID x 5 days     #HTN     -can resume losartan 100mg daily today   -hold HCTZ 12.5mg daily and amlodipine 2.5mg daily for now, will resume as tolerated      #Acute Blood Loss Anemia    #Iron Deficiency Anemia   -baseline hemoglobin 11.3 prior to surgery, now stable around 9.0   -iron studies suggestive of TAVO   -s/p 1-unit pRBCs this admission and IV iron x 3 doses (1/9 - 1/11)   -start PO Ferrous Sulfate 325mg q48hrs    -outpatient follow up for age-appropriate cancer screening      #HLD     -continue with atorvastatin 40mg daily      #Hyperthyroidism     -continue with methimazole 5mg daily       #GERD     -continue with pantoprazole 40mg daily        #DMII  -home regimen: Metformin and Trulicity   -continue with Metformin and Tradjenta as per endocrine while inpatient     #Parkinson's Disease    -continue with Carbidopa-Levodopa TID       DVT PPx: Lovenox     DIspo: [facility]
Patient is a 78y old  Female who presents with a chief complaint of back pain (09 Jan 2024 13:27)      Impression and Plan   #  Low back Pain s/p PSF, Lami, Decompression L2-L4 on 1/8/24  - pain control , DVT prophylaxis , Weightbearing status , Dressing changes and drain care per ortho team    # Fever 1/10/24 1 am   - No localizing signs of infection   - IF Fever  > 100.8 check Cxr ,RVP , blood cx     # HTN   - currently low normal blood pressures , patient asymptomatic , however hold amlodipine ,lisinopril and hctz       # Acute Blood loss Anemia Complicating Iron Deficiency Anemia   - Baseline HGB 11.3 prior to surgery   - Pre op  Iron studies -  Iron 43 , Ferritin 7 , Iron Saturation 10  - s/p 1 unit PRBC transfusion  - IF no fever for 24 hours can give IV iron Transfusion ,and discharge patient on Ferrous sulfate tab every other day     # HLD   - Crestor 10     # Hyperthyroidism   - Methimazole     # GERD   - Pantoprazole     # DM on Metformin and Trulicity at home , currently hyperglycemia , Endocrine recommended Metformin and Tradjenta     # Parkinson's Disease - Carbidopa- Levodopa TID     #DVT PPx - Lovenox     #Dispo - PT eval 
78y old female who presents with a chief complaint of back pain (09 Jan 2024 13:27)     #  Low back Pain s/p PSF, Lami, Decompression L2-L4 on 1/8/24   - pain control , DVT prophylaxis , Weightbearing status , Dressing changes and drain care per ortho team     #UTI  -positive UA with increased frequency, follow up UCx  -start Macrobid 100mg BID x 5 days    # HTN    - currently low normal blood pressures , patient asymptomatic , however hold amlodipine ,lisinopril and hctz (will resume as tolerated)     # Acute Blood loss Anemia Complicating Iron Deficiency Anemia    - Baseline HGB 11.3 prior to surgery    - Pre op  Iron studies -  Iron 43 , Ferritin 7 , Iron Saturation 10   - s/p 1 unit PRBC transfusion   - continue with IV iron   - outpatient follow up for age appropriate cancer screening     # HLD    - Crestor 10      # Hyperthyroidism    - Methimazole      # GERD    - Pantoprazole      # DM on Metformin and Trulicity at home   -continue with Metformin and Tradjenta as per endocrine     # Parkinson's Disease - Carbidopa- Levodopa TID      DVT PPx: Lovenox     Dispo: [facility]
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 Course c/b anemia s/p 1U RBC and iron infusion with improvement. Also with inc urinary frequency, found to have UTI on macrobid, and incidentally COVID+ without symptoms, comfortable on RA. PT/OT rec [facility], planned for DC today 1/13.

## 2024-01-13 NOTE — PROGRESS NOTE ADULT - TIME BILLING
review of laboratory data, radiology results, consultants' recommendations, documentation in [place], discussion with patient/ACP and interdisciplinary staff (such as , social workers, etc). Interventions were performed as documented above. review of laboratory data, radiology results, consultants' recommendations, documentation in [place], discussion with patient/ACP and interdisciplinary staff (such as , social workers, etc). Interventions were performed as documented above.

## 2024-01-18 PROBLEM — I82.409 ACUTE EMBOLISM AND THROMBOSIS OF UNSPECIFIED DEEP VEINS OF UNSPECIFIED LOWER EXTREMITY: Chronic | Status: ACTIVE | Noted: 2024-01-05

## 2024-01-18 PROBLEM — E11.9 TYPE 2 DIABETES MELLITUS WITHOUT COMPLICATIONS: Chronic | Status: ACTIVE | Noted: 2024-01-05

## 2024-01-18 PROBLEM — I34.0 NONRHEUMATIC MITRAL (VALVE) INSUFFICIENCY: Chronic | Status: ACTIVE | Noted: 2024-01-05

## 2024-01-23 DIAGNOSIS — M46.1 SACROILIITIS, NOT ELSEWHERE CLASSIFIED: ICD-10-CM

## 2024-01-23 DIAGNOSIS — M48.061 SPINAL STENOSIS, LUMBAR REGION WITHOUT NEUROGENIC CLAUDICATION: ICD-10-CM

## 2024-01-23 DIAGNOSIS — E11.65 TYPE 2 DIABETES MELLITUS WITH HYPERGLYCEMIA: ICD-10-CM

## 2024-01-23 DIAGNOSIS — R33.9 RETENTION OF URINE, UNSPECIFIED: ICD-10-CM

## 2024-01-23 DIAGNOSIS — U07.1 COVID-19: ICD-10-CM

## 2024-01-23 DIAGNOSIS — N39.0 URINARY TRACT INFECTION, SITE NOT SPECIFIED: ICD-10-CM

## 2024-01-23 DIAGNOSIS — M81.0 AGE-RELATED OSTEOPOROSIS WITHOUT CURRENT PATHOLOGICAL FRACTURE: ICD-10-CM

## 2024-01-23 DIAGNOSIS — M96.0 PSEUDARTHROSIS AFTER FUSION OR ARTHRODESIS: ICD-10-CM

## 2024-01-23 DIAGNOSIS — G20.A1 PARKINSON'S DISEASE WITHOUT DYSKINESIA, WITHOUT MENTION OF FLUCTUATIONS: ICD-10-CM

## 2024-01-23 DIAGNOSIS — I10 ESSENTIAL (PRIMARY) HYPERTENSION: ICD-10-CM

## 2024-01-23 DIAGNOSIS — E78.5 HYPERLIPIDEMIA, UNSPECIFIED: ICD-10-CM

## 2024-01-23 DIAGNOSIS — I34.1 NONRHEUMATIC MITRAL (VALVE) PROLAPSE: ICD-10-CM

## 2024-01-23 DIAGNOSIS — I34.0 NONRHEUMATIC MITRAL (VALVE) INSUFFICIENCY: ICD-10-CM

## 2024-01-23 DIAGNOSIS — K21.9 GASTRO-ESOPHAGEAL REFLUX DISEASE WITHOUT ESOPHAGITIS: ICD-10-CM

## 2024-01-23 DIAGNOSIS — Z98.1 ARTHRODESIS STATUS: ICD-10-CM

## 2024-01-23 DIAGNOSIS — M53.2X6 SPINAL INSTABILITIES, LUMBAR REGION: ICD-10-CM

## 2024-01-23 DIAGNOSIS — Z98.82 BREAST IMPLANT STATUS: ICD-10-CM

## 2024-01-23 DIAGNOSIS — Z90.49 ACQUIRED ABSENCE OF OTHER SPECIFIED PARTS OF DIGESTIVE TRACT: ICD-10-CM

## 2024-01-23 DIAGNOSIS — D50.8 OTHER IRON DEFICIENCY ANEMIAS: ICD-10-CM

## 2024-01-23 DIAGNOSIS — E05.90 THYROTOXICOSIS, UNSPECIFIED WITHOUT THYROTOXIC CRISIS OR STORM: ICD-10-CM

## 2024-01-23 DIAGNOSIS — D62 ACUTE POSTHEMORRHAGIC ANEMIA: ICD-10-CM

## 2024-01-23 DIAGNOSIS — Z96.652 PRESENCE OF LEFT ARTIFICIAL KNEE JOINT: ICD-10-CM

## 2024-01-23 DIAGNOSIS — Z86.718 PERSONAL HISTORY OF OTHER VENOUS THROMBOSIS AND EMBOLISM: ICD-10-CM

## 2024-01-23 DIAGNOSIS — M48.07 SPINAL STENOSIS, LUMBOSACRAL REGION: ICD-10-CM

## 2024-01-30 ENCOUNTER — APPOINTMENT (OUTPATIENT)
Dept: ORTHOPEDIC SURGERY | Facility: CLINIC | Age: 79
End: 2024-01-30
Payer: MEDICARE

## 2024-01-30 DIAGNOSIS — T81.41XA INFECTION FOLLOWING A PROCEDURE,SUPERFICIAL INCISIONAL SURGI SITE,INITIAL ENC: ICD-10-CM

## 2024-01-30 PROCEDURE — 99024 POSTOP FOLLOW-UP VISIT: CPT

## 2024-01-30 PROCEDURE — 72100 X-RAY EXAM L-S SPINE 2/3 VWS: CPT

## 2024-02-02 PROBLEM — T81.41XA INFECTION OF SUPERFICIAL INCISIONAL SURGICAL SITE AFTER PROCEDURE, INITIAL ENCOUNTER: Status: ACTIVE | Noted: 2024-01-31

## 2024-02-02 NOTE — HISTORY OF PRESENT ILLNESS
[Procedure: ___] : status post [unfilled] [___ Weeks Post Op] : [unfilled] weeks post op [Clean/Dry/Intact] : clean, dry and intact [Erythema] : erythematous [Neuro Intact] : an unremarkable neurological exam [Xray (Date:___)] : [unfilled] Xray -  [Doing Well] : is doing well [Excellent Pain Control] : has excellent pain control [Chills] : no chills [Fever] : no fever [Healed] : not healed [Discharge] : absent of discharge [Swelling] : not swollen [Dehiscence] : not dehisced [de-identified] : Lisseth De La Cruz is here for follow up following her lumbar reconstruction. Reports mild LBP. Denies leg pain,  numbness, tingling, or weakness.  [FreeTextEntry1] : solid fixation  [de-identified] : We will put her on Keflex for some mild incisional erythema.   She will avoid bending, lifting and twisting. She'll follow up in one month

## 2024-02-09 ENCOUNTER — NON-APPOINTMENT (OUTPATIENT)
Age: 79
End: 2024-02-09

## 2024-02-27 ENCOUNTER — APPOINTMENT (OUTPATIENT)
Dept: ORTHOPEDIC SURGERY | Facility: CLINIC | Age: 79
End: 2024-02-27
Payer: MEDICARE

## 2024-02-27 PROCEDURE — 99024 POSTOP FOLLOW-UP VISIT: CPT

## 2024-02-28 NOTE — HISTORY OF PRESENT ILLNESS
[Procedure: ___] : status post [unfilled] [___ Weeks Post Op] : [unfilled] weeks post op [de-identified] : Lisseth is a patient who is six weeks postoperative after undergoing L2 to the pelvis spinal fusion with posterior instrument, transforaminal lumbar interbody fusion (TLIF), and pelvic fixation. She reports satisfaction with her surgical results and states her preoperative symptoms have resolved. She currently experiences expected back discomfort.  [de-identified] : Her incision is healing well and dry. Distally, there is no sign of infection.  [de-identified] : Lisseth is 6.5 weeks postoperative from a lumbar to pelvic spinal fusion and is experiencing expected postoperative back discomfort. Her incision is healing well.  [de-identified] : - Encourage patient to walk for exercise.   - Advise patient to avoid bending and lifting.   - Request patient to send an x-ray for review in the coming week.   - Schedule a follow-up appointment in six weeks' time.   - Instructed patient to call for any change in symptoms.

## 2024-03-26 NOTE — PROVIDER CONTACT NOTE (OTHER) - RECOMMENDATIONS
requested O2 2 L NC Abuso de alcohol    La intoxicación por alcohol ocurre cuando la cantidad de alcohol que dagoberto persona ha consumido afecta munoz capacidad para funcionar mental y físicamente. El consumo crónico de alcohol también puede provocar dagoberto variedad de problemas de wojciech, incluidas enfermedades neurológicas, estomacales, cardíacas, hepáticas, etc. No conduzca después de beber alcohol. Beber suficiente alcohol дмитрий para terminar en dagoberto isaiah de emergencias sugiere que es posible que tenga un problema de abuso de alcohol. Busque ayuda en un centro de drogadicción.    - Teresa un seguimiento con munoz proveedor de atención primaria.    BUSQUE ATENCIÓN MÉDICA INMEDIATA SI TIENE ALGUNO DE LOS SIGUIENTES SÍNTOMAS: convulsiones, vómitos con marixa, marixa en las heces, aturdimiento/mareos o sensación de temblor o temblor cuando sunil de beber.    ----------------------------------------------------------------------------------------------------------------    Alcohol Abuse    Alcohol intoxication occurs when the amount of alcohol that a person has consumed impairs his or her ability to mentally and physically function. Chronic alcohol consumption can also lead to a variety of health issues including neurological disease, stomach disease, heart disease, liver disease, etc. Do not drive after drinking alcohol. Drinking enough alcohol to end up in an Emergency Room suggests you may have an alcohol abuse problem. Seek help at a drug addiction center.    - Follow up with your primary care provider     SEEK IMMEDIATE MEDICAL CARE IF YOU HAVE ANY OF THE FOLLOWING SYMPTOMS: seizures, vomiting blood, blood in your stool, lightheadedness/dizziness, or becoming shaky to tremulous when you stop drinking.

## 2024-04-09 ENCOUNTER — APPOINTMENT (OUTPATIENT)
Dept: ORTHOPEDIC SURGERY | Facility: CLINIC | Age: 79
End: 2024-04-09
Payer: MEDICARE

## 2024-04-09 VITALS
BODY MASS INDEX: 27.42 KG/M2 | WEIGHT: 149 LBS | OXYGEN SATURATION: 99 % | DIASTOLIC BLOOD PRESSURE: 68 MMHG | SYSTOLIC BLOOD PRESSURE: 114 MMHG | HEIGHT: 62 IN | HEART RATE: 78 BPM | TEMPERATURE: 98.3 F

## 2024-04-09 DIAGNOSIS — Z98.1 ARTHRODESIS STATUS: ICD-10-CM

## 2024-04-09 PROCEDURE — 99024 POSTOP FOLLOW-UP VISIT: CPT

## 2024-04-09 PROCEDURE — 72100 X-RAY EXAM L-S SPINE 2/3 VWS: CPT

## 2024-04-10 PROBLEM — Z98.1 HISTORY OF LUMBAR FUSION: Status: ACTIVE | Noted: 2024-01-18

## 2024-04-10 RX ORDER — CEPHALEXIN 500 MG/1
500 CAPSULE ORAL
Qty: 14 | Refills: 0 | Status: DISCONTINUED | COMMUNITY
Start: 2024-01-31 | End: 2024-04-10

## 2024-04-10 NOTE — HISTORY OF PRESENT ILLNESS
[Procedure: ___] : status post [unfilled] [___ Months Post Op] : [unfilled] months post op [Clean/Dry/Intact] : clean, dry and intact [Xray (Date:___)] : [unfilled] Xray -  [Hardware in Good Position] : hardware in good position [Erythema] : not erythematous [Discharge] : absent of discharge [Swelling] : not swollen [Dehiscence] : not dehisced [de-identified] : Lisseth is a patient who is 3 months postoperative after undergoing L2 to the pelvis spinal fusion with posterior instrument, transforaminal lumbar interbody fusion (TLIF), and pelvic fixation. She reports satisfaction with her surgical results and states her preoperative symptoms have resolved. She currently experiences expected back discomfort particularly with prolonged standing, laying in bed and getting out of bed. She has minimal pain sitting.  [de-identified] : Large scab at distal end of incision.  [de-identified] : Lisseth is 3 months postoperative from a lumbar to pelvic spinal fusion and is experiencing expected postoperative back discomfort. Her incision is healing well.  [de-identified] : - Encourage patient to walk for exercise.   - Advise patient to avoid bending and lifting.   - Schedule a follow-up appointment in six weeks at which point we will consider physical therapy.  - Instructed patient to call for any change in symptoms.

## 2024-04-10 NOTE — HISTORY OF PRESENT ILLNESS
[Procedure: ___] : status post [unfilled] [___ Months Post Op] : [unfilled] months post op [Clean/Dry/Intact] : clean, dry and intact [Xray (Date:___)] : [unfilled] Xray -  [Hardware in Good Position] : hardware in good position [Erythema] : not erythematous [Discharge] : absent of discharge [Swelling] : not swollen [Dehiscence] : not dehisced [de-identified] : Lisseth is a patient who is 3 months postoperative after undergoing L2 to the pelvis spinal fusion with posterior instrument, transforaminal lumbar interbody fusion (TLIF), and pelvic fixation. She reports satisfaction with her surgical results and states her preoperative symptoms have resolved. She currently experiences expected back discomfort particularly with prolonged standing, laying in bed and getting out of bed. She has minimal pain sitting.  [de-identified] : Large scab at distal end of incision.  [de-identified] : Lisseth is 3 months postoperative from a lumbar to pelvic spinal fusion and is experiencing expected postoperative back discomfort. Her incision is healing well.  [de-identified] : - Encourage patient to walk for exercise.   - Advise patient to avoid bending and lifting.   - Schedule a follow-up appointment in six weeks at which point we will consider physical therapy.  - Instructed patient to call for any change in symptoms.

## 2024-04-10 NOTE — END OF VISIT
[FreeTextEntry3] : I Jaimee Garcia, acting as scribe, attest that this documentation has been prepared under the direction and in the presence of Provider Gerry Bahena MD.   I was physically present during the service to the patient and/or personally examined the patient and I was directly involved in the management plan and recommendations of the care provided to the patient.   I Dr. Bahena, reviewed the history, the physical exam, and plan as documented by the PA. The documentation recorded by the PA, in my presence, accurately reflects the service I personally performed, and the decisions made by me with my edits as appropriate.  Gerry Bahena MD

## 2024-04-24 ENCOUNTER — INPATIENT (INPATIENT)
Facility: HOSPITAL | Age: 79
LOS: 2 days | Discharge: HOME | End: 2024-04-27
Attending: SURGERY | Admitting: SURGERY
Payer: MEDICARE

## 2024-04-24 VITALS
OXYGEN SATURATION: 97 % | WEIGHT: 138.89 LBS | DIASTOLIC BLOOD PRESSURE: 84 MMHG | TEMPERATURE: 98 F | HEART RATE: 78 BPM | RESPIRATION RATE: 16 BRPM | SYSTOLIC BLOOD PRESSURE: 138 MMHG | HEIGHT: 62 IN

## 2024-04-24 DIAGNOSIS — Z98.82 BREAST IMPLANT STATUS: Chronic | ICD-10-CM

## 2024-04-24 DIAGNOSIS — Z98.890 OTHER SPECIFIED POSTPROCEDURAL STATES: Chronic | ICD-10-CM

## 2024-04-24 DIAGNOSIS — Z96.659 PRESENCE OF UNSPECIFIED ARTIFICIAL KNEE JOINT: Chronic | ICD-10-CM

## 2024-04-24 PROCEDURE — 99285 EMERGENCY DEPT VISIT HI MDM: CPT

## 2024-04-24 RX ORDER — SODIUM CHLORIDE 9 MG/ML
1000 INJECTION INTRAMUSCULAR; INTRAVENOUS; SUBCUTANEOUS ONCE
Refills: 0 | Status: COMPLETED | OUTPATIENT
Start: 2024-04-24 | End: 2024-04-24

## 2024-04-24 RX ORDER — FAMOTIDINE 10 MG/ML
20 INJECTION INTRAVENOUS ONCE
Refills: 0 | Status: COMPLETED | OUTPATIENT
Start: 2024-04-24 | End: 2024-04-24

## 2024-04-24 NOTE — ED ADULT TRIAGE NOTE - HEART RATE (BEATS/MIN)
Patient has appt scheduled for 2/20/19.  Per Epic, Last celexa refll was 7/17/18  # 90 .  If OK to refill, please sign.  
78

## 2024-04-25 DIAGNOSIS — K56.609 UNSPECIFIED INTESTINAL OBSTRUCTION, UNSPECIFIED AS TO PARTIAL VERSUS COMPLETE OBSTRUCTION: ICD-10-CM

## 2024-04-25 LAB
ALBUMIN SERPL ELPH-MCNC: 3.6 G/DL — SIGNIFICANT CHANGE UP (ref 3.3–5)
ALP SERPL-CCNC: 108 U/L — SIGNIFICANT CHANGE UP (ref 40–120)
ALT FLD-CCNC: 14 U/L — SIGNIFICANT CHANGE UP (ref 12–78)
ANION GAP SERPL CALC-SCNC: 13 MMOL/L — SIGNIFICANT CHANGE UP (ref 5–17)
APTT BLD: 31.9 SEC — SIGNIFICANT CHANGE UP (ref 24.5–35.6)
AST SERPL-CCNC: 15 U/L — SIGNIFICANT CHANGE UP (ref 15–37)
BASOPHILS # BLD AUTO: 0.03 K/UL — SIGNIFICANT CHANGE UP (ref 0–0.2)
BASOPHILS NFR BLD AUTO: 0.4 % — SIGNIFICANT CHANGE UP (ref 0–2)
BILIRUB SERPL-MCNC: 0.4 MG/DL — SIGNIFICANT CHANGE UP (ref 0.2–1.2)
BLD GP AB SCN SERPL QL: SIGNIFICANT CHANGE UP
BUN SERPL-MCNC: 26 MG/DL — HIGH (ref 7–23)
CALCIUM SERPL-MCNC: 9.8 MG/DL — SIGNIFICANT CHANGE UP (ref 8.5–10.1)
CHLORIDE SERPL-SCNC: 105 MMOL/L — SIGNIFICANT CHANGE UP (ref 96–108)
CO2 SERPL-SCNC: 24 MMOL/L — SIGNIFICANT CHANGE UP (ref 22–31)
CREAT SERPL-MCNC: 0.8 MG/DL — SIGNIFICANT CHANGE UP (ref 0.5–1.3)
EGFR: 75 ML/MIN/1.73M2 — SIGNIFICANT CHANGE UP
EOSINOPHIL # BLD AUTO: 0.02 K/UL — SIGNIFICANT CHANGE UP (ref 0–0.5)
EOSINOPHIL NFR BLD AUTO: 0.3 % — SIGNIFICANT CHANGE UP (ref 0–6)
GLUCOSE BLDC GLUCOMTR-MCNC: 114 MG/DL — HIGH (ref 70–99)
GLUCOSE BLDC GLUCOMTR-MCNC: 123 MG/DL — HIGH (ref 70–99)
GLUCOSE BLDC GLUCOMTR-MCNC: 160 MG/DL — HIGH (ref 70–99)
GLUCOSE SERPL-MCNC: 155 MG/DL — HIGH (ref 70–99)
HCT VFR BLD CALC: 40.7 % — SIGNIFICANT CHANGE UP (ref 34.5–45)
HGB BLD-MCNC: 13.5 G/DL — SIGNIFICANT CHANGE UP (ref 11.5–15.5)
IMM GRANULOCYTES NFR BLD AUTO: 0.3 % — SIGNIFICANT CHANGE UP (ref 0–0.9)
LACTATE SERPL-SCNC: 2 MMOL/L — SIGNIFICANT CHANGE UP (ref 0.7–2)
LIDOCAIN IGE QN: 42 U/L — SIGNIFICANT CHANGE UP (ref 13–75)
LYMPHOCYTES # BLD AUTO: 1.76 K/UL — SIGNIFICANT CHANGE UP (ref 1–3.3)
LYMPHOCYTES # BLD AUTO: 26 % — SIGNIFICANT CHANGE UP (ref 13–44)
MCHC RBC-ENTMCNC: 29.3 PG — SIGNIFICANT CHANGE UP (ref 27–34)
MCHC RBC-ENTMCNC: 33.2 G/DL — SIGNIFICANT CHANGE UP (ref 32–36)
MCV RBC AUTO: 88.3 FL — SIGNIFICANT CHANGE UP (ref 80–100)
MONOCYTES # BLD AUTO: 0.4 K/UL — SIGNIFICANT CHANGE UP (ref 0–0.9)
MONOCYTES NFR BLD AUTO: 5.9 % — SIGNIFICANT CHANGE UP (ref 2–14)
NEUTROPHILS # BLD AUTO: 4.55 K/UL — SIGNIFICANT CHANGE UP (ref 1.8–7.4)
NEUTROPHILS NFR BLD AUTO: 67.1 % — SIGNIFICANT CHANGE UP (ref 43–77)
NRBC # BLD: 0 /100 WBCS — SIGNIFICANT CHANGE UP (ref 0–0)
PLATELET # BLD AUTO: 162 K/UL — SIGNIFICANT CHANGE UP (ref 150–400)
POTASSIUM SERPL-MCNC: 4.1 MMOL/L — SIGNIFICANT CHANGE UP (ref 3.5–5.3)
POTASSIUM SERPL-SCNC: 4.1 MMOL/L — SIGNIFICANT CHANGE UP (ref 3.5–5.3)
PROT SERPL-MCNC: 7.4 GM/DL — SIGNIFICANT CHANGE UP (ref 6–8.3)
RBC # BLD: 4.61 M/UL — SIGNIFICANT CHANGE UP (ref 3.8–5.2)
RBC # FLD: 14.5 % — SIGNIFICANT CHANGE UP (ref 10.3–14.5)
SODIUM SERPL-SCNC: 142 MMOL/L — SIGNIFICANT CHANGE UP (ref 135–145)
WBC # BLD: 6.78 K/UL — SIGNIFICANT CHANGE UP (ref 3.8–10.5)
WBC # FLD AUTO: 6.78 K/UL — SIGNIFICANT CHANGE UP (ref 3.8–10.5)

## 2024-04-25 PROCEDURE — 71045 X-RAY EXAM CHEST 1 VIEW: CPT | Mod: 26

## 2024-04-25 PROCEDURE — 43753 TX GASTRO INTUB W/ASP: CPT

## 2024-04-25 PROCEDURE — 74177 CT ABD & PELVIS W/CONTRAST: CPT | Mod: 26,MC

## 2024-04-25 PROCEDURE — 99223 1ST HOSP IP/OBS HIGH 75: CPT

## 2024-04-25 PROCEDURE — 99222 1ST HOSP IP/OBS MODERATE 55: CPT | Mod: 25

## 2024-04-25 RX ORDER — BENZOCAINE 10 %
1 GEL (GRAM) MUCOUS MEMBRANE EVERY 6 HOURS
Refills: 0 | Status: DISCONTINUED | OUTPATIENT
Start: 2024-04-25 | End: 2024-04-27

## 2024-04-25 RX ORDER — DEXTROSE MONOHYDRATE, SODIUM CHLORIDE, AND POTASSIUM CHLORIDE 50; .745; 4.5 G/1000ML; G/1000ML; G/1000ML
1000 INJECTION, SOLUTION INTRAVENOUS
Refills: 0 | Status: DISCONTINUED | OUTPATIENT
Start: 2024-04-25 | End: 2024-04-26

## 2024-04-25 RX ORDER — SODIUM CHLORIDE 9 MG/ML
1000 INJECTION, SOLUTION INTRAVENOUS
Refills: 0 | Status: DISCONTINUED | OUTPATIENT
Start: 2024-04-25 | End: 2024-04-27

## 2024-04-25 RX ORDER — GLUCAGON INJECTION, SOLUTION 0.5 MG/.1ML
1 INJECTION, SOLUTION SUBCUTANEOUS ONCE
Refills: 0 | Status: DISCONTINUED | OUTPATIENT
Start: 2024-04-25 | End: 2024-04-27

## 2024-04-25 RX ORDER — SODIUM CHLORIDE 9 MG/ML
1000 INJECTION, SOLUTION INTRAVENOUS ONCE
Refills: 0 | Status: COMPLETED | OUTPATIENT
Start: 2024-04-25 | End: 2024-04-25

## 2024-04-25 RX ORDER — AMLODIPINE BESYLATE 2.5 MG/1
1 TABLET ORAL
Refills: 0 | DISCHARGE

## 2024-04-25 RX ORDER — CARBIDOPA AND LEVODOPA 25; 100 MG/1; MG/1
1 TABLET ORAL
Qty: 0 | Refills: 0 | DISCHARGE

## 2024-04-25 RX ORDER — LOSARTAN POTASSIUM 100 MG/1
1 TABLET, FILM COATED ORAL
Qty: 0 | Refills: 0 | DISCHARGE

## 2024-04-25 RX ORDER — DULAGLUTIDE 4.5 MG/.5ML
0.75 INJECTION, SOLUTION SUBCUTANEOUS
Refills: 0 | DISCHARGE

## 2024-04-25 RX ORDER — INSULIN LISPRO 100/ML
VIAL (ML) SUBCUTANEOUS EVERY 6 HOURS
Refills: 0 | Status: DISCONTINUED | OUTPATIENT
Start: 2024-04-25 | End: 2024-04-27

## 2024-04-25 RX ORDER — MORPHINE SULFATE 50 MG/1
4 CAPSULE, EXTENDED RELEASE ORAL ONCE
Refills: 0 | Status: DISCONTINUED | OUTPATIENT
Start: 2024-04-25 | End: 2024-04-25

## 2024-04-25 RX ORDER — DEXTROSE 10 % IN WATER 10 %
125 INTRAVENOUS SOLUTION INTRAVENOUS ONCE
Refills: 0 | Status: DISCONTINUED | OUTPATIENT
Start: 2024-04-25 | End: 2024-04-27

## 2024-04-25 RX ORDER — HYDROCHLOROTHIAZIDE 25 MG
1 TABLET ORAL
Qty: 0 | Refills: 0 | DISCHARGE

## 2024-04-25 RX ORDER — ONDANSETRON 8 MG/1
4 TABLET, FILM COATED ORAL ONCE
Refills: 0 | Status: COMPLETED | OUTPATIENT
Start: 2024-04-25 | End: 2024-04-25

## 2024-04-25 RX ORDER — PANTOPRAZOLE SODIUM 20 MG/1
1 TABLET, DELAYED RELEASE ORAL
Refills: 0 | DISCHARGE

## 2024-04-25 RX ORDER — PANTOPRAZOLE SODIUM 20 MG/1
40 TABLET, DELAYED RELEASE ORAL DAILY
Refills: 0 | Status: DISCONTINUED | OUTPATIENT
Start: 2024-04-25 | End: 2024-04-27

## 2024-04-25 RX ORDER — METHIMAZOLE 10 MG/1
1 TABLET ORAL
Qty: 0 | Refills: 0 | DISCHARGE

## 2024-04-25 RX ORDER — ACETAMINOPHEN 500 MG
1000 TABLET ORAL ONCE
Refills: 0 | Status: COMPLETED | OUTPATIENT
Start: 2024-04-25 | End: 2024-04-25

## 2024-04-25 RX ORDER — DEXTROSE 50 % IN WATER 50 %
12.5 SYRINGE (ML) INTRAVENOUS ONCE
Refills: 0 | Status: DISCONTINUED | OUTPATIENT
Start: 2024-04-25 | End: 2024-04-27

## 2024-04-25 RX ORDER — DEXTROSE 50 % IN WATER 50 %
25 SYRINGE (ML) INTRAVENOUS ONCE
Refills: 0 | Status: DISCONTINUED | OUTPATIENT
Start: 2024-04-25 | End: 2024-04-27

## 2024-04-25 RX ORDER — METOPROLOL TARTRATE 50 MG
1 TABLET ORAL
Qty: 0 | Refills: 0 | DISCHARGE

## 2024-04-25 RX ORDER — METFORMIN HYDROCHLORIDE 850 MG/1
1 TABLET ORAL
Qty: 0 | Refills: 0 | DISCHARGE

## 2024-04-25 RX ORDER — ENOXAPARIN SODIUM 100 MG/ML
40 INJECTION SUBCUTANEOUS EVERY 24 HOURS
Refills: 0 | Status: DISCONTINUED | OUTPATIENT
Start: 2024-04-25 | End: 2024-04-27

## 2024-04-25 RX ORDER — DEXTROSE 50 % IN WATER 50 %
15 SYRINGE (ML) INTRAVENOUS ONCE
Refills: 0 | Status: DISCONTINUED | OUTPATIENT
Start: 2024-04-25 | End: 2024-04-27

## 2024-04-25 RX ORDER — ROSUVASTATIN CALCIUM 5 MG/1
1 TABLET ORAL
Qty: 0 | Refills: 0 | DISCHARGE

## 2024-04-25 RX ADMIN — SODIUM CHLORIDE 500 MILLILITER(S): 9 INJECTION, SOLUTION INTRAVENOUS at 03:52

## 2024-04-25 RX ADMIN — ONDANSETRON 4 MILLIGRAM(S): 8 TABLET, FILM COATED ORAL at 03:25

## 2024-04-25 RX ADMIN — Medication 400 MILLIGRAM(S): at 22:37

## 2024-04-25 RX ADMIN — ONDANSETRON 4 MILLIGRAM(S): 8 TABLET, FILM COATED ORAL at 01:01

## 2024-04-25 RX ADMIN — SODIUM CHLORIDE 1000 MILLILITER(S): 9 INJECTION INTRAMUSCULAR; INTRAVENOUS; SUBCUTANEOUS at 00:12

## 2024-04-25 RX ADMIN — Medication 1 SPRAY(S): at 04:45

## 2024-04-25 RX ADMIN — MORPHINE SULFATE 4 MILLIGRAM(S): 50 CAPSULE, EXTENDED RELEASE ORAL at 03:26

## 2024-04-25 RX ADMIN — DEXTROSE MONOHYDRATE, SODIUM CHLORIDE, AND POTASSIUM CHLORIDE 105 MILLILITER(S): 50; .745; 4.5 INJECTION, SOLUTION INTRAVENOUS at 12:39

## 2024-04-25 RX ADMIN — Medication 1 SPRAY(S): at 19:00

## 2024-04-25 RX ADMIN — Medication 1: at 06:42

## 2024-04-25 RX ADMIN — Medication 1 SPRAY(S): at 05:00

## 2024-04-25 RX ADMIN — PANTOPRAZOLE SODIUM 40 MILLIGRAM(S): 20 TABLET, DELAYED RELEASE ORAL at 12:13

## 2024-04-25 RX ADMIN — MORPHINE SULFATE 4 MILLIGRAM(S): 50 CAPSULE, EXTENDED RELEASE ORAL at 01:00

## 2024-04-25 RX ADMIN — ENOXAPARIN SODIUM 40 MILLIGRAM(S): 100 INJECTION SUBCUTANEOUS at 12:13

## 2024-04-25 RX ADMIN — FAMOTIDINE 20 MILLIGRAM(S): 10 INJECTION INTRAVENOUS at 00:26

## 2024-04-25 RX ADMIN — Medication 1000 MILLIGRAM(S): at 23:07

## 2024-04-25 NOTE — ED PROVIDER NOTE - PHYSICAL EXAMINATION
General: No acute distress, mentation at baseline,  well nourished, well developed  HEENT: NCAT, Neck supple without meningismus, PERRL, no conjunctival injection  Lungs: CTAB, No wheeze or crackles, No retractions, No increased work of breathing  Heart: S1S2 RRR, No M/R/G, Pules equal Bilaterally in upper and lower extremities distally  Abd: soft, upper abd TTp with distention, No guarding, No rebound.  No hernias, no palpable masses.  Extrem: FROM in all joints, no gross deformities appreciated, no significant edema noted, No ulcers. Cap refil < 2sec.  Skin: No rash noted, warm dry.  Neuro:  Grossly normal.  No difficulty ambulating. No focal deficits.  Psychiatric: Appropriate mood and affect.

## 2024-04-25 NOTE — H&P ADULT - HISTORY OF PRESENT ILLNESS
80 y/o female PMHx DM, HLD, HTN, GERD, right RVT 2019 (off AC), MR, MRP, Parkinson's, hyperthyroidism, b/l breast implants, abdominoplasty, spinal stenosis s/p spinal fusion 2024, small bowel perforation s/p ex lap, SBR 2012, incisional hernia repair 2013, SBO x 2 (last in 2019), TKA presents with upper abdominal pain since 9pm. Has nausea but denies vomiting. Last BM/flatus was 5pm. Last colonoscopy was 1 year ago. Pt usually gets colonoscopies every 6 months to remove benign polyps. Patient denies fever, chills, constipation, diarrhea, melena, hematochezia, dysuria, hematuria, chest pain, shortness of breath, dizziness, cough. 78 y/o female PMHx DM, HLD, HTN, GERD, right RVT 2019 (off AC), MR, MRP, Parkinson's, hyperthyroidism, b/l breast implants, abdominoplasty, spinal stenosis s/p spinal fusion 2024, SBO 1997 treated with NGT and surgery, small bowel perforation s/p ex lap, SBR 2012, incisional hernia repair 2013, TKA 2019 presents with upper abdominal pain since 9pm. Has nausea but denies vomiting. Last BM/flatus was 5pm. Last colonoscopy was 1 year ago. Pt usually gets colonoscopies every 6 months to remove benign polyps. Patient denies fever, chills, constipation, diarrhea, melena, hematochezia, dysuria, hematuria, chest pain, shortness of breath, dizziness, cough. 80 y/o female PMHx DM, HLD, HTN, GERD, right DVT 2019 (off AC), MR, MRP, Parkinson's, hyperthyroidism, b/l breast implants, abdominoplasty, spinal stenosis s/p spinal fusion 2024, SBO 1997 treated with NGT and surgery, small bowel perforation s/p ex lap, SBR 2012, incisional hernia repair 2013, TKA 2019 presents with upper abdominal pain since 9pm. Has nausea but denies vomiting. Last BM/flatus was 5pm. Last colonoscopy was 1 year ago. Pt usually gets colonoscopies every 6 months to remove benign polyps. Patient denies fever, chills, constipation, diarrhea, melena, hematochezia, dysuria, hematuria, chest pain, shortness of breath, dizziness, cough. 80 y/o female PMHx DM, HLD, HTN, GERD, right DVT 2019 (off AC), MR, MVP, Parkinson's, hyperthyroidism, b/l breast implants, abdominoplasty, spinal stenosis s/p spinal fusion 2024, SBO 1997 treated with NGT and surgery, small bowel perforation s/p ex lap, SBR 2012, incisional hernia repair 2013, TKA 2019 presents with upper abdominal pain since 9pm. Has nausea but denies vomiting. Last BM/flatus was 5pm. Last colonoscopy was 1 year ago. Pt usually gets colonoscopies every 6 months to remove benign polyps. Patient denies fever, chills, constipation, diarrhea, melena, hematochezia, dysuria, hematuria, chest pain, shortness of breath, dizziness, cough.

## 2024-04-25 NOTE — H&P ADULT - NSHPPHYSICALEXAM_GEN_ALL_CORE
PHYSICAL EXAM:  CONSTITUTIONAL: NAD, well-developed  HEAD:  Atraumatic, Normocephalic  EYES: Conjunctiva and sclera clear  ENMT: No tonsillar erythema, exudates, or enlargement; Moist mucous membranes, No lesions  NECK: Supple, No JVD, Normal thyroid  NERVOUS SYSTEM:  Alert & Oriented X3  RESPIRATORY: Clear to auscultation bilaterally; No rales, rhonchi, wheezing  CARDIOVASCULAR: Regular rate and rhythm. S1S2  GASTROINTESTINAL: softly distended, upper abdominal tenderness, no guarding, no rigidity   MUSCULOSKELETAL: 2+ Peripheral Pulses, No clubbing, cyanosis, or edema

## 2024-04-25 NOTE — H&P ADULT - NSHPREVIEWOFSYSTEMS_GEN_ALL_CORE
REVIEW OF SYSTEMS:  CONSTITUTIONAL: No fever, weight loss, or fatigue  EYES: No eye pain, visual disturbances, discharge  ENMT:  No difficulty hearing, tinnitus, vertigo; No sinus or throat pain  NECK: No pain or stiffness  BREASTS: No pain, masses, or nipple discharge  RESPIRATORY: No cough, wheezing, chills or hemoptysis; No shortness of breath  CARDIOVASCULAR: No chest pain, palpitations, dizziness, or leg swelling  GASTROINTESTINAL: +upper abdominal pain, nausea. No vomiting or hematemesis; No diarrhea or constipation. No melena or hematochezia.  GENITOURINARY: No dysuria, frequency, hematuria, or incontinence  NEUROLOGICAL: No headaches, memory loss, loss of strength, numbness, or tremors  SKIN: No itching, burning, rashes, or lesions   LYMPH NODES: No enlarged glands  ENDOCRINE: No heat or cold intolerance; No hair loss  MUSCULOSKELETAL: No joint pain or swelling; No muscle, back, or extremity pain  PSYCHIATRIC: No depression, anxiety, mood swings, or difficulty sleeping  HEME/LYMPH: No easy bruising, or bleeding gums  ALLERY AND IMMUNOLOGIC: No hives or eczema

## 2024-04-25 NOTE — H&P ADULT - NSHPLABSRESULTS_GEN_ALL_CORE
13.5   6.78  )-----------( 162      ( 25 Apr 2024 00:15 )             40.7   04-25    142  |  105  |  26<H>  ----------------------------<  155<H>  4.1   |  24  |  0.80    Ca    9.8      25 Apr 2024 00:15    TPro  7.4  /  Alb  3.6  /  TBili  0.4  /  DBili  x   /  AST  15  /  ALT  14  /  AlkPhos  108  04-25    < from: CT Abdomen and Pelvis w/ IV Cont (04.25.24 @ 02:24) >    FINDINGS:  LOWER CHEST: Bibasilar subsegmental atelectasis. Partially visualized   bilateral breast implants.    LIVER: Within normal limits.  BILE DUCTS: Normal caliber.  GALLBLADDER: Layering intraluminal high attenuation which could be due to   gallstones and/or sludge. Focal wall thickening of the fundus likely due   to adenomyomatosis.  SPLEEN: Within normal limits.  PANCREAS: Within normal limits.  ADRENALS: Within normal limits.  KIDNEYS/URETERS: Right renal contour defects in the due to scarring.   Right renal subcentimeter hypodense lesions too small for accurate   characterization. No hydronephrosis. Left kidney within normal limits.    BLADDER: Within normal limits.  REPRODUCTIVE ORGANS: Fibroid uterus. Ovaries within normal limits.    BOWEL: Dilated loops of jejunum in the left lower quadrant measuring up   to 3.8 cm to the level of a transition point distal to fecalized loops at   the level of the umbilicus (series 2, images 85-91) compatible with a   high-grade small bowel obstruction. Colonic diverticulosis without   diverticulitis. Appendix is normal.  PERITONEUM: No ascites.  VESSELS: Within normal limits.  RETROPERITONEUM/LYMPH NODES: No lymphadenopathy.  ABDOMINAL WALL: Focal left mid abdominal wall subcutaneous inflammatory   change and subcutaneous gas possibly a sequela of prior subcutaneous   injection.  BONES: L2-S1 posterior spinal fusion. L3-S1 laminectomies. Degenerative   changes of the spine.    IMPRESSION:  High-grade small bowel obstruction with transition point at the level of   the umbilicus.    < end of copied text >

## 2024-04-25 NOTE — CHART NOTE - NSCHARTNOTEFT_GEN_A_CORE
Patient seen and examined  Doing well; discomfort from NGT  She states her abdomen feels much less bloated; states she passed flatus x1    Awake, alert  Breathing comfortably on room air  Abd is soft, less distended, not tender. NO rebound, no guarding.  NGT in place    - continue NGT to suction overnight  - gastrograffin challenge tomorrow

## 2024-04-25 NOTE — ED PROVIDER NOTE - CLINICAL SUMMARY MEDICAL DECISION MAKING FREE TEXT BOX
79-year-old female with past history of bowel resection, multiple SBO's DVT presenting with abdominal pain for 1 day.  She states pain is in upper quadrants with associated nausea, does not member last bowel movement or if she was passing gas.  States feels similar to prior SBO's.  Denies fever chills vomiting dysuria hematuria.    On exam patient uncomfortable, tenderness diffusely but most pronounced in upper quadrants.    Clinical presentation likely secondary to SBO, less likely acute cholecystitis versus gastritis.  Pain control given with Toradol and then later morphine 2 rounds.  Zofran given for nausea.  IV fluids started.  Labs benign, CT shows high-grade obstruction.  Surgery consulted, NG tube placed, surgery requesting admission

## 2024-04-25 NOTE — ED ADULT NURSE REASSESSMENT NOTE - NS ED NURSE REASSESS COMMENT FT1
Covering primary RN. Pt c/o pain, Dr. Palacio aware and meds given per order. Pt educated regarding results, refusing NGT at this time. education provided regarding need, pt requesting to speak to surgery first. Pia made aware. Plan of care ongoing.

## 2024-04-25 NOTE — ED ADULT NURSE NOTE - NSFALLUNIVINTERV_ED_ALL_ED
Bed/Stretcher in lowest position, wheels locked, appropriate side rails in place/Call bell, personal items and telephone in reach/Instruct patient to call for assistance before getting out of bed/chair/stretcher/Non-slip footwear applied when patient is off stretcher/[place] to call system/Physically safe environment - no spills, clutter or unnecessary equipment/Purposeful proactive rounding/Room/bathroom lighting operational, light cord in reach

## 2024-04-25 NOTE — H&P ADULT - ASSESSMENT
80 y/o female PMHx DM, HLD, HTN, GERD, right RVT 2019 (off AC), MR, MRP, Parkinson's, hyperthyroidism, b/l breast implants, abdominoplasty, spinal stenosis s/p spinal fusion 2024, small bowel perforation s/p ex lap, SBR 2012, incisional hernia repair 2013, SBO x 2 (last in 2019), TKA presents with upper abdominal pain since 9pm. CT shows high grade SBO.  80 y/o female PMHx DM, HLD, HTN, GERD, right RVT 2019 (off AC), MR, MRP, Parkinson's, hyperthyroidism, b/l breast implants, abdominoplasty, spinal stenosis s/p spinal fusion 2024, SBO 1997 treated with NGT and surgery, small bowel perforation s/p ex lap, SBR 2012, incisional hernia repair 2013, TKA 2019 presents with upper abdominal pain since 9pm. CT shows high grade SBO.  80 y/o female PMHx DM, HLD, HTN, GERD, right DVT 2019 (off AC), MR, MRP, Parkinson's, hyperthyroidism, b/l breast implants, abdominoplasty, spinal stenosis s/p spinal fusion 2024, SBO 1997 treated with NGT and surgery, small bowel perforation s/p ex lap, SBR 2012, incisional hernia repair 2013, TKA 2019 presents with upper abdominal pain since 9pm. CT shows high grade SBO.  80 y/o female PMHx DM, HLD, HTN, GERD, right DVT 2019 (off AC), MR, MVP, Parkinson's, hyperthyroidism, b/l breast implants, abdominoplasty, spinal stenosis s/p spinal fusion 2024, SBO 1997 treated with NGT and surgery, small bowel perforation s/p ex lap, SBR 2012, incisional hernia repair 2013, TKA 2019 presents with upper abdominal pain since 9pm. CT shows high grade SBO.

## 2024-04-25 NOTE — ED ADULT NURSE NOTE - OBJECTIVE STATEMENT
Pt BIBA is severe distress c/o abdominal a/w nausea. Pt states it started earlier today and believes it to be a SBO because she's had it twice before. Abdomen appears distended, no ecchymosis noted. Pt states she hasn't had a BM all day. C/o nausea but no vomiting. PMH HTN, HLD, parkinson's & depression. Safety maintained.

## 2024-04-25 NOTE — PATIENT PROFILE ADULT - FALL HARM RISK - HARM RISK INTERVENTIONS

## 2024-04-25 NOTE — ED ADULT NURSE NOTE - CHIEF COMPLAINT QUOTE
Pt complains of abdominal pain across upper abdomen and swelling x 2100. Pt with a history of SBO

## 2024-04-25 NOTE — ED ADULT NURSE NOTE - ALCOHOL PRE SCREEN (AUDIT - C)
"Call placed to Dr. Rodrigues to clarify \"Return to Work.\" Dr. Rodrigues said she can return to work as early as tomorrow if she feels good.  "
Statement Selected

## 2024-04-25 NOTE — H&P ADULT - NS ATTEND AMEND GEN_ALL_CORE FT
Patient seen and examined  Labs and imaging reviewed  SBO  Admit; NPO, IV fluids, NGT to wall suction

## 2024-04-25 NOTE — H&P ADULT - PROBLEM SELECTOR PLAN 1
-Admit patient   -NPO, IVF, NGT to LWS. NGT placed at bedside with gastric output.  -Xray to confirm NGT placement  -GI ppx, DVT ppx  -f/u labs  -Cartwright, monitor urine output -Admit patient   -NPO, IVF, NGT to LWS. NGT placed at bedside with gastric output. 300cc initial output and vomited about 500cc before NGT placement.  -Xray to confirm NGT placement  -GI ppx, DVT ppx  -f/u labs  -Cartwright, monitor urine output

## 2024-04-26 ENCOUNTER — TRANSCRIPTION ENCOUNTER (OUTPATIENT)
Age: 79
End: 2024-04-26

## 2024-04-26 LAB
A1C WITH ESTIMATED AVERAGE GLUCOSE RESULT: 7.3 % — HIGH (ref 4–5.6)
ANION GAP SERPL CALC-SCNC: 5 MMOL/L — SIGNIFICANT CHANGE UP (ref 5–17)
BUN SERPL-MCNC: 11 MG/DL — SIGNIFICANT CHANGE UP (ref 7–23)
CALCIUM SERPL-MCNC: 9.1 MG/DL — SIGNIFICANT CHANGE UP (ref 8.5–10.1)
CHLORIDE SERPL-SCNC: 109 MMOL/L — HIGH (ref 96–108)
CO2 SERPL-SCNC: 28 MMOL/L — SIGNIFICANT CHANGE UP (ref 22–31)
CREAT SERPL-MCNC: 0.75 MG/DL — SIGNIFICANT CHANGE UP (ref 0.5–1.3)
EGFR: 81 ML/MIN/1.73M2 — SIGNIFICANT CHANGE UP
ESTIMATED AVERAGE GLUCOSE: 163 MG/DL — HIGH (ref 68–114)
GLUCOSE BLDC GLUCOMTR-MCNC: 118 MG/DL — HIGH (ref 70–99)
GLUCOSE BLDC GLUCOMTR-MCNC: 128 MG/DL — HIGH (ref 70–99)
GLUCOSE BLDC GLUCOMTR-MCNC: 131 MG/DL — HIGH (ref 70–99)
GLUCOSE BLDC GLUCOMTR-MCNC: 142 MG/DL — HIGH (ref 70–99)
GLUCOSE BLDC GLUCOMTR-MCNC: 142 MG/DL — HIGH (ref 70–99)
GLUCOSE SERPL-MCNC: 141 MG/DL — HIGH (ref 70–99)
HCT VFR BLD CALC: 39.9 % — SIGNIFICANT CHANGE UP (ref 34.5–45)
HGB BLD-MCNC: 12.7 G/DL — SIGNIFICANT CHANGE UP (ref 11.5–15.5)
MAGNESIUM SERPL-MCNC: 1.8 MG/DL — SIGNIFICANT CHANGE UP (ref 1.6–2.6)
MCHC RBC-ENTMCNC: 29.2 PG — SIGNIFICANT CHANGE UP (ref 27–34)
MCHC RBC-ENTMCNC: 31.8 G/DL — LOW (ref 32–36)
MCV RBC AUTO: 91.7 FL — SIGNIFICANT CHANGE UP (ref 80–100)
NRBC # BLD: 0 /100 WBCS — SIGNIFICANT CHANGE UP (ref 0–0)
PHOSPHATE SERPL-MCNC: 2.8 MG/DL — SIGNIFICANT CHANGE UP (ref 2.5–4.5)
PLATELET # BLD AUTO: 160 K/UL — SIGNIFICANT CHANGE UP (ref 150–400)
POTASSIUM SERPL-MCNC: 4.4 MMOL/L — SIGNIFICANT CHANGE UP (ref 3.5–5.3)
POTASSIUM SERPL-SCNC: 4.4 MMOL/L — SIGNIFICANT CHANGE UP (ref 3.5–5.3)
RBC # BLD: 4.35 M/UL — SIGNIFICANT CHANGE UP (ref 3.8–5.2)
RBC # FLD: 14.6 % — HIGH (ref 10.3–14.5)
SODIUM SERPL-SCNC: 142 MMOL/L — SIGNIFICANT CHANGE UP (ref 135–145)
WBC # BLD: 5.26 K/UL — SIGNIFICANT CHANGE UP (ref 3.8–10.5)
WBC # FLD AUTO: 5.26 K/UL — SIGNIFICANT CHANGE UP (ref 3.8–10.5)

## 2024-04-26 PROCEDURE — 74018 RADEX ABDOMEN 1 VIEW: CPT | Mod: 26,76

## 2024-04-26 PROCEDURE — 99232 SBSQ HOSP IP/OBS MODERATE 35: CPT | Mod: FS

## 2024-04-26 PROCEDURE — 99232 SBSQ HOSP IP/OBS MODERATE 35: CPT

## 2024-04-26 RX ORDER — ACETAMINOPHEN 500 MG
1000 TABLET ORAL ONCE
Refills: 0 | Status: COMPLETED | OUTPATIENT
Start: 2024-04-26 | End: 2024-04-26

## 2024-04-26 RX ORDER — DIATRIZOATE MEGLUMINE 180 MG/ML
90 INJECTION, SOLUTION INTRAVESICAL ONCE
Refills: 0 | Status: COMPLETED | OUTPATIENT
Start: 2024-04-26 | End: 2024-04-26

## 2024-04-26 RX ORDER — SODIUM CHLORIDE 9 MG/ML
3 INJECTION INTRAMUSCULAR; INTRAVENOUS; SUBCUTANEOUS EVERY 8 HOURS
Refills: 0 | Status: DISCONTINUED | OUTPATIENT
Start: 2024-04-26 | End: 2024-04-27

## 2024-04-26 RX ADMIN — DIATRIZOATE MEGLUMINE 90 MILLILITER(S): 180 INJECTION, SOLUTION INTRAVESICAL at 11:30

## 2024-04-26 RX ADMIN — PANTOPRAZOLE SODIUM 40 MILLIGRAM(S): 20 TABLET, DELAYED RELEASE ORAL at 11:05

## 2024-04-26 RX ADMIN — Medication 1000 MILLIGRAM(S): at 06:12

## 2024-04-26 RX ADMIN — Medication 400 MILLIGRAM(S): at 05:42

## 2024-04-26 RX ADMIN — SODIUM CHLORIDE 3 MILLILITER(S): 9 INJECTION INTRAMUSCULAR; INTRAVENOUS; SUBCUTANEOUS at 21:43

## 2024-04-26 NOTE — DISCHARGE NOTE PROVIDER - HOSPITAL COURSE
HPI:  78 y/o female PMHx DM, HLD, HTN, GERD, right DVT 2019 (off AC), MR, MVP, Parkinson's, hyperthyroidism, b/l breast implants, abdominoplasty, spinal stenosis s/p spinal fusion 2024, SBO 1997 treated with NGT and surgery, small bowel perforation s/p ex lap, SBR 2012, incisional hernia repair 2013, TKA 2019 presents with upper abdominal pain since 9pm. Has nausea but denies vomiting. Last BM/flatus was 5pm. Last colonoscopy was 1 year ago. Pt usually gets colonoscopies every 6 months to remove benign polyps. Patient denies fever, chills, constipation, diarrhea, melena, hematochezia, dysuria, hematuria, chest pain, shortness of breath, dizziness, cough. (25 Apr 2024 04:01)    Pt was admitted with SBO, treated conservatively with NGT and clinically improved. Diet was advanced and tolerated. She was stable for discharge.

## 2024-04-26 NOTE — DISCHARGE NOTE PROVIDER - NSDCFUSCHEDAPPT_GEN_ALL_CORE_FT
[facility]  ORTHOSURG [address] [facility]  Scheduled Appointment: 05/21/2024

## 2024-04-26 NOTE — DISCHARGE NOTE PROVIDER - NSDCMRMEDTOKEN_GEN_ALL_CORE_FT
amLODIPine 2.5 mg oral tablet: 1 tab(s) orally once a day  carbidopa-levodopa 25 mg-100 mg oral tablet: 1 tab(s) orally 3 times a day  Crestor 10 mg oral tablet: 1 tab(s) orally once a day (at bedtime)  hydroCHLOROthiazide 12.5 mg oral tablet: 1 tab(s) orally once a day  losartan 100 mg oral tablet: 1 tab(s) orally once a day  metFORMIN 1000 mg oral tablet, extended release: 1 tab(s) orally 2 times a day  methIMAzole 5 mg oral tablet: 1 tab(s) orally once a day  methocarbamol 500 mg oral tablet: 1 tab(s) orally every 8 hours  Metoprolol Succinate ER 25 mg oral tablet, extended release: 1 tab(s) orally once a day  pantoprazole 40 mg oral delayed release tablet: 1 tab(s) orally once a day  Trulicity Pen 0.75 mg/0.5 mL subcutaneous solution: 0.75 milligram(s) subcutaneously once a week

## 2024-04-26 NOTE — DISCHARGE NOTE PROVIDER - NSDCFUADDAPPT_GEN_ALL_CORE_FT
Follow up with your primary physician as per routine to discuss your hospitalization  You may Follow up with surgeon as needed.

## 2024-04-26 NOTE — DISCHARGE NOTE PROVIDER - CARE PROVIDER_API CALL
Ian Rodriguez  Surgery  [address] [facility]  [place] [id number]  Phone: [phone]  Fax: [phone]  Follow Up Time: 2 weeks   Ian Rodriguez  Surgery  [address] [facility]  [place] [id number]  Phone: [phone]  Fax: [phone]  Follow Up Time:

## 2024-04-27 ENCOUNTER — TRANSCRIPTION ENCOUNTER (OUTPATIENT)
Age: 79
End: 2024-04-27

## 2024-04-27 VITALS
OXYGEN SATURATION: 98 % | HEART RATE: 77 BPM | SYSTOLIC BLOOD PRESSURE: 129 MMHG | TEMPERATURE: 97 F | DIASTOLIC BLOOD PRESSURE: 87 MMHG | RESPIRATION RATE: 18 BRPM

## 2024-04-27 LAB
ANION GAP SERPL CALC-SCNC: 6 MMOL/L — SIGNIFICANT CHANGE UP (ref 5–17)
BUN SERPL-MCNC: 10 MG/DL — SIGNIFICANT CHANGE UP (ref 7–23)
CALCIUM SERPL-MCNC: 9.4 MG/DL — SIGNIFICANT CHANGE UP (ref 8.5–10.1)
CHLORIDE SERPL-SCNC: 108 MMOL/L — SIGNIFICANT CHANGE UP (ref 96–108)
CO2 SERPL-SCNC: 27 MMOL/L — SIGNIFICANT CHANGE UP (ref 22–31)
CREAT SERPL-MCNC: 0.73 MG/DL — SIGNIFICANT CHANGE UP (ref 0.5–1.3)
EGFR: 84 ML/MIN/1.73M2 — SIGNIFICANT CHANGE UP
GLUCOSE BLDC GLUCOMTR-MCNC: 128 MG/DL — HIGH (ref 70–99)
GLUCOSE BLDC GLUCOMTR-MCNC: 177 MG/DL — HIGH (ref 70–99)
GLUCOSE SERPL-MCNC: 139 MG/DL — HIGH (ref 70–99)
HCT VFR BLD CALC: 41 % — SIGNIFICANT CHANGE UP (ref 34.5–45)
HGB BLD-MCNC: 13.1 G/DL — SIGNIFICANT CHANGE UP (ref 11.5–15.5)
MAGNESIUM SERPL-MCNC: 1.9 MG/DL — SIGNIFICANT CHANGE UP (ref 1.6–2.6)
MCHC RBC-ENTMCNC: 29.2 PG — SIGNIFICANT CHANGE UP (ref 27–34)
MCHC RBC-ENTMCNC: 32 G/DL — SIGNIFICANT CHANGE UP (ref 32–36)
MCV RBC AUTO: 91.5 FL — SIGNIFICANT CHANGE UP (ref 80–100)
NRBC # BLD: 0 /100 WBCS — SIGNIFICANT CHANGE UP (ref 0–0)
PHOSPHATE SERPL-MCNC: 2.6 MG/DL — SIGNIFICANT CHANGE UP (ref 2.5–4.5)
PLATELET # BLD AUTO: 151 K/UL — SIGNIFICANT CHANGE UP (ref 150–400)
POTASSIUM SERPL-MCNC: 4.1 MMOL/L — SIGNIFICANT CHANGE UP (ref 3.5–5.3)
POTASSIUM SERPL-SCNC: 4.1 MMOL/L — SIGNIFICANT CHANGE UP (ref 3.5–5.3)
RBC # BLD: 4.48 M/UL — SIGNIFICANT CHANGE UP (ref 3.8–5.2)
RBC # FLD: 14.6 % — HIGH (ref 10.3–14.5)
SODIUM SERPL-SCNC: 141 MMOL/L — SIGNIFICANT CHANGE UP (ref 135–145)
WBC # BLD: 4.77 K/UL — SIGNIFICANT CHANGE UP (ref 3.8–10.5)
WBC # FLD AUTO: 4.77 K/UL — SIGNIFICANT CHANGE UP (ref 3.8–10.5)

## 2024-04-27 PROCEDURE — 99238 HOSP IP/OBS DSCHRG MGMT 30/<: CPT

## 2024-04-27 RX ADMIN — Medication 1: at 08:39

## 2024-04-27 RX ADMIN — SODIUM CHLORIDE 3 MILLILITER(S): 9 INJECTION INTRAMUSCULAR; INTRAVENOUS; SUBCUTANEOUS at 05:35

## 2024-04-27 RX ADMIN — PANTOPRAZOLE SODIUM 40 MILLIGRAM(S): 20 TABLET, DELAYED RELEASE ORAL at 12:48

## 2024-04-27 NOTE — DISCHARGE NOTE NURSING/CASE MANAGEMENT/SOCIAL WORK - PATIENT PORTAL LINK FT
You can access the FollowMyHealth Patient Portal offered by [facility] by registering at the following website: [email or web address]. By joining [employer]s FollowMyHealth portal, you will also be able to view your health information using other applications (apps) compatible with our system.

## 2024-04-27 NOTE — PROGRESS NOTE ADULT - SUBJECTIVE AND OBJECTIVE BOX
SURGERY PROGRESS HPI:  Pt seen and examined at bedside. Denies abdominal pain. C/o headache overnight. Pt denies nausea and vomiting. Passing flatus. Pt denies chest pain, SOB, dizziness, fever, chills. Ambulating.    Vital Signs Last 24 Hrs  T(C): 36.7 (26 Apr 2024 05:41), Max: 36.8 (25 Apr 2024 23:41)  T(F): 98.1 (26 Apr 2024 05:41), Max: 98.3 (25 Apr 2024 23:41)  HR: 63 (26 Apr 2024 05:41) (63 - 73)  BP: 139/85 (26 Apr 2024 05:41) (118/76 - 139/85)  BP(mean): --  RR: 16 (26 Apr 2024 05:41) (16 - 19)  SpO2: 97% (26 Apr 2024 05:41) (97% - 98%)    Parameters below as of 25 Apr 2024 11:49  Patient On (Oxygen Delivery Method): room air        PHYSICAL EXAM:    CONSTITUTIONAL: NAD  HEENT: NGT in place with gastric output  RESPIRATORY: Clear to ausculation, bilaterally   CARDIOVASCULAR: RRR S1S2  GASTROINTESTINAL: mild soft distention, +BS, soft, non tender, no guarding  MUSCULOSKELETAL: calf soft, non tender b/l    I&O's Detail    25 Apr 2024 07:01  -  26 Apr 2024 05:58  --------------------------------------------------------  IN:  Total IN: 0 mL    OUT:    Nasogastric/Oral tube (mL): 450 mL  Total OUT: 450 mL    Total NET: -450 mL    LABS:                        13.5   6.78  )-----------( 162      ( 25 Apr 2024 00:15 )             40.7     04-25    142  |  105  |  26<H>  ----------------------------<  155<H>  4.1   |  24  |  0.80    Ca    9.8      25 Apr 2024 00:15    TPro  7.4  /  Alb  3.6  /  TBili  0.4  /  DBili  x   /  AST  15  /  ALT  14  /  AlkPhos  108  04-25    PT/INR - ( 25 Apr 2024 00:15 )   PT: 10.7 sec;   INR: 0.90 ratio      PTT - ( 25 Apr 2024 00:15 )  PTT:31.9 sec    Urinalysis Basic - ( 25 Apr 2024 00:15 )    Color: x / Appearance: x / SG: x / pH: x  Gluc: 155 mg/dL / Ketone: x  / Bili: x / Urobili: x   Blood: x / Protein: x / Nitrite: x   Leuk Esterase: x / RBC: x / WBC x   Sq Epi: x / Non Sq Epi: x / Bacteria: x      78 y/o female PMHx DM, HLD, HTN, GERD, right DVT 2019 (off AC), MR, MVP, Parkinson's, hyperthyroidism, b/l breast implants, abdominoplasty, spinal stenosis s/p spinal fusion 2024, SBO 1997 treated with NGT and surgery, small bowel perforation s/p ex lap, SBR 2012, incisional hernia repair 2013, TKA 2019 presents with upper abdominal pain since 9pm. CT shows high grade SBO. +Flatus.    -NPO, IVF, NGT to LWS.   -Gastrografin challenge today  -GI ppx, DVT ppx  -f/u labs
SURGERY PROGRESS HPI:  Pt seen and examined at bedside.  Pt denies complaints. No acute events overnight.    Vital Signs Last 24 Hrs  T(C): 36.2 (27 Apr 2024 05:15), Max: 37.3 (26 Apr 2024 23:25)  T(F): 97.2 (27 Apr 2024 05:15), Max: 99.1 (26 Apr 2024 23:25)  HR: 58 (27 Apr 2024 05:15) (58 - 93)  BP: 123/80 (27 Apr 2024 05:15) (123/80 - 138/78)  BP(mean): --  RR: 18 (27 Apr 2024 05:15) (17 - 18)  SpO2: 97% (27 Apr 2024 05:15) (96% - 98%)    Parameters below as of 27 Apr 2024 05:15  Patient On (Oxygen Delivery Method): room air          PHYSICAL EXAM:    GENERAL: NAD  HEAD:  Atraumatic, Normocephalic  CHEST/LUNG: Clear to ausculation, bilaterally   HEART: RRR   ABDOMEN: non distended, soft, non tender, no guarding  EXTREMITIES:  calf soft, non tender b/l

## 2024-04-27 NOTE — PROGRESS NOTE ADULT - ASSESSMENT
78 y/o female with h/o abdominoplasty, SBO 1997 treated with NGT and surgery, small bowel perforation s/p ex lap, SBR 2012, incisional hernia repair 2013, here with high grade SBO.   GGC shows contrast in colon  Pt now with BM/flatus, pain has resolved  Breakfast this AM then likely dc home

## 2024-04-27 NOTE — PROGRESS NOTE ADULT - NS ATTEND AMEND GEN_ALL_CORE FT
Patient seen and examined with PAs  Doing well  No nausea, vomiting ,fever or chills  Tolerating PO intake  Having flatus and BMs    Awake, alert  Breathing comfortably on room air  Abd is soft, not tender and not distended  No rebound, no guarding    - regular diet  - plan for discharge to home today
Patient seen and examined  Doing well; no nausea, vomiting, fever or chills  She states abdominal bloating significantly improved. No abdominal pain.     On exam: awake, alert  Breathing comfortably on room air, no cough  Soft, not tender and not distended  NGT in place; clamped    - gastrograffin study today  - if contrast in colon with study will remove NGT and start clear liquids

## 2024-04-27 NOTE — DISCHARGE NOTE NURSING/CASE MANAGEMENT/SOCIAL WORK - NSDCPEFALRISK_GEN_ALL_CORE
For information on Fall & Injury Prevention, visit: [email or web address] OR  [email or web address] OR  https://www.cdc.gov/steadi/patient.html

## 2024-05-02 ENCOUNTER — APPOINTMENT (OUTPATIENT)
Dept: SURGERY | Facility: CLINIC | Age: 79
End: 2024-05-02
Payer: MEDICARE

## 2024-05-02 VITALS
WEIGHT: 149 LBS | HEART RATE: 75 BPM | OXYGEN SATURATION: 97 % | DIASTOLIC BLOOD PRESSURE: 79 MMHG | HEIGHT: 62 IN | TEMPERATURE: 98.2 F | BODY MASS INDEX: 27.42 KG/M2 | SYSTOLIC BLOOD PRESSURE: 133 MMHG

## 2024-05-02 DIAGNOSIS — E05.90 THYROTOXICOSIS, UNSPECIFIED WITHOUT THYROTOXIC CRISIS OR STORM: ICD-10-CM

## 2024-05-02 DIAGNOSIS — E11.9 TYPE 2 DIABETES MELLITUS WITHOUT COMPLICATIONS: ICD-10-CM

## 2024-05-02 DIAGNOSIS — E78.5 HYPERLIPIDEMIA, UNSPECIFIED: ICD-10-CM

## 2024-05-02 DIAGNOSIS — Z86.718 PERSONAL HISTORY OF OTHER VENOUS THROMBOSIS AND EMBOLISM: ICD-10-CM

## 2024-05-02 DIAGNOSIS — Z79.85 LONG-TERM (CURRENT) USE OF INJECTABLE NON-INSULIN ANTIDIABETIC DRUGS: ICD-10-CM

## 2024-05-02 DIAGNOSIS — Z96.653 PRESENCE OF ARTIFICIAL KNEE JOINT, BILATERAL: ICD-10-CM

## 2024-05-02 DIAGNOSIS — G20.A1 PARKINSON'S DISEASE WITHOUT DYSKINESIA, WITHOUT MENTION OF FLUCTUATIONS: ICD-10-CM

## 2024-05-02 DIAGNOSIS — K56.609 UNSPECIFIED INTESTINAL OBSTRUCTION, UNSPECIFIED AS TO PARTIAL VERSUS COMPLETE OBSTRUCTION: ICD-10-CM

## 2024-05-02 DIAGNOSIS — I34.0 NONRHEUMATIC MITRAL (VALVE) INSUFFICIENCY: ICD-10-CM

## 2024-05-02 DIAGNOSIS — K21.9 GASTRO-ESOPHAGEAL REFLUX DISEASE WITHOUT ESOPHAGITIS: ICD-10-CM

## 2024-05-02 DIAGNOSIS — I10 ESSENTIAL (PRIMARY) HYPERTENSION: ICD-10-CM

## 2024-05-02 DIAGNOSIS — Z98.1 ARTHRODESIS STATUS: ICD-10-CM

## 2024-05-02 DIAGNOSIS — Z79.84 LONG TERM (CURRENT) USE OF ORAL HYPOGLYCEMIC DRUGS: ICD-10-CM

## 2024-05-02 PROCEDURE — 99203 OFFICE O/P NEW LOW 30 MIN: CPT

## 2024-05-02 PROCEDURE — 99213 OFFICE O/P EST LOW 20 MIN: CPT

## 2024-05-07 RX ORDER — ACETAMINOPHEN AND CODEINE PHOSPHATE 300; 30 MG/1; MG/1
300-30 TABLET ORAL
Qty: 40 | Refills: 0 | Status: ACTIVE | COMMUNITY
Start: 2024-05-07 | End: 1900-01-01

## 2024-05-08 RX ORDER — TRAMADOL HYDROCHLORIDE 50 MG/1
50 TABLET, COATED ORAL
Qty: 40 | Refills: 0 | Status: ACTIVE | COMMUNITY
Start: 2024-05-08 | End: 1900-01-01

## 2024-05-21 ENCOUNTER — APPOINTMENT (OUTPATIENT)
Dept: ORTHOPEDIC SURGERY | Facility: CLINIC | Age: 79
End: 2024-05-21
Payer: MEDICARE

## 2024-05-21 DIAGNOSIS — M54.16 RADICULOPATHY, LUMBAR REGION: ICD-10-CM

## 2024-05-21 DIAGNOSIS — M54.50 LOW BACK PAIN, UNSPECIFIED: ICD-10-CM

## 2024-05-21 DIAGNOSIS — R26.89 OTHER ABNORMALITIES OF GAIT AND MOBILITY: ICD-10-CM

## 2024-05-21 PROCEDURE — 99212 OFFICE O/P EST SF 10 MIN: CPT

## 2024-05-21 PROCEDURE — 72100 X-RAY EXAM L-S SPINE 2/3 VWS: CPT

## 2024-05-21 RX ORDER — PREGABALIN 75 MG/1
75 CAPSULE ORAL
Qty: 60 | Refills: 0 | Status: ACTIVE | COMMUNITY
Start: 2024-05-21 | End: 1900-01-01

## 2024-05-22 NOTE — END OF VISIT
[FreeTextEntry3] : Documented by Isabel Cortés acting as a scribe for Dr. Gerry Bahena. 05/21/2024   All medical record entries made by the Scribe were at my, Dr. Gerry Bahena. direction and personally dictated by me on 05/21/2024. I have reviewed the chart and agree that the record accurately reflects my personal performance of the history, physical exam, assessment and plan. I have also personally directed, reviewed, and agreed with the chart.

## 2024-05-22 NOTE — PLAN
[TextEntry] : I recommended MRIs of cervical, thoracic and lumbar spines and follow up after completion of the aforementioned films. In addition, despite the x-rays which show solid fixation, I've recommended a CT scan of the lumbosacral spine to evaluate the fusion mass. In the interim she will continue with the bone stimulator and she will do a trial of Lyrica for her bilateral neuropathy which predominates in her feet. She's having no sciatic symptoms.

## 2024-05-22 NOTE — ADDENDUM
[FreeTextEntry1] : Documented by Isabel Cortés acting as a scribe under Dr. Gerry Bahena. 05/21/2024

## 2024-05-22 NOTE — PHYSICAL EXAM
[de-identified] : Her x-rays show solid fixation L2 to the pelvis and early signs of consolidation of her fusion.

## 2024-05-22 NOTE — HISTORY OF PRESENT ILLNESS
[de-identified] : Lisseth De La Cruz was doing quite well until approximately two weeks ago. At that point she took a "major change for the worse". She's now having worsening balance issues and lower back pain as well as pain transitioning from sitting to standing.

## 2024-06-07 ENCOUNTER — OUTPATIENT (OUTPATIENT)
Dept: OUTPATIENT SERVICES | Facility: HOSPITAL | Age: 79
LOS: 1 days | Discharge: HOME | End: 2024-06-07
Payer: MEDICARE

## 2024-06-07 DIAGNOSIS — Z98.82 BREAST IMPLANT STATUS: Chronic | ICD-10-CM

## 2024-06-07 DIAGNOSIS — Z98.890 OTHER SPECIFIED POSTPROCEDURAL STATES: Chronic | ICD-10-CM

## 2024-06-07 DIAGNOSIS — L89.90 PRESSURE ULCER OF UNSPECIFIED SITE, UNSPECIFIED STAGE: ICD-10-CM

## 2024-06-07 DIAGNOSIS — Z96.659 PRESENCE OF UNSPECIFIED ARTIFICIAL KNEE JOINT: Chronic | ICD-10-CM

## 2024-06-07 PROCEDURE — 99203 OFFICE O/P NEW LOW 30 MIN: CPT

## 2024-06-10 DIAGNOSIS — M19.90 UNSPECIFIED OSTEOARTHRITIS, UNSPECIFIED SITE: ICD-10-CM

## 2024-06-10 DIAGNOSIS — Z79.899 OTHER LONG TERM (CURRENT) DRUG THERAPY: ICD-10-CM

## 2024-06-10 DIAGNOSIS — L97.522 NON-PRESSURE CHRONIC ULCER OF OTHER PART OF LEFT FOOT WITH FAT LAYER EXPOSED: ICD-10-CM

## 2024-06-10 DIAGNOSIS — E11.621 TYPE 2 DIABETES MELLITUS WITH FOOT ULCER: ICD-10-CM

## 2024-06-10 DIAGNOSIS — M20.42 OTHER HAMMER TOE(S) (ACQUIRED), LEFT FOOT: ICD-10-CM

## 2024-06-10 DIAGNOSIS — Z86.718 PERSONAL HISTORY OF OTHER VENOUS THROMBOSIS AND EMBOLISM: ICD-10-CM

## 2024-06-10 DIAGNOSIS — Z79.84 LONG TERM (CURRENT) USE OF ORAL HYPOGLYCEMIC DRUGS: ICD-10-CM

## 2024-06-10 DIAGNOSIS — D64.9 ANEMIA, UNSPECIFIED: ICD-10-CM

## 2024-06-10 DIAGNOSIS — E11.42 TYPE 2 DIABETES MELLITUS WITH DIABETIC POLYNEUROPATHY: ICD-10-CM

## 2024-06-10 DIAGNOSIS — I10 ESSENTIAL (PRIMARY) HYPERTENSION: ICD-10-CM

## 2024-06-14 ENCOUNTER — OUTPATIENT (OUTPATIENT)
Dept: OUTPATIENT SERVICES | Facility: HOSPITAL | Age: 79
LOS: 1 days | Discharge: HOME | End: 2024-06-14
Payer: MEDICARE

## 2024-06-14 DIAGNOSIS — L89.90 PRESSURE ULCER OF UNSPECIFIED SITE, UNSPECIFIED STAGE: ICD-10-CM

## 2024-06-14 DIAGNOSIS — Z96.659 PRESENCE OF UNSPECIFIED ARTIFICIAL KNEE JOINT: Chronic | ICD-10-CM

## 2024-06-14 DIAGNOSIS — Z98.890 OTHER SPECIFIED POSTPROCEDURAL STATES: Chronic | ICD-10-CM

## 2024-06-14 DIAGNOSIS — Z98.82 BREAST IMPLANT STATUS: Chronic | ICD-10-CM

## 2024-06-14 PROCEDURE — 99213 OFFICE O/P EST LOW 20 MIN: CPT

## 2024-06-17 DIAGNOSIS — M20.42 OTHER HAMMER TOE(S) (ACQUIRED), LEFT FOOT: ICD-10-CM

## 2024-06-17 DIAGNOSIS — E11.42 TYPE 2 DIABETES MELLITUS WITH DIABETIC POLYNEUROPATHY: ICD-10-CM

## 2024-06-17 DIAGNOSIS — Z86.718 PERSONAL HISTORY OF OTHER VENOUS THROMBOSIS AND EMBOLISM: ICD-10-CM

## 2024-06-17 DIAGNOSIS — E11.621 TYPE 2 DIABETES MELLITUS WITH FOOT ULCER: ICD-10-CM

## 2024-06-17 DIAGNOSIS — Z79.899 OTHER LONG TERM (CURRENT) DRUG THERAPY: ICD-10-CM

## 2024-06-17 DIAGNOSIS — M19.90 UNSPECIFIED OSTEOARTHRITIS, UNSPECIFIED SITE: ICD-10-CM

## 2024-06-17 DIAGNOSIS — L97.522 NON-PRESSURE CHRONIC ULCER OF OTHER PART OF LEFT FOOT WITH FAT LAYER EXPOSED: ICD-10-CM

## 2024-06-17 DIAGNOSIS — D64.9 ANEMIA, UNSPECIFIED: ICD-10-CM

## 2024-06-17 DIAGNOSIS — Z79.84 LONG TERM (CURRENT) USE OF ORAL HYPOGLYCEMIC DRUGS: ICD-10-CM

## 2024-06-17 DIAGNOSIS — I10 ESSENTIAL (PRIMARY) HYPERTENSION: ICD-10-CM

## 2024-06-21 ENCOUNTER — OUTPATIENT (OUTPATIENT)
Dept: OUTPATIENT SERVICES | Facility: HOSPITAL | Age: 79
LOS: 1 days | Discharge: HOME | End: 2024-06-21
Payer: MEDICARE

## 2024-06-21 DIAGNOSIS — Z98.890 OTHER SPECIFIED POSTPROCEDURAL STATES: Chronic | ICD-10-CM

## 2024-06-21 DIAGNOSIS — L89.90 PRESSURE ULCER OF UNSPECIFIED SITE, UNSPECIFIED STAGE: ICD-10-CM

## 2024-06-21 DIAGNOSIS — Z98.82 BREAST IMPLANT STATUS: Chronic | ICD-10-CM

## 2024-06-21 DIAGNOSIS — Z96.659 PRESENCE OF UNSPECIFIED ARTIFICIAL KNEE JOINT: Chronic | ICD-10-CM

## 2024-06-21 PROCEDURE — 99213 OFFICE O/P EST LOW 20 MIN: CPT

## 2024-06-24 DIAGNOSIS — D64.9 ANEMIA, UNSPECIFIED: ICD-10-CM

## 2024-06-24 DIAGNOSIS — I10 ESSENTIAL (PRIMARY) HYPERTENSION: ICD-10-CM

## 2024-06-24 DIAGNOSIS — E11.42 TYPE 2 DIABETES MELLITUS WITH DIABETIC POLYNEUROPATHY: ICD-10-CM

## 2024-06-24 DIAGNOSIS — Z86.718 PERSONAL HISTORY OF OTHER VENOUS THROMBOSIS AND EMBOLISM: ICD-10-CM

## 2024-06-24 DIAGNOSIS — M20.42 OTHER HAMMER TOE(S) (ACQUIRED), LEFT FOOT: ICD-10-CM

## 2024-06-24 DIAGNOSIS — Z79.84 LONG TERM (CURRENT) USE OF ORAL HYPOGLYCEMIC DRUGS: ICD-10-CM

## 2024-06-24 DIAGNOSIS — Z79.899 OTHER LONG TERM (CURRENT) DRUG THERAPY: ICD-10-CM

## 2024-06-24 DIAGNOSIS — M19.90 UNSPECIFIED OSTEOARTHRITIS, UNSPECIFIED SITE: ICD-10-CM

## 2024-07-16 ENCOUNTER — APPOINTMENT (OUTPATIENT)
Dept: ORTHOPEDIC SURGERY | Facility: CLINIC | Age: 79
End: 2024-07-16
Payer: MEDICARE

## 2024-07-16 DIAGNOSIS — R26.89 OTHER ABNORMALITIES OF GAIT AND MOBILITY: ICD-10-CM

## 2024-07-16 PROCEDURE — 99212 OFFICE O/P EST SF 10 MIN: CPT

## 2024-08-13 NOTE — H&P PST ADULT - LAST CARDIAC ANGIOGRAM/IMAGING
[FreeTextEntry1] : 1).  Fortunately, patient's symptoms have since abated and appears to have been most likely musculoskeletal in nature.  However, recent echocardiogram now demonstrates moderately enlarged left atrium with chronically mild ascending aorta dilation and mild LVH.  LV systolic function is normal with LVEF of 60-65%.\par \par Patient has not been treated with CPAP for AQUILES and she remains obese which could be contributing to her cardiomegaly.  Follow up with Pulmonologist in near future to discuss AQUILES treatment.\par \par Diet and lifestyle modification discussed including low sodium, low fat and low carbohydrate weight reducing diet.  Patient is to implement aerobic exercise regimen few days per week. \par \par Incidental finding of thyroid cysts on echo.  Follow up with her Endocrinologist to discuss.\par \par 2).  No additional cardiac testing indicated at this time. \par \par 3).  Recommend patient report any untoward symptoms. \par \par 4).  Follow up with Dr. Lagunas in 4-5 months or PRN. 
none
none
13-Aug-2024 15:54

## 2024-08-28 NOTE — PATIENT PROFILE ADULT - FALL HARM RISK - FACTORS NURSING JUDGEMENT
The recording was initiated after a verbal consent was obtained from the patient to record this visit for documentation in their clinical record.    Yes

## 2024-09-03 ENCOUNTER — APPOINTMENT (OUTPATIENT)
Dept: ORTHOPEDIC SURGERY | Facility: CLINIC | Age: 79
End: 2024-09-03
Payer: MEDICARE

## 2024-09-03 VITALS
BODY MASS INDEX: 27.42 KG/M2 | HEIGHT: 62 IN | HEART RATE: 76 BPM | WEIGHT: 149 LBS | SYSTOLIC BLOOD PRESSURE: 144 MMHG | DIASTOLIC BLOOD PRESSURE: 68 MMHG | OXYGEN SATURATION: 97 %

## 2024-09-03 DIAGNOSIS — M54.16 RADICULOPATHY, LUMBAR REGION: ICD-10-CM

## 2024-09-03 PROCEDURE — 99212 OFFICE O/P EST SF 10 MIN: CPT

## 2024-09-03 PROCEDURE — 72110 X-RAY EXAM L-2 SPINE 4/>VWS: CPT

## 2024-09-04 NOTE — END OF VISIT
[FreeTextEntry3] : Documented by Joselin Roque acting as a scribe for Dr. Gerry Bahena. 09/03/2024   All medical record entries made by the Scribe were at my, Dr. Gerry Bahena, direction and personally dictated by me on 09/03/2024. I have reviewed the chart and agree that the record accurately reflects my personal performance of the history, physical exam, assessment and plan. I have also personally directed, reviewed, and agreed with the chart.

## 2024-09-04 NOTE — PHYSICAL EXAM
[de-identified] : Her x-rays show solid fixation. Her CAT scan and MRI show early signs of arthrodesis. There is an anterior fracture of the cephaloid end plate of L2 despite cement augmentation. despite cement augmentation.

## 2024-09-04 NOTE — PLAN
[TextEntry] : I do not believe that this stenosis is related to her peripheral neuropathy and foot numbness, nor her balance issues. I believe that these are mostly related to her Parkinson's. For this, I recommended activity modification, physiotherapy, a home exercise program, and follow-up in six months' time.

## 2024-09-04 NOTE — PHYSICAL EXAM
[de-identified] : Her x-rays show solid fixation. Her CAT scan and MRI show early signs of arthrodesis. There is an anterior fracture of the cephaloid end plate of L2 despite cement augmentation. despite cement augmentation.

## 2024-09-04 NOTE — HISTORY OF PRESENT ILLNESS
[de-identified] : Lisseth is here with her boyfriend. She has MRI of the cervical spine, which I reviewed. She does have diffuse spondylosis and a large cord-to-canal ratio. There is no myomalasia. Overall, she is what she described as a thousand times better than she was preoperative.

## 2024-09-04 NOTE — HISTORY OF PRESENT ILLNESS
[de-identified] : Lisseth is here with her boyfriend. She has MRI of the cervical spine, which I reviewed. She does have diffuse spondylosis and a large cord-to-canal ratio. There is no myomalasia. Overall, she is what she described as a thousand times better than she was preoperative.

## 2024-10-22 ENCOUNTER — APPOINTMENT (OUTPATIENT)
Dept: ORTHOPEDIC SURGERY | Facility: CLINIC | Age: 79
End: 2024-10-22
Payer: MEDICARE

## 2024-10-22 VITALS — WEIGHT: 149 LBS | HEIGHT: 62 IN | BODY MASS INDEX: 27.42 KG/M2

## 2024-10-22 DIAGNOSIS — G20.A1 PARKINSON'S DISEASE WITHOUT DYSKINESIA, WITHOUT MENTION OF FLUCTUATIONS: ICD-10-CM

## 2024-10-22 DIAGNOSIS — M54.16 RADICULOPATHY, LUMBAR REGION: ICD-10-CM

## 2024-10-22 DIAGNOSIS — Z96.651 PRESENCE OF RIGHT ARTIFICIAL KNEE JOINT: ICD-10-CM

## 2024-10-22 DIAGNOSIS — Z96.652 PRESENCE OF LEFT ARTIFICIAL KNEE JOINT: ICD-10-CM

## 2024-10-22 PROCEDURE — 99204 OFFICE O/P NEW MOD 45 MIN: CPT

## 2024-10-22 PROCEDURE — 99214 OFFICE O/P EST MOD 30 MIN: CPT

## 2024-10-22 PROCEDURE — G2211 COMPLEX E/M VISIT ADD ON: CPT

## 2024-10-22 PROCEDURE — 73564 X-RAY EXAM KNEE 4 OR MORE: CPT | Mod: RT

## 2024-11-12 ENCOUNTER — APPOINTMENT (OUTPATIENT)
Dept: ORTHOPEDIC SURGERY | Facility: CLINIC | Age: 79
End: 2024-11-12

## 2024-11-19 ENCOUNTER — APPOINTMENT (OUTPATIENT)
Dept: ORTHOPEDIC SURGERY | Facility: CLINIC | Age: 79
End: 2024-11-19
Payer: MEDICARE

## 2024-11-19 VITALS — SYSTOLIC BLOOD PRESSURE: 144 MMHG | OXYGEN SATURATION: 99 % | DIASTOLIC BLOOD PRESSURE: 90 MMHG | HEART RATE: 69 BPM

## 2024-11-19 DIAGNOSIS — M25.561 PAIN IN RIGHT KNEE: ICD-10-CM

## 2024-11-19 PROCEDURE — 99212 OFFICE O/P EST SF 10 MIN: CPT

## 2024-11-20 PROBLEM — M25.561 RIGHT KNEE PAIN, UNSPECIFIED CHRONICITY: Status: ACTIVE | Noted: 2024-11-20

## 2024-11-20 RX ORDER — DICLOFENAC SODIUM 75 MG/1
75 TABLET, DELAYED RELEASE ORAL
Qty: 60 | Refills: 0 | Status: ACTIVE | COMMUNITY
Start: 2024-11-20 | End: 1900-01-01

## 2024-12-17 ENCOUNTER — RX RENEWAL (OUTPATIENT)
Age: 79
End: 2024-12-17

## 2025-01-03 ENCOUNTER — APPOINTMENT (OUTPATIENT)
Dept: ORTHOPEDIC SURGERY | Facility: CLINIC | Age: 80
End: 2025-01-03
Payer: MEDICARE

## 2025-01-03 VITALS — WEIGHT: 145 LBS | BODY MASS INDEX: 26.68 KG/M2 | HEIGHT: 62 IN

## 2025-01-03 DIAGNOSIS — Z96.653 PRESENCE OF ARTIFICIAL KNEE JOINT, BILATERAL: ICD-10-CM

## 2025-01-03 DIAGNOSIS — Z96.659 OTHER MECHANICAL COMPLICATION OF OTHER INTERNAL JOINT PROSTHESIS, INITIAL ENCOUNTER: ICD-10-CM

## 2025-01-03 DIAGNOSIS — M54.16 RADICULOPATHY, LUMBAR REGION: ICD-10-CM

## 2025-01-03 DIAGNOSIS — M17.0 BILATERAL PRIMARY OSTEOARTHRITIS OF KNEE: ICD-10-CM

## 2025-01-03 DIAGNOSIS — T84.098A OTHER MECHANICAL COMPLICATION OF OTHER INTERNAL JOINT PROSTHESIS, INITIAL ENCOUNTER: ICD-10-CM

## 2025-01-03 DIAGNOSIS — G20.A1 PARKINSON'S DISEASE WITHOUT DYSKINESIA, WITHOUT MENTION OF FLUCTUATIONS: ICD-10-CM

## 2025-01-03 DIAGNOSIS — M25.562 PAIN IN RIGHT KNEE: ICD-10-CM

## 2025-01-03 DIAGNOSIS — M25.561 PAIN IN RIGHT KNEE: ICD-10-CM

## 2025-01-03 PROCEDURE — 73562 X-RAY EXAM OF KNEE 3: CPT | Mod: 50

## 2025-01-03 PROCEDURE — 99204 OFFICE O/P NEW MOD 45 MIN: CPT

## 2025-01-03 PROCEDURE — G2211 COMPLEX E/M VISIT ADD ON: CPT

## 2025-01-15 ENCOUNTER — RX RENEWAL (OUTPATIENT)
Age: 80
End: 2025-01-15

## 2025-01-15 NOTE — PATIENT PROFILE ADULT - NSPROSPHOSPCHAPLAINYN_GEN_A_NUR
Copied from CRM #[id number]. Topic: MW Schedule Appointment - MW Schedule Adult Specialty  >> Chau 15, 2025  1:08 PM Sivan VANCE wrote:  Faye Washington called to schedule an appointment with a specialist (who has not been converted to enhanced scheduling).    [facility] schedules; Active Referral required; does not exist under Needs Scheduling in the Contact Hub.  Go to \"Referral/Order\" CRM.    -- DO NOT REPLY / DO NOT REPLY ALL --  -- This inbox is not monitored. If this was sent to the wrong provider or department, reroute message to  [facility] Reroute pool. --  -- Message is from [facility] [employer] ([facility]) --    General Patient Message:     Patient called- scheduled for the next available appointment with Dr Bud Wang on Friday July 25, 2025 at 9:20 am and added patient to the wait list.       Caller Information       Contact Date/Time Type Contact Phone/Fax    01/08/2025 01:12 PM CST Phone (Incoming) Faye Yu [phone]    01/15/2025 01:07 PM CST Phone (Incoming) Faye Yu [phone]            Alternative phone number: n/a    Can a detailed message be left? No N/A    Patient has been advised the message will be addressed within 2-3 business days.                   no

## 2025-02-13 ENCOUNTER — RX RENEWAL (OUTPATIENT)
Age: 80
End: 2025-02-13

## 2025-03-05 ENCOUNTER — APPOINTMENT (OUTPATIENT)
Dept: ORTHOPEDIC SURGERY | Facility: CLINIC | Age: 80
End: 2025-03-05

## 2025-03-11 ENCOUNTER — APPOINTMENT (OUTPATIENT)
Dept: ORTHOPEDIC SURGERY | Facility: CLINIC | Age: 80
End: 2025-03-11
Payer: MEDICARE

## 2025-03-11 VITALS
BODY MASS INDEX: 25.48 KG/M2 | HEIGHT: 62.5 IN | HEART RATE: 108 BPM | WEIGHT: 142 LBS | SYSTOLIC BLOOD PRESSURE: 159 MMHG | DIASTOLIC BLOOD PRESSURE: 86 MMHG | OXYGEN SATURATION: 99 %

## 2025-03-11 DIAGNOSIS — S32.000A WEDGE COMPRESSION FRACTURE OF UNSPECIFIED LUMBAR VERTEBRA, INITIAL ENCOUNTER FOR CLOSED FRACTURE: ICD-10-CM

## 2025-03-11 PROCEDURE — 72082 X-RAY EXAM ENTIRE SPI 2/3 VW: CPT

## 2025-03-11 PROCEDURE — 99212 OFFICE O/P EST SF 10 MIN: CPT

## 2025-03-14 ENCOUNTER — RX RENEWAL (OUTPATIENT)
Age: 80
End: 2025-03-14

## 2025-03-18 NOTE — ASU PREOP CHECKLIST - STERILIZATION AFFIRMATION
Chief Complaint   Patient presents with    Hip     right    Office Visit     Date of Injury: 03/05/2025  Initial Treatment Date: 03/1/2025   Date Last Seen: New Patient  Mechanism of Onset: Workout  Occupation: Not working  Referred by: Marbin Taylor DC  Primary Care Physician: Dasha Merritt MD  Date informed consent signed: 03/18/2025 (expires 03/18/2026)  I have reviewed the past medical history, family history, social history, medications and allergies listed in the medical record as obtained by my nursing staff and support staff and agree with their documentation.  Allergies, Medications, Medical history, Surgical history, Social history and Family history were reviewed and updated.  Floyd  reports that she has never smoked. She has never used smokeless tobacco.  Floyd is allergic to bactrim [sulfamethoxazole-trimethoprim] and onion   (food or med).  Visit Number of Current Episode: 1  Initial Pain Rating: 3/10  Initial PROM 10 % Oswestry    COVID-19 Screening:    Does the patient OR patient’s household members have any of the following symptoms?  Temperature: Fever >=100.0°F or >=37.8°C?  No  Respiratory symptoms: New or worsening cough, shortness of breath, or sore throat? No  GI symptoms: New onset of nausea, vomiting or diarrhea?  No  Miscellaneous: New onset of loss of taste or smell, chills, repeated shaking with chills, muscle pain or headache?  No  Has the patient or a household member tested positive for COVID-19 in the last 14 days?  No  Has the patient or a household member been tested for COVID-19 and are waiting for the results?  No    CHIROPRACTIC PATIENT HISTORY:     SUBJECTIVE:  The patient is a pleasant 51 year old female that presents to our office today for an evaluation and treatment of right hip pain. Patient rates her pain today at 3/10 with 10 being worst.  Patient stated she was starting a new weight lifting routine on 03/05/2025 immediately knew she had aggravated her right  hip, buttock and quad. She also had radiation to the right hamstring. She said her pain is constant, but manageable. She lifts weights 3x/week. She had seen PT 4 months ago with a similar pain which helped a lot. Sitting in the car will cause added pain. Patient sleeps well. Patient uses a foam roller for stretching out.  She reported a secondary complaint of neck tension bilaterally.    Start date: 03/05/2025  Cause of pain (i.e. accident/injury): Workouts  Has this been an issue in the past: Yes  Past treatment: physical therapy, dry needling  Current Pain scale (0-10, 10 being the worst, 0 being no pain): 3  Pain at its best 0-10: 0  Pain at its worst 0-10: 3  Pain description: constant , tight, and tender, achy  Increases pain:  driving  Reduces pain: stretching out  Radiates: Yes From: right hip to: hamstring (right)  Numbness/Tingling: No  Bowel/Bladder dysfunction: No  Saddle anesthesia: No  Sleep disturbance: No  Headache: No - Location:  N/A      Relevant Diagnostic Imaging:   XR Cervical Spine 2 or 3 VW (10/17/23)  RESULTS:   Cervical vertebral body alignment is maintained. Lateral   masses are aligned. No prevertebral soft tissue swelling.   Disc spaces are grossly preserved.     Pertinent imaging results were reviewed. See chart for full image reports.    OBJECTIVE FINDINGS:   Patient Emotional screening was completed 03/18/2025    During the past 24 hours, how has pain interfered with normal activities: 0/10  During the past 24 hours, how has pain interfered with your sleep: 0/10  During the past 24 hours, how has pain affected your mood: 0/10  During the past 24 hours, how has pain contributed to your stress: 0/10    Patient Recorded Objective Measure is 10 % Oswestry    Problem Focused Examination revealed:    (Cervical spine) Cervical spine facet joint function is within normal limits except for her C7/T1 right facet joints that exhibited limited passive range of motion and segmental restriction with  tenderness upon palpation. The following muscles were examined for normal flexibility and tone; right and left upper trapezius, right scalene, left scalene, right levator scapulae, left levator scapulae, deep neck flexor, right sternocleidomastoid, left sternocleidomastoid, right suboccipital and left suboccipital; these muscles were within normal limits.     (Thoracic Spine) Thoracic spine facet joint function is within normal limits except for her C7/T1 right facet joints, T2-T5 facet joints and T9/T10 facet joints that exhibited limited passive range of motion and segmental restriction and tenderness upon palpation. The following muscles were examined for normal flexibility and tone; right rhomboid, left rhomboid, right serratus, left serratus, right latissimus dorsi, and left latissimus dorsi. These muscles were within normal limits.    (Lumbar spine, sacrum and pelvis) Lumbar spine facet joint function is within normal limits except for her L2-L5 left facet joints that exhibited limited passive range of motion and segmental restriction and tenderness on palpation; sacroiliac joint function is within normal limits; The following muscles were examined for flexibility and tone at rest; right hip flexor, left hip flexor, right hip rotator, left hip rotator, right piriformis, left piriformis, right hamstring, left hamstring, right lumbar paraspinals, left lumbar paraspinals, right gluteus medius, left gluteus medius, right gluteus zana, left gluteus zana, right quadratus lumborum, left quadratus lumborum, right tensor fasciae latae, left tensor fasciae latae, right internal hip rotators, left internal hip rotators, right quadriceps, left quadriceps, right iliotibial band, left iliotibial band, and gluteus minimus muscles. These muscles were found to be within normal limits except for her right gluteus medius and right gluteus minimus, right tensor fasciae latae  that exhibited limited flexibility and were  hypertonic at rest.    Orthopedic/Neurological tests:    Pulse 74, last menstrual period 11/04/2024, SpO2 100%.  She is mesomorphic in constitution. She is appropriately attired and of pleasant demeanor. Level of distress is mild. Peripheral pulses are strong. There is no apparent vascular deficit. Head is normocephalic. There is a lack of numbness in the upper and lower extremities. Orientation is x3. Gait and station are within normal limits. Lumbar active range of motion was within normal limits. Hip active range of motion was within normal limits.     (Lower back)   Tenderness to palpation is noted over right PSIS  Straight leg raise did not provoke radicular leg pain;   Arcos’s test did not provoke low back pain;   Slump test did not provoke lower back pain;   Yeoman’s did not provoke low back pain;     (Hip)   Ton’s DAVID test did not provoke hip pain;   FADIR test did not provoke hip pain.    Directional Preference:   None    Assessment:   1. Chronic right hip pain    2. Chronic right-sided low back pain without sciatica    3. Segmental and somatic dysfunction of cervical region    4. Segmental and somatic dysfunction of thoracic region    5. Segmental and somatic dysfunction of lumbar region      Complicating Factors/Comorbidities:   Previous episodes of back pain    PLAN:  Patient was evaluated and then treated with manipulation to her cervicothoracic junction via diversified manipulation technique, to her thoracic spine via anterior manipulation technique, to her lumbar spine via side posture manipulation technique to improve function and passive range of motion of facet joints.  Patient also treated with manual muscle manipulation to muscles noted as taut in objective findings to improve flexibility and decrease strain to spinal structures. Patient also treated with lumbar distraction x 5=3 minutes to stretch lumbar paraspinal muscle.     Therapeutic Exercise/Rehab/Modalities performed today:   Supine  piriformis stretch (1x10), Hip airplanes (2x5)    Patient Instruction/Education: Perform  Hip airplanes (2x5) before leg days.     Patient educated in the nature of condition and possible pain generators as well as plan of care to resolve symptoms and improve muscular and skeletal function.  Patient noted verbal understanding of condition and is agreeable to plan of care.  Patient stated understanding of, and was in agreement with, the discussed instructions.    Goals of Care: Goal of care is to improve muscular and skeletal function and provide symptom relief.   Additional Goals Include and to be obtained by end of this plan of care.   To be obtained by end of this plan of care:  Patient independent with modified and progressed home exercise program.  Patient will decrease symptoms by 60-80% of current Visual Analog Pain Scale Score.  Patient’s active range-of-motion will be within normal limits with no/minimal pain.   Patient will be able to return to regular workouts with no/minimal back or hip pain.  Patient will be able to bend and lift for activities of daily living and instrumental activities of daily living completion at home and work without pain/difficulty.  Patient’s DOD/VA pain questionnaire will be decreased by 50-70%.       Other treatment options discussed with patient: See dry needling progress note for additional information for other services performed during today's visit.     Patient rates her pain post treatment at 5/10 with 10 being worst.    Patient is to return next week for continued care and treatment of her condition consistent with plan of care. A trial of care will be performed consisting of 2 visits/week for 2 weeks, with a re-evaluation after 4 visits.    Treatment was tolerated without incident.     On 3/20/2025, Lakisha RITCHIE scribed the services personally performed by Marbin Taylor DC    The documentation recorded by the scribe accurately and completely reflects the  service(s) I personally performed and the decisions made by me.      Informed Consent Surgery or Other Procedure (Chiropractic manipulation; manual or instrument assisted muscle therapy; therapeutic exercise)    Current Condition:  I understand my current medical condition, including my diagnosis and prognosis.  Treatment/Procedure:  I understand when, where and how this Treatment/Procedure will be done.  Risks and Benefits:  I understand the risks and benefits of this Treatment/Procedure, the likelihood of these risks and benefits, my health care providers cannot describe every possible risk that may occur, and likelihood of achieving my goals and any potential problems that might occur during my recuperation.  No guarantees have been made to me about the results of this procedure (s).  The following are some possible benefits and risks (other risks may have been discussed as noted in the physician notes or history):  Increased flexibility, Decreased joint inflammation, Reduction in pain, Temporary soreness, Vascular tearing/incident, and Fracture.      Patient understands the potential risks and benefits and wishes to proceed with the treatment.       n/a

## 2025-04-14 ENCOUNTER — RX RENEWAL (OUTPATIENT)
Age: 80
End: 2025-04-14

## 2025-05-23 NOTE — PROGRESS NOTE ADULT - NS_MD_PANP_GEN_ALL_CORE
Attending and PA/NP shared services statement (NON-critical care):
Attending and PA/NP shared services statement (NON-critical care):
DC instructions

## 2025-06-01 ENCOUNTER — EMERGENCY (EMERGENCY)
Facility: HOSPITAL | Age: 80
LOS: 0 days | Discharge: HOME | End: 2025-06-02
Attending: EMERGENCY MEDICINE
Payer: MEDICARE

## 2025-06-01 VITALS
HEART RATE: 78 BPM | TEMPERATURE: 98 F | OXYGEN SATURATION: 95 % | SYSTOLIC BLOOD PRESSURE: 137 MMHG | RESPIRATION RATE: 18 BRPM | DIASTOLIC BLOOD PRESSURE: 67 MMHG

## 2025-06-01 VITALS
WEIGHT: 139.99 LBS | SYSTOLIC BLOOD PRESSURE: 157 MMHG | HEIGHT: 62 IN | OXYGEN SATURATION: 100 % | HEART RATE: 79 BPM | DIASTOLIC BLOOD PRESSURE: 97 MMHG | TEMPERATURE: 98 F | RESPIRATION RATE: 20 BRPM

## 2025-06-01 DIAGNOSIS — Z98.82 BREAST IMPLANT STATUS: Chronic | ICD-10-CM

## 2025-06-01 DIAGNOSIS — Z96.659 PRESENCE OF UNSPECIFIED ARTIFICIAL KNEE JOINT: Chronic | ICD-10-CM

## 2025-06-01 DIAGNOSIS — Z98.890 OTHER SPECIFIED POSTPROCEDURAL STATES: Chronic | ICD-10-CM

## 2025-06-01 LAB
ALBUMIN SERPL ELPH-MCNC: 3.7 G/DL — SIGNIFICANT CHANGE UP (ref 3.3–5)
ALP SERPL-CCNC: 94 U/L — SIGNIFICANT CHANGE UP (ref 40–120)
ALT FLD-CCNC: 14 U/L — SIGNIFICANT CHANGE UP (ref 12–78)
ANION GAP SERPL CALC-SCNC: 7 MMOL/L — SIGNIFICANT CHANGE UP (ref 5–17)
APPEARANCE UR: CLEAR — SIGNIFICANT CHANGE UP
AST SERPL-CCNC: 14 U/L — LOW (ref 15–37)
BASOPHILS # BLD AUTO: 0.03 K/UL — SIGNIFICANT CHANGE UP (ref 0–0.2)
BASOPHILS NFR BLD AUTO: 0.5 % — SIGNIFICANT CHANGE UP (ref 0–2)
BILIRUB SERPL-MCNC: 0.3 MG/DL — SIGNIFICANT CHANGE UP (ref 0.2–1.2)
BILIRUB UR-MCNC: NEGATIVE — SIGNIFICANT CHANGE UP
BUN SERPL-MCNC: 28 MG/DL — HIGH (ref 7–23)
CALCIUM SERPL-MCNC: 9.6 MG/DL — SIGNIFICANT CHANGE UP (ref 8.5–10.1)
CHLORIDE SERPL-SCNC: 107 MMOL/L — SIGNIFICANT CHANGE UP (ref 96–108)
CO2 SERPL-SCNC: 26 MMOL/L — SIGNIFICANT CHANGE UP (ref 22–31)
COLOR SPEC: YELLOW — SIGNIFICANT CHANGE UP
CREAT SERPL-MCNC: 1.1 MG/DL — SIGNIFICANT CHANGE UP (ref 0.5–1.3)
DIFF PNL FLD: NEGATIVE — SIGNIFICANT CHANGE UP
EGFR: 51 ML/MIN/1.73M2 — LOW
EGFR: 51 ML/MIN/1.73M2 — LOW
EOSINOPHIL # BLD AUTO: 0.09 K/UL — SIGNIFICANT CHANGE UP (ref 0–0.5)
EOSINOPHIL NFR BLD AUTO: 1.5 % — SIGNIFICANT CHANGE UP (ref 0–6)
GLUCOSE SERPL-MCNC: 144 MG/DL — HIGH (ref 70–99)
GLUCOSE UR QL: NEGATIVE MG/DL — SIGNIFICANT CHANGE UP
HCT VFR BLD CALC: 36.3 % — SIGNIFICANT CHANGE UP (ref 34.5–45)
HGB BLD-MCNC: 11.9 G/DL — SIGNIFICANT CHANGE UP (ref 11.5–15.5)
IMM GRANULOCYTES NFR BLD AUTO: 0.3 % — SIGNIFICANT CHANGE UP (ref 0–0.9)
KETONES UR QL: NEGATIVE MG/DL — SIGNIFICANT CHANGE UP
LEUKOCYTE ESTERASE UR-ACNC: NEGATIVE — SIGNIFICANT CHANGE UP
LIDOCAIN IGE QN: 24 U/L — SIGNIFICANT CHANGE UP (ref 13–75)
LYMPHOCYTES # BLD AUTO: 1.92 K/UL — SIGNIFICANT CHANGE UP (ref 1–3.3)
LYMPHOCYTES # BLD AUTO: 31.2 % — SIGNIFICANT CHANGE UP (ref 13–44)
MCHC RBC-ENTMCNC: 29.3 PG — SIGNIFICANT CHANGE UP (ref 27–34)
MCHC RBC-ENTMCNC: 32.8 G/DL — SIGNIFICANT CHANGE UP (ref 32–36)
MCV RBC AUTO: 89.4 FL — SIGNIFICANT CHANGE UP (ref 80–100)
MONOCYTES # BLD AUTO: 0.56 K/UL — SIGNIFICANT CHANGE UP (ref 0–0.9)
MONOCYTES NFR BLD AUTO: 9.1 % — SIGNIFICANT CHANGE UP (ref 2–14)
NEUTROPHILS # BLD AUTO: 3.54 K/UL — SIGNIFICANT CHANGE UP (ref 1.8–7.4)
NEUTROPHILS NFR BLD AUTO: 57.4 % — SIGNIFICANT CHANGE UP (ref 43–77)
NITRITE UR-MCNC: NEGATIVE — SIGNIFICANT CHANGE UP
NRBC BLD AUTO-RTO: 0 /100 WBCS — SIGNIFICANT CHANGE UP (ref 0–0)
PH UR: 6 — SIGNIFICANT CHANGE UP (ref 5–8)
PLATELET # BLD AUTO: 166 K/UL — SIGNIFICANT CHANGE UP (ref 150–400)
POTASSIUM SERPL-MCNC: 4 MMOL/L — SIGNIFICANT CHANGE UP (ref 3.5–5.3)
POTASSIUM SERPL-SCNC: 4 MMOL/L — SIGNIFICANT CHANGE UP (ref 3.5–5.3)
PROT SERPL-MCNC: 6.7 GM/DL — SIGNIFICANT CHANGE UP (ref 6–8.3)
PROT UR-MCNC: NEGATIVE MG/DL — SIGNIFICANT CHANGE UP
RBC # BLD: 4.06 M/UL — SIGNIFICANT CHANGE UP (ref 3.8–5.2)
RBC # FLD: 14.6 % — HIGH (ref 10.3–14.5)
SODIUM SERPL-SCNC: 140 MMOL/L — SIGNIFICANT CHANGE UP (ref 135–145)
SP GR SPEC: 1.01 — SIGNIFICANT CHANGE UP (ref 1–1.03)
UROBILINOGEN FLD QL: 0.2 MG/DL — SIGNIFICANT CHANGE UP (ref 0.2–1)
WBC # BLD: 6.16 K/UL — SIGNIFICANT CHANGE UP (ref 3.8–10.5)
WBC # FLD AUTO: 6.16 K/UL — SIGNIFICANT CHANGE UP (ref 3.8–10.5)

## 2025-06-01 PROCEDURE — 74177 CT ABD & PELVIS W/CONTRAST: CPT | Mod: 26

## 2025-06-01 PROCEDURE — 99285 EMERGENCY DEPT VISIT HI MDM: CPT | Mod: GC

## 2025-06-01 PROCEDURE — 76705 ECHO EXAM OF ABDOMEN: CPT | Mod: 26

## 2025-06-01 RX ORDER — LIDOCAINE HYDROCHLORIDE 20 MG/ML
1 JELLY TOPICAL ONCE
Refills: 0 | Status: COMPLETED | OUTPATIENT
Start: 2025-06-01 | End: 2025-06-01

## 2025-06-01 RX ADMIN — Medication 4 MILLIGRAM(S): at 21:39

## 2025-06-01 RX ADMIN — Medication 1000 MILLILITER(S): at 21:42

## 2025-06-01 RX ADMIN — LIDOCAINE HYDROCHLORIDE 1 PATCH: 20 JELLY TOPICAL at 21:42

## 2025-06-01 NOTE — ED PROVIDER NOTE - NSFOLLOWUPINSTRUCTIONS_ED_ALL_ED_FT
1) Take tylenol for pain  2) Take prescription medication as instructed  3) Follow-up with spine  4) Follow up with your primary care doctor  5) Return to the ER for worsening or concerning symptoms

## 2025-06-01 NOTE — ED PROVIDER NOTE - PATIENT PORTAL LINK FT
You can access the FollowMyHealth Patient Portal offered by [facility] by registering at the following website: [email or web address]. By joining [email or web address]’s FollowMyHealth portal, you will also be able to view your health information using other applications (apps) compatible with our system.

## 2025-06-01 NOTE — ED PROVIDER NOTE - CLINICAL SUMMARY MEDICAL DECISION MAKING FREE TEXT BOX
This is a 80-year-old, medical history significant for HTN, HLD, HTN, DVT, MR, MVP, Parkinson's disease, hypothyroidism, abdominoplasty, spinal stenosis, previous SBO with perforation, presenting to the emergency department for back pain/abdominal pain.  The patient states that for 2 weeks she has been having left lower back pain without any unusual numbness, weakness, urinary symptoms, fecal incontinence or saddle anesthesia.  The patient states that Friday however she began having severe right lower quadrant abdominal pain.  The patient states that she felt something was twisting her ovary.  The patient states that she has never had symptoms like this before.  The patient states that the pain radiates down from the right lower quadrant to her groin and leg.  The patient states that she been taking percocet for symptoms at home with Advil and initially took the edge off but today were not relieved with the. the patient states that her last bowel movement was this morning and she reports that she has been passing gas.  The patient otherwise denies fevers, chills, chest pain, shortness of breath or urinary symptoms.      VSS.  PE.  Tenderness to palpation in the right lower quadrant.  Positive Warner sign.  No rebound or guarding on examination.  Paraspinal tenderness to palpation in the right paraspinal muscles.     Patient has no midline tenderness lower extremity, low suspicion for compression at this time.  Differential includes was not limited to muscle spasm, sciatica, intra-abdominal pathology such as SBO, cholecystitis, biliary colic, appendicitis, nephrolithiasis, colitis.  Will obtain basic labs, CT imaging of the abdomen, ultrasound imaging of the right upper quadrant, will give pain control.  Final disposition pending labs imaging and reassessment.

## 2025-06-01 NOTE — ED ADULT NURSE NOTE - OBJECTIVE STATEMENT
Pt AOX4 presenting to the ED c/o "10/10" Lower back pain radiating down right leg x 1 month. H/O Parkinson DM Fx Spine x 2 months ago. Pt in NAD at this time. Pt noted with even, unlabored respirations. Pt denies any chest pain or SOB at this time. Pt states she is usually ambulatory with her cane indoors and walker out doors. Pt states she usually gets zofran and morphine for her symptoms,

## 2025-06-01 NOTE — ED ADULT NURSE NOTE - CAS DISCH CONDITION
CC: HRT consult    Jamel Jj is a 60 y.o. female  presents for HRT consult.  LMP: No LMP recorded. Patient is postmenopausal. Menarche at age 13. Menopause around age 50. She started on HRT around that time. She has tried estradiol patches but developed localized reaction with higher doses of path. She is currently taking oral estradiol 0.5mg QD and Progesterone 100mg QHS. She wishes to continue HRT. She reports a family history of SUAREZ and breast cancer. She reports vaginal dryness. Using estrace cream BIW but still feels dry and having dyspareunia.   No prior cardiac history, family history of MI prior to age 50, or personal history of gestational DM/pre-eclampsia. She denies the following contraindications to HRT:  Vaginal bleeding, history of VTE/PE, thrombophilia,  breast cancer, or active liver disease.         Mammogram: 2024 TC 13.76%   Pap: 10/19/2023  WWE: 10/19/2023 with Dr. Gonzales  PCP: Dr. Mary Evans  Routine Screening Labs: 2024 TC 13.76%   Colonoscopy: scheduled  DEXA:  normal per pt    History reviewed. No pertinent past medical history.  Past Surgical History:   Procedure Laterality Date     SECTION  03/10/1993    1996    THYROIDECTOMY, PARTIAL  2017     Social History     Socioeconomic History    Marital status:    Tobacco Use    Smoking status: Former     Current packs/day: 0.00     Average packs/day: 0.3 packs/day for 34.0 years (8.5 ttl pk-yrs)     Types: Cigarettes     Quit date: 1990     Years since quittin.5    Smokeless tobacco: Never   Substance and Sexual Activity    Alcohol use: Yes     Comment: about 1 drink a month or two    Drug use: Never    Sexual activity: Yes     Partners: Male     Social Determinants of Health     Financial Resource Strain: Low Risk  (10/19/2023)    Received from [facility]    Overall Financial Resource Strain (CARDIA)     Difficulty of Paying Living Expenses: Not hard at all   Food Insecurity: No Food  Insecurity (10/19/2023)    Received from [facility]    Hunger Vital Sign     Worried About Running Out of Food in the Last Year: Never true     Ran Out of Food in the Last Year: Never true   Transportation Needs: No Transportation Needs (10/19/2023)    Received from [facility]    PRAPARE - Transportation     Lack of Transportation (Medical): No     Lack of Transportation (Non-Medical): No   Physical Activity: Sufficiently Active (10/19/2023)    Received from [facility]    Exercise Vital Sign     Days of Exercise per Week: 7 days     Minutes of Exercise per Session: 40 min   Stress: No Stress Concern Present (10/19/2023)    Received from [facility]    [employer] [place] [employer] - Occupational Stress Questionnaire     Feeling of Stress : Not at all     Family History   Problem Relation Name Age of Onset    Hearing loss Mother Agueda Veliz     Heart disease Mother Agueda Veliz     Hypertension Mother Agueda Veliz     Miscarriages / Stillbirths Mother Agueda Veliz     Stroke Mother Agueda Veliz     Vision loss Mother Agueda Veliz     Diabetes Father      Obesity Father      Breast cancer Maternal Aunt Aunt Maricarmen  and her daughter- breast cancer     Hearing loss Maternal Uncle Shemar DAWKINSLeech     Cancer Maternal Grandmother Skye Leech     Hearing loss Maternal Grandmother Skye Leech     Cancer Maternal Grandfather Shemar Leech     Hearing loss Maternal Grandfather Shemar Leech     Heart disease Maternal Grandfather Shemar Leech     Breast cancer Other          maternal first cousin     OB History          2    Para   2    Term   2            AB        Living             SAB        IAB        Ectopic        Multiple        Live Births                     Current Outpatient Medications:     levothyroxine (SYNTHROID) 50 MCG tablet, Take 1 tablet (50 mcg total) by mouth before breakfast., Disp: 90 tablet, Rfl: 3    pravastatin (PRAVACHOL) 20 MG tablet, Take 1 tablet (20 mg total) by mouth every  "evening., Disp: 90 tablet, Rfl: 3    estradioL (DIVIGEL) 0.25 mg/0.25 gram (0.1 %) GlPk, Apply one packet to the inner thigh daily, Disp: 30 packet, Rfl: 11    prasterone, dhea, (INTRAROSA) 6.5 mg Inst, Place 6.5 mg vaginally every evening., Disp: 28 each, Rfl: 11    progesterone (PROMETRIUM) 100 MG capsule, Take 1 capsule (100 mg total) by mouth every evening., Disp: 30 capsule, Rfl: 11    tretinoin (RETIN-A) 0.025 % cream, Apply topically every evening., Disp: , Rfl:     The 10-year ASCVD risk score (Oliva TADEO, et al., 2019) is: 2.6%    Values used to calculate the score:      Age: 60 years      Sex: Female      Is Non- : No      Diabetic: No      Tobacco smoker: No      Systolic Blood Pressure: 114 mmHg      Is BP treated: No      HDL Cholesterol: 57 mg/dL      Total Cholesterol: 206 mg/dL    /76   Pulse 64   Ht 5' 5" (1.651 m)   Wt 64.9 kg (143 lb)   BMI 23.80 kg/m²       ROS:  GENERAL: Denies weight gain or weight loss. Feeling well overall.   SKIN: Denies rash or lesions.   HEAD: Denies head injury or headache.   NODES: Denies enlarged lymph nodes.   CHEST: Denies chest pain or shortness of breath.   CARDIOVASCULAR: Denies palpitations or left sided chest pain.   ABDOMEN: No abdominal pain, constipation, diarrhea, nausea, vomiting or rectal bleeding.   URINARY: No frequency, dysuria, hematuria, or burning on urination.  REPRODUCTIVE: See HPI.   BREASTS: Denies pain, lumps, or nipple discharge.   HEMATOLOGIC: No easy bruisability or excessive bleeding.   MUSCULOSKELETAL: Denies joint pain or swelling.   NEUROLOGIC: Denies syncope or weakness.   PSYCHIATRIC: Denies depression, anxiety or mood swings.      PHYSICAL EXAM:  APPEARANCE: Well nourished, well developed, in no acute distress.  AFFECT: WNL, alert and oriented x 3      ASSESSMENT:   Menopausal symptoms: Risks and benefits of hormone replacement therapy were discussed. No contraindication to HRT.We also discussed HRT and " breast cancer. Breast cancer is very common, affecting 1/8 women regardless of HRT status.  We did discuss that transdermal option of estrogen is safer than oral estradiol as transdermal methods decrease risk for blood clots and stroke as they bypass liver metabolism.  -     estradioL (DIVIGEL) 0.25 mg/0.25 gram (0.1 %) GlPk; Apply one packet to the inner thigh daily  Dispense: 30 packet; Refill: 11  -     prasterone, dhea, (INTRAROSA) 6.5 mg Inst; Place 6.5 mg vaginally every evening.  Dispense: 28 each; Refill: 11  -     progesterone (PROMETRIUM) 100 MG capsule; Take 1 capsule (100 mg total) by mouth every evening.  Dispense: 30 capsule; Refill: 11        PLAN:   Baseline hormone levels today.  D/c estradiol 0.5mg QD  Start divigel 0.25mg QD- counseled on use  Continue progesterone 100mg QHS  D/c estrace cream  Start intrarosa nightly  Consider pelvic floor PT if dyspareunia persists.  We reviewed TC score and breast cancer risk  Continue annual screening mammogram.  Follow up in 3 months    Patient was counseled today on A.C.S. Pap guidelines and recommendations for yearly pelvic exams, mammograms and monthly self breast exams; to see her PCP for other health maintenance.                          Stable

## 2025-06-01 NOTE — ED PROVIDER NOTE - ATTENDING CONTRIBUTION TO CARE
This patient is an 80 year old woman hx of HTN, HLD, Parkinson's disease and chronic back pain with compression fracture (January 2025) who presents to the ER c/o left sided back pain and 2 weeks and 2 days of right sided back pain radiating to abdomen.  She denies recent injury, fall or trauma.  She has been taking advil for the left sided back pain intermittently for 2 week.  She began taking percocet today for the right sided back pain radiating to abdomen with no relief.  She denies fever, vomiting, dysuria, and hematuria,     Patient well appearing no acute distress, vitals stable.  RLQ tenderness noted on exam.  DDx includes but not limited to musculoskeletal pain, UTI, pyelonephritis, appendicitis.  Will give medication for pain, check labs and CT scan.

## 2025-06-01 NOTE — ED PROVIDER NOTE - PROGRESS NOTE DETAILS
Li Blunt, PGY-3 DO:  US showing gallstones w/o cholecystitis. Patient continues to endorse pain. Pending CT abd and pelvis.

## 2025-06-01 NOTE — ED ADULT NURSE NOTE - CARDIO ASSESSMENT
Behavioral Health Screening    Diagnosis: Alcohol Use Disorder and Generalized Anxiety Disorder    Time Spent: 120 mins    Crisis presentation: Patient is a 55-year-old, female with a self-reported history of depression, anxiety and alcohol use. Patient presents to the ED for alcohol intoxication and increased anxiety. Patients friend dropped patient off for concern of withdrawal from alcohol, at the request of the patient. Patient reports she has been drinking alcohol daily for the last 4 weeks after completing 6 weeks of sobriety. When asked trigger for relapse, patient responded \"its an illness I have been battling for years now and its hard.\" She reports increase in anxiety and akathisia, stating this is typical when she is binge drinking. At this time, patient denies SI/HI/AH/VH and denies any current self-harming behaviors. Patient states \"I am going to kill myself with alcohol if I don't stop, I want the help.\" Patient does not meet criteria for inpatient psychiatric hospitalization at this time.    Treatment History: Per medical record, patient has been diagnosed with Bipolar Disorder, BPD, PTSD and alcohol use disorder. Patient currently follows-up with Dr. Tylor Leroy at [facility], which she last saw in November and reports her next appointment is in February. She reports only taking Ativan at this time and states she does not know if she has been compliant since she has been drinking on a daily basis. She denies having a psychotherapist stating \"[facility] won't give me one because of my insurance.\" Patient is also connected with [facility] support services but reports \"I meet with a new Auth0 kid  every Friday for 5 mins over a cup of coffee and they still haven't connected me to other resources.\" Patient has been psychiatrically hospitalized at least 10 times and reports the last hospitalization was in July (EMR reports 9/12-10/17) at [facility] where she completed 12 sessions of  ECT.     Social History: Patient lives independently in an apartment that she states she needs to get out of because it is above a liquor store further enabling her alcohol dependence. Patient is currently unemployed and receives SSDI for her mental health. 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 She shared that she has no supports in her life and states that her son \"is a crude person\" so she has not spoken with him in a while. Patient is her own guardian at this time and own payee.     Chemical Dependency History and Current Use: Patient has extensive history of alcohol use and reporting that she has been binge drinking daily for the last 3-4 weeks. Patient unable to quantify how much she has been drinking, stating she will drink \"anything\" that she is able to get. Patient reporting she has been blacking out every night from intoxication since she has not been eating for several weeks now. She reports a history of seizures from withdrawal and unable to recall the last time she had one but states it was some time this year. She reports most recent period of sobriety was 6 weeks prior to this current relapse, reporting reason for relapse were the cravings and access to alcohol. Longest point of sobriety was a couple of years in 2019 after completing a residential treatment program. She has attended several rehab programs with the most recent completion of a program earlier this year at [facility] but states she relapsed the first day out. She denies any drug use. Upon arrival to the ED, BAL=117 and UDS is pending.    Past and Current Psychotic and Manic Symptoms: Patient denies current AH/VH and reports she has in the past experienced AH when actively withdrawing. At the time of this assessment, patient does not appear to be exhibiting any symptoms congruent with psychosis or jagdish.     Risk of Harm to Self: Patient denies current SI or worsening symptoms of depression. She  has a history of two previous suicide attempts in her early 20s via overdose but denies having any recent attempts. She denies any current self-harming behaviors other than misusing alcohol.    Risk of Self-Harm: Low    Past and Current Risk of Harm to Others: Patient denies HI or history of physical aggression towards other people. She denies any history of legal issues, including incarcerations and DUIs. She denies having access to weapons. Patient is calm and cooperative during assessment, with no noted aggression/irritability.     Risk of Harm to Others: Low    Collateral Report: none obtained at this time.     Plan for remediation of the crisis:  Patient is not presenting with acute psychiatric concern and would be appropriate for continued outpatient mental health treatment. Patient cleared from  standpoint and resources placed in AVS. Re-consultation can be placed to CL if needed.     Intake Assessment    Assessment Method  Assessment Method: In-person assessment    Intake Assessment Completed by:  Completed by:: Denise Flores LPC  Service Type ([facility] ONLY): Crisis  Reason for Seeking Treatment  Patient stated reason for treatment: Patient presents to the ED for alcohol use disorder reporting she has been binge drinking for the last month after 6 weeks of sobriety. Patient has a history of depression and anxiet, reports worsening symptoms of anxiety since she relapsed.  Reason for assessment: Substance Use, Anxiety  The Patient is Experiencing: A deterioration in the level of role functioning within the last 7 days, and/or    Patient Brought to Hospital By  Pt Brought to Hospital By::  (Friend dropped patient off to the ED per patients request)  Do You Identify as Male or Female?: Female    Provider Information  How were you referred here: Self  Referral Source Notified?: No  Psychiatrist: Yes  Name: Dr. Tylor Leroy  Phone: [phone]  Address: [address] [place] [id number]  Date of Last  Visit:  (November 2024)  Clinic: [facility]  HUSSAIN Obtained: No  Primary Care Physician: Yes  Name: Irwin Guzman MD  Phone: [phone]  Address: [address] [place] [address]  Clinic: [facility]  HUSSAIN Obtained: No  Therapist: None  :  (Patient reports that she follows-up with [facility] for case management. However, states they have not been helping her with getting connected to different resources)  Any Other Providers Past and/or Present: None  Most Recent Inpatient/Partial Hospitalization/IOP  Most Recent Inpatient/Partial Hospitalization/IOP: Yes  When: July 2024  Where: [facility]  How Long: Several weeks  Level of Care: Inpatient  Provide Additional Treatment History: Completed several sessions of ECT while inpatient    Weapons  Do You Have Any Weapons or Firearms with You Today?: No  Do You Have Any Weapons or Firearms You Plan to Use to Harm Yourself or Others?: No    Violence Assessment  Any history of arrests or legal charges for serious crimes (robbery, sexual assault, assault battery, weapons charge, murder)?: No  Any recent or current thoughts/threats to harm or kill others?: No  Access to Means: No  Has there been a recent history of anger, outbursts, property destruction, or aggression?: No   Does patient have a plan to act in a violent manner?: No    [place] Suicide Severity Rating Scale (C-SSRS)  1. Have you wished you were dead or wished you could go to sleep and not wake up? (past month): No  2. Have you actually had any thoughts of killing yourself? (past month): No  6. Have you ever done anything, started to do anything, or prepared to do anything to end your life? (lifetime): Yes  How long ago did you do any of these?: Over a year ago  Suicide Evaluation: History Only - Yellow    Contributing Factors to Suicide Risk  Current/Past Psychiatric History: Alcohol/Substance Abuse, Anxiety  Key Suicidal Symptoms: Anxious, Non-compliance with treatment,  Non-compliance with medications  Precipitants/Stressors/Interpersonal Concerns: History of abuse, Loss of relationship(s), Loss of health, Substance abuse  Protective Factors/Reason for Living: Ability to cope with frustration, Ability to cope with stress    Family Mental Health History  Family History of Suicide: No  Family History of Suicide Attempts: No  Family History of Mental Health Diagnosis: No  Family Member with Psychiatric Disorder Requiring Hospitalization: No    Legal Status  Legal Issues: None    Interpersonal Safety  Interpersonal Safety Screening: Complete assessment (alone or age 12 years or less with parents)  How often does anyone, including family and friends, physically hurt you? : Never  How often does anyone, including family and friends, insult or talk down to you?  : Never  How often does anyone, including family and friends, threaten you with harm? : Never  How often does anyone, including family and friends, scream or curse at you? : Never    Trauma Assessment  In your life, have you ever had any experience that was so frightening, horrible, or upsetting that you thought about it in the past month?: No    Sleep Analysis  Sleep Report: Poor  Sleep/Wake Cycle: Insomnia  Hours Slept: None or minimal for the last several weeks  What Shift Do You Work?: Does not apply  Have you been diagnosed with Sleep Apnea?: No    Nutrition Assessment  Within the past 12 months, the food you bought just didn't last and you didn't have money to get more. : Never true  Within the past 12 months, you worried that your food would run out before you got money to buy more.  : Never true  Are You Drinking Fluids Daily?: Yes  Any Changes in Your Appetite?: Yes  Describe Meals Per Day: none  Dental Problems Impairing Ability to Eat?: No  Weight Gain of 10 or More Pounds in the Last Month: No  Weight Loss of 10 or More Pounds in the Last Month: Yes (Unknown)  Do you have a history of disordered eating?: No    MENTAL  STATUS  Level of Consciousness (calc): Alert  Orientation: Oriented to person, Oriented to place, Oriented to time  Attention (calc): Maintains attention  Memory: Intact  Perceptual Misinterpretations/Hallucinations: Clear reality based perceptions  Speech: Strained voice  Motor Behavior-Agitation (calc): Calm and purposeful  Motor Behavior-Retardation (calc): Calm and purposeful  Behavior: Poor eye contact  Affect: Anxious  Mood : Anxious    Social Assessment  What is your living situation today? : I have a steady place to live  Type of Residence: Apartment  In the past 12 months has the electric, gas, oil, or water company threatened to shut off services in your home?: No  Do you have problems with any of the following? : None of the above  Living Arrangements: Alone  Support Systems: None  In the past year, have you or any family members you live with been unable to get any of the following when it was really needed? Check all that apply.: None  How often do you see or talk to people that you care about and feel close to? (For example: talking to friends on the phone, visiting friends or family, going to Spiritism or club meetings): Less than once a week  In the past 12 months, has lack of reliable transportation kept you from medical appointments, meetings, work or from getting things needed for daily living? 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 Melton, IM resident Dr. Nolan Crum, and KIRSTEN Cabral, and all are in agreement that patient does not meet criteria for involuntary psychiatric admission, and is appropriate for D?C once medically cleared with referrals for outpatient treatment.    Reasons for Level of Treatment  Reason(s) for Outpatient or Intensive Outpatient Treatment: Support  and therapeutic contact required to achieve/maintain treatment goals    Primary Diagnosis  State ICD 10 Diagnosis: F10.2 Alcohol use disorder, severe    Weapons Intervention  Do You Have Any Weapons or Firearms at Home?: No    Intake Assessment Completed  Intake Assessment Completed: Yes  Total Face to Face Time: Other (120 mins)         Final Disposition  Disposition Level of Care: Medical admission      ---

## 2025-06-01 NOTE — ED ADULT NURSE NOTE - IN ACCORDANCE WITH [place] LAW, WE OFFER EVERY PATIENT A HEPATITIS C TEST. WOULD YOU LIKE TO BE TESTED TODAY?
Luz Sanchez is a 20 year old female here alone presenting with:    Symptoms: pt reports urinary frequency, difficult to urinate x2 weeks    Pt seen 1 week ago for UTI and given antibiotics and completed    Patient also reports bump on outside a vagina x1 week. Patient is concerned for STDs and herpes.    Denies: fever, urinary pain, itching    OTC medications: no    Recent ABX use: yes UTI 1 week ago    Work/school note needed: no    Patient would like communication of their results via:    [facility]    Visit Vitals  /70   Pulse 71   Temp 97.7 °F (36.5 °C) (Temporal)   Resp 18   LMP 12/21/2023 (Approximate)   SpO2 97%       Opt out

## 2025-06-02 RX ORDER — LIDOCAINE HYDROCHLORIDE 20 MG/ML
1 JELLY TOPICAL
Qty: 1 | Refills: 0
Start: 2025-06-02

## 2025-06-02 RX ORDER — METHOCARBAMOL 500 MG/1
2 TABLET, FILM COATED ORAL
Qty: 40 | Refills: 0
Start: 2025-06-02 | End: 2025-06-06

## 2025-06-02 RX ORDER — IBUPROFEN 200 MG
1 TABLET ORAL
Qty: 20 | Refills: 0
Start: 2025-06-02 | End: 2025-06-06

## 2025-06-02 RX ADMIN — Medication 4 MILLIGRAM(S): at 00:03

## 2025-06-10 ENCOUNTER — APPOINTMENT (OUTPATIENT)
Dept: ORTHOPEDIC SURGERY | Facility: CLINIC | Age: 80
End: 2025-06-10
Payer: MEDICARE

## 2025-06-10 VITALS
BODY MASS INDEX: 25.76 KG/M2 | SYSTOLIC BLOOD PRESSURE: 148 MMHG | HEIGHT: 62 IN | OXYGEN SATURATION: 100 % | HEART RATE: 67 BPM | DIASTOLIC BLOOD PRESSURE: 79 MMHG | RESPIRATION RATE: 17 BRPM | WEIGHT: 140 LBS

## 2025-06-10 PROCEDURE — 72100 X-RAY EXAM L-S SPINE 2/3 VWS: CPT

## 2025-06-10 PROCEDURE — 99212 OFFICE O/P EST SF 10 MIN: CPT

## 2025-06-14 ENCOUNTER — INPATIENT (INPATIENT)
Facility: HOSPITAL | Age: 80
LOS: 4 days | Discharge: NURSING, REHAB OR LONG-TERM CARE | End: 2025-06-19
Attending: INTERNAL MEDICINE | Admitting: INTERNAL MEDICINE
Payer: MEDICARE

## 2025-06-14 VITALS
TEMPERATURE: 98 F | RESPIRATION RATE: 16 BRPM | HEIGHT: 62 IN | HEART RATE: 84 BPM | WEIGHT: 139.99 LBS | OXYGEN SATURATION: 97 % | SYSTOLIC BLOOD PRESSURE: 163 MMHG | DIASTOLIC BLOOD PRESSURE: 95 MMHG

## 2025-06-14 DIAGNOSIS — Z98.890 OTHER SPECIFIED POSTPROCEDURAL STATES: Chronic | ICD-10-CM

## 2025-06-14 DIAGNOSIS — E05.90 THYROTOXICOSIS, UNSPECIFIED WITHOUT THYROTOXIC CRISIS OR STORM: ICD-10-CM

## 2025-06-14 DIAGNOSIS — E11.9 TYPE 2 DIABETES MELLITUS WITHOUT COMPLICATIONS: ICD-10-CM

## 2025-06-14 DIAGNOSIS — R53.81 OTHER MALAISE: ICD-10-CM

## 2025-06-14 DIAGNOSIS — Z96.659 PRESENCE OF UNSPECIFIED ARTIFICIAL KNEE JOINT: Chronic | ICD-10-CM

## 2025-06-14 DIAGNOSIS — G20.A1 PARKINSON'S DISEASE WITHOUT DYSKINESIA, WITHOUT MENTION OF FLUCTUATIONS: ICD-10-CM

## 2025-06-14 DIAGNOSIS — I10 ESSENTIAL (PRIMARY) HYPERTENSION: ICD-10-CM

## 2025-06-14 DIAGNOSIS — Z98.82 BREAST IMPLANT STATUS: Chronic | ICD-10-CM

## 2025-06-14 DIAGNOSIS — E78.5 HYPERLIPIDEMIA, UNSPECIFIED: ICD-10-CM

## 2025-06-14 LAB
ALBUMIN SERPL ELPH-MCNC: 3.8 G/DL — SIGNIFICANT CHANGE UP (ref 3.3–5)
ALP SERPL-CCNC: 81 U/L — SIGNIFICANT CHANGE UP (ref 40–120)
ALT FLD-CCNC: 12 U/L — SIGNIFICANT CHANGE UP (ref 12–78)
ANION GAP SERPL CALC-SCNC: 8 MMOL/L — SIGNIFICANT CHANGE UP (ref 5–17)
APPEARANCE UR: CLEAR — SIGNIFICANT CHANGE UP
AST SERPL-CCNC: 17 U/L — SIGNIFICANT CHANGE UP (ref 15–37)
BASOPHILS # BLD AUTO: 0.04 K/UL — SIGNIFICANT CHANGE UP (ref 0–0.2)
BASOPHILS NFR BLD AUTO: 0.7 % — SIGNIFICANT CHANGE UP (ref 0–2)
BILIRUB SERPL-MCNC: 0.4 MG/DL — SIGNIFICANT CHANGE UP (ref 0.2–1.2)
BILIRUB UR-MCNC: NEGATIVE — SIGNIFICANT CHANGE UP
BUN SERPL-MCNC: 27 MG/DL — HIGH (ref 7–23)
CALCIUM SERPL-MCNC: 9.8 MG/DL — SIGNIFICANT CHANGE UP (ref 8.5–10.1)
CHLORIDE SERPL-SCNC: 106 MMOL/L — SIGNIFICANT CHANGE UP (ref 96–108)
CO2 SERPL-SCNC: 23 MMOL/L — SIGNIFICANT CHANGE UP (ref 22–31)
COLOR SPEC: YELLOW — SIGNIFICANT CHANGE UP
CREAT SERPL-MCNC: 1.17 MG/DL — SIGNIFICANT CHANGE UP (ref 0.5–1.3)
DIFF PNL FLD: NEGATIVE — SIGNIFICANT CHANGE UP
EGFR: 47 ML/MIN/1.73M2 — LOW
EGFR: 47 ML/MIN/1.73M2 — LOW
EOSINOPHIL # BLD AUTO: 0.07 K/UL — SIGNIFICANT CHANGE UP (ref 0–0.5)
EOSINOPHIL NFR BLD AUTO: 1.1 % — SIGNIFICANT CHANGE UP (ref 0–6)
GLUCOSE SERPL-MCNC: 108 MG/DL — HIGH (ref 70–99)
GLUCOSE UR QL: NEGATIVE MG/DL — SIGNIFICANT CHANGE UP
HCT VFR BLD CALC: 40.4 % — SIGNIFICANT CHANGE UP (ref 34.5–45)
HGB BLD-MCNC: 12.9 G/DL — SIGNIFICANT CHANGE UP (ref 11.5–15.5)
IMM GRANULOCYTES NFR BLD AUTO: 0.2 % — SIGNIFICANT CHANGE UP (ref 0–0.9)
KETONES UR QL: NEGATIVE MG/DL — SIGNIFICANT CHANGE UP
LEUKOCYTE ESTERASE UR-ACNC: NEGATIVE — SIGNIFICANT CHANGE UP
LYMPHOCYTES # BLD AUTO: 2.11 K/UL — SIGNIFICANT CHANGE UP (ref 1–3.3)
LYMPHOCYTES # BLD AUTO: 34.4 % — SIGNIFICANT CHANGE UP (ref 13–44)
MCHC RBC-ENTMCNC: 28.9 PG — SIGNIFICANT CHANGE UP (ref 27–34)
MCHC RBC-ENTMCNC: 31.9 G/DL — LOW (ref 32–36)
MCV RBC AUTO: 90.4 FL — SIGNIFICANT CHANGE UP (ref 80–100)
MONOCYTES # BLD AUTO: 0.45 K/UL — SIGNIFICANT CHANGE UP (ref 0–0.9)
MONOCYTES NFR BLD AUTO: 7.3 % — SIGNIFICANT CHANGE UP (ref 2–14)
NEUTROPHILS # BLD AUTO: 3.46 K/UL — SIGNIFICANT CHANGE UP (ref 1.8–7.4)
NEUTROPHILS NFR BLD AUTO: 56.3 % — SIGNIFICANT CHANGE UP (ref 43–77)
NITRITE UR-MCNC: NEGATIVE — SIGNIFICANT CHANGE UP
NRBC BLD AUTO-RTO: 0 /100 WBCS — SIGNIFICANT CHANGE UP (ref 0–0)
PH UR: 6 — SIGNIFICANT CHANGE UP (ref 5–8)
PLATELET # BLD AUTO: 172 K/UL — SIGNIFICANT CHANGE UP (ref 150–400)
POTASSIUM SERPL-MCNC: 4.1 MMOL/L — SIGNIFICANT CHANGE UP (ref 3.5–5.3)
POTASSIUM SERPL-SCNC: 4.1 MMOL/L — SIGNIFICANT CHANGE UP (ref 3.5–5.3)
PROT SERPL-MCNC: 7.1 GM/DL — SIGNIFICANT CHANGE UP (ref 6–8.3)
PROT UR-MCNC: SIGNIFICANT CHANGE UP MG/DL
RBC # BLD: 4.47 M/UL — SIGNIFICANT CHANGE UP (ref 3.8–5.2)
RBC # FLD: 14.3 % — SIGNIFICANT CHANGE UP (ref 10.3–14.5)
SODIUM SERPL-SCNC: 137 MMOL/L — SIGNIFICANT CHANGE UP (ref 135–145)
SP GR SPEC: 1.01 — SIGNIFICANT CHANGE UP (ref 1–1.03)
UROBILINOGEN FLD QL: 0.2 MG/DL — SIGNIFICANT CHANGE UP (ref 0.2–1)
WBC # BLD: 6.14 K/UL — SIGNIFICANT CHANGE UP (ref 3.8–10.5)
WBC # FLD AUTO: 6.14 K/UL — SIGNIFICANT CHANGE UP (ref 3.8–10.5)

## 2025-06-14 PROCEDURE — 73502 X-RAY EXAM HIP UNI 2-3 VIEWS: CPT | Mod: 26,RT

## 2025-06-14 PROCEDURE — 99285 EMERGENCY DEPT VISIT HI MDM: CPT | Mod: GC

## 2025-06-14 PROCEDURE — 99222 1ST HOSP IP/OBS MODERATE 55: CPT

## 2025-06-14 RX ORDER — SODIUM CHLORIDE 9 G/1000ML
1000 INJECTION, SOLUTION INTRAVENOUS
Refills: 0 | Status: DISCONTINUED | OUTPATIENT
Start: 2025-06-14 | End: 2025-06-19

## 2025-06-14 RX ORDER — LOSARTAN POTASSIUM 100 MG/1
100 TABLET, FILM COATED ORAL DAILY
Refills: 0 | Status: DISCONTINUED | OUTPATIENT
Start: 2025-06-14 | End: 2025-06-19

## 2025-06-14 RX ORDER — PRAMIPEXOLE DIHYDROCHLORIDE 1 MG/1
0.12 TABLET ORAL DAILY
Refills: 0 | Status: DISCONTINUED | OUTPATIENT
Start: 2025-06-14 | End: 2025-06-19

## 2025-06-14 RX ORDER — MELATONIN 5 MG
3 TABLET ORAL AT BEDTIME
Refills: 0 | Status: DISCONTINUED | OUTPATIENT
Start: 2025-06-14 | End: 2025-06-19

## 2025-06-14 RX ORDER — GABAPENTIN 400 MG/1
600 CAPSULE ORAL ONCE
Refills: 0 | Status: COMPLETED | OUTPATIENT
Start: 2025-06-14 | End: 2025-06-14

## 2025-06-14 RX ORDER — ROSUVASTATIN CALCIUM 20 MG/1
10 TABLET, FILM COATED ORAL AT BEDTIME
Refills: 0 | Status: DISCONTINUED | OUTPATIENT
Start: 2025-06-14 | End: 2025-06-19

## 2025-06-14 RX ORDER — GLUCAGON 3 MG/1
1 POWDER NASAL ONCE
Refills: 0 | Status: DISCONTINUED | OUTPATIENT
Start: 2025-06-14 | End: 2025-06-19

## 2025-06-14 RX ORDER — DEXTROSE 50 % IN WATER 50 %
15 SYRINGE (ML) INTRAVENOUS ONCE
Refills: 0 | Status: DISCONTINUED | OUTPATIENT
Start: 2025-06-14 | End: 2025-06-19

## 2025-06-14 RX ORDER — MAGNESIUM, ALUMINUM HYDROXIDE 200-200 MG
30 TABLET,CHEWABLE ORAL EVERY 4 HOURS
Refills: 0 | Status: DISCONTINUED | OUTPATIENT
Start: 2025-06-14 | End: 2025-06-19

## 2025-06-14 RX ORDER — DEXTROSE 50 % IN WATER 50 %
25 SYRINGE (ML) INTRAVENOUS ONCE
Refills: 0 | Status: DISCONTINUED | OUTPATIENT
Start: 2025-06-14 | End: 2025-06-19

## 2025-06-14 RX ORDER — B1/B2/B3/B5/B6/B12/VIT C/FOLIC 500-0.5 MG
1 TABLET ORAL DAILY
Refills: 0 | Status: DISCONTINUED | OUTPATIENT
Start: 2025-06-14 | End: 2025-06-19

## 2025-06-14 RX ORDER — TRAMADOL HYDROCHLORIDE 50 MG/1
50 TABLET, FILM COATED ORAL EVERY 6 HOURS
Refills: 0 | Status: DISCONTINUED | OUTPATIENT
Start: 2025-06-14 | End: 2025-06-19

## 2025-06-14 RX ORDER — METOPROLOL SUCCINATE 50 MG/1
25 TABLET, EXTENDED RELEASE ORAL DAILY
Refills: 0 | Status: DISCONTINUED | OUTPATIENT
Start: 2025-06-14 | End: 2025-06-19

## 2025-06-14 RX ORDER — DEXTROSE 50 % IN WATER 50 %
12.5 SYRINGE (ML) INTRAVENOUS ONCE
Refills: 0 | Status: DISCONTINUED | OUTPATIENT
Start: 2025-06-14 | End: 2025-06-19

## 2025-06-14 RX ORDER — CARBIDOPA/LEVODOPA 25MG-100MG
1 TABLET ORAL THREE TIMES A DAY
Refills: 0 | Status: DISCONTINUED | OUTPATIENT
Start: 2025-06-14 | End: 2025-06-19

## 2025-06-14 RX ORDER — TRAMADOL HYDROCHLORIDE AND ACETAMINOPHEN 37.5; 325 MG/1; MG/1
1 TABLET ORAL EVERY 6 HOURS
Refills: 0 | Status: DISCONTINUED | OUTPATIENT
Start: 2025-06-14 | End: 2025-06-19

## 2025-06-14 RX ORDER — ACETAMINOPHEN 500 MG/5ML
975 LIQUID (ML) ORAL ONCE
Refills: 0 | Status: COMPLETED | OUTPATIENT
Start: 2025-06-14 | End: 2025-06-14

## 2025-06-14 RX ORDER — ONDANSETRON HCL/PF 4 MG/2 ML
4 VIAL (ML) INJECTION EVERY 8 HOURS
Refills: 0 | Status: DISCONTINUED | OUTPATIENT
Start: 2025-06-14 | End: 2025-06-19

## 2025-06-14 RX ORDER — ASPIRIN 325 MG
81 TABLET ORAL DAILY
Refills: 0 | Status: DISCONTINUED | OUTPATIENT
Start: 2025-06-14 | End: 2025-06-19

## 2025-06-14 RX ORDER — LIDOCAINE HYDROCHLORIDE 20 MG/ML
1 JELLY TOPICAL ONCE
Refills: 0 | Status: COMPLETED | OUTPATIENT
Start: 2025-06-14 | End: 2025-06-14

## 2025-06-14 RX ORDER — ENOXAPARIN SODIUM 100 MG/ML
40 INJECTION SUBCUTANEOUS EVERY 24 HOURS
Refills: 0 | Status: DISCONTINUED | OUTPATIENT
Start: 2025-06-14 | End: 2025-06-19

## 2025-06-14 RX ORDER — INSULIN LISPRO 100 U/ML
INJECTION, SOLUTION INTRAVENOUS; SUBCUTANEOUS
Refills: 0 | Status: DISCONTINUED | OUTPATIENT
Start: 2025-06-14 | End: 2025-06-19

## 2025-06-14 RX ORDER — FOLIC ACID 1 MG/1
1 TABLET ORAL DAILY
Refills: 0 | Status: DISCONTINUED | OUTPATIENT
Start: 2025-06-14 | End: 2025-06-19

## 2025-06-14 RX ORDER — PREGABALIN 50 MG/1
75 CAPSULE ORAL
Refills: 0 | Status: DISCONTINUED | OUTPATIENT
Start: 2025-06-14 | End: 2025-06-19

## 2025-06-14 RX ORDER — DICLOFENAC SODIUM 75 MG/1
75 TABLET, DELAYED RELEASE ORAL
Refills: 0 | Status: DISCONTINUED | OUTPATIENT
Start: 2025-06-14 | End: 2025-06-19

## 2025-06-14 RX ADMIN — LIDOCAINE HYDROCHLORIDE 1 PATCH: 20 JELLY TOPICAL at 17:09

## 2025-06-14 RX ADMIN — Medication 4 MILLIGRAM(S): at 17:25

## 2025-06-14 RX ADMIN — Medication 4 MILLIGRAM(S): at 19:39

## 2025-06-14 RX ADMIN — Medication 4 MILLIGRAM(S): at 20:39

## 2025-06-14 NOTE — ED PROVIDER NOTE - ATTENDING CONTRIBUTION TO CARE
81 y/o F hx of lumbar fusions, prior kyphophlasty per patient , chronic back pain presents w/ acute on chronic back and leg pain. pain over R side of her lower back and pain that is "burning" in nature to touch over the R upper thigh. denies new trauma/falls. denies saddle anesthesia. denies urinary retention. denies dysuria. denies fever/chills. denies nausea/vomiting. takes tylenol w/ codeine at home for pain, has had epidurals w/ her pain management in past. states pain has been like this for months now w/ no improvement. lives alone and uses cane for ambulation.   tender R paraspinal L spine  tender over R upper thigh region  no midline or L sided L spine tenderness  benign abdomen on exam  stable vitals   likely is radicular/msk back pain  pain control, if not optimized will likely require admission for PT eval/possible rehab and for further continuous pain medications. patient is agreeable to plan, if clinically improved can be discharged w/ outpatient f/u.

## 2025-06-14 NOTE — ED PROVIDER NOTE - PROGRESS NOTE DETAILS
Saint Cleve, DO (PGY2): patient reassessed. Continuing to report pain, unable to ambulate. additional dose of morphine ordered. Will obtain basic labs. Patient states she lives alone. Likely admit for PT and pain control. Saint Cleve (PGY2): Case discussed with hospitalist. Patient admitted.

## 2025-06-14 NOTE — ED ADULT NURSE NOTE - HISTORY OF COVID-19 VACCINATION
Cinthya Grayson MD Le, Lauren Md ~  [facility] Clinical Support Pool1 hour ago (7:38 AM)     LL  Yes please schedule in person appt to discuss this. Thank you!      Vaccine status unknown

## 2025-06-14 NOTE — H&P ADULT - HISTORY OF PRESENT ILLNESS
Lisseth De La Cruz is an 80 year old female with PMHx of HTN, HLD, NIDDM2, hyperthyroidism, Parkinson's disease, and hx of L1 compression fracture (s/p kyphoplasty) who presented to the ED on 6/14/25 for complaints of right hip/buttock pain and inability to ambulate.    Patient reports she had left hip pain for three months and received injections from orthopedic surgery outpatient with improvement of pain. Over the past three weeks, now having right hip pain that starts in the buttock and radiates down the right leg. Describes it as stabbing, throbbing, pounding, and debilitating in nature. Worse with movement and improved with rest. States the pain then turns into a burning sensation. Severity was 15/10 and now down to 10/10. Due to this, has been unable to ambulate. Baseline functional status is ambulates with cane and independent with all ADLs. Lives at home alone.    In the ED, VSS except BP as elevated as 180/81. Labs grossly unremarkable. U/A with negative leuks, negative nitrites, moderate bacteria, squamous epithelial cells present. Received acetaminophen 975 mg PO, gabapentin 600 mg PO, lidocaine patch, and morphine 4 mg IV.

## 2025-06-14 NOTE — ED ADULT NURSE NOTE - NS ED PATIENT SAFETY CONCERN
No
Pt A&Ox4 presents to ED with complaints of vaginal bleeding. Pt is , delivered by c section on 8/10/24 at 38 weeks gestation. Pt reports abdominal cramping and vaginal bleeding starting today. Reports using 8 pads since this morning. Reports chills and intermittent headache since Saturday. Pt reports multiple negative covid tests since Saturday. Denies chest pain, shortness of breath, n/v, diarrhea, urinary symptoms. 3

## 2025-06-14 NOTE — ED PROVIDER NOTE - PHYSICAL EXAMINATION
GENERAL: no acute distress, non-toxic appearing  HEAD: normocephalic, atraumatic  HEENT: PERRL, EOMI, normal conjunctiva, oral mucosa moist, full ROM of neck,  no neck swelling, no midline tenderness  CARDIAC: regular rate and rhythm  PULM: clear to ascultation bilaterally  GI: abdomen nondistended, soft, nontender  NEURO: alert and oriented x 4, normal speech, no focal motor or sensory deficits, gait normal, no gross neurologic deficit, +resting tremor  MSK: no visible deformities, no peripheral edema, calf tenderness/redness/swelling  SKIN: no visible rashes, dry, well-perfused  PSYCH: appropriate mood and affect

## 2025-06-14 NOTE — ED PROVIDER NOTE - SHIFT CHANGE DETAILS
pending reassessment - admit if not improved . can discharge if patient significantly improved and comfortable w/ outpatient f/u

## 2025-06-14 NOTE — H&P ADULT - NSICDXPASTSURGICALHX_GEN_ALL_CORE_FT
PAST SURGICAL HISTORY:  H/O abdominal surgery 1999    History of bowel resection 2012    S/P breast augmentation 2014 saline implants    S/P foot surgery, left 5/2/2018    S/P hernia repair 2013    S/P knee replacement Left ( 1/2/2019 ), right TKR, 2019    Status post kyphoplasty

## 2025-06-14 NOTE — CHART NOTE - NSCHARTNOTEFT_GEN_A_CORE
Confidential Drug Utilization Report  Search Terms: Lisseth De La Cruz, 1945Search Date: 06/14/2025 22:40:05 PM  Searching on behalf of: Myself  The Drug Utilization Report below displays all of the controlled substance prescriptions, if any, that your patient has filled in the last twelve months. The information displayed on this report is compiled from pharmacy submissions to the Department, and accurately reflects the information as submitted by the pharmacies.    This report was requested by: Malia Greene | Reference #: [id number]    Practitioner Count: 2  Pharmacy Count: 1  Current Opioid Prescriptions: 0  Current Benzodiazepine Prescriptions: 0  Current Stimulant Prescriptions: 0      Patient Demographic Information (PDI)       PDI	First Name	Last Name	Birth Date	Gender	Street Address	[facility]	Zip Code  A	Lisseth De La Cruz	1945	Female	[address] [address]    Prescription Information      PDI Filter:    PDI	My Rx	Current Rx	Drug Type	Rx Written	Rx Dispensed	Drug	Quantity	Days Supply	Prescriber Name	Prescriber JAYA #	Payment Method	Dispenser  A	N	N	O	05/06/2025	05/07/2025	tramadol hcl 50 mg tablet	21	7	Tammi Elizabeth	[id number]	Medicare	[id number]  A	N	N	O	03/21/2025	03/22/2025	hydrocodone-acetaminophen 5-325 mg tablet	28	7	Paul Florence	[id number]	Medicare	[id number]    * - Details of Drug Type : O = Opioid, B = Benzodiazepine, S = Stimulant    * - Drugs marked with an asterisk are compound drugs. If the compound drug is made up of more than one controlled substance, then each controlled substance will be a separate row in the table.

## 2025-06-14 NOTE — ED PROVIDER NOTE - OBJECTIVE STATEMENT
Saint Cleve, DO (PGY2): 80-year-old, medical history significant for HTN, HLD, HTN, DVT, MR, MVP, Parkinson's disease, hypothyroidism, abdominoplasty, spinal stenosis, previous SBO with perforation, presenting to the emergency department for back pain/abdominal pain. Reports a month of lower back pain without associated neurological symptoms. States that it was originally located on the left, now s/p epidural and dextrose injections. Now pain is on the right side. No weakness, numbness, urinary symptoms, fecal incontinence or saddle anesthesia. The pain radiates down from the right lower back to the right thigh and is burning in nature.  Patient states that she is also experiencing right lower quadrant abdominal pain, further explaining that she thinks it might be her ovaries.  Otherwise no fevers, chills, nausea, vomiting, chest pain, or shortness of breath.

## 2025-06-14 NOTE — GOALS OF CARE CONVERSATION - ADVANCED CARE PLANNING - CONVERSATION DETAILS
Code status is DNR/DNI. Has living will and HCP with her that states she would not want cardiac resuscitation, mechanica ventilation, or feeding tube/artificial nutrition if she were to have a terminal condition, permanently unconscious, or conscious but have irreversible brain damage and will never regain the ability to make decisions and express her wishes. Would want maximum pain relief. Appoints sisters, Vanita Mayo (who is a cardiac RN and resides in [place]) and Kayla Hayes (if Vanita were unable to be reached, resides in [place]) to be her HCPs. Signed and dated 7/14/2006. Code status is DNR/DNI. Has living will and HCP with her that states she would not want cardiac resuscitation, mechanical ventilation, or feeding tube/artificial nutrition if she were to have a terminal condition, permanently unconscious, or conscious but have irreversible brain damage and will never regain the ability to make decisions and express her wishes. Would want maximum pain relief. Appoints sisters, Vanita Mayo (who is a cardiac RN and resides in [place]) and Kayla Hayes (if Vanita were unable to be reached, resides in [place]) to be her HCPs. Signed and dated 7/14/2006.

## 2025-06-14 NOTE — ED ADULT TRIAGE NOTE - CHIEF COMPLAINT QUOTE
brought by ems for abdominal pain and back pain for couple of months and getting worse . h/o HTN, parkinson's, DM

## 2025-06-14 NOTE — ED ADULT NURSE REASSESSMENT NOTE - NS ED NURSE REASSESS COMMENT FT1
pt c.o r thigh burning. MD made aware
pt states previous medication helped, but pain still there. MD at bedside for patient reassessment.

## 2025-06-14 NOTE — H&P ADULT - NSHPLABSRESULTS_GEN_ALL_CORE
12.9   6.14  )-----------( 172      ( 2025 19:39 )             40.4     137  |  106  |  27[H]  ----------------------------<  108[H]       4.1   |  23  |  1.17    Ca    9.8      2025 19:39    TPro  7.1  /  Alb  3.8  /  TBili  0.4  /  DBili  x   /  AST  17  /  ALT  12  /  AlkPhos  81  06-14    Urinalysis Basic - ( 2025 16:49 )  Color: Yellow / Appearance: Clear / S.010 / pH: 6.0  Gluc: Negative mg/dL / Ketone: x  / Bili: Negative / Urobili: 0.2 mg/dL   Blood: Negative / Protein: Trace mg/dL / Nitrite: Negative   Leuk Esterase: Negative / RBC: 0 /HPF / WBC 3 /HPF   Sq Epi: x / Bacteria: Moderate /HPF  Hyaline Casts: x/WBC Casts: x    R. hip x-ray 25

## 2025-06-14 NOTE — ED PROVIDER NOTE - CLINICAL SUMMARY MEDICAL DECISION MAKING FREE TEXT BOX
Saint Cleve, DO (PGY2): Well-appearing 80-year-old, medical history significant for HTN, HLD, HTN, DVT, MR, MVP, Parkinson's disease, hypothyroidism, abdominoplasty, spinal stenosis, previous SBO with perforation, presenting to the emergency department for back pain/abdominal pain for a month. Was seen in this ED for the same about two weeks ago. Workup including labs, CT and US unrevealing. Vitals stable. Physical exam as above. Likely worsening radicular pain. Low concerns for serious pathology such as cord compression or intra-abdominal pathology. Plan for pain control and reassessment. Will obtain UA. Dispo and further management pending results and reassessment.

## 2025-06-14 NOTE — H&P ADULT - ASSESSMENT
Lisseth De La Cruz is an 80 year old female with PMHx of HTN, HLD, NIDDM2, hyperthyroidism, Parkinson's disease, and hx of L1 compression fracture (s/p kyphoplasty) who presented to the ED on 6/14/25 for complaints of right hip/buttock pain and inability to ambulate and admitted for physical deconditioning.     Physical deconditioning  In pt with hx of L1 compression fracture (s/p kyphoplasty)  Reports previously with left hip pain x 3 months, received injections from orthopedic surgery as an outpatient with improvement of pain  Now having right hip pain x 3 weeks, starts in buttock, radiates down the right leg  Describes it as stabbing, throbbing, pounding, and debilitating in nature. Worse with movement and improved with rest  States the pain then turns into a burning sensation, unable to ambulate due to severe pain  S/p acetaminophen 975 mg PO, gabapentin 600 mg PO, lidocaine patch, and morphine 4 mg IV in the ED  PTA pregabalin 75 mg BID, tylenol/codeine q6 PRN, diclofenac 75 mg BID PRN, tramadol 50 mg q6 PRN  Multimodal pain management PRN, fall precautions  F/u R. hip x-ray, MRI L-spine with and without IV contrast as recommended by ortho resident  PT consulted  Pending official orthopedic surgery consult - please f/u in AM      Chronic medical conditions:  HTN, uncontrolled suspect secondary to pain: BP as elevated as 180/81 on admission, PTA losartan 100 mg, HCTZ 12.5 mg, metoprolol succinate 25 mg  HLD: PTA rosuvastatin 10 mg qhs  NIDDM2: last known A1c 7.3 (on 4/26/24), POC qac and qhs, PTA metformin 1000 mg BID and Trulicity weekly held, low dose SSI qac started, blood glucose goal < 180, f/u A1c  Hyperthyroidism: PTA methimazole 5 mg  Parkinson's disease: PTA carbidopa / levodopa 25 / 100 mg TID, pramipexole 0.125 mg    Medication reconciliation completed using med list from outpatient orthopedic surgery, Dr. Bahena's note from 6/10/25. However, patient does not know if this is accurate med list. Please call her pharmacy for med list in AM. Lisseth De La Cruz is an 80 year old female with PMHx of HTN, HLD, NIDDM2, hyperthyroidism, Parkinson's disease, and hx of L1 compression fracture (s/p kyphoplasty) who presented to the ED on 6/14/25 for complaints of right hip/buttock pain and inability to ambulate and admitted for physical deconditioning.     Physical deconditioning  In pt with hx of L1 compression fracture (s/p kyphoplasty)  Reports previously with left hip pain x 3 months, received injections from orthopedic surgery as an outpatient with improvement of pain  Now having right hip pain x 3 weeks, starts in buttock, radiates down the right leg  Describes it as stabbing, throbbing, pounding, and debilitating in nature. Worse with movement and improved with rest  States the pain then turns into a burning sensation, unable to ambulate due to severe pain  S/p acetaminophen 975 mg PO, gabapentin 600 mg PO, lidocaine patch, and morphine 4 mg IV in the ED  PTA pregabalin 75 mg BID, tylenol/codeine q6 PRN, diclofenac 75 mg BID PRN, tramadol 50 mg q6 PRN  Multimodal pain management PRN, fall precautions  F/u R. hip x-ray, MRI L-spine with and without IV contrast as recommended by ortho resident  PT consulted; patient reports she does not want to go to rehab and prefers home PT  Pending official orthopedic surgery consult - please f/u in AM      Chronic medical conditions:  HTN, uncontrolled suspect secondary to pain: BP as elevated as 180/81 on admission, PTA losartan 100 mg, HCTZ 12.5 mg, metoprolol succinate 25 mg  HLD: PTA rosuvastatin 10 mg qhs  NIDDM2: last known A1c 7.3 (on 4/26/24), POC qac and qhs, PTA metformin 1000 mg BID and Trulicity weekly held, low dose SSI qac started, blood glucose goal < 180, f/u A1c  Hyperthyroidism: PTA methimazole 5 mg  Parkinson's disease: PTA carbidopa / levodopa 25 / 100 mg TID, pramipexole 0.125 mg    Medication reconciliation completed using med list from outpatient orthopedic surgery, Dr. Bahena's note from 6/10/25. However, patient does not know if this is accurate med list. Please call her pharmacy for med list in AM.

## 2025-06-14 NOTE — H&P ADULT - NSICDXPASTMEDICALHX_GEN_ALL_CORE_FT
PAST MEDICAL HISTORY:  Diabetes mellitus type II, non insulin dependent     GERD (gastroesophageal reflux disease)     HTN (hypertension)     Hyperlipidemia     Hyperthyroidism monitored  by endocrinologist    Lumbar compression fracture     MR (mitral regurgitation)     MVP (mitral valve prolapse) echo 2018    Parkinson disease     Spinal stenosis pain relieved with steroid injection

## 2025-06-14 NOTE — H&P ADULT - TIME BILLING
coordination of care with ER resident, ER RN, and orthopedic surgery resident, reviewing outpatient orthopedic surgery notes, obtaining history, performing a physical examination, reviewing and interpreting labs and imaging, ordering further studies and tests, explaining the diagnosis and treatment plan to patient, attempting to complete medication reconciliation to the best of my ability, and documentation as above.

## 2025-06-14 NOTE — H&P ADULT - NSHPPHYSICALEXAM_GEN_ALL_CORE
T(C): 36.4 (06-14-25 @ 22:25), Max: 36.7 (06-14-25 @ 15:30)  HR: 79 (06-14-25 @ 22:25) (58 - 84)  BP: 180/81 (06-14-25 @ 22:25) (151/84 - 180/81)  RR: 15 (06-14-25 @ 22:25) (14 - 16)  SpO2: 97% (06-14-25 @ 22:25) (97% - 98%)    CONSTITUTIONAL: Well groomed, no apparent distress, pleasant, conversational  EYES: PERRLA and symmetric, EOMI  ENMT: Oral mucosa with moist membranes  RESP: No respiratory distress, no use of accessory muscles; CTA b/l  CV: RRR  GI: Soft, NT, ND  MSK: R. hip with limited AROM and PROM   SKIN: b/l knees with healed vertical incision scars  NEURO: AAO x 3, b/l hands with pill-rolling tremor noted

## 2025-06-15 LAB
A1C WITH ESTIMATED AVERAGE GLUCOSE RESULT: 6.8 % — HIGH (ref 4–5.6)
ESTIMATED AVERAGE GLUCOSE: 148 MG/DL — HIGH (ref 68–114)
GLUCOSE BLDC GLUCOMTR-MCNC: 131 MG/DL — HIGH (ref 70–99)
GLUCOSE BLDC GLUCOMTR-MCNC: 154 MG/DL — HIGH (ref 70–99)
GLUCOSE BLDC GLUCOMTR-MCNC: 193 MG/DL — HIGH (ref 70–99)
GLUCOSE BLDC GLUCOMTR-MCNC: 193 MG/DL — HIGH (ref 70–99)

## 2025-06-15 PROCEDURE — 99232 SBSQ HOSP IP/OBS MODERATE 35: CPT

## 2025-06-15 RX ORDER — ALPRAZOLAM 0.5 MG
0.25 TABLET, EXTENDED RELEASE 24 HR ORAL ONCE
Refills: 0 | Status: DISCONTINUED | OUTPATIENT
Start: 2025-06-15 | End: 2025-06-16

## 2025-06-15 RX ADMIN — TRAMADOL HYDROCHLORIDE 50 MILLIGRAM(S): 50 TABLET, FILM COATED ORAL at 03:46

## 2025-06-15 RX ADMIN — TRAMADOL HYDROCHLORIDE 50 MILLIGRAM(S): 50 TABLET, FILM COATED ORAL at 11:00

## 2025-06-15 RX ADMIN — INSULIN LISPRO 1: 100 INJECTION, SOLUTION INTRAVENOUS; SUBCUTANEOUS at 12:05

## 2025-06-15 RX ADMIN — Medication 1 TABLET(S): at 05:39

## 2025-06-15 RX ADMIN — LIDOCAINE HYDROCHLORIDE 1 PATCH: 20 JELLY TOPICAL at 07:08

## 2025-06-15 RX ADMIN — Medication 1 TABLET(S): at 12:12

## 2025-06-15 RX ADMIN — Medication 40 MILLIGRAM(S): at 09:03

## 2025-06-15 RX ADMIN — FOLIC ACID 1 MILLIGRAM(S): 1 TABLET ORAL at 12:12

## 2025-06-15 RX ADMIN — Medication 3 MILLIGRAM(S): at 22:14

## 2025-06-15 RX ADMIN — TRAMADOL HYDROCHLORIDE 50 MILLIGRAM(S): 50 TABLET, FILM COATED ORAL at 22:12

## 2025-06-15 RX ADMIN — TRAMADOL HYDROCHLORIDE AND ACETAMINOPHEN 1 TABLET(S): 37.5; 325 TABLET ORAL at 14:37

## 2025-06-15 RX ADMIN — Medication 81 MILLIGRAM(S): at 12:12

## 2025-06-15 RX ADMIN — PREGABALIN 75 MILLIGRAM(S): 50 CAPSULE ORAL at 05:39

## 2025-06-15 RX ADMIN — TRAMADOL HYDROCHLORIDE AND ACETAMINOPHEN 1 TABLET(S): 37.5; 325 TABLET ORAL at 06:59

## 2025-06-15 RX ADMIN — PREGABALIN 75 MILLIGRAM(S): 50 CAPSULE ORAL at 17:16

## 2025-06-15 RX ADMIN — Medication 1 TABLET(S): at 22:12

## 2025-06-15 RX ADMIN — Medication 1 TABLET(S): at 13:38

## 2025-06-15 RX ADMIN — ENOXAPARIN SODIUM 40 MILLIGRAM(S): 100 INJECTION SUBCUTANEOUS at 22:12

## 2025-06-15 RX ADMIN — TRAMADOL HYDROCHLORIDE AND ACETAMINOPHEN 1 TABLET(S): 37.5; 325 TABLET ORAL at 07:59

## 2025-06-15 RX ADMIN — TRAMADOL HYDROCHLORIDE 50 MILLIGRAM(S): 50 TABLET, FILM COATED ORAL at 10:00

## 2025-06-15 RX ADMIN — PRAMIPEXOLE DIHYDROCHLORIDE 0.12 MILLIGRAM(S): 1 TABLET ORAL at 13:38

## 2025-06-15 RX ADMIN — TRAMADOL HYDROCHLORIDE 50 MILLIGRAM(S): 50 TABLET, FILM COATED ORAL at 23:12

## 2025-06-15 RX ADMIN — TRAMADOL HYDROCHLORIDE AND ACETAMINOPHEN 1 TABLET(S): 37.5; 325 TABLET ORAL at 13:37

## 2025-06-15 RX ADMIN — METOPROLOL SUCCINATE 25 MILLIGRAM(S): 50 TABLET, EXTENDED RELEASE ORAL at 05:39

## 2025-06-15 RX ADMIN — TRAMADOL HYDROCHLORIDE 50 MILLIGRAM(S): 50 TABLET, FILM COATED ORAL at 04:36

## 2025-06-15 NOTE — CONSULT NOTE ADULT - SUBJECTIVE AND OBJECTIVE BOX
Patient is a 80yFemale community ambulator with assistive devices consulted for worsening R radicular back pain. Immobile for 2xdays however. Denies numbness/tingling/paresthesias/weakness, denies incontinence of bowel/bladder.  Denies having any other pain elsewhere. No other orthopaedic concerns at this time. Hx of L2-Pelvis with Matusz (1/24), L1 Kypho (3/25), Epidural Injection (PM&R 4/25).      PAST MEDICAL & SURGICAL HISTORY:  Hyperthyroidism  monitored  by endocrinologist      HTN (hypertension)      Hyperlipidemia      GERD (gastroesophageal reflux disease)      Parkinson disease      Spinal stenosis  pain relieved with steroid injection      MVP (mitral valve prolapse)  echo 2018      MR (mitral regurgitation)      Diabetes mellitus type II, non insulin dependent      Lumbar compression fracture      S/P foot surgery, left  5/2/2018      History of bowel resection  2012      S/P hernia repair  2013      S/P breast augmentation  2014 saline implants      H/O abdominal surgery  1999      S/P knee replacement  Left ( 1/2/2019 ), right TKR, 2019      Status post kyphoplasty          Vital Signs Last 24 Hrs  T(C): 37 (15 Miguel 2025 05:34), Max: 37 (15 Miguel 2025 05:34)  T(F): 98.6 (15 Miguel 2025 05:34), Max: 98.6 (15 Miguel 2025 05:34)  HR: 79 (15 Miguel 2025 05:34) (58 - 84)  BP: 130/69 (15 Miguel 2025 05:34) (130/69 - 180/81)  BP(mean): --  RR: 16 (15 Miguel 2025 05:34) (14 - 16)  SpO2: 98% (15 Miguel 2025 05:34) (95% - 98%)    Parameters below as of 14 Jun 2025 23:35  Patient On (Oxygen Delivery Method): room air      I&O's Detail    14 Jun 2025 07:01  -  15 Miguel 2025 07:00  --------------------------------------------------------  IN:  Total IN: 0 mL    OUT:    Voided (mL): 450 mL  Total OUT: 450 mL    Total NET: -450 mL          LABS:                        12.9   6.14  )-----------( 172      ( 14 Jun 2025 19:39 )             40.4     06-14    137  |  106  |  27[H]  ----------------------------<  108[H]  4.1   |  23  |  1.17    Ca    9.8      14 Jun 2025 19:39    TPro  7.1  /  Alb  3.8  /  TBili  0.4  /  DBili  x   /  AST  17  /  ALT  12  /  AlkPhos  81  06-14      Urinalysis Basic - ( 14 Jun 2025 19:39 )    Color: x / Appearance: x / SG: x / pH: x  Gluc: 108 mg/dL / Ketone: x  / Bili: x / Urobili: x   Blood: x / Protein: x / Nitrite: x   Leuk Esterase: x / RBC: x / WBC x   Sq Epi: x / Non Sq Epi: x / Bacteria: x        PHYSICAL EXAM:  General    Compartments soft and compressible  Grossly moving all extremities  2+ radial pulses  2+ DP Pulses    Motor:                   C5                C6              C7               C8           T1   R             5/5                5/5            5/5              5/5          5/5  L             5/5                5/5            5/5              5/5          5/5                    L2                  L3             L4              L5            S1  R            5/5                5/5             5/5            5/5          5/5  L             5/5                5/5            5/5            5/5          5/5    Sensory:            C5         C6         C7      C8       T1        (0=absent, 1=impaired, 2=normal, NT=not testable)  R         2            2           2        2         2  L          2            2           2        2         2               L2          L3         L4      L5       S1         (0=absent, 1=impaired, 2=normal, NT=not testable)  R         2            2            2        2        2  L          2            2           2        2         2    Babinski Negative Bilaterally  Clonus Negative Bilaterally  Mccord Negative Bilaterally     Imaging:  AP Pelvis/R Hip: Hardware intact  MR T/LSpine from [facility]ology from 3/25 reviewed.                                           A/P :   Patient is a 80yFemale w/worsening R radiculopathy back pain. Extensive spine hx of L2-Pelvis with Matusz (1/24), L1 Kypho (3/25), Epidural Injection (PM&R 4/25). Clinical presentation and physical exam are not consistent w/ acute cord compression/cauda equina. Does not demonstrate red flag symptoms such as bowel/bladder incontinence, saddle anesthesia, fevers/chills, or weight loss. Recommend Pain control and follow up with primary surgeon or PM&R. No acute surgical intervention.     -PTOT: WBAT  -No heavy lifting/twisting  -Multimodal analgesia - recommend low dose opioids, acetaminophen, muscle relaxant, gabapentin as tolerated  -All patient's questions answered. Patient understands and agrees w/ above plan.    No acute orthopaedic surgical intervention indicated at this time. This patient is orthopaedically stable for discharge.   Patient to follow up with primary surgeon vs PMR as an outpatient for further evaluation and management.   All of the patient's questions and concerns were answered and addressed.    Discussed with Attending who is aware and agrees with above plan.

## 2025-06-15 NOTE — PROGRESS NOTE ADULT - ASSESSMENT
Patient is a 80yFemale w/worsening R radiculopathy back pain. Extensive spine hx of L2-Pelvis with Matusz (1/24), L1 Kypho (3/25), Epidural Injection (PM&R 4/25). Clinical presentation and physical exam are not consistent w/ acute cord compression/cauda equina. Does not demonstrate red flag symptoms such as bowel/bladder incontinence, saddle anesthesia, fevers/chills, or weight loss. Recommend Pain control and follow up with primary surgeon or PM&R. No acute surgical intervention.     -PTOT: WBAT  -No heavy lifting/twisting  -Multimodal analgesia -S/p acetaminophen 975 mg PO, gabapentin 600 mg PO, lidocaine patch, and morphine 4 mg IV in the ED  PTA pregabalin 75 mg BID, tylenol/codeine q6 PRN, diclofenac 75 mg BID PRN, tramadol 50 mg q6 PRN  Multimodal pain management PRN, fall precautions  - MRI L-spine with and without IV contrast pending, she refused today  PT consulted; patient reports she does not want to go to rehab and prefers home PT      Chronic medical conditions:  HTN, uncontrolled suspect secondary to pain: BP as elevated as 180/81 on admission, PTA losartan 100 mg, HCTZ 12.5 mg, metoprolol succinate 25 mg  HLD: PTA rosuvastatin 10 mg qhs  NIDDM2: last known A1c 7.3 (on 4/26/24), POC qac and qhs, PTA metformin 1000 mg BID and Trulicity weekly held, low dose SSI qac started, blood glucose goal < 180, f/u A1c  Hyperthyroidism: PTA methimazole 5 mg  Parkinson's disease: PTA carbidopa / levodopa 25 / 100 mg TID, pramipexole 0.125      Patient is a 80yFemale w/worsening R radiculopathy back pain. Extensive spine hx of L2-Pelvis with Matusz (1/24), L1 Kypho (3/25), Epidural Injection (PM&R 4/25). Clinical presentation and physical exam are not consistent w/ acute cord compression/cauda equina. Does not demonstrate red flag symptoms such as bowel/bladder incontinence, saddle anesthesia, fevers/chills, or weight loss. Recommend Pain control and follow up with primary surgeon or PM&R. No acute surgical intervention.     -PTOT: WBAT  -No heavy lifting/twisting  -Multimodal analgesia -S/p acetaminophen 975 mg PO, gabapentin 600 mg PO, lidocaine patch, and morphine 4 mg IV in the ED  PTA pregabalin 75 mg BID, tylenol/codeine q6 PRN, diclofenac 75 mg BID PRN, tramadol 50 mg q6 PRN  Multimodal pain management PRN, fall precautions  - MRI L-spine with and without IV contrast pending, she refused today  PT consulted; patient reports she does not want to go to rehab and prefers home PT      Chronic medical conditions:  HTN, uncontrolled suspect secondary to pain: BP as elevated as 180/81 on admission, PTA losartan 100 mg, HCTZ 12.5 mg, metoprolol succinate 25 mg  HLD: PTA rosuvastatin 10 mg qhs  NIDDM2: last known A1c 7.3 (on 4/26/24), POC qac and qhs, PTA metformin 1000 mg BID and Trulicity weekly held, low dose SSI qac started, blood glucose goal < 180, f/u A1c  Hyperthyroidism: PTA methimazole 5 mg  Parkinson's disease: PTA carbidopa / levodopa 25 / 100 mg TID, pramipexole 0.125     Patient unclear about a few meds. Called pharmacy to confirm it closed early today.

## 2025-06-16 LAB
ANION GAP SERPL CALC-SCNC: 5 MMOL/L — SIGNIFICANT CHANGE UP (ref 5–17)
BUN SERPL-MCNC: 23 MG/DL — SIGNIFICANT CHANGE UP (ref 7–23)
CALCIUM SERPL-MCNC: 8.6 MG/DL — SIGNIFICANT CHANGE UP (ref 8.5–10.1)
CHLORIDE SERPL-SCNC: 107 MMOL/L — SIGNIFICANT CHANGE UP (ref 96–108)
CO2 SERPL-SCNC: 28 MMOL/L — SIGNIFICANT CHANGE UP (ref 22–31)
CREAT SERPL-MCNC: 0.72 MG/DL — SIGNIFICANT CHANGE UP (ref 0.5–1.3)
EGFR: 84 ML/MIN/1.73M2 — SIGNIFICANT CHANGE UP
EGFR: 84 ML/MIN/1.73M2 — SIGNIFICANT CHANGE UP
GLUCOSE BLDC GLUCOMTR-MCNC: 137 MG/DL — HIGH (ref 70–99)
GLUCOSE BLDC GLUCOMTR-MCNC: 143 MG/DL — HIGH (ref 70–99)
GLUCOSE BLDC GLUCOMTR-MCNC: 147 MG/DL — HIGH (ref 70–99)
GLUCOSE BLDC GLUCOMTR-MCNC: 151 MG/DL — HIGH (ref 70–99)
GLUCOSE SERPL-MCNC: 130 MG/DL — HIGH (ref 70–99)
HCT VFR BLD CALC: 37.2 % — SIGNIFICANT CHANGE UP (ref 34.5–45)
HGB BLD-MCNC: 11.7 G/DL — SIGNIFICANT CHANGE UP (ref 11.5–15.5)
MCHC RBC-ENTMCNC: 29 PG — SIGNIFICANT CHANGE UP (ref 27–34)
MCHC RBC-ENTMCNC: 31.5 G/DL — LOW (ref 32–36)
MCV RBC AUTO: 92.1 FL — SIGNIFICANT CHANGE UP (ref 80–100)
NRBC BLD AUTO-RTO: 0 /100 WBCS — SIGNIFICANT CHANGE UP (ref 0–0)
PLATELET # BLD AUTO: 157 K/UL — SIGNIFICANT CHANGE UP (ref 150–400)
POTASSIUM SERPL-MCNC: 3.7 MMOL/L — SIGNIFICANT CHANGE UP (ref 3.5–5.3)
POTASSIUM SERPL-SCNC: 3.7 MMOL/L — SIGNIFICANT CHANGE UP (ref 3.5–5.3)
RBC # BLD: 4.04 M/UL — SIGNIFICANT CHANGE UP (ref 3.8–5.2)
RBC # FLD: 14.4 % — SIGNIFICANT CHANGE UP (ref 10.3–14.5)
SODIUM SERPL-SCNC: 140 MMOL/L — SIGNIFICANT CHANGE UP (ref 135–145)
WBC # BLD: 4.32 K/UL — SIGNIFICANT CHANGE UP (ref 3.8–10.5)
WBC # FLD AUTO: 4.32 K/UL — SIGNIFICANT CHANGE UP (ref 3.8–10.5)

## 2025-06-16 PROCEDURE — 99232 SBSQ HOSP IP/OBS MODERATE 35: CPT

## 2025-06-16 RX ADMIN — Medication 1 TABLET(S): at 21:17

## 2025-06-16 RX ADMIN — Medication 2 MILLIGRAM(S): at 15:32

## 2025-06-16 RX ADMIN — Medication 3 MILLIGRAM(S): at 21:18

## 2025-06-16 RX ADMIN — PREGABALIN 75 MILLIGRAM(S): 50 CAPSULE ORAL at 17:40

## 2025-06-16 RX ADMIN — PRAMIPEXOLE DIHYDROCHLORIDE 0.12 MILLIGRAM(S): 1 TABLET ORAL at 11:44

## 2025-06-16 RX ADMIN — Medication 0.25 MILLIGRAM(S): at 15:32

## 2025-06-16 RX ADMIN — TRAMADOL HYDROCHLORIDE 50 MILLIGRAM(S): 50 TABLET, FILM COATED ORAL at 12:49

## 2025-06-16 RX ADMIN — FOLIC ACID 1 MILLIGRAM(S): 1 TABLET ORAL at 11:43

## 2025-06-16 RX ADMIN — Medication 1 TABLET(S): at 14:50

## 2025-06-16 RX ADMIN — PREGABALIN 75 MILLIGRAM(S): 50 CAPSULE ORAL at 06:14

## 2025-06-16 RX ADMIN — METOPROLOL SUCCINATE 25 MILLIGRAM(S): 50 TABLET, EXTENDED RELEASE ORAL at 06:14

## 2025-06-16 RX ADMIN — Medication 40 MILLIGRAM(S): at 06:15

## 2025-06-16 RX ADMIN — TRAMADOL HYDROCHLORIDE 50 MILLIGRAM(S): 50 TABLET, FILM COATED ORAL at 06:15

## 2025-06-16 RX ADMIN — ENOXAPARIN SODIUM 40 MILLIGRAM(S): 100 INJECTION SUBCUTANEOUS at 21:17

## 2025-06-16 RX ADMIN — TRAMADOL HYDROCHLORIDE 50 MILLIGRAM(S): 50 TABLET, FILM COATED ORAL at 07:15

## 2025-06-16 RX ADMIN — Medication 1 TABLET(S): at 06:14

## 2025-06-16 RX ADMIN — Medication 1 TABLET(S): at 11:43

## 2025-06-16 RX ADMIN — TRAMADOL HYDROCHLORIDE 50 MILLIGRAM(S): 50 TABLET, FILM COATED ORAL at 21:18

## 2025-06-16 RX ADMIN — LOSARTAN POTASSIUM 100 MILLIGRAM(S): 100 TABLET, FILM COATED ORAL at 06:14

## 2025-06-16 RX ADMIN — Medication 81 MILLIGRAM(S): at 11:43

## 2025-06-16 RX ADMIN — TRAMADOL HYDROCHLORIDE 50 MILLIGRAM(S): 50 TABLET, FILM COATED ORAL at 22:18

## 2025-06-16 NOTE — PROGRESS NOTE ADULT - ASSESSMENT
A/P    Patient is a 80yFemale w/worsening R radiculopathy back pain. Extensive spine hx of L2-Pelvis with Matusz (1/24), L1 Kypho (3/25), Epidural Injection (PM&R 4/25). Clinical presentation and physical exam are not consistent w/ acute cord compression/cauda equina. Does not demonstrate red flag symptoms such as bowel/bladder incontinence, saddle anesthesia, fevers/chills, or weight loss. Recommend Pain control and follow up with primary surgeon or PM&R. No acute surgical intervention.     -PTOT: WBAT  -No heavy lifting/twisting  -Multimodal analgesia -S/p acetaminophen 975 mg PO, gabapentin 600 mg PO, lidocaine patch, and morphine 4 mg IV in the ED  PTA pregabalin 75 mg BID, tylenol/codeine q6 PRN, diclofenac 75 mg BID PRN, tramadol 50 mg q6 PRN  Multimodal pain management PRN, fall precautions  - MRI L-spine with and without IV contrast pending for today  PT consulted       Chronic medical conditions:  HTN, uncontrolled suspect secondary to pain: BP as elevated as 180/81 on admission, PTA losartan 100 mg, HCTZ 12.5 mg, metoprolol succinate 25 mg  HLD: PTA rosuvastatin 10 mg qhs  NIDDM2: last known A1c 7.3 (on 4/26/24), POC qac and qhs, PTA metformin 1000 mg BID and Trulicity weekly held, low dose SSI qac started, blood glucose goal < 180, f/u A1c  Hyperthyroidism: PTA methimazole 5 mg  Parkinson's disease: PTA carbidopa / levodopa 25 / 100 mg TID, pramipexole 0.125     Dispo: Pending MRI and PT zane Yeung MD

## 2025-06-17 LAB
-  AMOXICILLIN/CLAVULANIC ACID: SIGNIFICANT CHANGE UP
-  AMPICILLIN/SULBACTAM: SIGNIFICANT CHANGE UP
-  AMPICILLIN: SIGNIFICANT CHANGE UP
-  AZTREONAM: SIGNIFICANT CHANGE UP
-  CEFAZOLIN: SIGNIFICANT CHANGE UP
-  CEFEPIME: SIGNIFICANT CHANGE UP
-  CEFOXITIN: SIGNIFICANT CHANGE UP
-  CEFTRIAXONE: SIGNIFICANT CHANGE UP
-  CEFUROXIME: SIGNIFICANT CHANGE UP
-  CIPROFLOXACIN: SIGNIFICANT CHANGE UP
-  ERTAPENEM: SIGNIFICANT CHANGE UP
-  GENTAMICIN: SIGNIFICANT CHANGE UP
-  IMIPENEM: SIGNIFICANT CHANGE UP
-  LEVOFLOXACIN: SIGNIFICANT CHANGE UP
-  MEROPENEM: SIGNIFICANT CHANGE UP
-  NITROFURANTOIN: SIGNIFICANT CHANGE UP
-  PIPERACILLIN/TAZOBACTAM: SIGNIFICANT CHANGE UP
-  TIGECYCLINE: SIGNIFICANT CHANGE UP
-  TOBRAMYCIN: SIGNIFICANT CHANGE UP
-  TRIMETHOPRIM/SULFAMETHOXAZOLE: SIGNIFICANT CHANGE UP
CULTURE RESULTS: ABNORMAL
GLUCOSE BLDC GLUCOMTR-MCNC: 160 MG/DL — HIGH (ref 70–99)
GLUCOSE BLDC GLUCOMTR-MCNC: 173 MG/DL — HIGH (ref 70–99)
GLUCOSE BLDC GLUCOMTR-MCNC: 179 MG/DL — HIGH (ref 70–99)
GLUCOSE BLDC GLUCOMTR-MCNC: 180 MG/DL — HIGH (ref 70–99)
METHOD TYPE: SIGNIFICANT CHANGE UP
ORGANISM # SPEC MICROSCOPIC CNT: ABNORMAL
ORGANISM # SPEC MICROSCOPIC CNT: SIGNIFICANT CHANGE UP
SPECIMEN SOURCE: SIGNIFICANT CHANGE UP

## 2025-06-17 PROCEDURE — 99232 SBSQ HOSP IP/OBS MODERATE 35: CPT

## 2025-06-17 RX ORDER — ALPRAZOLAM 0.5 MG
0.25 TABLET, EXTENDED RELEASE 24 HR ORAL ONCE
Refills: 0 | Status: DISCONTINUED | OUTPATIENT
Start: 2025-06-17 | End: 2025-06-19

## 2025-06-17 RX ORDER — KETOROLAC TROMETHAMINE 30 MG/ML
30 INJECTION, SOLUTION INTRAMUSCULAR; INTRAVENOUS ONCE
Refills: 0 | Status: DISCONTINUED | OUTPATIENT
Start: 2025-06-17 | End: 2025-06-18

## 2025-06-17 RX ORDER — LORAZEPAM 4 MG/ML
1 VIAL (ML) INJECTION ONCE
Refills: 0 | Status: DISCONTINUED | OUTPATIENT
Start: 2025-06-17 | End: 2025-06-18

## 2025-06-17 RX ADMIN — Medication 1 TABLET(S): at 13:54

## 2025-06-17 RX ADMIN — PRAMIPEXOLE DIHYDROCHLORIDE 0.12 MILLIGRAM(S): 1 TABLET ORAL at 13:54

## 2025-06-17 RX ADMIN — FOLIC ACID 1 MILLIGRAM(S): 1 TABLET ORAL at 13:54

## 2025-06-17 RX ADMIN — TRAMADOL HYDROCHLORIDE 50 MILLIGRAM(S): 50 TABLET, FILM COATED ORAL at 13:54

## 2025-06-17 RX ADMIN — Medication 1 TABLET(S): at 22:28

## 2025-06-17 RX ADMIN — LOSARTAN POTASSIUM 100 MILLIGRAM(S): 100 TABLET, FILM COATED ORAL at 05:40

## 2025-06-17 RX ADMIN — TRAMADOL HYDROCHLORIDE 50 MILLIGRAM(S): 50 TABLET, FILM COATED ORAL at 22:28

## 2025-06-17 RX ADMIN — INSULIN LISPRO 1: 100 INJECTION, SOLUTION INTRAVENOUS; SUBCUTANEOUS at 17:17

## 2025-06-17 RX ADMIN — METOPROLOL SUCCINATE 25 MILLIGRAM(S): 50 TABLET, EXTENDED RELEASE ORAL at 05:40

## 2025-06-17 RX ADMIN — PREGABALIN 75 MILLIGRAM(S): 50 CAPSULE ORAL at 05:40

## 2025-06-17 RX ADMIN — INSULIN LISPRO 1: 100 INJECTION, SOLUTION INTRAVENOUS; SUBCUTANEOUS at 11:57

## 2025-06-17 RX ADMIN — Medication 81 MILLIGRAM(S): at 13:55

## 2025-06-17 RX ADMIN — Medication 1 TABLET(S): at 13:55

## 2025-06-17 RX ADMIN — TRAMADOL HYDROCHLORIDE 50 MILLIGRAM(S): 50 TABLET, FILM COATED ORAL at 05:40

## 2025-06-17 RX ADMIN — TRAMADOL HYDROCHLORIDE 50 MILLIGRAM(S): 50 TABLET, FILM COATED ORAL at 06:40

## 2025-06-17 RX ADMIN — PREGABALIN 75 MILLIGRAM(S): 50 CAPSULE ORAL at 18:55

## 2025-06-17 RX ADMIN — Medication 1 TABLET(S): at 05:37

## 2025-06-17 RX ADMIN — ROSUVASTATIN CALCIUM 10 MILLIGRAM(S): 20 TABLET, FILM COATED ORAL at 22:28

## 2025-06-17 RX ADMIN — TRAMADOL HYDROCHLORIDE 50 MILLIGRAM(S): 50 TABLET, FILM COATED ORAL at 23:28

## 2025-06-17 RX ADMIN — Medication 40 MILLIGRAM(S): at 06:40

## 2025-06-17 RX ADMIN — ENOXAPARIN SODIUM 40 MILLIGRAM(S): 100 INJECTION SUBCUTANEOUS at 22:28

## 2025-06-17 NOTE — PHYSICAL THERAPY INITIAL EVALUATION ADULT - PERTINENT HX OF CURRENT PROBLEM, REHAB EVAL
Patient is a 80yFemale w/worsening R radiculopathy back pain. Extensive spine hx of L2-Pelvis with Matusz (1/24), L1 Kypho (3/25), Epidural Injection (PM&R 4/25). Clinical presentation and physical exam are not consistent w/ acute cord compression/cauda equina. Does not demonstrate red flag symptoms such as bowel/bladder incontinence, saddle anesthesia, fevers/chills, or weight loss. Recommend Pain control and follow up with primary surgeon or PM&R. No acute surgical intervention.

## 2025-06-17 NOTE — PHYSICAL THERAPY INITIAL EVALUATION ADULT - IMPAIRMENTS CONTRIBUTING IMPAIRED BED MOBILITY, REHAB EVAL
Arrival to room, tele monitor on, family at bs, bgl 92, given snack, pt calm, fall precs in place. Denies sob/pain [place]. as   impaired balance/decreased strength

## 2025-06-17 NOTE — PHYSICAL THERAPY INITIAL EVALUATION ADULT - GENERAL OBSERVATIONS, REHAB EVAL
Pt found semi supine in bed in NAD, +hep lock, +primafit, agreeable to PT Shannan and RN Velasquez aware.

## 2025-06-17 NOTE — PROGRESS NOTE ADULT - ASSESSMENT
A/P    Patient is a 80yFemale w/worsening R radiculopathy back pain. Extensive spine hx of L2-Pelvis with Matusz (1/24), L1 Kypho (3/25), Epidural Injection (PM&R 4/25). Clinical presentation and physical exam are not consistent w/ acute cord compression/cauda equina. Does not demonstrate red flag symptoms such as bowel/bladder incontinence, saddle anesthesia, fevers/chills, or weight loss. Recommend Pain control and follow up with primary surgeon or PM&R. No acute surgical intervention.     -PTOT: WBAT  -No heavy lifting/twisting  -Multimodal analgesia -S/p acetaminophen 975 mg PO, gabapentin 600 mg PO, lidocaine patch, and morphine 4 mg IV in the ED  PTA pregabalin 75 mg BID, tylenol/codeine q6 PRN, diclofenac 75 mg BID PRN, tramadol 50 mg q6 PRN  Multimodal pain management PRN, fall precautions  - MRI L-spine with and without IV contrast pending for today - will give toradol and ativan 30 mins prior to MRI   PT consulted       Chronic medical conditions:  HTN, uncontrolled suspect secondary to pain: BP as elevated as 180/81 on admission, PTA losartan 100 mg, HCTZ 12.5 mg, metoprolol succinate 25 mg  HLD: PTA rosuvastatin 10 mg qhs  NIDDM2: last known A1c 7.3 (on 4/26/24), POC qac and qhs, PTA metformin 1000 mg BID and Trulicity weekly held, low dose SSI qac started, blood glucose goal < 180, f/u A1c  Hyperthyroidism: PTA methimazole 5 mg  Parkinson's disease: PTA carbidopa / levodopa 25 / 100 mg TID, pramipexole 0.125     Dispo: Pending MRI and PT zane Yeung MD

## 2025-06-18 LAB
GLUCOSE BLDC GLUCOMTR-MCNC: 129 MG/DL — HIGH (ref 70–99)
GLUCOSE BLDC GLUCOMTR-MCNC: 219 MG/DL — HIGH (ref 70–99)
GLUCOSE BLDC GLUCOMTR-MCNC: 228 MG/DL — HIGH (ref 70–99)

## 2025-06-18 PROCEDURE — 99497 ADVNCD CARE PLAN 30 MIN: CPT

## 2025-06-18 PROCEDURE — 99232 SBSQ HOSP IP/OBS MODERATE 35: CPT

## 2025-06-18 PROCEDURE — 72158 MRI LUMBAR SPINE W/O & W/DYE: CPT | Mod: 26

## 2025-06-18 RX ORDER — LIDOCAINE HYDROCHLORIDE 20 MG/ML
1 JELLY TOPICAL EVERY 24 HOURS
Refills: 0 | Status: DISCONTINUED | OUTPATIENT
Start: 2025-06-18 | End: 2025-06-19

## 2025-06-18 RX ADMIN — ENOXAPARIN SODIUM 40 MILLIGRAM(S): 100 INJECTION SUBCUTANEOUS at 22:30

## 2025-06-18 RX ADMIN — LOSARTAN POTASSIUM 100 MILLIGRAM(S): 100 TABLET, FILM COATED ORAL at 05:37

## 2025-06-18 RX ADMIN — Medication 40 MILLIGRAM(S): at 05:37

## 2025-06-18 RX ADMIN — ROSUVASTATIN CALCIUM 10 MILLIGRAM(S): 20 TABLET, FILM COATED ORAL at 22:30

## 2025-06-18 RX ADMIN — Medication 1 MILLIGRAM(S): at 16:45

## 2025-06-18 RX ADMIN — Medication 1 TABLET(S): at 14:45

## 2025-06-18 RX ADMIN — Medication 1 TABLET(S): at 17:51

## 2025-06-18 RX ADMIN — TRAMADOL HYDROCHLORIDE 50 MILLIGRAM(S): 50 TABLET, FILM COATED ORAL at 14:40

## 2025-06-18 RX ADMIN — Medication 3 MILLIGRAM(S): at 22:32

## 2025-06-18 RX ADMIN — Medication 1 TABLET(S): at 05:42

## 2025-06-18 RX ADMIN — INSULIN LISPRO 2: 100 INJECTION, SOLUTION INTRAVENOUS; SUBCUTANEOUS at 13:16

## 2025-06-18 RX ADMIN — METOPROLOL SUCCINATE 25 MILLIGRAM(S): 50 TABLET, EXTENDED RELEASE ORAL at 05:37

## 2025-06-18 RX ADMIN — TRAMADOL HYDROCHLORIDE 50 MILLIGRAM(S): 50 TABLET, FILM COATED ORAL at 17:58

## 2025-06-18 RX ADMIN — TRAMADOL HYDROCHLORIDE 50 MILLIGRAM(S): 50 TABLET, FILM COATED ORAL at 05:36

## 2025-06-18 RX ADMIN — Medication 81 MILLIGRAM(S): at 12:45

## 2025-06-18 RX ADMIN — FOLIC ACID 1 MILLIGRAM(S): 1 TABLET ORAL at 17:51

## 2025-06-18 RX ADMIN — PREGABALIN 75 MILLIGRAM(S): 50 CAPSULE ORAL at 17:58

## 2025-06-18 RX ADMIN — KETOROLAC TROMETHAMINE 30 MILLIGRAM(S): 30 INJECTION, SOLUTION INTRAMUSCULAR; INTRAVENOUS at 16:45

## 2025-06-18 RX ADMIN — TRAMADOL HYDROCHLORIDE 50 MILLIGRAM(S): 50 TABLET, FILM COATED ORAL at 06:36

## 2025-06-18 RX ADMIN — PRAMIPEXOLE DIHYDROCHLORIDE 0.12 MILLIGRAM(S): 1 TABLET ORAL at 12:44

## 2025-06-18 RX ADMIN — PREGABALIN 75 MILLIGRAM(S): 50 CAPSULE ORAL at 05:36

## 2025-06-18 NOTE — PROGRESS NOTE ADULT - ASSESSMENT
A/P    Patient is a 80yFemale w/worsening R radiculopathy back pain. Extensive spine hx of L2-Pelvis with Matusz (1/24), L1 Kypho (3/25), Epidural Injection (PM&R 4/25). Clinical presentation and physical exam are not consistent w/ acute cord compression/cauda equina. Does not demonstrate red flag symptoms such as bowel/bladder incontinence, saddle anesthesia, fevers/chills, or weight loss. Recommend Pain control and follow up with primary surgeon or PM&R. No acute surgical intervention.     -PTOT: WBAT  -No heavy lifting/twisting  -Multimodal analgesia -S/p acetaminophen 975 mg PO, gabapentin 600 mg PO, lidocaine patch, and morphine 4 mg IV in the ED  PTA pregabalin 75 mg BID, tylenol/codeine q6 PRN, diclofenac 75 mg BID PRN, tramadol 50 mg q6 PRN  Multimodal pain management PRN, fall precautions  - MRI L-spine unable to be done - patient could not tolerate procedure  PT consulted - > [facility]      Chronic medical conditions:  HTN, uncontrolled suspect secondary to pain: BP as elevated as 180/81 on admission, PTA losartan 100 mg, HCTZ 12.5 mg, metoprolol succinate 25 mg  HLD: PTA rosuvastatin 10 mg qhs  NIDDM2: last known A1c 7.3 (on 4/26/24), POC qac and qhs, PTA metformin 1000 mg BID and Trulicity weekly held, low dose SSI qac started, blood glucose goal < 180, f/u A1c  Hyperthyroidism: PTA methimazole 5 mg  Parkinson's disease: PTA carbidopa / levodopa 25 / 100 mg TID, pramipexole 0.125     Dispo: Pending [facility] placement    Dean Yeung MD

## 2025-06-18 NOTE — PROGRESS NOTE ADULT - SUBJECTIVE AND OBJECTIVE BOX
Patient is a 80y old  Female who presents with a chief complaint of Physical deconditioning (16 Jun 2025 11:23)      INTERVAL HPI/OVERNIGHT EVENTS: No acute events overnight. Could not get MRI yesterday due to pain.     MEDICATIONS  (STANDING):  aspirin  chewable 81 milliGRAM(s) Oral daily  carbidopa/levodopa  25/100 1 Tablet(s) Oral three times a day  dextrose 5%. 1000 milliLiter(s) (100 mL/Hr) IV Continuous <Continuous>  dextrose 5%. 1000 milliLiter(s) (50 mL/Hr) IV Continuous <Continuous>  dextrose 50% Injectable 25 Gram(s) IV Push once  dextrose 50% Injectable 12.5 Gram(s) IV Push once  dextrose 50% Injectable 25 Gram(s) IV Push once  enoxaparin Injectable 40 milliGRAM(s) SubCutaneous every 24 hours  folic acid 1 milliGRAM(s) Oral daily  glucagon  Injectable 1 milliGRAM(s) IntraMuscular once  hydrochlorothiazide 12.5 milliGRAM(s) Oral daily  insulin lispro (ADMELOG) corrective regimen sliding scale   SubCutaneous three times a day before meals  ketorolac   Injectable 30 milliGRAM(s) IV Push once  LORazepam     Tablet 1 milliGRAM(s) Oral once  losartan 100 milliGRAM(s) Oral daily  methIMAzole 5 milliGRAM(s) Oral daily  metoprolol succinate ER 25 milliGRAM(s) Oral daily  multivitamin 1 Tablet(s) Oral daily  pantoprazole    Tablet 40 milliGRAM(s) Oral before breakfast  pramipexole 0.125 milliGRAM(s) Oral daily  pregabalin 75 milliGRAM(s) Oral two times a day  rosuvastatin 10 milliGRAM(s) Oral at bedtime    MEDICATIONS  (PRN):  acetaminophen  300 mG/codeine 30 mG 1 Tablet(s) Oral every 6 hours PRN for mild pain  ALPRAZolam 0.25 milliGRAM(s) Oral once PRN MRI  aluminum hydroxide/magnesium hydroxide/simethicone Suspension 30 milliLiter(s) Oral every 4 hours PRN Dyspepsia  dextrose Oral Gel 15 Gram(s) Oral once PRN Blood Glucose LESS THAN 70 milliGRAM(s)/deciliter  diclofenac 75 milliGRAM(s) Oral two times a day PRN for mild pain  melatonin 3 milliGRAM(s) Oral at bedtime PRN Insomnia  morphine  - Injectable 2 milliGRAM(s) IV Push once PRN MRI  ondansetron Injectable 4 milliGRAM(s) IV Push every 8 hours PRN Nausea and/or Vomiting  traMADol 50 milliGRAM(s) Oral every 6 hours PRN for moderate pain      Allergies    No Known Allergies    Intolerances    Percocet 5/325 (Other; Nausea)  OxyContin (Nausea)  hydrocodone (Other; Nausea)      REVIEW OF SYSTEMS:  CONSTITUTIONAL: +ve for fatigue  EYES: No eye pain, visual disturbances, or discharge    ALLERGY AND IMMUNOLOGIC: No hives or eczema    Vital Signs Last 24 Hrs  T(C): 36.6 (17 Jun 2025 05:05), Max: 36.7 (17 Jun 2025 00:02)  T(F): 97.9 (17 Jun 2025 05:05), Max: 98.1 (17 Jun 2025 00:02)  HR: 65 (17 Jun 2025 05:05) (59 - 70)  BP: 124/76 (17 Jun 2025 05:05) (102/66 - 159/88)  BP(mean): --  RR: 18 (17 Jun 2025 05:05) (14 - 18)  SpO2: 95% (17 Jun 2025 05:05) (94% - 97%)        PHYSICAL EXAM:    HEAD:  Atraumatic, Normocephalic  EYES: EOMI, PERRLA, conjunctiva and sclera clear    LABS:                        11.7   4.32  )-----------( 157      ( 16 Jun 2025 07:10 )             37.2     06-16    140  |  107  |  23  ----------------------------<  130[H]  3.7   |  28  |  0.72    Ca    8.6      16 Jun 2025 07:10        Urinalysis Basic - ( 16 Jun 2025 07:10 )    Color: x / Appearance: x / SG: x / pH: x  Gluc: 130 mg/dL / Ketone: x  / Bili: x / Urobili: x   Blood: x / Protein: x / Nitrite: x   Leuk Esterase: x / RBC: x / WBC x   Sq Epi: x / Non Sq Epi: x / Bacteria: x      CAPILLARY BLOOD GLUCOSE      POCT Blood Glucose.: 160 mg/dL (17 Jun 2025 08:32)  POCT Blood Glucose.: 151 mg/dL (16 Jun 2025 21:37)  POCT Blood Glucose.: 147 mg/dL (16 Jun 2025 16:14)  POCT Blood Glucose.: 137 mg/dL (16 Jun 2025 11:14)      
Patient is a 80y old  Female who presents with a chief complaint of Physical deconditioning (17 Jun 2025 10:08)      INTERVAL HPI/OVERNIGHT EVENTS: No acute events overnight.     MEDICATIONS  (STANDING):  aspirin  chewable 81 milliGRAM(s) Oral daily  carbidopa/levodopa  25/100 1 Tablet(s) Oral three times a day  dextrose 5%. 1000 milliLiter(s) (100 mL/Hr) IV Continuous <Continuous>  dextrose 5%. 1000 milliLiter(s) (50 mL/Hr) IV Continuous <Continuous>  dextrose 50% Injectable 25 Gram(s) IV Push once  dextrose 50% Injectable 12.5 Gram(s) IV Push once  dextrose 50% Injectable 25 Gram(s) IV Push once  enoxaparin Injectable 40 milliGRAM(s) SubCutaneous every 24 hours  folic acid 1 milliGRAM(s) Oral daily  glucagon  Injectable 1 milliGRAM(s) IntraMuscular once  hydrochlorothiazide 12.5 milliGRAM(s) Oral daily  insulin lispro (ADMELOG) corrective regimen sliding scale   SubCutaneous three times a day before meals  ketorolac   Injectable 30 milliGRAM(s) IV Push once  LORazepam     Tablet 1 milliGRAM(s) Oral once  losartan 100 milliGRAM(s) Oral daily  methIMAzole 5 milliGRAM(s) Oral daily  metoprolol succinate ER 25 milliGRAM(s) Oral daily  multivitamin 1 Tablet(s) Oral daily  pantoprazole    Tablet 40 milliGRAM(s) Oral before breakfast  pramipexole 0.125 milliGRAM(s) Oral daily  pregabalin 75 milliGRAM(s) Oral two times a day  rosuvastatin 10 milliGRAM(s) Oral at bedtime    MEDICATIONS  (PRN):  acetaminophen  300 mG/codeine 30 mG 1 Tablet(s) Oral every 6 hours PRN for mild pain  ALPRAZolam 0.25 milliGRAM(s) Oral once PRN MRI  aluminum hydroxide/magnesium hydroxide/simethicone Suspension 30 milliLiter(s) Oral every 4 hours PRN Dyspepsia  dextrose Oral Gel 15 Gram(s) Oral once PRN Blood Glucose LESS THAN 70 milliGRAM(s)/deciliter  diclofenac 75 milliGRAM(s) Oral two times a day PRN for mild pain  melatonin 3 milliGRAM(s) Oral at bedtime PRN Insomnia  morphine  - Injectable 2 milliGRAM(s) IV Push once PRN MRI  ondansetron Injectable 4 milliGRAM(s) IV Push every 8 hours PRN Nausea and/or Vomiting  traMADol 50 milliGRAM(s) Oral every 6 hours PRN for moderate pain      Allergies    No Known Allergies    Intolerances    Percocet 5/325 (Other; Nausea)  OxyContin (Nausea)  hydrocodone (Other; Nausea)      REVIEW OF SYSTEMS:  CONSTITUTIONAL: +ve for fatigue  EYES: No eye pain, visual disturbances, or discharge      Vital Signs Last 24 Hrs  T(C): 36.6 (18 Jun 2025 10:50), Max: 36.7 (17 Jun 2025 17:32)  T(F): 97.8 (18 Jun 2025 10:50), Max: 98.1 (17 Jun 2025 17:32)  HR: 77 (18 Jun 2025 10:50) (72 - 81)  BP: 95/48 (18 Jun 2025 10:50) (95/48 - 154/71)  BP(mean): --  RR: 18 (18 Jun 2025 10:50) (15 - 18)  SpO2: 96% (18 Jun 2025 10:50) (96% - 98%)    Parameters below as of 18 Jun 2025 10:50  Patient On (Oxygen Delivery Method): room air        PHYSICAL EXAM:    HEAD:  Atraumatic, Normocephalic  EYES: EOMI, PERRLA, conjunctiva and sclera clear      
Patient is a 80y old  Female who presents with a chief complaint of Physical deconditioning (15 Miguel 2025 08:28)      INTERVAL HPI/OVERNIGHT EVENTS:  Patient seen and examined at bedside this AM. Reports waking up this morning with right leg pain. No fecal or urinary incotninence. The pain improved s/p medication. Denies numbness, weakness, SOB, cough, chest pain,    MEDICATIONS  (STANDING):  aspirin  chewable 81 milliGRAM(s) Oral daily  carbidopa/levodopa  25/100 1 Tablet(s) Oral three times a day  dextrose 5%. 1000 milliLiter(s) (100 mL/Hr) IV Continuous <Continuous>  dextrose 5%. 1000 milliLiter(s) (50 mL/Hr) IV Continuous <Continuous>  dextrose 50% Injectable 25 Gram(s) IV Push once  dextrose 50% Injectable 12.5 Gram(s) IV Push once  dextrose 50% Injectable 25 Gram(s) IV Push once  enoxaparin Injectable 40 milliGRAM(s) SubCutaneous every 24 hours  folic acid 1 milliGRAM(s) Oral daily  glucagon  Injectable 1 milliGRAM(s) IntraMuscular once  hydrochlorothiazide 12.5 milliGRAM(s) Oral daily  insulin lispro (ADMELOG) corrective regimen sliding scale   SubCutaneous three times a day before meals  losartan 100 milliGRAM(s) Oral daily  methIMAzole 5 milliGRAM(s) Oral daily  metoprolol succinate ER 25 milliGRAM(s) Oral daily  multivitamin 1 Tablet(s) Oral daily  pantoprazole    Tablet 40 milliGRAM(s) Oral before breakfast  pramipexole 0.125 milliGRAM(s) Oral daily  pregabalin 75 milliGRAM(s) Oral two times a day  rosuvastatin 10 milliGRAM(s) Oral at bedtime    MEDICATIONS  (PRN):  acetaminophen  300 mG/codeine 30 mG 1 Tablet(s) Oral every 6 hours PRN for mild pain  aluminum hydroxide/magnesium hydroxide/simethicone Suspension 30 milliLiter(s) Oral every 4 hours PRN Dyspepsia  dextrose Oral Gel 15 Gram(s) Oral once PRN Blood Glucose LESS THAN 70 milliGRAM(s)/deciliter  diclofenac 75 milliGRAM(s) Oral two times a day PRN for mild pain  melatonin 3 milliGRAM(s) Oral at bedtime PRN Insomnia  ondansetron Injectable 4 milliGRAM(s) IV Push every 8 hours PRN Nausea and/or Vomiting  traMADol 50 milliGRAM(s) Oral every 6 hours PRN for moderate pain      Allergies    No Known Allergies    Intolerances    Percocet 5/325 (Other; Nausea)  OxyContin (Nausea)  hydrocodone (Other; Nausea)      REVIEW OF SYSTEMS:  as above    Vital Signs Last 24 Hrs  T(C): 36.3 (15 Miguel 2025 16:57), Max: 37 (15 Migule 2025 05:34)  T(F): 97.3 (15 Miguel 2025 16:57), Max: 98.6 (15 Miguel 2025 05:34)  HR: 61 (15 Miguel 2025 16:57) (58 - 82)  BP: 147/81 (15 Miguel 2025 16:57) (113/67 - 180/81)  BP(mean): --  RR: 18 (15 Miguel 2025 16:57) (14 - 18)  SpO2: 97% (15 Migeul 2025 16:57) (95% - 98%)    Parameters below as of 15 Miguel 2025 16:57  Patient On (Oxygen Delivery Method): room air        PHYSICAL EXAM:  CONSTITUTIONAL: Well groomed, no apparent distress, pleasant, conversational  EYES: PERRLA and symmetric, EOMI  ENMT: Oral mucosa with moist membranes  RESP: No respiratory distress, no use of accessory muscles; CTA b/l  CV: RRR  GI: Soft, NT, ND  MSK: R. hip with limited AROM and PROM   SKIN: b/l knees with healed vertical incision scars  NEURO: AAO x 3, b/l hands with pill-rolling tremor noted    LABS:                        12.9   6.14  )-----------( 172      ( 14 Jun 2025 19:39 )             40.4     14 Jun 2025 19:39    137    |  106    |  27     ----------------------------<  108    4.1     |  23     |  1.17     Ca    9.8        14 Jun 2025 19:39    TPro  7.1    /  Alb  3.8    /  TBili  0.4    /  DBili  x      /  AST  17     /  ALT  12     /  AlkPhos  81     14 Jun 2025 19:39      Urinalysis Basic - ( 14 Jun 2025 19:39 )    Color: x / Appearance: x / SG: x / pH: x  Gluc: 108 mg/dL / Ketone: x  / Bili: x / Urobili: x   Blood: x / Protein: x / Nitrite: x   Leuk Esterase: x / RBC: x / WBC x   Sq Epi: x / Non Sq Epi: x / Bacteria: x      CAPILLARY BLOOD GLUCOSE      POCT Blood Glucose.: 131 mg/dL (15 Miguel 2025 16:11)  POCT Blood Glucose.: 193 mg/dL (15 Miguel 2025 12:01)  POCT Blood Glucose.: 154 mg/dL (15 Miguel 2025 08:00)    
Patient is a 80y old  Female who presents with a chief complaint of Physical deconditioning (15 Miguel 2025 17:09)      INTERVAL HPI/OVERNIGHT EVENTS: No acute events overnight.     MEDICATIONS  (STANDING):  aspirin  chewable 81 milliGRAM(s) Oral daily  carbidopa/levodopa  25/100 1 Tablet(s) Oral three times a day  dextrose 5%. 1000 milliLiter(s) (100 mL/Hr) IV Continuous <Continuous>  dextrose 5%. 1000 milliLiter(s) (50 mL/Hr) IV Continuous <Continuous>  dextrose 50% Injectable 25 Gram(s) IV Push once  dextrose 50% Injectable 12.5 Gram(s) IV Push once  dextrose 50% Injectable 25 Gram(s) IV Push once  enoxaparin Injectable 40 milliGRAM(s) SubCutaneous every 24 hours  folic acid 1 milliGRAM(s) Oral daily  glucagon  Injectable 1 milliGRAM(s) IntraMuscular once  hydrochlorothiazide 12.5 milliGRAM(s) Oral daily  insulin lispro (ADMELOG) corrective regimen sliding scale   SubCutaneous three times a day before meals  losartan 100 milliGRAM(s) Oral daily  methIMAzole 5 milliGRAM(s) Oral daily  metoprolol succinate ER 25 milliGRAM(s) Oral daily  multivitamin 1 Tablet(s) Oral daily  pantoprazole    Tablet 40 milliGRAM(s) Oral before breakfast  pramipexole 0.125 milliGRAM(s) Oral daily  pregabalin 75 milliGRAM(s) Oral two times a day  rosuvastatin 10 milliGRAM(s) Oral at bedtime    MEDICATIONS  (PRN):  acetaminophen  300 mG/codeine 30 mG 1 Tablet(s) Oral every 6 hours PRN for mild pain  ALPRAZolam 0.25 milliGRAM(s) Oral once PRN MRI  aluminum hydroxide/magnesium hydroxide/simethicone Suspension 30 milliLiter(s) Oral every 4 hours PRN Dyspepsia  dextrose Oral Gel 15 Gram(s) Oral once PRN Blood Glucose LESS THAN 70 milliGRAM(s)/deciliter  diclofenac 75 milliGRAM(s) Oral two times a day PRN for mild pain  melatonin 3 milliGRAM(s) Oral at bedtime PRN Insomnia  morphine  - Injectable 2 milliGRAM(s) IV Push once PRN MRI  ondansetron Injectable 4 milliGRAM(s) IV Push every 8 hours PRN Nausea and/or Vomiting  traMADol 50 milliGRAM(s) Oral every 6 hours PRN for moderate pain      Allergies    No Known Allergies    Intolerances    Percocet 5/325 (Other; Nausea)  OxyContin (Nausea)  hydrocodone (Other; Nausea)      REVIEW OF SYSTEMS:  CONSTITUTIONAL: +ve for fatigue  EYES: No eye pain, visual disturbances, or discharge      Vital Signs Last 24 Hrs  T(C): 36.4 (16 Jun 2025 05:08), Max: 36.4 (15 Miguel 2025 11:34)  T(F): 97.6 (16 Jun 2025 05:08), Max: 97.6 (16 Jun 2025 05:08)  HR: 60 (16 Jun 2025 05:08) (60 - 75)  BP: 149/84 (16 Jun 2025 05:08) (113/67 - 149/84)  BP(mean): --  RR: 18 (16 Jun 2025 05:08) (16 - 18)  SpO2: 97% (16 Jun 2025 05:08) (96% - 97%)    Parameters below as of 16 Jun 2025 05:08  Patient On (Oxygen Delivery Method): room air        PHYSICAL EXAM:    HEAD:  Atraumatic, Normocephalic  EYES: EOMI, PERRLA, conjunctiva and sclera clear      LABS:                        11.7   4.32  )-----------( 157      ( 16 Jun 2025 07:10 )             37.2     06-16    140  |  107  |  23  ----------------------------<  130[H]  3.7   |  28  |  0.72    Ca    8.6      16 Jun 2025 07:10    TPro  7.1  /  Alb  3.8  /  TBili  0.4  /  DBili  x   /  AST  17  /  ALT  12  /  AlkPhos  81  06-14      Urinalysis Basic - ( 16 Jun 2025 07:10 )    Color: x / Appearance: x / SG: x / pH: x  Gluc: 130 mg/dL / Ketone: x  / Bili: x / Urobili: x   Blood: x / Protein: x / Nitrite: x   Leuk Esterase: x / RBC: x / WBC x   Sq Epi: x / Non Sq Epi: x / Bacteria: x      CAPILLARY BLOOD GLUCOSE      POCT Blood Glucose.: 137 mg/dL (16 Jun 2025 11:14)  POCT Blood Glucose.: 143 mg/dL (16 Jun 2025 07:58)  POCT Blood Glucose.: 193 mg/dL (15 Miguel 2025 21:13)  POCT Blood Glucose.: 131 mg/dL (15 Miguel 2025 16:11)  POCT Blood Glucose.: 193 mg/dL (15 Miguel 2025 12:01)

## 2025-06-19 ENCOUNTER — TRANSCRIPTION ENCOUNTER (OUTPATIENT)
Age: 80
End: 2025-06-19

## 2025-06-19 VITALS
OXYGEN SATURATION: 97 % | DIASTOLIC BLOOD PRESSURE: 77 MMHG | TEMPERATURE: 98 F | HEART RATE: 70 BPM | SYSTOLIC BLOOD PRESSURE: 127 MMHG

## 2025-06-19 PROBLEM — E11.9 TYPE 2 DIABETES MELLITUS WITHOUT COMPLICATIONS: Chronic | Status: ACTIVE | Noted: 2025-06-14

## 2025-06-19 LAB
GLUCOSE BLDC GLUCOMTR-MCNC: 190 MG/DL — HIGH (ref 70–99)
GLUCOSE BLDC GLUCOMTR-MCNC: 215 MG/DL — HIGH (ref 70–99)
GLUCOSE BLDC GLUCOMTR-MCNC: 264 MG/DL — HIGH (ref 70–99)

## 2025-06-19 PROCEDURE — 99239 HOSP IP/OBS DSCHRG MGMT >30: CPT

## 2025-06-19 RX ORDER — TRAMADOL HYDROCHLORIDE 50 MG/1
1 TABLET, FILM COATED ORAL
Qty: 0 | Refills: 0 | DISCHARGE
Start: 2025-06-19

## 2025-06-19 RX ORDER — B1/B2/B3/B5/B6/B12/VIT C/FOLIC 500-0.5 MG
1 TABLET ORAL
Qty: 0 | Refills: 0 | DISCHARGE
Start: 2025-06-19

## 2025-06-19 RX ORDER — ASPIRIN 325 MG
1 TABLET ORAL
Qty: 0 | Refills: 0 | DISCHARGE
Start: 2025-06-19

## 2025-06-19 RX ORDER — LIDOCAINE HYDROCHLORIDE 20 MG/ML
1 JELLY TOPICAL
Qty: 0 | Refills: 0 | DISCHARGE
Start: 2025-06-19

## 2025-06-19 RX ORDER — FOLIC ACID 1 MG/1
1 TABLET ORAL
Qty: 0 | Refills: 0 | DISCHARGE
Start: 2025-06-19

## 2025-06-19 RX ORDER — PRAMIPEXOLE DIHYDROCHLORIDE 1 MG/1
1 TABLET ORAL
Qty: 0 | Refills: 0 | DISCHARGE
Start: 2025-06-19

## 2025-06-19 RX ORDER — PRAMIPEXOLE DIHYDROCHLORIDE 1 MG/1
1 TABLET ORAL
Refills: 0 | DISCHARGE

## 2025-06-19 RX ORDER — B1/B2/B3/B5/B6/B12/VIT C/FOLIC 500-0.5 MG
1 TABLET ORAL
Refills: 0 | DISCHARGE

## 2025-06-19 RX ORDER — ROSUVASTATIN CALCIUM 20 MG/1
1 TABLET, FILM COATED ORAL
Qty: 0 | Refills: 0 | DISCHARGE
Start: 2025-06-19

## 2025-06-19 RX ORDER — CARBIDOPA/LEVODOPA 25MG-100MG
1 TABLET ORAL
Qty: 0 | Refills: 0 | DISCHARGE
Start: 2025-06-19

## 2025-06-19 RX ORDER — FOLIC ACID 1 MG/1
1 TABLET ORAL
Refills: 0 | DISCHARGE

## 2025-06-19 RX ORDER — HYDROCHLOROTHIAZIDE 50 MG/1
1 TABLET ORAL
Qty: 0 | Refills: 0 | DISCHARGE
Start: 2025-06-19

## 2025-06-19 RX ORDER — ASPIRIN 325 MG
1 TABLET ORAL
Refills: 0 | DISCHARGE

## 2025-06-19 RX ORDER — PREGABALIN 50 MG/1
1 CAPSULE ORAL
Refills: 0 | DISCHARGE

## 2025-06-19 RX ORDER — METOPROLOL SUCCINATE 50 MG/1
1 TABLET, EXTENDED RELEASE ORAL
Qty: 0 | Refills: 0 | DISCHARGE
Start: 2025-06-19

## 2025-06-19 RX ORDER — METHIMAZOLE 5 MG
1 TABLET ORAL
Qty: 0 | Refills: 0 | DISCHARGE
Start: 2025-06-19

## 2025-06-19 RX ORDER — TRAMADOL HYDROCHLORIDE 50 MG/1
1 TABLET, FILM COATED ORAL
Refills: 0 | DISCHARGE

## 2025-06-19 RX ORDER — PREGABALIN 50 MG/1
1 CAPSULE ORAL
Qty: 0 | Refills: 0 | DISCHARGE
Start: 2025-06-19

## 2025-06-19 RX ORDER — LOSARTAN POTASSIUM 100 MG/1
1 TABLET, FILM COATED ORAL
Qty: 0 | Refills: 0 | DISCHARGE
Start: 2025-06-19

## 2025-06-19 RX ADMIN — TRAMADOL HYDROCHLORIDE 50 MILLIGRAM(S): 50 TABLET, FILM COATED ORAL at 15:11

## 2025-06-19 RX ADMIN — LOSARTAN POTASSIUM 100 MILLIGRAM(S): 100 TABLET, FILM COATED ORAL at 05:42

## 2025-06-19 RX ADMIN — Medication 1 TABLET(S): at 05:42

## 2025-06-19 RX ADMIN — FOLIC ACID 1 MILLIGRAM(S): 1 TABLET ORAL at 12:45

## 2025-06-19 RX ADMIN — INSULIN LISPRO 1: 100 INJECTION, SOLUTION INTRAVENOUS; SUBCUTANEOUS at 12:44

## 2025-06-19 RX ADMIN — Medication 40 MILLIGRAM(S): at 05:44

## 2025-06-19 RX ADMIN — PREGABALIN 75 MILLIGRAM(S): 50 CAPSULE ORAL at 05:42

## 2025-06-19 RX ADMIN — INSULIN LISPRO 3: 100 INJECTION, SOLUTION INTRAVENOUS; SUBCUTANEOUS at 08:47

## 2025-06-19 RX ADMIN — METOPROLOL SUCCINATE 25 MILLIGRAM(S): 50 TABLET, EXTENDED RELEASE ORAL at 05:42

## 2025-06-19 RX ADMIN — TRAMADOL HYDROCHLORIDE 50 MILLIGRAM(S): 50 TABLET, FILM COATED ORAL at 06:00

## 2025-06-19 RX ADMIN — Medication 1 TABLET(S): at 14:12

## 2025-06-19 RX ADMIN — Medication 1 TABLET(S): at 00:14

## 2025-06-19 RX ADMIN — PRAMIPEXOLE DIHYDROCHLORIDE 0.12 MILLIGRAM(S): 1 TABLET ORAL at 12:47

## 2025-06-19 RX ADMIN — LIDOCAINE HYDROCHLORIDE 1 PATCH: 20 JELLY TOPICAL at 09:26

## 2025-06-19 RX ADMIN — Medication 1 TABLET(S): at 12:47

## 2025-06-19 RX ADMIN — Medication 81 MILLIGRAM(S): at 12:45

## 2025-06-19 RX ADMIN — TRAMADOL HYDROCHLORIDE 50 MILLIGRAM(S): 50 TABLET, FILM COATED ORAL at 14:11

## 2025-06-19 RX ADMIN — TRAMADOL HYDROCHLORIDE 50 MILLIGRAM(S): 50 TABLET, FILM COATED ORAL at 05:41

## 2025-06-19 NOTE — PATIENT PROFILE ADULT - HAVE YOU BEEN EATING POORLY BECAUSE OF A DECREASED APPETITE?
Assistance with ambulation/Assistance OOB with selected safe patient handling equipment/Communicate Risk of Fall with Harm to all staff/Monitor gait and stability/Reinforce activity limits and safety measures with patient and family/Sit up slowly, dangle for a short time, stand at bedside before walking/Tailored Fall Risk Interventions/Visual Cue: Yellow wristband and red socks/Bed in lowest position, wheels locked, appropriate side rails in place/Call bell, personal items and telephone in reach/Instruct patient to call for assistance before getting out of bed or chair/Non-slip footwear when patient is out of bed/[place] to call system/Physically safe environment - no spills, clutter or unnecessary equipment/Purposeful Proactive Rounding/Room/bathroom lighting operational, light cord in reach
No (0)

## 2025-06-19 NOTE — PATIENT PROFILE ADULT - FALL HARM RISK - FACTORS
Frequent toileting needed/Impaired gait/IV and/or equipment tethered to patient/Medication side effects/Poor balance/Weakness

## 2025-06-19 NOTE — DISCHARGE NOTE PROVIDER - NSDCFUADDAPPT_GEN_ALL_CORE_FT
APPTS ARE READY TO BE MADE: [ ] YES    Best Family or Patient Contact (if needed):    Additional Information about above appointments (if needed):    1: PCP  2: Orthopedic surgeon Dr. Bahena  3:     Other comments or requests:

## 2025-06-19 NOTE — PATIENT PROFILE ADULT - FALL HARM RISK - HARM RISK INTERVENTIONS
Assistance with ambulation/Assistance OOB with selected safe patient handling equipment/Communicate Risk of Fall with Harm to all staff/Discuss with provider need for PT consult/Monitor gait and stability/Reinforce activity limits and safety measures with patient and family/Review medications for side effects contributing to fall risk/Sit up slowly, dangle for a short time, stand at bedside before walking/Tailored Fall Risk Interventions/Toileting schedule using arm’s reach rule for commode and bathroom/Visual Cue: Yellow wristband and red socks/Bed in lowest position, wheels locked, appropriate side rails in place/Call bell, personal items and telephone in reach/Instruct patient to call for assistance before getting out of bed or chair/Non-slip footwear when patient is out of bed/[place] to call system/Physically safe environment - no spills, clutter or unnecessary equipment/Purposeful Proactive Rounding/Room/bathroom lighting operational, light cord in reach

## 2025-06-19 NOTE — DISCHARGE NOTE PROVIDER - NSDCMRMEDTOKEN_GEN_ALL_CORE_FT
aspirin 81 mg oral tablet: 1 tab(s) orally once a day  carbidopa-levodopa 25 mg-100 mg oral tablet: 1 tab(s) orally 3 times a day  codeine-acetaminophen 30 mg-300 mg oral tablet: 1 tab(s) orally every 6 hours as needed for  mild pain  Crestor 10 mg oral tablet: 1 tab(s) orally once a day (at bedtime)  diclofenac sodium 75 mg oral delayed release tablet: 1 tab(s) orally 2 times a day as needed for  mild pain  folic acid 1 mg oral tablet: 1 tab(s) orally once a day  hydroCHLOROthiazide 12.5 mg oral tablet: 1 tab(s) orally once a day  losartan 100 mg oral tablet: 1 tab(s) orally once a day  metFORMIN 1000 mg oral tablet, extended release: 1 tab(s) orally 2 times a day  methIMAzole 5 mg oral tablet: 1 tab(s) orally once a day  Metoprolol Succinate ER 25 mg oral tablet, extended release: 1 tab(s) orally once a day  Multiple Vitamins oral tablet: 1 tab(s) orally once a day  pantoprazole 40 mg oral delayed release tablet: 1 tab(s) orally once a day  pramipexole 0.125 mg oral tablet: 1 tab(s) orally once a day  pregabalin 75 mg oral capsule: 1 cap(s) orally 2 times a day  traMADol 50 mg oral tablet: 1 tab(s) orally every 6 hours as needed for  moderate pain  Trulicity Pen 0.75 mg/0.5 mL subcutaneous solution: 0.75 milligram(s) subcutaneously once a week   aspirin 81 mg oral tablet, chewable: 1 tab(s) orally once a day  carbidopa-levodopa 25 mg-100 mg oral tablet: 1 tab(s) orally 3 times a day  codeine-acetaminophen 30 mg-300 mg oral tablet: 1 tab(s) orally every 6 hours as needed for  mild pain  diclofenac sodium 75 mg oral delayed release tablet: 1 tab(s) orally 2 times a day as needed for  mild pain  folic acid 1 mg oral tablet: 1 tab(s) orally once a day  hydroCHLOROthiazide 12.5 mg oral capsule: 1 cap(s) orally once a day  lidocaine 4% topical film: 1 patch transdermal every 24 hours as needed for Neck pain  losartan 100 mg oral tablet: 1 tab(s) orally once a day  metFORMIN 1000 mg oral tablet, extended release: 1 tab(s) orally 2 times a day  methIMAzole 5 mg oral tablet: 1 tab(s) orally once a day  metoprolol succinate 25 mg oral tablet, extended release: 1 tab(s) orally once a day  Multiple Vitamins oral tablet: 1 tab(s) orally once a day  pantoprazole 40 mg oral delayed release tablet: 1 tab(s) orally once a day (before a meal)  pramipexole 0.125 mg oral tablet: 1 tab(s) orally once a day  pregabalin 75 mg oral capsule: 1 cap(s) orally 2 times a day  rosuvastatin 10 mg oral tablet: 1 tab(s) orally once a day (at bedtime)  traMADol 50 mg oral tablet: 1 tab(s) orally every 6 hours As needed for moderate pain  Trulicity Pen 0.75 mg/0.5 mL subcutaneous solution: 0.75 milligram(s) subcutaneously once a week

## 2025-06-19 NOTE — DISCHARGE NOTE PROVIDER - CARE PROVIDER_API CALL
Gerry Bahena  Orthopaedic Surgery  [address]  [place] [address]  Phone: [phone]  Fax: [phone]  Follow Up Time: 1 week    Primary care provider,   Phone: (   )    -  Fax: (   )    -  Follow Up Time: 1 week

## 2025-06-19 NOTE — PATIENT PROFILE ADULT - CAREGIVER ADDRESS
[facility]  [place] [id number]               Amena Wick   3/2/2017  2:33 PM   ED    Description:  Female : 3/10/1930   Department:  [facility]           Your Care was Coordinated By:     Provider Role From To    Andres Lopez MD Attending Provider 17 1435 --      Reason for Visit     Rectal Bleeding           Diagnoses this Visit        Comments    BRBPR (bright red blood per rectum)    -  Primary     External hemorrhoid, bleeding           ED Disposition     ED Disposition Condition Comment    Discharge             To Do List           Follow-up Information     Schedule an appointment as soon as possible for a visit with Thai Her MD.    Specialty:  General Surgery    Contact information:    [address] [facility] GENER[facility]G [place] [address] [phone]          Schedule an appointment as soon as possible for a visit with [facility].    Specialty:  Gastroenterology    Contact information:    [address] [place] [address] [address]  [address]  [place] [address]  [phone]         These Medications        Disp Refills Start End    docusate sodium (COLACE) 100 MG capsule 30 capsule 0 3/2/2017 2017    Take 1 capsule (100 mg total) by mouth once daily. - Oral      [facility]     [facility] Care [facility] -  Assistance  Registered nurses in the [facility] provide clinical advisement, health education, appointment booking, and other advisory services.  Call for this free service at [phone].             Medications           Message regarding Medications     Verify the changes and/or additions to your medication regime listed below are the same as discussed with your clinician today.  If any of these changes or additions are incorrect, please notify your healthcare provider.        START taking these NEW medications        Refills    docusate  sodium (COLACE) 100 MG capsule 0    Sig: Take 1 capsule (100 mg total) by mouth once daily.    Class: Print    Route: Oral           Verify that the below list of medications is an accurate representation of the medications you are currently taking.  If none reported, the list may be blank. If incorrect, please contact your healthcare provider. Carry this list with you in case of emergency.           Current Medications     hydrochlorothiazide (HYDRODIURIL) 25 MG tablet Take 25 mg by mouth once daily.    multivitamin capsule Take 1 capsule by mouth once daily.    docusate sodium (COLACE) 100 MG capsule Take 1 capsule (100 mg total) by mouth once daily.           Clinical Reference Information           Your Vitals Were     BP                   151/69 (BP Location: Left arm, Patient Position: Sitting)           Allergies as of 3/2/2017     No Known Allergies      Immunizations Administered on Date of Encounter - 3/2/2017     None      ED Micro, Lab, POCT     Start Ordered       Status Ordering Provider    03/02/17 1446 03/02/17 1445  CBC auto differential  STAT      Final result     03/02/17 1446 03/02/17 1445  Comprehensive metabolic panel  STAT      In process     03/02/17 1446 03/02/17 1445  Protime-INR  STAT      Final result       ED Imaging Orders     None      Discharge References/Attachments     HEMORRHOIDS (ENGLISH)    RECTAL BLEEDING, UNDERSTANDING (ENGLISH)      [facility] Sign-Up     Activating your [facility] account is as easy as 1-2-3!     1) Visit [email or web address], select Sign Up Now, enter this activation code and your date of birth, then select Next.  [email or web address]  Expires: 4/16/2017  3:23 PM      2) Create a username and password to use when you visit [facility] in the future and select a security question in case you lose your password and select Next.    3) Enter your e-mail address and click Sign Up!    Additional Information  If you have questions, please e-mail [email or web address] or call  [phone] to talk to our [facility] staff. Remember, [facility] is NOT to be used for urgent needs. For medical emergencies, dial 911.         Smoking Cessation     If you would like to quit smoking:   You may be eligible for free services if you are a [place] resident and started smoking cigarettes before September 1, 1988.  Call the Smoking Cessation Trust (SCT) toll free at (824) 010-9769 or ([phone] 034-7923.   Call 1-800-QUIT-NOW if you do not meet the above criteria.             [facility] complies with applicable Federal civil rights laws and does not discriminate on the basis of race, color, national origin, age, disability, or sex.        Language Assistance Services     ATTENTION: Language assistance services are available, free of charge. Please call [phone].      ATENCIÓN: Si habla español, tiene a quintanilla disposición servicios gratuitos de asistencia lingüística. Llame al [phone].     CHÚ Ý: N?u b?n nói Ti?ng Vi?t, có các d?ch v? h? tr? ngôn ng? mi?n phí dành cho b?n. G?i s? [phone].         1 na

## 2025-06-19 NOTE — DISCHARGE NOTE NURSING/CASE MANAGEMENT/SOCIAL WORK - FINANCIAL ASSISTANCE
[facility] provides services at a reduced cost to those who are determined to be eligible through [facility]’s financial assistance program. Information regarding [facility]’s financial assistance program can be found by going to [email or web address] or by calling [phone].

## 2025-06-19 NOTE — DISCHARGE NOTE NURSING/CASE MANAGEMENT/SOCIAL WORK - PATIENT PORTAL LINK FT
You can access the FollowMyHealth Patient Portal offered by [facility] by registering at the following website: [email or web address]. By joining [employer]’s FollowMyHealth portal, you will also be able to view your health information using other applications (apps) compatible with our system.

## 2025-06-19 NOTE — DISCHARGE NOTE PROVIDER - HOSPITAL COURSE
Patient is a 80 year old female with PMHx of HTN, HLD, NIDDM2, Hyperthyroidism, Parkinson's disease, and hx of L1 compression fracture (s/p kyphoplasty) who presented to the ED on 6/14/25 for complaints of right hip/buttock pain and inability to ambulate and admitted for physical deconditioning.     Patient reports she had left hip pain for three months and received injections from orthopedic surgery outpatient with improvement of pain. Over the past three weeks, now having right hip pain that starts in the buttock and radiates down the right leg. Describes it as stabbing, throbbing, pounding, and debilitating in nature. Worse with movement and improved with rest. States the pain then turns into a burning sensation. Severity was 15/10 and now down to 10/10. Due to this, has been unable to ambulate. Baseline functional status is ambulates with cane and independent with all ADLs. Lives at home alone.    In the ED, VSS except BP as elevated as 180/81. Labs grossly unremarkable. U/A with negative leuks, negative nitrites, moderate bacteria, squamous epithelial cells present. Received acetaminophen 975 mg PO, gabapentin 600 mg PO, lidocaine patch, and morphine 4 mg IV.        w/worsening R radiculopathy back pain. Extensive spine hx of L2-Pelvis with Matusz (1/24), L1 Kypho (3/25), Epidural Injection (PM&R 4/25). Clinical presentation and physical exam are not consistent w/ acute cord compression/cauda equina. Does not demonstrate red flag symptoms such as bowel/bladder incontinence, saddle anesthesia, fevers/chills, or weight loss. Recommend Pain control and follow up with primary surgeon or PM&R. No acute surgical intervention.       -PTOT: WBAT  -No heavy lifting/twisting  -Multimodal analgesia -S/p acetaminophen 975 mg PO, gabapentin 600 mg PO, lidocaine patch, and morphine 4 mg IV in the ED  PTA pregabalin 75 mg BID, tylenol/codeine q6 PRN, diclofenac 75 mg BID PRN, tramadol 50 mg q6 PRN  Multimodal pain management PRN, fall precautions  - MRI L-spine unable to be done - patient could not tolerate procedure  PT consulted - > KANDIS      Chronic medical conditions:  HTN, uncontrolled suspect secondary to pain: BP as elevated as 180/81 on admission, PTA losartan 100 mg, HCTZ 12.5 mg, metoprolol succinate 25 mg  HLD: PTA rosuvastatin 10 mg qhs  NIDDM2: last known A1c 7.3 (on 4/26/24), POC qac and qhs, PTA metformin 1000 mg BID and Trulicity weekly held, low dose SSI qac started, blood glucose goal < 180, f/u A1c  Hyperthyroidism: PTA methimazole 5 mg  Parkinson's disease: PTA carbidopa / levodopa 25 / 100 mg TID, pramipexole 0.125    Patient is a 80 year old Female with PMHx of HTN, HLD, NIDDM2, Hyperthyroidism, Parkinson's disease, and hx of L1 compression fracture (s/p kyphoplasty) who presented to the ED on 6/14/25 for complaints of Right hip/buttock pain and inability to ambulate.    Patient reports she had Left hip pain for three months and received injections from orthopedic surgery outpatient with improvement of pain. Over the past three weeks, now having Right hip pain that starts in the buttock and radiates down the Right leg. Describes it as stabbing, throbbing, pounding, and debilitating in nature. Worse with movement and improved with rest. States the pain then turns into a burning sensation. Severity was 15/10 and now down to 10/10. Due to this, has been unable to ambulate. Baseline functional status is ambulates with cane and independent with all ADLs. Lives at home alone.    #Physical deconditioning  #R sided Radiculopathy/ back pain  - Multimodal pain management PRN  - PTA Pregabalin 75 mg BID, Tylenol/Codeine 300/30 q6 PRN, Diclofenac 75 mg BID PRN, Tramadol 50 mg q6 PRN  - Lidocaine patch every 24 hours PRN  - Fall precautions  - MRI L-spine unable to be done - patient could not tolerate procedure  - PT consulted - > recommended KANDIS  - Orthopedics consulted: Note below  "Patient with worsening R radiculopathy back pain. Extensive spine hx of L2-Pelvis with Matusz (1/24), L1 Kypho (3/25), Epidural Injection (PM&R 4/25). Clinical presentation and physical exam are not consistent w/ acute cord compression/cauda equina. Does not demonstrate red flag symptoms such as bowel/bladder incontinence, saddle anesthesia, fevers/chills, or weight loss. Recommend Pain control and follow up with primary surgeon or PM&R. No acute surgical intervention.    - PTOT: WBAT  - No heavy lifting/twisting  - Multimodal analgesia - recommend low dose opioids, acetaminophen, muscle relaxant, gabapentin as tolerated  - No acute orthopaedic surgical intervention indicated at this time. This patient is orthopaedically stable for discharge.   - Patient to follow up with primary surgeon vs PMR as an outpatient for further evaluation and management."    Chronic medical conditions:  HTN, uncontrolled suspect secondary to pain: BP as elevated as 180/81 on admission, PTA Losartan 100 mg, HCTZ 12.5 mg, Metoprolol succinate 25 mg  HLD: PTA Rosuvastatin 10 mg qhs  NIDDM2: Last known A1c 7.3 (on 4/26/24), A1c 6.8 6/15/25. POC qac and qhs, PTA Metformin 1000 mg BID and Trulicity weekly held while inpatient, low dose SSI qac started, blood glucose goal < 180 while inpatient, resume Metformin and Trulicity outpatient  Hyperthyroidism: PTA Methimazole 5 mg  Parkinson's disease: PTA Carbidopa / Levodopa 25 / 100 mg TID, Pramipexole 0.125     Pt is medically stable for discharge to [facility], plan discussed with Dr. Yeung 6/19/25.   Patient is a 80 year old Female with PMHx of HTN, HLD, NIDDM2, Hyperthyroidism, Parkinson's disease, and hx of L1 compression fracture (s/p kyphoplasty) who presented to the ED on 6/14/25 for complaints of Right hip/buttock pain and inability to ambulate.    Patient reports she had Left hip pain for three months and received injections from orthopedic surgery outpatient with improvement of pain. Over the past three weeks, now having Right hip pain that starts in the buttock and radiates down the Right leg. Describes it as stabbing, throbbing, pounding, and debilitating in nature. Worse with movement and improved with rest. States the pain then turns into a burning sensation. Severity was 15/10 and now down to 10/10. Due to this, has been unable to ambulate. Baseline functional status is ambulates with cane and independent with all ADLs. Lives at home alone.    #Physical deconditioning  #R sided Radiculopathy/ back pain  - Multimodal pain management PRN  - PTA Pregabalin 75 mg BID, Tylenol/Codeine 300/30 q6 PRN, Diclofenac 75 mg BID PRN, Tramadol 50 mg q6 PRN  - Lidocaine patch every 24 hours PRN  - Fall precautions  - MRI L-spine : Moderate to severe right L1-2 and L4-5 foraminal stenoses, Moderate left L4-5 lateral recess stenosis, Nonspecific fluid collection in the dorsal subcutaneous fat.  - PT consulted - > recommended KANDIS  - Orthopedics consulted: Note below  "Patient with worsening R radiculopathy back pain. Extensive spine hx of L2-Pelvis with Matusz (1/24), L1 Kypho (3/25), Epidural Injection (PM&R 4/25). Clinical presentation and physical exam are not consistent w/ acute cord compression/cauda equina. Does not demonstrate red flag symptoms such as bowel/bladder incontinence, saddle anesthesia, fevers/chills, or weight loss. Recommend Pain control and follow up with primary surgeon or PM&R. No acute surgical intervention.    - PTOT: WBAT  - No heavy lifting/twisting  - Multimodal analgesia - recommend low dose opioids, acetaminophen, muscle relaxant, gabapentin as tolerated  - No acute orthopaedic surgical intervention indicated at this time. This patient is orthopaedically stable for discharge.   - Patient to follow up with primary surgeon vs PMR as an outpatient for further evaluation and management."    Chronic medical conditions:  HTN, uncontrolled suspect secondary to pain: BP as elevated as 180/81 on admission, PTA Losartan 100 mg, HCTZ 12.5 mg, Metoprolol succinate 25 mg  HLD: PTA Rosuvastatin 10 mg qhs  NIDDM2: Last known A1c 7.3 (on 4/26/24), A1c 6.8 6/15/25. POC qac and qhs, PTA Metformin 1000 mg BID and Trulicity weekly held while inpatient, low dose SSI qac started, blood glucose goal < 180 while inpatient, resume Metformin and Trulicity outpatient  Hyperthyroidism: PTA Methimazole 5 mg  Parkinson's disease: PTA Carbidopa / Levodopa 25 / 100 mg TID, Pramipexole 0.125     Pt is medically stable for discharge to [facility], plan discussed with Dr. Yeung 6/19/25.

## 2025-06-19 NOTE — DISCHARGE NOTE PROVIDER - NSDCFUSCHEDAPPT_GEN_ALL_CORE_FT
Gerry Bahena [facility]  ORTHOSURG [address]  Scheduled Appointment: 06/23/2025    Gerry Bahena [facility]  ORTHOSURG [address]  Scheduled Appointment: 07/08/2025

## 2025-06-19 NOTE — DISCHARGE NOTE PROVIDER - PROVIDER TOKENS
PROVIDER:[TOKEN:[[id number]],FOLLOWUP:[1 week]],FREE:[LAST:[Primary care provider],PHONE:[(   )    -],FAX:[(   )    -],FOLLOWUP:[1 week]]

## 2025-06-19 NOTE — DISCHARGE NOTE PROVIDER - NSDCCPCAREPLAN_GEN_ALL_CORE_FT
PRINCIPAL DISCHARGE DIAGNOSIS  Diagnosis: Physical deconditioning  Assessment and Plan of Treatment:       SECONDARY DISCHARGE DIAGNOSES  Diagnosis: HTN (hypertension)  Assessment and Plan of Treatment:     Diagnosis: HLD (hyperlipidemia)  Assessment and Plan of Treatment:     Diagnosis: Diabetes mellitus type II, non insulin dependent  Assessment and Plan of Treatment:     Diagnosis: Hyperthyroidism  Assessment and Plan of Treatment:     Diagnosis: Parkinson's disease  Assessment and Plan of Treatment:     Diagnosis: Compression fracture of L1 vertebra  Assessment and Plan of Treatment:     Diagnosis: Back pain  Assessment and Plan of Treatment:

## 2025-06-19 NOTE — DISCHARGE NOTE PROVIDER - CARE PROVIDERS DIRECT ADDRESSES
[email or web address]

## 2025-06-20 PROBLEM — S32.000A WEDGE COMPRESSION FRACTURE OF UNSPECIFIED LUMBAR VERTEBRA, INITIAL ENCOUNTER FOR CLOSED FRACTURE: Chronic | Status: ACTIVE | Noted: 2025-06-14

## 2025-06-22 ENCOUNTER — INPATIENT (INPATIENT)
Facility: HOSPITAL | Age: 80
LOS: 4 days | Discharge: NURSING, REHAB OR LONG-TERM CARE | End: 2025-06-27
Payer: MEDICARE

## 2025-06-22 VITALS
HEIGHT: 62 IN | OXYGEN SATURATION: 98 % | TEMPERATURE: 98 F | HEART RATE: 68 BPM | SYSTOLIC BLOOD PRESSURE: 149 MMHG | DIASTOLIC BLOOD PRESSURE: 85 MMHG | WEIGHT: 139.99 LBS | RESPIRATION RATE: 18 BRPM

## 2025-06-22 DIAGNOSIS — Z98.890 OTHER SPECIFIED POSTPROCEDURAL STATES: Chronic | ICD-10-CM

## 2025-06-22 DIAGNOSIS — Z96.659 PRESENCE OF UNSPECIFIED ARTIFICIAL KNEE JOINT: Chronic | ICD-10-CM

## 2025-06-22 DIAGNOSIS — Z98.82 BREAST IMPLANT STATUS: Chronic | ICD-10-CM

## 2025-06-22 LAB
ANION GAP SERPL CALC-SCNC: 8 MMOL/L — SIGNIFICANT CHANGE UP (ref 5–17)
APPEARANCE UR: CLEAR — SIGNIFICANT CHANGE UP
APTT BLD: 31.3 SEC — SIGNIFICANT CHANGE UP (ref 26.1–36.8)
APTT BLD: 31.7 SEC — SIGNIFICANT CHANGE UP (ref 26.1–36.8)
BASOPHILS # BLD AUTO: 0.03 K/UL — SIGNIFICANT CHANGE UP (ref 0–0.2)
BASOPHILS NFR BLD AUTO: 0.4 % — SIGNIFICANT CHANGE UP (ref 0–2)
BILIRUB UR-MCNC: NEGATIVE — SIGNIFICANT CHANGE UP
BLD GP AB SCN SERPL QL: NEGATIVE — SIGNIFICANT CHANGE UP
BUN SERPL-MCNC: 36 MG/DL — HIGH (ref 7–23)
CALCIUM SERPL-MCNC: 9 MG/DL — SIGNIFICANT CHANGE UP (ref 8.4–10.5)
CHLORIDE SERPL-SCNC: 103 MMOL/L — SIGNIFICANT CHANGE UP (ref 96–108)
CO2 SERPL-SCNC: 27 MMOL/L — SIGNIFICANT CHANGE UP (ref 22–31)
COLOR SPEC: YELLOW — SIGNIFICANT CHANGE UP
CREAT SERPL-MCNC: 0.7 MG/DL — SIGNIFICANT CHANGE UP (ref 0.5–1.3)
DIFF PNL FLD: NEGATIVE — SIGNIFICANT CHANGE UP
EGFR: 87 ML/MIN/1.73M2 — SIGNIFICANT CHANGE UP
EGFR: 87 ML/MIN/1.73M2 — SIGNIFICANT CHANGE UP
EOSINOPHIL # BLD AUTO: 0.09 K/UL — SIGNIFICANT CHANGE UP (ref 0–0.5)
EOSINOPHIL NFR BLD AUTO: 1.2 % — SIGNIFICANT CHANGE UP (ref 0–6)
GLUCOSE SERPL-MCNC: 161 MG/DL — HIGH (ref 70–99)
GLUCOSE UR QL: NEGATIVE MG/DL — SIGNIFICANT CHANGE UP
HCT VFR BLD CALC: 40.9 % — SIGNIFICANT CHANGE UP (ref 34.5–45)
HGB BLD-MCNC: 12.8 G/DL — SIGNIFICANT CHANGE UP (ref 11.5–15.5)
IMM GRANULOCYTES # BLD AUTO: 0.02 K/UL — SIGNIFICANT CHANGE UP (ref 0–0.07)
IMM GRANULOCYTES NFR BLD AUTO: 0.3 % — SIGNIFICANT CHANGE UP (ref 0–0.9)
INR BLD: 0.91 — SIGNIFICANT CHANGE UP (ref 0.85–1.16)
INR BLD: 0.91 — SIGNIFICANT CHANGE UP (ref 0.85–1.16)
KETONES UR QL: NEGATIVE MG/DL — SIGNIFICANT CHANGE UP
LEUKOCYTE ESTERASE UR-ACNC: NEGATIVE — SIGNIFICANT CHANGE UP
LYMPHOCYTES # BLD AUTO: 1.87 K/UL — SIGNIFICANT CHANGE UP (ref 1–3.3)
LYMPHOCYTES NFR BLD AUTO: 24.9 % — SIGNIFICANT CHANGE UP (ref 13–44)
MCHC RBC-ENTMCNC: 29.2 PG — SIGNIFICANT CHANGE UP (ref 27–34)
MCHC RBC-ENTMCNC: 31.3 G/DL — LOW (ref 32–36)
MCV RBC AUTO: 93.4 FL — SIGNIFICANT CHANGE UP (ref 80–100)
MONOCYTES # BLD AUTO: 0.57 K/UL — SIGNIFICANT CHANGE UP (ref 0–0.9)
MONOCYTES NFR BLD AUTO: 7.6 % — SIGNIFICANT CHANGE UP (ref 2–14)
NEUTROPHILS # BLD AUTO: 4.93 K/UL — SIGNIFICANT CHANGE UP (ref 1.8–7.4)
NEUTROPHILS NFR BLD AUTO: 65.6 % — SIGNIFICANT CHANGE UP (ref 43–77)
NITRITE UR-MCNC: NEGATIVE — SIGNIFICANT CHANGE UP
NRBC # BLD AUTO: 0 K/UL — SIGNIFICANT CHANGE UP (ref 0–0)
NRBC # FLD: 0 K/UL — SIGNIFICANT CHANGE UP (ref 0–0)
NRBC BLD AUTO-RTO: 0 /100 WBCS — SIGNIFICANT CHANGE UP (ref 0–0)
PH UR: 7 — SIGNIFICANT CHANGE UP (ref 5–8)
PLATELET # BLD AUTO: 173 K/UL — SIGNIFICANT CHANGE UP (ref 150–400)
PMV BLD: 11 FL — SIGNIFICANT CHANGE UP (ref 7–13)
POTASSIUM SERPL-MCNC: 4.5 MMOL/L — SIGNIFICANT CHANGE UP (ref 3.5–5.3)
POTASSIUM SERPL-SCNC: 4.5 MMOL/L — SIGNIFICANT CHANGE UP (ref 3.5–5.3)
PROT UR-MCNC: NEGATIVE MG/DL — SIGNIFICANT CHANGE UP
PROTHROM AB SERPL-ACNC: 10.7 SEC — SIGNIFICANT CHANGE UP (ref 9.9–13.4)
PROTHROM AB SERPL-ACNC: 10.7 SEC — SIGNIFICANT CHANGE UP (ref 9.9–13.4)
RBC # BLD: 4.38 M/UL — SIGNIFICANT CHANGE UP (ref 3.8–5.2)
RBC # FLD: 14 % — SIGNIFICANT CHANGE UP (ref 10.3–14.5)
RH IG SCN BLD-IMP: NEGATIVE — SIGNIFICANT CHANGE UP
SODIUM SERPL-SCNC: 138 MMOL/L — SIGNIFICANT CHANGE UP (ref 135–145)
SP GR SPEC: 1.02 — SIGNIFICANT CHANGE UP (ref 1–1.03)
UROBILINOGEN FLD QL: 1 MG/DL — SIGNIFICANT CHANGE UP (ref 0.2–1)
WBC # BLD: 7.51 K/UL — SIGNIFICANT CHANGE UP (ref 3.8–10.5)
WBC # FLD AUTO: 7.51 K/UL — SIGNIFICANT CHANGE UP (ref 3.8–10.5)

## 2025-06-22 PROCEDURE — 71046 X-RAY EXAM CHEST 2 VIEWS: CPT | Mod: 26

## 2025-06-22 PROCEDURE — 99222 1ST HOSP IP/OBS MODERATE 55: CPT

## 2025-06-22 PROCEDURE — 99285 EMERGENCY DEPT VISIT HI MDM: CPT

## 2025-06-22 PROCEDURE — 93010 ELECTROCARDIOGRAM REPORT: CPT

## 2025-06-22 RX ORDER — LOSARTAN POTASSIUM 100 MG/1
100 TABLET, FILM COATED ORAL DAILY
Refills: 0 | Status: DISCONTINUED | OUTPATIENT
Start: 2025-06-22 | End: 2025-06-22

## 2025-06-22 RX ORDER — SODIUM CHLORIDE 9 G/1000ML
1000 INJECTION, SOLUTION INTRAVENOUS
Refills: 0 | Status: DISCONTINUED | OUTPATIENT
Start: 2025-06-22 | End: 2025-06-26

## 2025-06-22 RX ORDER — DEXTROSE 50 % IN WATER 50 %
12.5 SYRINGE (ML) INTRAVENOUS ONCE
Refills: 0 | Status: DISCONTINUED | OUTPATIENT
Start: 2025-06-22 | End: 2025-06-27

## 2025-06-22 RX ORDER — OXYCODONE HYDROCHLORIDE 30 MG/1
10 TABLET ORAL EVERY 6 HOURS
Refills: 0 | Status: DISCONTINUED | OUTPATIENT
Start: 2025-06-22 | End: 2025-06-22

## 2025-06-22 RX ORDER — ROSUVASTATIN CALCIUM 5 MG/1
10 TABLET, FILM COATED ORAL AT BEDTIME
Refills: 0 | Status: DISCONTINUED | OUTPATIENT
Start: 2025-06-22 | End: 2025-06-27

## 2025-06-22 RX ORDER — TRAMADOL HYDROCHLORIDE 50 MG/1
50 TABLET, FILM COATED ORAL EVERY 6 HOURS
Refills: 0 | Status: DISCONTINUED | OUTPATIENT
Start: 2025-06-22 | End: 2025-06-22

## 2025-06-22 RX ORDER — PRAMIPEXOLE DIHYDROCHLORIDE 1 MG/1
0.12 TABLET ORAL DAILY
Refills: 0 | Status: DISCONTINUED | OUTPATIENT
Start: 2025-06-22 | End: 2025-06-27

## 2025-06-22 RX ORDER — HYDROMORPHONE/SOD CHLOR,ISO/PF 2 MG/10 ML
2 SYRINGE (ML) INJECTION EVERY 6 HOURS
Refills: 0 | Status: DISCONTINUED | OUTPATIENT
Start: 2025-06-22 | End: 2025-06-24

## 2025-06-22 RX ORDER — DEXTROSE 50 % IN WATER 50 %
15 SYRINGE (ML) INTRAVENOUS ONCE
Refills: 0 | Status: DISCONTINUED | OUTPATIENT
Start: 2025-06-22 | End: 2025-06-27

## 2025-06-22 RX ORDER — GLUCAGON 3 MG/1
1 POWDER NASAL ONCE
Refills: 0 | Status: DISCONTINUED | OUTPATIENT
Start: 2025-06-22 | End: 2025-06-27

## 2025-06-22 RX ORDER — TRAMADOL HYDROCHLORIDE 50 MG/1
25 TABLET, FILM COATED ORAL EVERY 6 HOURS
Refills: 0 | Status: DISCONTINUED | OUTPATIENT
Start: 2025-06-22 | End: 2025-06-22

## 2025-06-22 RX ORDER — HYDROMORPHONE/SOD CHLOR,ISO/PF 2 MG/10 ML
0.5 SYRINGE (ML) INJECTION EVERY 6 HOURS
Refills: 0 | Status: DISCONTINUED | OUTPATIENT
Start: 2025-06-22 | End: 2025-06-24

## 2025-06-22 RX ORDER — DEXTROSE 50 % IN WATER 50 %
25 SYRINGE (ML) INTRAVENOUS ONCE
Refills: 0 | Status: DISCONTINUED | OUTPATIENT
Start: 2025-06-22 | End: 2025-06-27

## 2025-06-22 RX ORDER — B1/B2/B3/B5/B6/B12/VIT C/FOLIC 500-0.5 MG
1 TABLET ORAL DAILY
Refills: 0 | Status: DISCONTINUED | OUTPATIENT
Start: 2025-06-22 | End: 2025-06-27

## 2025-06-22 RX ORDER — CARBIDOPA/LEVODOPA 25MG-100MG
1 TABLET ORAL THREE TIMES A DAY
Refills: 0 | Status: DISCONTINUED | OUTPATIENT
Start: 2025-06-22 | End: 2025-06-27

## 2025-06-22 RX ORDER — HYDROMORPHONE/SOD CHLOR,ISO/PF 2 MG/10 ML
4 SYRINGE (ML) INJECTION EVERY 6 HOURS
Refills: 0 | Status: DISCONTINUED | OUTPATIENT
Start: 2025-06-22 | End: 2025-06-24

## 2025-06-22 RX ORDER — OXYCODONE HYDROCHLORIDE 30 MG/1
5 TABLET ORAL EVERY 6 HOURS
Refills: 0 | Status: DISCONTINUED | OUTPATIENT
Start: 2025-06-22 | End: 2025-06-22

## 2025-06-22 RX ORDER — FOLIC ACID 1 MG/1
1 TABLET ORAL DAILY
Refills: 0 | Status: DISCONTINUED | OUTPATIENT
Start: 2025-06-22 | End: 2025-06-27

## 2025-06-22 RX ORDER — INSULIN LISPRO 100 U/ML
INJECTION, SOLUTION INTRAVENOUS; SUBCUTANEOUS
Refills: 0 | Status: DISCONTINUED | OUTPATIENT
Start: 2025-06-22 | End: 2025-06-27

## 2025-06-22 RX ORDER — METOPROLOL SUCCINATE 50 MG/1
25 TABLET, EXTENDED RELEASE ORAL DAILY
Refills: 0 | Status: DISCONTINUED | OUTPATIENT
Start: 2025-06-22 | End: 2025-06-23

## 2025-06-22 RX ADMIN — Medication 1 APPLICATION(S): at 21:59

## 2025-06-22 RX ADMIN — Medication 4 MILLIGRAM(S): at 19:00

## 2025-06-22 RX ADMIN — TRAMADOL HYDROCHLORIDE 50 MILLIGRAM(S): 50 TABLET, FILM COATED ORAL at 13:19

## 2025-06-22 RX ADMIN — FOLIC ACID 1 MILLIGRAM(S): 1 TABLET ORAL at 14:38

## 2025-06-22 RX ADMIN — Medication 1 TABLET(S): at 21:58

## 2025-06-22 RX ADMIN — ROSUVASTATIN CALCIUM 10 MILLIGRAM(S): 5 TABLET, FILM COATED ORAL at 21:58

## 2025-06-22 RX ADMIN — Medication 4 MILLIGRAM(S): at 18:00

## 2025-06-22 RX ADMIN — Medication 1 TABLET(S): at 14:38

## 2025-06-22 RX ADMIN — Medication 0.5 MILLIGRAM(S): at 14:39

## 2025-06-22 RX ADMIN — PRAMIPEXOLE DIHYDROCHLORIDE 0.12 MILLIGRAM(S): 1 TABLET ORAL at 19:09

## 2025-06-22 RX ADMIN — INSULIN LISPRO 6: 100 INJECTION, SOLUTION INTRAVENOUS; SUBCUTANEOUS at 17:54

## 2025-06-22 RX ADMIN — TRAMADOL HYDROCHLORIDE 50 MILLIGRAM(S): 50 TABLET, FILM COATED ORAL at 14:50

## 2025-06-22 RX ADMIN — Medication 0.5 MILLIGRAM(S): at 15:50

## 2025-06-22 NOTE — ED PROVIDER NOTE - CLINICAL SUMMARY MEDICAL DECISION MAKING FREE TEXT BOX
80 year old Female with PMHx of HTN, HLD, NIDDM2, Hyperthyroidism, Parkinson's disease, and hx of L1 compression fracture (s/p kyphoplasty) presenting here for spinal surgery with Dr. Joseph tomorrow. Endorsing lower back pain but no weakness, numbness, incontinence/retention, saddle anesthesia.     Spoke to ortho, pre op labs/EKG/CXR, no imaging. Admit to dr joseph [facility]

## 2025-06-22 NOTE — CONSULT NOTE ADULT - ASSESSMENT
80F hx of HTN, HLD, DM, Hyperthyroidism, Parkinson's s/p L2-pelvis PSF 2023 c/b L1 compression fx s/p kyphoplasty in 2024 now presents with worsening LBP with RLE radiculopathy for the past 3 months. Denies falls/trauma/injuries to the back. No red flag symptoms. Sent in by Dr. Bahena for preop and OR tomorrow 6/23 for revision surgery (extension of prior fusion to T10 with cement and tethers). Medicine consulted for pre-op     #Pre-operative clearance  No significant cardiac/pulmonary history. No adverse reactions to anesthesia in the past in self or first-degree relatives.  - EKG  - CXR: without infiltration  - METS >4 no cardiopulmonary limitations however limited by msk related pain/debility   - RCRI 0 points (Class I Risk) ~ 3.9% for 30d risk of death, MI, or cardiac arrest.   - Cintron score 0.0% for risk of MI or cardiac arrest, intraoperatively or up to 30d post-op.  - Patient deemed _ risk for _ risk surgery  80F hx of HTN, HLD, DM, Hyperthyroidism, Parkinson's s/p L2-pelvis PSF 2023 c/b L1 compression fx s/p kyphoplasty in 2024 now presents with worsening LBP with RLE radiculopathy for the past 3 months. Denies falls/trauma/injuries to the back. No red flag symptoms. Sent in by Dr. Bahena for preop and OR tomorrow 6/23 for revision surgery (extension of prior fusion to T10 with cement and tethers). Medicine consulted for pre-op     #Pre-operative clearance  No significant cardiac/pulmonary history. No adverse reactions to anesthesia in the past in self or first-degree relatives.  - EKG NSR   - CXR: without infiltration  - METS >4 no cardiopulmonary limitations however limited by msk related pain/debility   - RCRI 0 points (Class I Risk) ~ 3.9% for 30d risk of death, MI, or cardiac arrest.   - Cintron score 0.1% for risk of MI or cardiac arrest, intraoperatively or up to 30d post-op.  - Patient deemed intermediate risk for intermediate risk surgery       #HTN   PT w hx of HTN takes metoprolol and hydrochlorthiazide     #hyperthyrodism     #DM   PT w hx of DM takes     #Parkinsons    80F hx of HTN, HLD, DM, Hyperthyroidism, Parkinson's s/p L2-pelvis PSF 2023 c/b L1 compression fx s/p kyphoplasty in 2024 now presents with worsening LBP with RLE radiculopathy for the past 3 months. Denies falls/trauma/injuries to the back. No red flag symptoms. Sent in by Dr. Bahena for preop and OR tomorrow 6/23 for revision surgery (extension of prior fusion to T10 with cement and tethers). Medicine consulted for pre-op     #Pre-operative clearance  No significant cardiac/pulmonary history. No adverse reactions to anesthesia in the past in self or first-degree relatives.  - EKG NSR   - CXR: without infiltration  - METS >4 no cardiopulmonary limitations however limited by msk related pain/debility   - RCRI 0 points (Class I Risk) ~ 3.9% for 30d risk of death, MI, or cardiac arrest.   - Cintron score 0.1% for risk of MI or cardiac arrest, intraoperatively or up to 30d post-op.  - Patient deemed intermediate risk for intermediate risk surgery       #HTN   PT w hx of HTN takes metoprolol 25mg ER and hydrochlorthiazide 12.5mg qd   - would hold HCTZ day of procedure     #hyperthyrodism   PT w hx of hyperthyroidism, takes 5mg methimazole at home     #DM   PT w hx of DM takes 1000mg of metformin BID and trulicty 0.75mg weekly   - sliding scale while inpatient   - routine finger sticks     #Parkinsons   PT w hx of PArkinsons takes Carbidopa-levodopa 25-100mg TID   - c/w home meds  80F hx of HTN, HLD, DM, Hyperthyroidism, Parkinson's s/p L2-pelvis PSF 2023 c/b L1 compression fx s/p kyphoplasty in 2024 now presents with worsening LBP with RLE radiculopathy for the past 3 months. Denies falls/trauma/injuries to the back. No red flag symptoms. Sent in by Dr. Bahena for preop and OR tomorrow 6/23 for revision surgery (extension of prior fusion to T10 with cement and tethers). Medicine consulted for pre-op     #Pre-operative clearance  No significant cardiac/pulmonary history. No adverse reactions to anesthesia in the past in self or first-degree relatives.  - EKG NSR   - CXR: without infiltration  - METS >4 no cardiopulmonary limitations however limited by msk related pain/debility   - RCRI 0 points (Class I Risk) ~ 3.9% for 30d risk of death, MI, or cardiac arrest.   - Cintron score 0.1% for risk of MI or cardiac arrest, intraoperatively or up to 30d post-op.  - Patient deemed intermediate risk for intermediate risk surgery       #HTN   PT w hx of HTN takes metoprolol 25mg ER and hydrochlorthiazide 12.5mg qd   - would hold HCTZ day of procedure   - c/w BB     hyperthyroidism   PT w hx of hyperthyroidism, takes 5mg methimazole at home   - cw home med     #DM   PT w hx of DM takes 1000mg of metformin BID and trulicty 0.75mg weekly   - would start basal insulin at this time given elevated BG readings w 10U Lantus qhs   - sliding scale while inpatient   - q6 finger sticks     Parkinson   PT w hx of Parkinson's takes Carbidopa-levodopa 25-100mg TID   - c/w home meds     Case discussed with Dr. Leigh  80F hx of HTN, HLD, DM, Hyperthyroidism, Parkinson's s/p L2-pelvis PSF 2023 c/b L1 compression fx s/p kyphoplasty in 2024 now presents with worsening LBP with RLE radiculopathy for the past 3 months. Denies falls/trauma/injuries to the back. No red flag symptoms. Sent in by Dr. Bahena for preop and OR tomorrow 6/23 for revision surgery (extension of prior fusion to T10 with cement and tethers). Medicine consulted for pre-op     #Pre-operative clearance  No significant cardiac/pulmonary history. No adverse reactions to anesthesia in the past in self or first-degree relatives.  - EKG NSR   - CXR: without infiltration  - METS >4 no cardiopulmonary limitations however limited by msk related pain/debility   - RCRI 0 points (Class I Risk) ~ 3.9% for 30d risk of death, MI, or cardiac arrest.   - Cintron score 0.1% for risk of MI or cardiac arrest, intraoperatively or up to 30d post-op.  - Patient deemed intermediate risk for intermediate risk surgery       #HTN   PT w hx of HTN takes metoprolol 25mg ER and hydrochlorthiazide 12.5mg qd   - would hold HCTZ day of procedure   - c/w BB     hyperthyroidism   PT w hx of hyperthyroidism, takes 5mg methimazole at home   - cw home med     #DM   PT w hx of DM takes 1000mg of metformin BID and trulicty 0.75mg weekly   - would start basal insulin at this time given elevated BG readings w 10U Lantus qhs   - sliding scale while inpatient   - q6 finger sticks     Parkinson   PT w hx of Parkinson's takes Carbidopa-levodopa 25-100mg TID and 0.125mg Mirapex  - c/w home meds     Case discussed with Dr. Leigh

## 2025-06-22 NOTE — ED PROVIDER NOTE - ADMIT DISPOSITION PRESENT ON ADMISSION SEPSIS
[No

## 2025-06-22 NOTE — H&P ADULT - NSHPPHYSICALEXAM_GEN_ALL_CORE
VS: stable, reviewed per nursing documentation  General: No acute distress, resting comfortably  Neuro: Alert, oriented x 3  Psych: Normal mood & affect  HEENT: normocephalic, atraumatic   Neck: trachea midline  Respiratory: nonlabored on room air   CV: no cyanosis, no peripheral edema   GI: nontender, nondistended   Skin: Warm, dry, no rash, no lesions, no abrasions no ecchymosis   MSK: atraumatic with exception of below  RUE RLE LUE LLE     -Inspection/palpation: No swelling/deformity noted. Skin intact     -Compartments: soft, nontender bilaterally     -ROM: Full APROM of the extremities    -Motor: Quadriceps/TA/GS/EHL/FHL 5/5 bilateral lower extremities     -Sensation:   intact to light touch bilateral lower extremities    -Pulses: 2+ DP/PT pulses bilaterally, symmetric, extremities warm and    well perfused, capillary refill brisk

## 2025-06-22 NOTE — CONSULT NOTE ADULT - ATTENDING COMMENTS
80F hx of HTN, HLD, DM, hyperthyroidism, Parkinson's s/p L2-pelvis PSF 2023 c/b L1 compression fx s/p kyphoplasty in 2024 now presents with worsening LBP with RLE radiculopathy for the past 3 months. Denies falls/trauma/injuries to the back. No red flag symptoms. Sent in by Dr. Bahena for preop and OR tomorrow 6/23 for revision surgery (extension of prior fusion to T10 with cement and tethers). Medicine consulted for pre-op     Plan  -Preop assessment: Patient deemed intermediate risk for intermediate risk surgery. She is optimized from a medical standpoint and no further tests are recommended  -Elevated BUN possibly in the setting of HCTZ and poor hydration; would hold HCTZ for now, PO hydration encouraged  -c/w beta blocker perioperatively, can c/w ARB or hold the morning of procedure  -would c/w methimazole, sinemet, mirapex, and statin perioperatively 80F hx of HTN, HLD, DM, hyperthyroidism, Parkinson's s/p L2-pelvis PSF 2023 c/b L1 compression fx s/p kyphoplasty in 2024 now presents with worsening LBP with RLE radiculopathy for the past 3 months. Denies falls/trauma/injuries to the back. No red flag symptoms. Sent in by Dr. Bahena for preop and OR tomorrow 6/23 for revision surgery (extension of prior fusion to T10 with cement and tethers). Medicine consulted for pre-op     Plan  -As per patient, she underwent an angiogram at [facility] 7-8 months ago which was unremarkable; current ECG is sinus rhythm with nonspecific ST changes.   -Preop assessment: Patient deemed intermediate risk for intermediate risk surgery. She is optimized from a medical standpoint and no further tests are recommended  -Elevated BUN possibly in the setting of HCTZ and poor hydration; would hold HCTZ for now, PO hydration encouraged  -c/w beta blocker perioperatively, can c/w ARB or hold the morning of procedure  -would c/w methimazole, sinemet, mirapex, and statin perioperatively

## 2025-06-22 NOTE — H&P ADULT - HISTORY OF PRESENT ILLNESS
80F hx of HTN, HLD, DM, Hyperthyroidism, Parkinson's s/p L2-pelvis PSF 2023 c/b L1 compression fx s/p kyphoplasty in 2024 now presents with worsening LBP with RLE radiculopathy for the past 3 months. Denies falls/trauma/injuries to the back. No red flag symptoms. Sent in by Dr. Bahena for preop and OR tomorrow 6/23 for revision surgery (extension of prior fusion to T10 with cement and tethers).

## 2025-06-22 NOTE — ED PROVIDER NOTE - OBJECTIVE STATEMENT
80 year old Female with PMHx of HTN, HLD, NIDDM2, Hyperthyroidism, Parkinson's disease, and hx of L1 compression fracture (s/p kyphoplasty) presenting here for spinal surgery with Dr. Bahnea tomorrow. Endorsing lower back pain but no weakness, numbness, incontinence/retention, saddle anesthesia.

## 2025-06-22 NOTE — CONSULT NOTE ADULT - TIME BILLING
The necessity of the time spent during the encounter on this date of service was due to:     coordination of care; preparing to see Pt.; interviewing Pt.; examining Pt.; reviewing labs and images; documentation in [place]; d/w Medicine resident on rounds. The necessity of the time spent during the encounter on this date of service was due to:     coordination of care; preparing to see Pt.; interviewing Pt.; examining Pt.; reviewing labs and images; documentation in [place]; d/w Medicine resident on rounds. The time spent on the total encounter excludes teaching and separately reported services.

## 2025-06-22 NOTE — ED ADULT NURSE NOTE - OBJECTIVE STATEMENT
Patient presents to the ED sent by MD Zelaya to be admitted for surgery tomorrow for her back. Patient states that she has been having lower back pain and pain going down her right leg for the past few months. Denies any numbness to the pelvic area or urinary incontinence. Patient reports history of spinal surgery in the past.

## 2025-06-22 NOTE — ED ADULT NURSE NOTE - NSFALLHARMRISKINTERV_ED_ALL_ED
Assistance OOB with selected safe patient handling equipment if applicable/Assistance with ambulation/Communicate risk of Fall with Harm to all staff, patient, and family/Monitor gait and stability/Provide visual cue: red socks, yellow wristband, yellow gown, etc/Reinforce activity limits and safety measures with patient and family/Bed in lowest position, wheels locked, appropriate side rails in place/Call bell, personal items and telephone in reach/Instruct patient to call for assistance before getting out of bed/chair/stretcher/Non-slip footwear applied when patient is off stretcher/[place] to call system/Physically safe environment - no spills, clutter or unnecessary equipment/Purposeful Proactive Rounding/Room/bathroom lighting operational, light cord in reach

## 2025-06-22 NOTE — PATIENT PROFILE ADULT - FALL HARM RISK - RISK INTERVENTIONS

## 2025-06-22 NOTE — ED ADULT TRIAGE NOTE - CHIEF COMPLAINT QUOTE
Pt sent in by surgeon for spinal surgery tomorrow. Pt co lower back pain radiating down both of her legs. No loss of control of bowels / bladder. No numbness.

## 2025-06-22 NOTE — CONSULT NOTE ADULT - SUBJECTIVE AND OBJECTIVE BOX
80F hx of HTN, HLD, DM, Hyperthyroidism, Parkinson's s/p L2-pelvis PSF 2023 c/b L1 compression fx s/p kyphoplasty in 2024 now presents with worsening LBP with RLE radiculopathy for the past 3 months. Denies falls/trauma/injuries to the back admitted for revision surgery (extension of prior fusion to T10 with cement and tethers). MEdicine consulted for preop eval         PAST MEDICAL/SURGICAL HISTORY  PAST MEDICAL & SURGICAL HISTORY:  Hyperthyroidism  monitored  by endocrinologist      HTN (hypertension)      Hyperlipidemia      GERD (gastroesophageal reflux disease)      Parkinson disease      Spinal stenosis  pain relieved with steroid injection      MVP (mitral valve prolapse)  echo 2018      MR (mitral regurgitation)      Diabetes mellitus type II, non insulin dependent      Lumbar compression fracture      S/P foot surgery, left  5/2/2018      History of bowel resection  2012      S/P hernia repair  2013      S/P breast augmentation  2014 saline implants      H/O abdominal surgery  1999      S/P knee replacement  Left ( 1/2/2019 ), right TKR, 2019      Status post kyphoplasty          REVIEW OF SYSTEMS:  as per H&P    T(C): 36.4 (06-22-25 @ 13:06), Max: 36.8 (06-22-25 @ 11:46)  HR: 68 (06-22-25 @ 13:06) (68 - 75)  BP: 136/68 (06-22-25 @ 13:06) (136/68 - 153/87)  RR: 18 (06-22-25 @ 13:06) (18 - 18)  SpO2: 98% (06-22-25 @ 13:06) (97% - 98%)  Wt(kg): --Vital Signs Last 24 Hrs  T(C): 36.4 (22 Jun 2025 13:06), Max: 36.8 (22 Jun 2025 11:46)  T(F): 97.5 (22 Jun 2025 13:06), Max: 98.2 (22 Jun 2025 11:46)  HR: 68 (22 Jun 2025 13:06) (68 - 75)  BP: 136/68 (22 Jun 2025 13:06) (136/68 - 153/87)  BP(mean): --  RR: 18 (22 Jun 2025 13:06) (18 - 18)  SpO2: 98% (22 Jun 2025 13:06) (97% - 98%)    Parameters below as of 22 Jun 2025 13:06  Patient On (Oxygen Delivery Method): room air        PHYSICAL EXAM:  GENERAL: NAD, well-groomed, well-developed  EYES: EOMI, PERRLA, conjunctiva and sclera clear  ENMT: MMM  NERVOUS SYSTEM:  Alert & Oriented X3, tremors noted, minor rigidity   CHEST/LUNG: Clear to percussion bilaterally; No rales, rhonchi, wheezing, or rubs  HEART: Regular rate and rhythm; No murmurs, rubs, or gallops  ABDOMEN: Soft, Nontender, Nondistended; Bowel sounds present  EXTREMITIES:  2+ Peripheral Pulses, No clubbing, cyanosis, or edema  SKIN: No rashes or lesions    Consultant(s) Notes Reviewed:  [x ] YES  [ ] NO  Care Discussed with Consultants/Other Providers [ x] YES  [ ] NO    LABS:  CBC   06-22-25 @ 10:45  Hematcorit 40.9  Hemoglobin 12.8  Mean Cell Hemoglobin 29.2  Platelet Count-Automated 173  RBC Count 4.38  Red Cell Distrib Width 14.0  Wbc Count 7.51      BMP  06-22-25 @ 10:45  Anion Gap. Serum 8  Blood Urea Nitrogen,Serm 36  Calcium, Total Serum 9.0  Carbon Dioxide, Serum 27  Chloride, Serum 103  Creatinine, Serum 0.70  eGFR in  --  eGFR in Non Afican American --  Gloucose, serum 161  Potassium, Serum 4.5  Sodium, Serum 138                  CMP  06-22-25 @ 10:45  Teresita Aminotransferase(ALT/SGPT)--  Albumin, Serum --  Alkaline Phosphatase, Serum --  Anion Gap, Serum 8  Aspartate Aminotransferase (AST/SGOT)--  Bilirubin Total, Serum --  Blood Urea Nitrogen, Serum 36  Calcium,Total Serum 9.0  Carbon Dioxide, Serum 27  Chloride, Serum 103  Creatinine, Serum 0.70  eGFR if  --  eGFR if Non African American --  Glucose, Serum 161  Potassium, Serum 4.5  Protein Total, Serum --  Sodium, Serum 138                          PT/INR  PT/INR  06-22-25 @ 13:10  INR 0.91  Prothrombin Time Comment --  Prothrobin Time, [id number]  PT/INR  06-22-25 @ 10:45  INR 0.91  Prothrombin Time Comment --  Prothrobin Time, [id number]      Amylase/Lipase            RADIOLOGY & ADDITIONAL TESTS:    Imaging Personally Reviewed:  [ ] YES  [ ] NO

## 2025-06-22 NOTE — H&P ADULT - ASSESSMENT
80F hx of HTN, HLD, DM, Hyperthyroidism, Parkinson's s/p L2-pelvis PSF 2023 c/b L1 compression fx s/p kyphoplasty in 2024 now with worsening LBP with RLE radiculopathy, MRI consistent with ASD with spinal stenosis at L1-2  - Admit  - Preop/NPO/IVF  - Pending med clearance  - Consented  - On schedule for 745 start tomorrow 6/23

## 2025-06-23 ENCOUNTER — TRANSCRIPTION ENCOUNTER (OUTPATIENT)
Age: 80
End: 2025-06-23

## 2025-06-23 ENCOUNTER — APPOINTMENT (OUTPATIENT)
Dept: ORTHOPEDIC SURGERY | Facility: HOSPITAL | Age: 80
End: 2025-06-23
Payer: MEDICARE

## 2025-06-23 DIAGNOSIS — S32.000A WEDGE COMPRESSION FRACTURE OF UNSPECIFIED LUMBAR VERTEBRA, INITIAL ENCOUNTER FOR CLOSED FRACTURE: ICD-10-CM

## 2025-06-23 DIAGNOSIS — M40.209 UNSPECIFIED KYPHOSIS, SITE UNSPECIFIED: ICD-10-CM

## 2025-06-23 DIAGNOSIS — M48.061 SPINAL STENOSIS, LUMBAR REGION WITHOUT NEUROGENIC CLAUDICATION: ICD-10-CM

## 2025-06-23 PROCEDURE — 22830 EXPLORATION OF SPINAL FUSION: CPT | Mod: 59

## 2025-06-23 PROCEDURE — 22614 ARTHRD PST TQ 1NTRSPC EA ADD: CPT

## 2025-06-23 PROCEDURE — 99232 SBSQ HOSP IP/OBS MODERATE 35: CPT

## 2025-06-23 PROCEDURE — 22212 INCIS 1 VERTEBRAL SEG THORAC: CPT

## 2025-06-23 PROCEDURE — 22216 INCIS ADDL SPINE SEGMENT: CPT

## 2025-06-23 PROCEDURE — 22612 ARTHRD PST TQ 1NTRSPC LUMBAR: CPT

## 2025-06-23 PROCEDURE — 22843 INSERT SPINE FIXATION DEVICE: CPT

## 2025-06-23 RX ORDER — ACETAMINOPHEN 500 MG/5ML
1000 LIQUID (ML) ORAL EVERY 8 HOURS
Refills: 0 | Status: DISCONTINUED | OUTPATIENT
Start: 2025-06-23 | End: 2025-06-25

## 2025-06-23 RX ORDER — SODIUM CHLORIDE 9 G/1000ML
1000 INJECTION, SOLUTION INTRAVENOUS
Refills: 0 | Status: DISCONTINUED | OUTPATIENT
Start: 2025-06-23 | End: 2025-06-26

## 2025-06-23 RX ORDER — BUPIVACAINE 13.3 MG/ML
20 INJECTION, SUSPENSION, LIPOSOMAL INFILTRATION ONCE
Refills: 0 | Status: DISCONTINUED | OUTPATIENT
Start: 2025-06-23 | End: 2025-06-25

## 2025-06-23 RX ORDER — APREPITANT 40 MG/1
40 CAPSULE ORAL ONCE
Refills: 0 | Status: COMPLETED | OUTPATIENT
Start: 2025-06-23 | End: 2025-06-23

## 2025-06-23 RX ORDER — ACETAMINOPHEN 500 MG/5ML
1000 LIQUID (ML) ORAL ONCE
Refills: 0 | Status: COMPLETED | OUTPATIENT
Start: 2025-06-23 | End: 2025-06-23

## 2025-06-23 RX ORDER — METHOCARBAMOL 500 MG/1
500 TABLET, FILM COATED ORAL EVERY 8 HOURS
Refills: 0 | Status: DISCONTINUED | OUTPATIENT
Start: 2025-06-23 | End: 2025-06-24

## 2025-06-23 RX ORDER — POVIDONE-IODINE 7.5 %
1 SOLUTION, NON-ORAL TOPICAL ONCE
Refills: 0 | Status: COMPLETED | OUTPATIENT
Start: 2025-06-23 | End: 2025-06-23

## 2025-06-23 RX ORDER — POLYETHYLENE GLYCOL 3350 17 G/17G
17 POWDER, FOR SOLUTION ORAL DAILY
Refills: 0 | Status: DISCONTINUED | OUTPATIENT
Start: 2025-06-23 | End: 2025-06-25

## 2025-06-23 RX ORDER — ONDANSETRON HCL/PF 4 MG/2 ML
4 VIAL (ML) INJECTION EVERY 6 HOURS
Refills: 0 | Status: DISCONTINUED | OUTPATIENT
Start: 2025-06-23 | End: 2025-06-27

## 2025-06-23 RX ORDER — METOPROLOL SUCCINATE 50 MG/1
25 TABLET, EXTENDED RELEASE ORAL DAILY
Refills: 0 | Status: DISCONTINUED | OUTPATIENT
Start: 2025-06-23 | End: 2025-06-27

## 2025-06-23 RX ORDER — SENNA 187 MG
2 TABLET ORAL AT BEDTIME
Refills: 0 | Status: DISCONTINUED | OUTPATIENT
Start: 2025-06-23 | End: 2025-06-27

## 2025-06-23 RX ORDER — CEFAZOLIN SODIUM IN 0.9 % NACL 3 G/100 ML
2000 INTRAVENOUS SOLUTION, PIGGYBACK (ML) INTRAVENOUS EVERY 8 HOURS
Refills: 0 | Status: COMPLETED | OUTPATIENT
Start: 2025-06-23 | End: 2025-06-24

## 2025-06-23 RX ORDER — HYDROMORPHONE/SOD CHLOR,ISO/PF 2 MG/10 ML
0.5 SYRINGE (ML) INJECTION
Refills: 0 | Status: DISCONTINUED | OUTPATIENT
Start: 2025-06-23 | End: 2025-06-24

## 2025-06-23 RX ADMIN — Medication 4 MILLIGRAM(S): at 18:37

## 2025-06-23 RX ADMIN — Medication 1 LOZENGE: at 23:54

## 2025-06-23 RX ADMIN — Medication 400 MILLIGRAM(S): at 13:17

## 2025-06-23 RX ADMIN — Medication 0.5 MILLIGRAM(S): at 12:48

## 2025-06-23 RX ADMIN — Medication 1 TABLET(S): at 16:55

## 2025-06-23 RX ADMIN — Medication 4 MILLIGRAM(S): at 22:46

## 2025-06-23 RX ADMIN — Medication 4 MILLIGRAM(S): at 21:46

## 2025-06-23 RX ADMIN — PRAMIPEXOLE DIHYDROCHLORIDE 0.12 MILLIGRAM(S): 1 TABLET ORAL at 16:55

## 2025-06-23 RX ADMIN — Medication 4 MILLIGRAM(S): at 23:56

## 2025-06-23 RX ADMIN — APREPITANT 40 MILLIGRAM(S): 40 CAPSULE ORAL at 07:59

## 2025-06-23 RX ADMIN — Medication 2 MILLIGRAM(S): at 05:36

## 2025-06-23 RX ADMIN — Medication 1000 MILLIGRAM(S): at 13:19

## 2025-06-23 RX ADMIN — Medication 1 APPLICATION(S): at 07:45

## 2025-06-23 RX ADMIN — ROSUVASTATIN CALCIUM 10 MILLIGRAM(S): 5 TABLET, FILM COATED ORAL at 21:49

## 2025-06-23 RX ADMIN — Medication 2 MILLIGRAM(S): at 06:36

## 2025-06-23 RX ADMIN — Medication 1 TABLET(S): at 21:49

## 2025-06-23 RX ADMIN — Medication 1000 MILLIGRAM(S): at 07:58

## 2025-06-23 RX ADMIN — Medication 1000 MILLIGRAM(S): at 21:48

## 2025-06-23 RX ADMIN — Medication 0.5 MILLIGRAM(S): at 12:24

## 2025-06-23 RX ADMIN — Medication 2 TABLET(S): at 21:49

## 2025-06-23 RX ADMIN — Medication 1 TABLET(S): at 07:59

## 2025-06-23 RX ADMIN — Medication 2 MILLIGRAM(S): at 18:33

## 2025-06-23 RX ADMIN — SODIUM CHLORIDE 80 MILLILITER(S): 9 INJECTION, SOLUTION INTRAVENOUS at 00:05

## 2025-06-23 RX ADMIN — Medication 100 MILLIGRAM(S): at 16:55

## 2025-06-23 RX ADMIN — SODIUM CHLORIDE 100 MILLILITER(S): 9 INJECTION, SOLUTION INTRAVENOUS at 15:55

## 2025-06-23 RX ADMIN — Medication 1 APPLICATION(S): at 06:00

## 2025-06-23 RX ADMIN — METHOCARBAMOL 500 MILLIGRAM(S): 500 TABLET, FILM COATED ORAL at 21:48

## 2025-06-23 NOTE — PROGRESS NOTE ADULT - SUBJECTIVE AND OBJECTIVE BOX
Still groggy from anesthesia.  VS reviewed.     OVERNIGHT EVENTS: NAEO    Remaining ROS negative       PHYSICAL EXAM:    General: groggy, wearing nasal cannula  HEENT: NC/AT; MMM  Neck: supple  Cardiovascular: +S1/S2, RRR  Respiratory: CTA B/L; no W/R/R      VITAL SIGNS:  Vital Signs Last 24 Hrs  T(C): 36.4 (2025 13:28), Max: 36.7 (2025 04:32)  T(F): 97.6 (2025 13:28), Max: 98.1 (2025 04:32)  HR: 66 (2025 13:43) (64 - 96)  BP: 111/56 (2025 13:43) (102/56 - 142/63)  BP(mean): 80 (2025 13:43) (74 - 90)  RR: 13 (2025 13:43) (12 - 18)  SpO2: 97% (2025 13:43) (94% - 97%)    Parameters below as of 2025 13:28  Patient On (Oxygen Delivery Method): nasal cannula  O2 Flow (L/min): 3        MEDICATIONS:  MEDICATIONS  (STANDING):  acetaminophen     Tablet .. 1000 milliGRAM(s) Oral every 8 hours  BUPivacaine liposome 1.3% Injectable 20 milliLiter(s) Local Injection once  carbidopa/levodopa  25/100 1 Tablet(s) Oral three times a day  ceFAZolin   IVPB 2000 milliGRAM(s) IV Intermittent every 8 hours  chlorhexidine 2% Cloths 1 Application(s) Topical <User Schedule>  dextrose 5%. 1000 milliLiter(s) (50 mL/Hr) IV Continuous <Continuous>  dextrose 5%. 1000 milliLiter(s) (100 mL/Hr) IV Continuous <Continuous>  dextrose 50% Injectable 25 Gram(s) IV Push once  dextrose 50% Injectable 12.5 Gram(s) IV Push once  dextrose 50% Injectable 25 Gram(s) IV Push once  folic acid 1 milliGRAM(s) Oral daily  glucagon  Injectable 1 milliGRAM(s) IntraMuscular once  insulin lispro (ADMELOG) corrective regimen sliding scale   SubCutaneous Before meals and at bedtime  lactated ringers. 1000 milliLiter(s) (80 mL/Hr) IV Continuous <Continuous>  lactated ringers. 1000 milliLiter(s) (100 mL/Hr) IV Continuous <Continuous>  methIMAzole 5 milliGRAM(s) Oral daily  methocarbamol 500 milliGRAM(s) Oral every 8 hours  metoprolol succinate ER 25 milliGRAM(s) Oral daily  multivitamin 1 Tablet(s) Oral daily  ondansetron   Disintegrating Tablet 4 milliGRAM(s) Oral every 6 hours  pantoprazole    Tablet 40 milliGRAM(s) Oral before breakfast  polyethylene glycol 3350 17 Gram(s) Oral daily  pramipexole 0.125 milliGRAM(s) Oral daily  rosuvastatin 10 milliGRAM(s) Oral at bedtime  senna 2 Tablet(s) Oral at bedtime    MEDICATIONS  (PRN):  dextrose Oral Gel 15 Gram(s) Oral once PRN Blood Glucose LESS THAN 70 milliGRAM(s)/deciliter  HYDROmorphone   Tablet 2 milliGRAM(s) Oral every 6 hours PRN Moderate Pain (4 - 6)  HYDROmorphone   Tablet 4 milliGRAM(s) Oral every 6 hours PRN Severe Pain (7 - 10)  HYDROmorphone  Injectable 0.5 milliGRAM(s) IV Push every 6 hours PRN Breakthrough pain  HYDROmorphone  Injectable 0.5 milliGRAM(s) IV Push every 15 minutes PRN For breakthrough pain      ALLERGIES:  Allergies    No Known Allergies    Intolerances    OxyContin (Nausea)  Percocet 5/325 (Other; Nausea)  hydrocodone (Other; Nausea)      LABS:                        12.8   7.51  )-----------( 173      ( 2025 10:45 )             40.9         138  |  103  |  36[H]  ----------------------------<  161[H]  4.5   |  27  |  0.70    Ca    9.0      2025 10:45      PT/INR - ( 2025 13:10 )   PT: 10.7 sec;   INR: 0.91          PTT - ( 2025 13:10 )  PTT:31.7 sec  Urinalysis Basic - ( 2025 11:20 )    Color: Yellow / Appearance: Clear / S.016 / pH: x  Gluc: x / Ketone: x  / Bili: Negative / Urobili: 1.0 mg/dL   Blood: x / Protein: Negative mg/dL / Nitrite: Negative   Leuk Esterase: Negative / RBC: x / WBC x   Sq Epi: x / Non Sq Epi: x / Bacteria: x      CAPILLARY BLOOD GLUCOSE      POCT Blood Glucose.: 158 mg/dL (2025 12:13)      RADIOLOGY & ADDITIONAL TESTS: Reviewed.

## 2025-06-23 NOTE — PROGRESS NOTE ADULT - SUBJECTIVE AND OBJECTIVE BOX
Orthopaedic Surgery Post Operative Check    Procedure:  extension of fusion T10-L2 with cement augmentation bilateral T10, proximal tether and osteotomy on 6/23  Surgeon: Dr. Bahena    Patient seen in the PACU. Very groggy 2/2 anesthesia, able to be aroused but still very groggy/moaning in pain. C/o back pain.     Vital Signs Last 24 Hrs  T(C): 36.4 (06-23-25 @ 13:28), Max: 36.5 (06-23-25 @ 11:58)  T(F): 97.6 (06-23-25 @ 13:28), Max: 97.7 (06-23-25 @ 11:58)  HR: 66 (06-23-25 @ 13:43) (66 - 96)  BP: 111/56 (06-23-25 @ 13:43) (102/56 - 142/63)  BP(mean): 80 (06-23-25 @ 13:43) (74 - 90)  RR: 13 (06-23-25 @ 13:43) (12 - 16)  SpO2: 97% (06-23-25 @ 13:43) (94% - 97%)  AVSS    General: lying in bed, moaning from pain, groggy but arousable   Dressing check deferred 2/2 patient lying on back in pain; per RN has gauze/paper tape dressing. Hemovac x 2 holding suction   Motor: exam somewhat difficult 2/2 patient pain/grogginess from anesthesia, with encouragement EHL/FHL/TA/GS 5/5, /biceps/triceps 5/5       A/P: 80yFemale POD#0 s/p extension of fusion T10-L2 with cement augmentation bilateral T10, proximal tether and osteotomy on 6/23  - Stable  - Pain Control  - DVT ppx: SCDs  - Post op abx: ancef   - PT, WBS: WBAT, pending PT evaluation   - f/u AM labs   - will re-assess pain level once patient more awake from anesthesia     Ortho Pager [phone] Orthopaedic Surgery Post Operative Check    Procedure:  extension of fusion T10-L2 with cement augmentation bilateral T10, proximal tether and osteotomy on 6/23  Surgeon: Dr. Bahena    Patient seen in the PACU. Very groggy 2/2 anesthesia, able to be aroused but still very groggy/moaning in pain.   Complaining of back pain however unable to do complete review of systems as patient still very groggy from anesthesia.      Vital Signs Last 24 Hrs  T(C): 36.4 (06-23-25 @ 13:28), Max: 36.5 (06-23-25 @ 11:58)  T(F): 97.6 (06-23-25 @ 13:28), Max: 97.7 (06-23-25 @ 11:58)  HR: 66 (06-23-25 @ 13:43) (66 - 96)  BP: 111/56 (06-23-25 @ 13:43) (102/56 - 142/63)  BP(mean): 80 (06-23-25 @ 13:43) (74 - 90)  RR: 13 (06-23-25 @ 13:43) (12 - 16)  SpO2: 97% (06-23-25 @ 13:43) (94% - 97%)  AVSS    General: lying in bed, moaning from pain, groggy but arousable   Dressing check deferred 2/2 patient lying on back in pain; per RN has gauze/paper tape dressing. Hemovac x 2 holding suction   Motor: exam somewhat difficult 2/2 patient pain/grogginess from anesthesia, with encouragement EHL/FHL/TA/GS 5/5, /biceps/triceps 5/5       A/P: 80yFemale POD#0 s/p extension of fusion T10-L2 with cement augmentation bilateral T10, proximal tether and osteotomy on 6/23  - Stable  - Pain Control  - DVT ppx: SCDs  - Post op abx: ancef   - PT, WBS: WBAT, pending PT evaluation   - f/u AM labs   - will re-assess pain level/symptoms once patient more awake from anesthesia     Ortho Pager [phone]

## 2025-06-23 NOTE — BRIEF OPERATIVE NOTE - NSICDXBRIEFPOSTOP_GEN_ALL_CORE_FT
POST-OP DIAGNOSIS:  Lumbar stenosis 23-Jun-2025 11:46:16  Jaimee Garcia  Lumbar compression fracture 23-Jun-2025 11:46:24  Jaimee Garcia  Kyphosis 23-Jun-2025 11:46:29  Jaimee Garcia

## 2025-06-24 ENCOUNTER — TRANSCRIPTION ENCOUNTER (OUTPATIENT)
Age: 80
End: 2025-06-24

## 2025-06-24 LAB
ANION GAP SERPL CALC-SCNC: 11 MMOL/L — SIGNIFICANT CHANGE UP (ref 5–17)
BUN SERPL-MCNC: 16 MG/DL — SIGNIFICANT CHANGE UP (ref 7–23)
CALCIUM SERPL-MCNC: 8.7 MG/DL — SIGNIFICANT CHANGE UP (ref 8.4–10.5)
CHLORIDE SERPL-SCNC: 105 MMOL/L — SIGNIFICANT CHANGE UP (ref 96–108)
CO2 SERPL-SCNC: 22 MMOL/L — SIGNIFICANT CHANGE UP (ref 22–31)
CREAT SERPL-MCNC: 0.65 MG/DL — SIGNIFICANT CHANGE UP (ref 0.5–1.3)
EGFR: 89 ML/MIN/1.73M2 — SIGNIFICANT CHANGE UP
EGFR: 89 ML/MIN/1.73M2 — SIGNIFICANT CHANGE UP
GLUCOSE SERPL-MCNC: 111 MG/DL — HIGH (ref 70–99)
HCT VFR BLD CALC: 32.3 % — LOW (ref 34.5–45)
HGB BLD-MCNC: 9.6 G/DL — LOW (ref 11.5–15.5)
MCHC RBC-ENTMCNC: 29.2 PG — SIGNIFICANT CHANGE UP (ref 27–34)
MCHC RBC-ENTMCNC: 29.7 G/DL — LOW (ref 32–36)
MCV RBC AUTO: 98.2 FL — SIGNIFICANT CHANGE UP (ref 80–100)
NRBC # BLD AUTO: 0.02 K/UL — HIGH (ref 0–0)
NRBC # FLD: 0.02 K/UL — HIGH (ref 0–0)
NRBC BLD AUTO-RTO: 0 /100 WBCS — SIGNIFICANT CHANGE UP (ref 0–0)
PLATELET # BLD AUTO: 123 K/UL — LOW (ref 150–400)
PMV BLD: 11 FL — SIGNIFICANT CHANGE UP (ref 7–13)
POTASSIUM SERPL-MCNC: 4.2 MMOL/L — SIGNIFICANT CHANGE UP (ref 3.5–5.3)
POTASSIUM SERPL-SCNC: 4.2 MMOL/L — SIGNIFICANT CHANGE UP (ref 3.5–5.3)
RBC # BLD: 3.29 M/UL — LOW (ref 3.8–5.2)
RBC # FLD: 13.9 % — SIGNIFICANT CHANGE UP (ref 10.3–14.5)
SODIUM SERPL-SCNC: 138 MMOL/L — SIGNIFICANT CHANGE UP (ref 135–145)
WBC # BLD: 5.7 K/UL — SIGNIFICANT CHANGE UP (ref 3.8–10.5)
WBC # FLD AUTO: 5.7 K/UL — SIGNIFICANT CHANGE UP (ref 3.8–10.5)

## 2025-06-24 PROCEDURE — 86900 BLOOD TYPING SEROLOGIC ABO: CPT

## 2025-06-24 PROCEDURE — 85027 COMPLETE CBC AUTOMATED: CPT

## 2025-06-24 PROCEDURE — 82962 GLUCOSE BLOOD TEST: CPT

## 2025-06-24 PROCEDURE — 86850 RBC ANTIBODY SCREEN: CPT

## 2025-06-24 PROCEDURE — 85730 THROMBOPLASTIN TIME PARTIAL: CPT

## 2025-06-24 PROCEDURE — 80048 BASIC METABOLIC PNL TOTAL CA: CPT

## 2025-06-24 PROCEDURE — 71046 X-RAY EXAM CHEST 2 VIEWS: CPT

## 2025-06-24 PROCEDURE — 36415 COLL VENOUS BLD VENIPUNCTURE: CPT

## 2025-06-24 PROCEDURE — 85025 COMPLETE CBC W/AUTO DIFF WBC: CPT

## 2025-06-24 PROCEDURE — 81003 URINALYSIS AUTO W/O SCOPE: CPT

## 2025-06-24 PROCEDURE — 99233 SBSQ HOSP IP/OBS HIGH 50: CPT

## 2025-06-24 PROCEDURE — 86901 BLOOD TYPING SEROLOGIC RH(D): CPT

## 2025-06-24 PROCEDURE — 85610 PROTHROMBIN TIME: CPT

## 2025-06-24 RX ORDER — HYDROMORPHONE/SOD CHLOR,ISO/PF 2 MG/10 ML
2 SYRINGE (ML) INJECTION EVERY 6 HOURS
Refills: 0 | Status: DISCONTINUED | OUTPATIENT
Start: 2025-06-24 | End: 2025-06-24

## 2025-06-24 RX ORDER — HYDROMORPHONE/SOD CHLOR,ISO/PF 2 MG/10 ML
2 SYRINGE (ML) INJECTION EVERY 6 HOURS
Refills: 0 | Status: DISCONTINUED | OUTPATIENT
Start: 2025-06-24 | End: 2025-06-25

## 2025-06-24 RX ORDER — LIDOCAINE HYDROCHLORIDE 20 MG/ML
1 JELLY TOPICAL DAILY
Refills: 0 | Status: DISCONTINUED | OUTPATIENT
Start: 2025-06-24 | End: 2025-06-27

## 2025-06-24 RX ORDER — METHOCARBAMOL 500 MG/1
500 TABLET, FILM COATED ORAL
Refills: 0 | Status: DISCONTINUED | OUTPATIENT
Start: 2025-06-24 | End: 2025-06-24

## 2025-06-24 RX ORDER — HYDROMORPHONE/SOD CHLOR,ISO/PF 2 MG/10 ML
1 SYRINGE (ML) INJECTION EVERY 6 HOURS
Refills: 0 | Status: DISCONTINUED | OUTPATIENT
Start: 2025-06-24 | End: 2025-06-25

## 2025-06-24 RX ORDER — METHOCARBAMOL 500 MG/1
500 TABLET, FILM COATED ORAL
Refills: 0 | Status: DISCONTINUED | OUTPATIENT
Start: 2025-06-24 | End: 2025-06-25

## 2025-06-24 RX ORDER — HYDROMORPHONE/SOD CHLOR,ISO/PF 2 MG/10 ML
1 SYRINGE (ML) INJECTION EVERY 6 HOURS
Refills: 0 | Status: DISCONTINUED | OUTPATIENT
Start: 2025-06-24 | End: 2025-06-24

## 2025-06-24 RX ADMIN — Medication 1 TABLET(S): at 21:27

## 2025-06-24 RX ADMIN — Medication 4 MILLIGRAM(S): at 07:10

## 2025-06-24 RX ADMIN — METOPROLOL SUCCINATE 25 MILLIGRAM(S): 50 TABLET, EXTENDED RELEASE ORAL at 06:09

## 2025-06-24 RX ADMIN — METHOCARBAMOL 500 MILLIGRAM(S): 500 TABLET, FILM COATED ORAL at 17:38

## 2025-06-24 RX ADMIN — SODIUM CHLORIDE 100 MILLILITER(S): 9 INJECTION, SOLUTION INTRAVENOUS at 12:38

## 2025-06-24 RX ADMIN — Medication 2 TABLET(S): at 21:26

## 2025-06-24 RX ADMIN — Medication 4 MILLIGRAM(S): at 06:10

## 2025-06-24 RX ADMIN — Medication 100 MILLIGRAM(S): at 00:00

## 2025-06-24 RX ADMIN — Medication 1000 MILLIGRAM(S): at 13:28

## 2025-06-24 RX ADMIN — Medication 1000 MILLIGRAM(S): at 21:26

## 2025-06-24 RX ADMIN — Medication 4 MILLIGRAM(S): at 12:39

## 2025-06-24 RX ADMIN — ROSUVASTATIN CALCIUM 10 MILLIGRAM(S): 5 TABLET, FILM COATED ORAL at 21:27

## 2025-06-24 RX ADMIN — LIDOCAINE HYDROCHLORIDE 1 PATCH: 20 JELLY TOPICAL at 16:42

## 2025-06-24 RX ADMIN — Medication 1 TABLET(S): at 12:39

## 2025-06-24 RX ADMIN — FOLIC ACID 1 MILLIGRAM(S): 1 TABLET ORAL at 12:42

## 2025-06-24 RX ADMIN — Medication 40 MILLIGRAM(S): at 06:09

## 2025-06-24 RX ADMIN — METHOCARBAMOL 500 MILLIGRAM(S): 500 TABLET, FILM COATED ORAL at 06:10

## 2025-06-24 RX ADMIN — Medication 1000 MILLIGRAM(S): at 06:07

## 2025-06-24 RX ADMIN — Medication 2 MILLIGRAM(S): at 22:26

## 2025-06-24 RX ADMIN — PRAMIPEXOLE DIHYDROCHLORIDE 0.12 MILLIGRAM(S): 1 TABLET ORAL at 12:40

## 2025-06-24 RX ADMIN — POLYETHYLENE GLYCOL 3350 17 GRAM(S): 17 POWDER, FOR SOLUTION ORAL at 12:38

## 2025-06-24 RX ADMIN — Medication 1 APPLICATION(S): at 06:18

## 2025-06-24 RX ADMIN — Medication 2 MILLIGRAM(S): at 21:26

## 2025-06-24 RX ADMIN — Medication 4 MILLIGRAM(S): at 13:35

## 2025-06-24 RX ADMIN — Medication 1 TABLET(S): at 06:09

## 2025-06-24 RX ADMIN — INSULIN LISPRO 2: 100 INJECTION, SOLUTION INTRAVENOUS; SUBCUTANEOUS at 12:41

## 2025-06-24 RX ADMIN — Medication 4 MILLIGRAM(S): at 17:38

## 2025-06-24 RX ADMIN — Medication 1 TABLET(S): at 13:28

## 2025-06-24 RX ADMIN — LIDOCAINE HYDROCHLORIDE 1 PATCH: 20 JELLY TOPICAL at 18:50

## 2025-06-24 NOTE — PHYSICAL THERAPY INITIAL EVALUATION ADULT - PHYSICAL ASSIST/NONPHYSICAL ASSIST: SIT/STAND, REHAB EVAL
Pt with progressive pancreatic cancer on chemotherapy pw septic shock now determined from pneumonia; Pt received broad spec antibiotic coverage, fluids, and has had progressive hypotension despite this; mental status good at this time and pt overall well appearing; conversation re: advanced directives had and pt agrees with pressors and ICU admission;  will initiate pressors and admit to MICU verbal cues/nonverbal cues (demo/gestures)/1 person assist

## 2025-06-24 NOTE — PROGRESS NOTE ADULT - SUBJECTIVE AND OBJECTIVE BOX
More awake and interactive today.  She states that she has more prolonged grogginess after anesthesia.  Had some pain medication this morning.  Denies any positive ROS.  Will try to use the incentive spirometer more today.     OVERNIGHT EVENTS: NAEO    Remaining ROS negative       PHYSICAL EXAM:    General: nad, sitting up in bed  HEENT: NC/AT; MMM  Cardiovascular: +S1/S2, RRR  Respiratory: CTA B/L; no W/R/R  Gastrointestinal: soft, NT/ND; +BSx4  Extremities: scds applied  Neurological: speech fluent, no facial asymmetry, moves all extremities, follows commands  Psychiatric: pleasant mood and affect    VITAL SIGNS:  Vital Signs Last 24 Hrs  T(C): 36.6 (24 Jun 2025 09:00), Max: 36.8 (23 Jun 2025 20:44)  T(F): 97.9 (24 Jun 2025 09:00), Max: 98.2 (23 Jun 2025 20:44)  HR: 70 (24 Jun 2025 09:00) (62 - 100)  BP: 119/58 (24 Jun 2025 09:00) (102/56 - 131/63)  BP(mean): 89 (23 Jun 2025 14:20) (74 - 89)  RR: 16 (24 Jun 2025 09:00) (12 - 20)  SpO2: 93% (24 Jun 2025 09:00) (93% - 99%)    Parameters below as of 24 Jun 2025 09:00  Patient On (Oxygen Delivery Method): room air          MEDICATIONS:  MEDICATIONS  (STANDING):  acetaminophen     Tablet .. 1000 milliGRAM(s) Oral every 8 hours  BUPivacaine liposome 1.3% Injectable 20 milliLiter(s) Local Injection once  carbidopa/levodopa  25/100 1 Tablet(s) Oral three times a day  chlorhexidine 2% Cloths 1 Application(s) Topical <User Schedule>  dextrose 5%. 1000 milliLiter(s) (50 mL/Hr) IV Continuous <Continuous>  dextrose 5%. 1000 milliLiter(s) (100 mL/Hr) IV Continuous <Continuous>  dextrose 50% Injectable 25 Gram(s) IV Push once  dextrose 50% Injectable 12.5 Gram(s) IV Push once  dextrose 50% Injectable 25 Gram(s) IV Push once  folic acid 1 milliGRAM(s) Oral daily  glucagon  Injectable 1 milliGRAM(s) IntraMuscular once  insulin lispro (ADMELOG) corrective regimen sliding scale   SubCutaneous Before meals and at bedtime  lactated ringers. 1000 milliLiter(s) (80 mL/Hr) IV Continuous <Continuous>  lactated ringers. 1000 milliLiter(s) (100 mL/Hr) IV Continuous <Continuous>  methIMAzole 5 milliGRAM(s) Oral daily  methocarbamol 500 milliGRAM(s) Oral <User Schedule>  metoprolol succinate ER 25 milliGRAM(s) Oral daily  multivitamin 1 Tablet(s) Oral daily  ondansetron   Disintegrating Tablet 4 milliGRAM(s) Oral every 6 hours  pantoprazole    Tablet 40 milliGRAM(s) Oral before breakfast  polyethylene glycol 3350 17 Gram(s) Oral daily  pramipexole 0.125 milliGRAM(s) Oral daily  rosuvastatin 10 milliGRAM(s) Oral at bedtime  senna 2 Tablet(s) Oral at bedtime    MEDICATIONS  (PRN):  benzocaine/menthol Lozenge 1 Lozenge Oral three times a day PRN Sore Throat  dextrose Oral Gel 15 Gram(s) Oral once PRN Blood Glucose LESS THAN 70 milliGRAM(s)/deciliter  HYDROmorphone   Tablet 2 milliGRAM(s) Oral every 6 hours PRN Moderate Pain (4 - 6)  HYDROmorphone   Tablet 4 milliGRAM(s) Oral every 6 hours PRN Severe Pain (7 - 10)  HYDROmorphone  Injectable 0.5 milliGRAM(s) IV Push every 6 hours PRN Breakthrough pain  HYDROmorphone  Injectable 0.5 milliGRAM(s) IV Push every 15 minutes PRN For breakthrough pain      ALLERGIES:  Allergies    No Known Allergies    Intolerances    OxyContin (Nausea)  Percocet 5/325 (Other; Nausea)  hydrocodone (Other; Nausea)      LABS:                        9.6    5.70  )-----------( 123      ( 24 Jun 2025 06:00 )             32.3     06-24    138  |  105  |  16  ----------------------------<  111[H]  4.2   |  22  |  0.65    Ca    8.7      24 Jun 2025 06:00      PT/INR - ( 22 Jun 2025 13:10 )   PT: 10.7 sec;   INR: 0.91          PTT - ( 22 Jun 2025 13:10 )  PTT:31.7 sec  Urinalysis Basic - ( 24 Jun 2025 06:00 )    Color: x / Appearance: x / SG: x / pH: x  Gluc: 111 mg/dL / Ketone: x  / Bili: x / Urobili: x   Blood: x / Protein: x / Nitrite: x   Leuk Esterase: x / RBC: x / WBC x   Sq Epi: x / Non Sq Epi: x / Bacteria: x      CAPILLARY BLOOD GLUCOSE      POCT Blood Glucose.: 153 mg/dL (24 Jun 2025 12:02)      RADIOLOGY & ADDITIONAL TESTS: Reviewed.

## 2025-06-24 NOTE — PHYSICAL THERAPY INITIAL EVALUATION ADULT - DID THE PATIENT HAVE SURGERY?
s/p extension of fusion T10-L2 w/ cement augmentation bilateral T10, proximal tether and esteotomy on 6/23/yes

## 2025-06-24 NOTE — DISCHARGE NOTE PROVIDER - NSDCCPTREATMENT_GEN_ALL_CORE_FT
PRINCIPAL PROCEDURE  Procedure: Fusion, spine, thoracolumbar, posterior approach  Findings and Treatment:

## 2025-06-24 NOTE — PHYSICAL THERAPY INITIAL EVALUATION ADULT - IMPAIRMENTS CONTRIBUTING TO GAIT DEVIATIONS, PT EVAL
impaired balance/cognition/decreased flexibility/narrow base of support/pain/impaired postural control/decreased strength

## 2025-06-24 NOTE — DISCHARGE NOTE PROVIDER - NSDCMRMEDTOKEN_GEN_ALL_CORE_FT
aspirin 81 mg oral tablet, chewable: 1 tab(s) orally once a day  carbidopa-levodopa 25 mg-100 mg oral tablet: 1 tab(s) orally 3 times a day  codeine-acetaminophen 30 mg-300 mg oral tablet: 1 tab(s) orally every 6 hours as needed for  mild pain  diclofenac sodium 75 mg oral delayed release tablet: 1 tab(s) orally 2 times a day as needed for  mild pain  folic acid 1 mg oral tablet: 1 tab(s) orally once a day  hydroCHLOROthiazide 12.5 mg oral capsule: 1 cap(s) orally once a day  lidocaine 4% topical film: 1 patch transdermal every 24 hours as needed for Neck pain  losartan 100 mg oral tablet: 1 tab(s) orally once a day  metFORMIN 1000 mg oral tablet, extended release: 1 tab(s) orally 2 times a day  methIMAzole 5 mg oral tablet: 1 tab(s) orally once a day  metoprolol succinate 25 mg oral tablet, extended release: 1 tab(s) orally once a day  Multiple Vitamins oral tablet: 1 tab(s) orally once a day  pantoprazole 40 mg oral delayed release tablet: 1 tab(s) orally once a day (before a meal)  pramipexole 0.125 mg oral tablet: 1 tab(s) orally once a day  pregabalin 75 mg oral capsule: 1 cap(s) orally 2 times a day  rosuvastatin 10 mg oral tablet: 1 tab(s) orally once a day (at bedtime)  traMADol 50 mg oral tablet: 1 tab(s) orally every 6 hours As needed for moderate pain  Trulicity Pen 0.75 mg/0.5 mL subcutaneous solution: 0.75 milligram(s) subcutaneously once a week   aspirin 81 mg oral tablet, chewable: 1 tab(s) orally once a day  carbidopa-levodopa 25 mg-100 mg oral tablet: 1 tab(s) orally 3 times a day  codeine-acetaminophen 30 mg-300 mg oral tablet: 1 tab(s) orally every 6 hours as needed for  mild pain  diclofenac sodium 75 mg oral delayed release tablet: 1 tab(s) orally 2 times a day as needed for  mild pain  enoxaparin: 40 milligram(s) subcutaneous every 24 hours While less ambulatory/at rehab, once more ambulatory/discharged from rehab ok to d/c  folic acid 1 mg oral tablet: 1 tab(s) orally once a day  hydroCHLOROthiazide 12.5 mg oral capsule: 1 cap(s) orally once a day  lidocaine 4% topical film: Apply topically to affected area every 24 hours  losartan 100 mg oral tablet: 1 tab(s) orally once a day Monitor for hypotension, hold for BP less than 110, HR less than 60  metFORMIN 1000 mg oral tablet, extended release: 1 tab(s) orally 2 times a day  methIMAzole 5 mg oral tablet: 1 tab(s) orally once a day  methocarbamol 500 mg oral tablet: 1 tab(s) orally every 12 hours monitor for sedation, hold for sedation  metoprolol succinate 25 mg oral tablet, extended release: 1 tab(s) orally once a day  Multiple Vitamins oral tablet: 1 tab(s) orally once a day  pantoprazole 40 mg oral delayed release tablet: 1 tab(s) orally once a day (before a meal)  polyethylene glycol 3350 oral powder for reconstitution: 17 gram(s) orally 2 times a day as needed for constipation  pramipexole 0.125 mg oral tablet: 1 tab(s) orally once a day  pregabalin 75 mg oral capsule: 1 cap(s) orally 2 times a day  rosuvastatin 10 mg oral tablet: 1 tab(s) orally once a day (at bedtime)  senna leaf extract oral tablet: 2 tab(s) orally once a day (at bedtime) as needed for constipation  traMADol 50 mg oral tablet: 0.5 tab(s) orally every 6 hours As needed Moderate Pain (4 - 6)  traMADol 50 mg oral tablet: 1 tab(s) orally every 6 hours As needed Severe Pain (7 - 10)  Trulicity Pen 0.75 mg/0.5 mL subcutaneous solution: 0.75 milligram(s) subcutaneously once a week   aspirin 81 mg oral tablet, chewable: 1 tab(s) orally once a day  carbidopa-levodopa 25 mg-100 mg oral tablet: 1 tab(s) orally 3 times a day  enoxaparin: 40 milligram(s) subcutaneous every 24 hours While less ambulatory/at rehab, once more ambulatory/discharged from rehab ok to d/c  folic acid 1 mg oral tablet: 1 tab(s) orally once a day  hydroCHLOROthiazide 12.5 mg oral capsule: 1 cap(s) orally once a day  lidocaine 4% topical film: Apply topically to affected area every 24 hours  losartan 100 mg oral tablet: 1 tab(s) orally once a day Monitor for hypotension, hold for BP less than 110, HR less than 60  metFORMIN 1000 mg oral tablet, extended release: 1 tab(s) orally 2 times a day  methIMAzole 5 mg oral tablet: 1 tab(s) orally once a day  methocarbamol 500 mg oral tablet: 1 tab(s) orally every 12 hours monitor for sedation, hold for sedation  metoprolol succinate 25 mg oral tablet, extended release: 1 tab(s) orally once a day  Multiple Vitamins oral tablet: 1 tab(s) orally once a day  pantoprazole 40 mg oral delayed release tablet: 1 tab(s) orally once a day (before a meal)  polyethylene glycol 3350 oral powder for reconstitution: 17 gram(s) orally 2 times a day as needed for constipation  pramipexole 0.125 mg oral tablet: 1 tab(s) orally once a day  rosuvastatin 10 mg oral tablet: 1 tab(s) orally once a day (at bedtime)  senna leaf extract oral tablet: 2 tab(s) orally once a day (at bedtime) as needed for constipation  traMADol 50 mg oral tablet: 0.5 tab(s) orally every 6 hours As needed Moderate Pain (4 - 6)  traMADol 50 mg oral tablet: 1 tab(s) orally every 6 hours As needed Severe Pain (7 - 10)  Trulicity Pen 0.75 mg/0.5 mL subcutaneous solution: 0.75 milligram(s) subcutaneously once a week

## 2025-06-24 NOTE — PHYSICAL THERAPY INITIAL EVALUATION ADULT - ADDITIONAL COMMENTS
Pt somewhat limited historian 2/2 lethargy. Per [facility]cart pt lives in an elevator access apt, owns a walker, cane, and raised toilet seat. Pt was independent w/ ADLs and no HHA. Pt receives assist as needed from sister.
Poor

## 2025-06-24 NOTE — OCCUPATIONAL THERAPY INITIAL EVALUATION ADULT - DIAGNOSIS, OT EVAL
Pt p/w impaired cognition, strength, balance, and functional activity tolerance, impacting ability to perform functional mobility/ADLs.

## 2025-06-24 NOTE — PHYSICAL THERAPY INITIAL EVALUATION ADULT - GENERAL OBSERVATIONS, REHAB EVAL
PT IE Completed. ANN MARIE Martin clearing pt for OOB w/ PT. Pt received semi-supine in bed, NAD, +tele, +aquino, +PIV, +CB, +alarm. Pt to benefit from 2 person assist.

## 2025-06-24 NOTE — PROGRESS NOTE ADULT - SUBJECTIVE AND OBJECTIVE BOX
POST OPERATIVE DAY #: 1  STATUS POST:  Extension of fusion T10-L2 with augmentation b/l T10 proximal tether and osteotomy                 SUBJECTIVE: Patient seen and examined. Pt. states she feels very tired this am, she took dilaudid 4mg on an empty stomach and usually will eat when taking pain meds.   Denies any sob/cp/n/v/numbness or tingling in b/l les.     OBJECTIVE:     Vital Signs Last 24 Hrs  T(C): 36.9 (24 Jun 2025 12:34), Max: 36.9 (24 Jun 2025 12:34)  T(F): 98.4 (24 Jun 2025 12:34), Max: 98.4 (24 Jun 2025 12:34)  HR: 79 (24 Jun 2025 12:34) (62 - 100)  BP: 155/70 (24 Jun 2025 12:34) (102/56 - 155/70)  BP(mean): 89 (23 Jun 2025 14:20) (74 - 89)  RR: 17 (24 Jun 2025 12:34) (12 - 20)  SpO2: 93% (24 Jun 2025 12:34) (93% - 99%)    Parameters below as of 24 Jun 2025 12:34  Patient On (Oxygen Delivery Method): room air        General: NAD   Affected extremity: b/l les skin intact, no erythema/ecchymosis  Dressing: clean/dry/intact gauze/papertape  Sensation: intact to light touch to patient's baseline  Motor exam: EHL/TA/GS  5/5 b/l les   Pulses 2+             I&O's Detail    23 Jun 2025 07:01  -  24 Jun 2025 07:00  --------------------------------------------------------  IN:    Lactated Ringers: 160 mL  Total IN: 160 mL    OUT:    Accordian (mL): 212 mL    Accordian (mL): 75 mL    Indwelling Catheter - Urethral (mL): 1950 mL  Total OUT: 2237 mL    Total NET: -2077 mL      24 Jun 2025 07:01  -  24 Jun 2025 12:51  --------------------------------------------------------  IN:    Oral Fluid: 280 mL  Total IN: 280 mL    OUT:    Indwelling Catheter - Urethral (mL): 550 mL  Total OUT: 550 mL    Total NET: -270 mL          LABS:                        9.6    5.70  )-----------( 123      ( 24 Jun 2025 06:00 )             32.3     06-24    138  |  105  |  16  ----------------------------<  111[H]  4.2   |  22  |  0.65    Ca    8.7      24 Jun 2025 06:00      PT/INR - ( 22 Jun 2025 13:10 )   PT: 10.7 sec;   INR: 0.91          PTT - ( 22 Jun 2025 13:10 )  PTT:31.7 sec  Urinalysis Basic - ( 24 Jun 2025 06:00 )    Color: x / Appearance: x / SG: x / pH: x  Gluc: 111 mg/dL / Ketone: x  / Bili: x / Urobili: x   Blood: x / Protein: x / Nitrite: x   Leuk Esterase: x / RBC: x / WBC x   Sq Epi: x / Non Sq Epi: x / Bacteria: x        MEDICATIONS:    acetaminophen     Tablet .. 1000 milliGRAM(s) Oral every 8 hours  carbidopa/levodopa  25/100 1 Tablet(s) Oral three times a day  HYDROmorphone   Tablet 2 milliGRAM(s) Oral every 6 hours PRN  HYDROmorphone   Tablet 4 milliGRAM(s) Oral every 6 hours PRN  HYDROmorphone  Injectable 0.5 milliGRAM(s) IV Push every 6 hours PRN  HYDROmorphone  Injectable 0.5 milliGRAM(s) IV Push every 15 minutes PRN  methocarbamol 500 milliGRAM(s) Oral <User Schedule>  ondansetron   Disintegrating Tablet 4 milliGRAM(s) Oral every 6 hours  pramipexole 0.125 milliGRAM(s) Oral daily          ASSESSMENT AND PLAN: 81yo Female s/p  Extension of fusion T10-L2 with augmentation b/l T10 proximal tether and osteotomy    - Analgesic pain control change robaxin to bid at 10am/6pm separate from 6am medications. Likely too much pain medication given at once along with patient being tired.   - DVT prophylaxis:    SCDs        - Weight Bearing Status:  Weight bearing as tolerated     - Disposition: Subacute Rehab        - Continue drains   - Will dc aquino after OT today, pending TOV POST OPERATIVE DAY #: 1  STATUS POST:  Extension of fusion T10-L2 with augmentation b/l T10 proximal tether and osteotomy                 SUBJECTIVE: Patient seen and examined. Pt. states she feels very tired this am, she took dilaudid 4mg on an empty stomach and usually will eat when taking pain meds. Pt. states pain medication is helping with pain. Denies any sob/cp/n/v/numbness or tingling in b/l les.     OBJECTIVE:     Vital Signs Last 24 Hrs  T(C): 36.9 (24 Jun 2025 12:34), Max: 36.9 (24 Jun 2025 12:34)  T(F): 98.4 (24 Jun 2025 12:34), Max: 98.4 (24 Jun 2025 12:34)  HR: 79 (24 Jun 2025 12:34) (62 - 100)  BP: 155/70 (24 Jun 2025 12:34) (102/56 - 155/70)  BP(mean): 89 (23 Jun 2025 14:20) (74 - 89)  RR: 17 (24 Jun 2025 12:34) (12 - 20)  SpO2: 93% (24 Jun 2025 12:34) (93% - 99%)    Parameters below as of 24 Jun 2025 12:34  Patient On (Oxygen Delivery Method): room air        General: NAD, Pt. slightly lethargic but alert and oriented x 3. More alert through conversation  Affected extremity: b/l les skin intact, no erythema/ecchymosis  Dressing: clean/dry/intact gauze/papertape  Sensation: intact to light touch to patient's baseline  Motor exam: EHL/TA/GS  5/5 b/l les   Pulses 2+             I&O's Detail    23 Jun 2025 07:01  -  24 Jun 2025 07:00  --------------------------------------------------------  IN:    Lactated Ringers: 160 mL  Total IN: 160 mL    OUT:    Accordian (mL): 212 mL    Accordian (mL): 75 mL    Indwelling Catheter - Urethral (mL): 1950 mL  Total OUT: 2237 mL    Total NET: -2077 mL      24 Jun 2025 07:01  -  24 Jun 2025 12:51  --------------------------------------------------------  IN:    Oral Fluid: 280 mL  Total IN: 280 mL    OUT:    Indwelling Catheter - Urethral (mL): 550 mL  Total OUT: 550 mL    Total NET: -270 mL          LABS:                        9.6    5.70  )-----------( 123      ( 24 Jun 2025 06:00 )             32.3     06-24    138  |  105  |  16  ----------------------------<  111[H]  4.2   |  22  |  0.65    Ca    8.7      24 Jun 2025 06:00      PT/INR - ( 22 Jun 2025 13:10 )   PT: 10.7 sec;   INR: 0.91          PTT - ( 22 Jun 2025 13:10 )  PTT:31.7 sec  Urinalysis Basic - ( 24 Jun 2025 06:00 )    Color: x / Appearance: x / SG: x / pH: x  Gluc: 111 mg/dL / Ketone: x  / Bili: x / Urobili: x   Blood: x / Protein: x / Nitrite: x   Leuk Esterase: x / RBC: x / WBC x   Sq Epi: x / Non Sq Epi: x / Bacteria: x        MEDICATIONS:    acetaminophen     Tablet .. 1000 milliGRAM(s) Oral every 8 hours  carbidopa/levodopa  25/100 1 Tablet(s) Oral three times a day  HYDROmorphone   Tablet 2 milliGRAM(s) Oral every 6 hours PRN  HYDROmorphone   Tablet 4 milliGRAM(s) Oral every 6 hours PRN  HYDROmorphone  Injectable 0.5 milliGRAM(s) IV Push every 6 hours PRN  HYDROmorphone  Injectable 0.5 milliGRAM(s) IV Push every 15 minutes PRN  methocarbamol 500 milliGRAM(s) Oral <User Schedule>  ondansetron   Disintegrating Tablet 4 milliGRAM(s) Oral every 6 hours  pramipexole 0.125 milliGRAM(s) Oral daily          ASSESSMENT AND PLAN: 81yo Female s/p  Extension of fusion T10-L2 with augmentation b/l T10 proximal tether and osteotomy    - Analgesic pain control change robaxin to bid at 10am/6pm separate from 6am medications. Likely too much pain medication given at once along with patient being tired.   - DVT prophylaxis:    SCDs        - Weight Bearing Status:  Weight bearing as tolerated     - Disposition: Subacute Rehab        - Continue drains   - Will dc aquino after OT today, pending TOV

## 2025-06-24 NOTE — PROGRESS NOTE ADULT - SUBJECTIVE AND OBJECTIVE BOX
Ortho Note    Pt comfortable without complaints, pain controlled  Denies CP, SOB, N/V, numbness/tingling     Vital Signs Last 24 Hrs  T(C): 36.6 (06-24-25 @ 06:24), Max: 36.6 (06-24-25 @ 06:24)  T(F): 97.9 (06-24-25 @ 06:24), Max: 97.9 (06-24-25 @ 06:24)  HR: 69 (06-24-25 @ 06:24) (69 - 69)  BP: 131/63 (06-24-25 @ 06:24) (131/63 - 131/63)  BP(mean): --  RR: 20 (06-24-25 @ 06:24) (20 - 20)  SpO2: 99% (06-24-25 @ 06:24) (99% - 99%)  I&O's Summary    23 Jun 2025 07:01  -  24 Jun 2025 07:00  --------------------------------------------------------  IN: 160 mL / OUT: 2237 mL / NET: -2077 mL    24 Jun 2025 07:01  -  24 Jun 2025 10:00  --------------------------------------------------------  IN: 280 mL / OUT: 0 mL / NET: 280 mL        General: lying in bed, moaning from pain, groggy but arousable   Dressing check deferred 2/2 patient lying on back in pain; per RN has gauze/paper tape dressing. Hemovac x 2 holding suction   Motor: exam somewhat difficult 2/2 patient pain/grogginess from anesthesia, with encouragement EHL/FHL/TA/GS 5/5, /biceps/triceps 5/5                           9.6    5.70  )-----------( 123      ( 24 Jun 2025 06:00 )             32.3     06-24    138  |  105  |  16  ----------------------------<  111[H]  4.2   |  22  |  0.65    Ca    8.7      24 Jun 2025 06:00        A/P: 80yFemale POD#1 s/p extension of fusion T10-L2 with cement augmentation bilateral T10, proximal tether and osteotomy on 6/23    - Stable  - Pain Control  - DVT ppx: SCDs  - Post op abx: ancef   - Monitor drain output  - PT, WBS: WBAT, pending PT evaluation       Ortho Pager [phone]

## 2025-06-24 NOTE — OCCUPATIONAL THERAPY INITIAL EVALUATION ADULT - MODALITIES TREATMENT COMMENTS
Pt lethargic and confused throughout session, demo poor attention, delayed processing, and able to follow 50% single step commands. Pt able to perform bed mobility, requiring Max Ax1 2/2 impaired sequencing, decreased BUE/BLE strength, impaired motor control, and impaired postural control, impacting ability to perform functional reaching/weight shifting. Pt performed UB dressing while seated at EOB, requiring Max Ax1 2/2 impaired sequencing, impaired dynamic balance and decreased functional reach. Pt performed sit<->stand with Max Ax1 using RW, demo +retropulsion, requiring Max cueing to correct. Pt able to ambulate 4 L side steps along EOB, requiring Mod Ax1, demo shuffling gait with impaired ability to weight shift 2/2 decreased BLE strength/motor control. Pt returned to bed and left as found, +all lines, +call bell, +bed alarm, NAD, VSS, RN Veronica made aware of outcome. Pt would benefit from continued OT services to facilitate increased independence with functional mobility/ADLs.

## 2025-06-24 NOTE — DISCHARGE NOTE PROVIDER - NSDCFUADDINST_GEN_ALL_CORE_FT
ACTIVITY:   - No extreme bending, extending, turning, twisting, or straining. No strenuous activity, heavy lifting, driving or returning to work until cleared by your surgeon.     DRESSING/SHOWERING:    -Change dressing daily as needed until post-op day 5. Sponge bathe until post-op day 5 then may take full shower. Once dressing removed keep incision clean and dry. Do not pick at your incision. Do not apply creams, ointments or oils to your incision until cleared by your surgeon. Do not soak your incision in sitting water (ie tubs, pools, lakes, etc.) until cleared by your surgeon.      MEDICATION/ANTICOAGULATION:   - You have been prescribed medications for pain:     - Tylenol (Acetaminophen) for mild to moderate pain. Do not exceed 3,000mg daily.     - For more severe pain, you may continue to take the Tylenol with the addition of narcotic pain medication. Take this medication as prescribed. Do not take more than prescribed. Note that this medication may cause drowsiness or dizziness. Do not operate machinery. This medication may cause constipation. If you take a benzodiazepine at home, take caution when taking opioid pain medication. These medications may cause oversedation and respiratory depression (slow your breathing). It is [facility] policy to have the reversal agent Narcan/naloxone available if you are taking an opioid while on a benzodiazepine. If this pertains to you, Narcan/nalaxone has been prescribed to you in addition to your other medications.   - For any additional medications, follow instructions on the bottle.   - Try to have regular bowel movements. Take stool softener or laxative if necessary. You may wish to take Miralax daily until you have regular bowel movements.    - Do not take antiinflammatories (Aleve, Advil, Naproxen, Ibuprofen, etc.) until cleared by your surgeon. Tylenol is not an anti-inflammatory and okay to take (see above).   - If you have a pain management physician, please follow-up with them postoperatively.    - If you experience any negative side effects of your medications, please call your surgeon's office to discuss.     FOLLOW UP:   - Call to schedule an appt with Dr. Bahena for follow up.    - Please follow-up with your primary care physician or any other specialist you see postoperatively, if needed.    - Contact your doctor or go to the emergency room if you experience: fever greater than 101.5F, chills, chest pain, difficulty breathing, redness or excessive drainage around the incision, other concerns.  ACTIVITY:   - No extreme bending, extending, turning, twisting, or straining. No strenuous activity, heavy lifting, driving or returning to work until cleared by your surgeon.     DRESSING/SHOWERING:    Aquacel - may remove after 1 week. Water resistant, ok to shower, pat dry after. No soaking in bathtubs. No creams/ointments to incision.   Medipore (gauze/tape) to drain removal site may be removed, replace as needed for saturation.    MEDICATION/ANTICOAGULATION:   - You have been prescribed medications for pain:     - Tylenol (Acetaminophen) for mild to moderate pain. Do not exceed 3,000mg daily.     - For more severe pain, you may continue to take the Tylenol with the addition of narcotic pain medication. Take this medication as prescribed. Do not take more than prescribed. Note that this medication may cause drowsiness or dizziness. Do not operate machinery. This medication may cause constipation. If you take a benzodiazepine at home, take caution when taking opioid pain medication. These medications may cause oversedation and respiratory depression (slow your breathing). It is [facility] policy to have the reversal agent Narcan/naloxone available if you are taking an opioid while on a benzodiazepine. If this pertains to you, Narcan/nalaxone has been prescribed to you in addition to your other medications.   - For any additional medications, follow instructions on the bottle.   - Try to have regular bowel movements. Take stool softener or laxative if necessary. You may wish to take Miralax daily until you have regular bowel movements.    - Do not take antiinflammatories (Aleve, Advil, Naproxen, Ibuprofen, etc.) until cleared by your surgeon. Tylenol is not an anti-inflammatory and okay to take (see above).   - If you have a pain management physician, please follow-up with them postoperatively.    - If you experience any negative side effects of your medications, please call your surgeon's office to discuss.     FOLLOW UP:   - Call to schedule an appt with Dr. Bahena for follow up.    - Please follow-up with your primary care physician or any other specialist you see postoperatively, if needed.    - Contact your doctor or go to the emergency room if you experience: fever greater than 101.5F, chills, chest pain, difficulty breathing, redness or excessive drainage around the incision, other concerns.     **You were seen by cardiology during admission for PACs/PVCs seen on Monitor. Per our cardiology team, possible flutter was noted on one of your EKGs - please follow with your outpatient cardiologist Dr. Trejo at [place] for remote monitor for consideration of anticoagulation***   ACTIVITY:   - No extreme bending, extending, turning, twisting, or straining. No strenuous activity, heavy lifting, driving or returning to work until cleared by your surgeon.     DRESSING/SHOWERING:    Aquacel - may remove after 1 week. Water resistant, ok to shower, pat dry after. No soaking in bathtubs. No creams/ointments to incision.   Medipore (gauze/tape) to drain removal site may be removed, replace as needed for saturation.    MEDICATION/ANTICOAGULATION:   - You have been prescribed medications for pain:     - Tylenol (Acetaminophen) for mild to moderate pain. Do not exceed 3,000mg daily.     - For more severe pain, you may continue to take the Tylenol with the addition of narcotic pain medication. Take this medication as prescribed. Do not take more than prescribed. Note that this medication may cause drowsiness or dizziness. Do not operate machinery. This medication may cause constipation. If you take a benzodiazepine at home, take caution when taking opioid pain medication. These medications may cause oversedation and respiratory depression (slow your breathing). It is [facility] policy to have the reversal agent Narcan/naloxone available if you are taking an opioid while on a benzodiazepine. If this pertains to you, Narcan/nalaxone has been prescribed to you in addition to your other medications.   - For any additional medications, follow instructions on the bottle.   - Try to have regular bowel movements. Take stool softener or laxative if necessary. You may wish to take Miralax daily until you have regular bowel movements.    - Do not take antiinflammatories (Aleve, Advil, Naproxen, Ibuprofen, etc.) until cleared by your surgeon. Tylenol is not an anti-inflammatory and okay to take (see above).   - If you have a pain management physician, please follow-up with them postoperatively.    - If you experience any negative side effects of your medications, please call your surgeon's office to discuss.     FOLLOW UP:   - Call to schedule an appt with Dr. Bahena for follow up.    - Please follow-up with your primary care physician or any other specialist you see postoperatively, if needed.    - Contact your doctor or go to the emergency room if you experience: fever greater than 101.5F, chills, chest pain, difficulty breathing, redness or excessive drainage around the incision, other concerns.     **You were seen by cardiology during admission for PACs/PVCs seen on telemetry monitor. Per our cardiology team, possible flutter was noted on one of your EKGs - please follow with your outpatient cardiologist Dr. Trejo at [place] for remote monitor for consideration of anticoagulation***

## 2025-06-24 NOTE — DISCHARGE NOTE PROVIDER - CARE PROVIDER_API CALL
Gerry Bahena  Orthopaedic Surgery  [address]  [place] [address]  Phone: [phone]  Fax: [phone]  Follow Up Time:

## 2025-06-24 NOTE — DISCHARGE NOTE PROVIDER - HOSPITAL COURSE
Admit to Orthopaedics for extension of fusion T10-L2 with cement augmentation, T10 tethers and osteotomy   Perioperative Antibiotics  DVT prophylaxis  Physical Therapy  Pain Management   Medical Co management   Drains removed when output appropriately low Admit to Orthopaedics for extension of fusion T10-L2 with cement augmentation, T10 tethers and osteotomy   Perioperative Antibiotics  DVT prophylaxis  Physical Therapy  Pain Management   Medical Co management   Drains removed when output appropriately low     Pain management consult.     Cardiology consult for PACs/PVCs seen on telemetry monitor. Echo performed. Per our cardiology team, possible flutter was noted on one of your EKGs - plan for outpatient cardiologist Dr. Trejo at [place] for remote monitor for consideration of anticoagulation.    < from: TTE Echo Complete w/o Contrast w/ Doppler (06.26.25 @ 12:00) >  CONCLUSIONS:   1. Mild left ventricular hypertrophy.   2. Left ventricular cavity is normal in size. Left ventricular systolic   function is normal with an ejection fraction of 60 % by Nelson's method   of disks. There are no regional wall motion abnormalities seen.   3. Normal right ventricular cavity size, with normal wall thickness, and   normal right ventricular systolic function.   4.Left atrium is severely dilated.   5. Moderate to severe mitral regurgitation.   6. Mild to moderate tricuspid regurgitation.   7. Estimated pulmonary artery systolic pressure is 37 mmHg.   8. No pericardial effusion seen.

## 2025-06-24 NOTE — OCCUPATIONAL THERAPY INITIAL EVALUATION ADULT - ADDITIONAL COMMENTS
Pt is a poor historian 2/2 lethargy and confusion. Per chart pt lives in an elevator access apt, owns a walker, cane, and raised toilet seat. Pt was independent w/ ADLs and no HHA. Pt receives assist as needed from sister.

## 2025-06-24 NOTE — OCCUPATIONAL THERAPY INITIAL EVALUATION ADULT - GENERAL OBSERVATIONS, REHAB EVAL
OT IE completed. Orders received, chart reviewed, pt cleared for OT by ANN MARIE Martin. Pt received semi supine in bed, NAD, +IV, +tele, +spinal dressing C/D/I, +hemovac x2. Pt A&Ox1, agreeable to OT, and tolerated session fairly.

## 2025-06-25 DIAGNOSIS — Z79.85 LONG-TERM (CURRENT) USE OF INJECTABLE NON-INSULIN ANTIDIABETIC DRUGS: ICD-10-CM

## 2025-06-25 DIAGNOSIS — Z53.29 PROCEDURE AND TREATMENT NOT CARRIED OUT BECAUSE OF PATIENT'S DECISION FOR OTHER REASONS: ICD-10-CM

## 2025-06-25 DIAGNOSIS — E11.9 TYPE 2 DIABETES MELLITUS WITHOUT COMPLICATIONS: ICD-10-CM

## 2025-06-25 DIAGNOSIS — M54.16 RADICULOPATHY, LUMBAR REGION: ICD-10-CM

## 2025-06-25 DIAGNOSIS — R53.81 OTHER MALAISE: ICD-10-CM

## 2025-06-25 DIAGNOSIS — E78.5 HYPERLIPIDEMIA, UNSPECIFIED: ICD-10-CM

## 2025-06-25 DIAGNOSIS — Z98.1 ARTHRODESIS STATUS: ICD-10-CM

## 2025-06-25 DIAGNOSIS — M48.061 SPINAL STENOSIS, LUMBAR REGION WITHOUT NEUROGENIC CLAUDICATION: ICD-10-CM

## 2025-06-25 DIAGNOSIS — Z79.82 LONG TERM (CURRENT) USE OF ASPIRIN: ICD-10-CM

## 2025-06-25 DIAGNOSIS — Z79.84 LONG TERM (CURRENT) USE OF ORAL HYPOGLYCEMIC DRUGS: ICD-10-CM

## 2025-06-25 DIAGNOSIS — Z90.3 ACQUIRED ABSENCE OF STOMACH [PART OF]: ICD-10-CM

## 2025-06-25 DIAGNOSIS — E05.90 THYROTOXICOSIS, UNSPECIFIED WITHOUT THYROTOXIC CRISIS OR STORM: ICD-10-CM

## 2025-06-25 DIAGNOSIS — Z96.652 PRESENCE OF LEFT ARTIFICIAL KNEE JOINT: ICD-10-CM

## 2025-06-25 DIAGNOSIS — I10 ESSENTIAL (PRIMARY) HYPERTENSION: ICD-10-CM

## 2025-06-25 LAB
ANION GAP SERPL CALC-SCNC: 12 MMOL/L — SIGNIFICANT CHANGE UP (ref 5–17)
BUN SERPL-MCNC: 11 MG/DL — SIGNIFICANT CHANGE UP (ref 7–23)
CALCIUM SERPL-MCNC: 8.8 MG/DL — SIGNIFICANT CHANGE UP (ref 8.4–10.5)
CHLORIDE SERPL-SCNC: 108 MMOL/L — SIGNIFICANT CHANGE UP (ref 96–108)
CO2 SERPL-SCNC: 20 MMOL/L — LOW (ref 22–31)
CREAT SERPL-MCNC: 0.6 MG/DL — SIGNIFICANT CHANGE UP (ref 0.5–1.3)
EGFR: 91 ML/MIN/1.73M2 — SIGNIFICANT CHANGE UP
EGFR: 91 ML/MIN/1.73M2 — SIGNIFICANT CHANGE UP
GLUCOSE SERPL-MCNC: 131 MG/DL — HIGH (ref 70–99)
HCT VFR BLD CALC: 33 % — LOW (ref 34.5–45)
HGB BLD-MCNC: 10.1 G/DL — LOW (ref 11.5–15.5)
MCHC RBC-ENTMCNC: 29.1 PG — SIGNIFICANT CHANGE UP (ref 27–34)
MCHC RBC-ENTMCNC: 30.6 G/DL — LOW (ref 32–36)
MCV RBC AUTO: 95.1 FL — SIGNIFICANT CHANGE UP (ref 80–100)
NRBC # BLD AUTO: 0 K/UL — SIGNIFICANT CHANGE UP (ref 0–0)
NRBC # FLD: 0 K/UL — SIGNIFICANT CHANGE UP (ref 0–0)
NRBC BLD AUTO-RTO: 0 /100 WBCS — SIGNIFICANT CHANGE UP (ref 0–0)
PLATELET # BLD AUTO: 115 K/UL — LOW (ref 150–400)
PMV BLD: 11.6 FL — SIGNIFICANT CHANGE UP (ref 7–13)
POTASSIUM SERPL-MCNC: 4.6 MMOL/L — SIGNIFICANT CHANGE UP (ref 3.5–5.3)
POTASSIUM SERPL-SCNC: 4.6 MMOL/L — SIGNIFICANT CHANGE UP (ref 3.5–5.3)
RBC # BLD: 3.47 M/UL — LOW (ref 3.8–5.2)
RBC # FLD: 14.1 % — SIGNIFICANT CHANGE UP (ref 10.3–14.5)
SODIUM SERPL-SCNC: 140 MMOL/L — SIGNIFICANT CHANGE UP (ref 135–145)
WBC # BLD: 6.99 K/UL — SIGNIFICANT CHANGE UP (ref 3.8–10.5)
WBC # FLD AUTO: 6.99 K/UL — SIGNIFICANT CHANGE UP (ref 3.8–10.5)

## 2025-06-25 PROCEDURE — 86900 BLOOD TYPING SEROLOGIC ABO: CPT

## 2025-06-25 PROCEDURE — 80048 BASIC METABOLIC PNL TOTAL CA: CPT

## 2025-06-25 PROCEDURE — 85730 THROMBOPLASTIN TIME PARTIAL: CPT

## 2025-06-25 PROCEDURE — 97161 PT EVAL LOW COMPLEX 20 MIN: CPT

## 2025-06-25 PROCEDURE — 86850 RBC ANTIBODY SCREEN: CPT

## 2025-06-25 PROCEDURE — 86901 BLOOD TYPING SEROLOGIC RH(D): CPT

## 2025-06-25 PROCEDURE — 81003 URINALYSIS AUTO W/O SCOPE: CPT

## 2025-06-25 PROCEDURE — 71046 X-RAY EXAM CHEST 2 VIEWS: CPT

## 2025-06-25 PROCEDURE — 85610 PROTHROMBIN TIME: CPT

## 2025-06-25 PROCEDURE — 36415 COLL VENOUS BLD VENIPUNCTURE: CPT

## 2025-06-25 PROCEDURE — 85025 COMPLETE CBC W/AUTO DIFF WBC: CPT

## 2025-06-25 PROCEDURE — 85027 COMPLETE CBC AUTOMATED: CPT

## 2025-06-25 PROCEDURE — 99233 SBSQ HOSP IP/OBS HIGH 50: CPT

## 2025-06-25 PROCEDURE — 99222 1ST HOSP IP/OBS MODERATE 55: CPT

## 2025-06-25 PROCEDURE — 82962 GLUCOSE BLOOD TEST: CPT

## 2025-06-25 RX ORDER — ACETAMINOPHEN 500 MG/5ML
1000 LIQUID (ML) ORAL ONCE
Refills: 0 | Status: COMPLETED | OUTPATIENT
Start: 2025-06-26 | End: 2025-06-26

## 2025-06-25 RX ORDER — BISACODYL 5 MG
5 TABLET, DELAYED RELEASE (ENTERIC COATED) ORAL EVERY 12 HOURS
Refills: 0 | Status: DISCONTINUED | OUTPATIENT
Start: 2025-06-25 | End: 2025-06-27

## 2025-06-25 RX ORDER — ENOXAPARIN SODIUM 100 MG/ML
40 INJECTION SUBCUTANEOUS EVERY 24 HOURS
Refills: 0 | Status: DISCONTINUED | OUTPATIENT
Start: 2025-06-25 | End: 2025-06-27

## 2025-06-25 RX ORDER — POLYETHYLENE GLYCOL 3350 17 G/17G
17 POWDER, FOR SOLUTION ORAL
Refills: 0 | Status: DISCONTINUED | OUTPATIENT
Start: 2025-06-25 | End: 2025-06-27

## 2025-06-25 RX ORDER — TRAMADOL HYDROCHLORIDE 50 MG/1
25 TABLET, FILM COATED ORAL EVERY 6 HOURS
Refills: 0 | Status: DISCONTINUED | OUTPATIENT
Start: 2025-06-25 | End: 2025-06-27

## 2025-06-25 RX ORDER — TRAMADOL HYDROCHLORIDE 50 MG/1
50 TABLET, FILM COATED ORAL EVERY 6 HOURS
Refills: 0 | Status: DISCONTINUED | OUTPATIENT
Start: 2025-06-25 | End: 2025-06-27

## 2025-06-25 RX ORDER — METHOCARBAMOL 500 MG/1
500 TABLET, FILM COATED ORAL EVERY 12 HOURS
Refills: 0 | Status: DISCONTINUED | OUTPATIENT
Start: 2025-06-25 | End: 2025-06-27

## 2025-06-25 RX ORDER — ACETAMINOPHEN 500 MG/5ML
1000 LIQUID (ML) ORAL ONCE
Refills: 0 | Status: COMPLETED | OUTPATIENT
Start: 2025-06-25 | End: 2025-06-25

## 2025-06-25 RX ORDER — MAGNESIUM HYDROXIDE 400 MG/5ML
30 SUSPENSION ORAL DAILY
Refills: 0 | Status: DISCONTINUED | OUTPATIENT
Start: 2025-06-25 | End: 2025-06-27

## 2025-06-25 RX ADMIN — Medication 2 MILLIGRAM(S): at 13:10

## 2025-06-25 RX ADMIN — Medication 1 APPLICATION(S): at 06:55

## 2025-06-25 RX ADMIN — SODIUM CHLORIDE 100 MILLILITER(S): 9 INJECTION, SOLUTION INTRAVENOUS at 21:36

## 2025-06-25 RX ADMIN — Medication 1 TABLET(S): at 05:34

## 2025-06-25 RX ADMIN — METOPROLOL SUCCINATE 25 MILLIGRAM(S): 50 TABLET, EXTENDED RELEASE ORAL at 05:34

## 2025-06-25 RX ADMIN — FOLIC ACID 1 MILLIGRAM(S): 1 TABLET ORAL at 12:16

## 2025-06-25 RX ADMIN — TRAMADOL HYDROCHLORIDE 50 MILLIGRAM(S): 50 TABLET, FILM COATED ORAL at 19:19

## 2025-06-25 RX ADMIN — Medication 2 MILLIGRAM(S): at 04:33

## 2025-06-25 RX ADMIN — Medication 2 MILLIGRAM(S): at 05:33

## 2025-06-25 RX ADMIN — Medication 5 MILLIGRAM(S): at 21:39

## 2025-06-25 RX ADMIN — INSULIN LISPRO 2: 100 INJECTION, SOLUTION INTRAVENOUS; SUBCUTANEOUS at 12:17

## 2025-06-25 RX ADMIN — Medication 400 MILLIGRAM(S): at 14:47

## 2025-06-25 RX ADMIN — Medication 1 TABLET(S): at 12:16

## 2025-06-25 RX ADMIN — LIDOCAINE HYDROCHLORIDE 1 PATCH: 20 JELLY TOPICAL at 04:38

## 2025-06-25 RX ADMIN — ENOXAPARIN SODIUM 40 MILLIGRAM(S): 100 INJECTION SUBCUTANEOUS at 19:46

## 2025-06-25 RX ADMIN — SODIUM CHLORIDE 100 MILLILITER(S): 9 INJECTION, SOLUTION INTRAVENOUS at 05:34

## 2025-06-25 RX ADMIN — Medication 4 MILLIGRAM(S): at 17:03

## 2025-06-25 RX ADMIN — Medication 1000 MILLIGRAM(S): at 23:50

## 2025-06-25 RX ADMIN — Medication 1 TABLET(S): at 21:39

## 2025-06-25 RX ADMIN — Medication 4 MILLIGRAM(S): at 23:20

## 2025-06-25 RX ADMIN — Medication 2 TABLET(S): at 21:40

## 2025-06-25 RX ADMIN — POLYETHYLENE GLYCOL 3350 17 GRAM(S): 17 POWDER, FOR SOLUTION ORAL at 12:17

## 2025-06-25 RX ADMIN — Medication 2 MILLIGRAM(S): at 12:16

## 2025-06-25 RX ADMIN — PRAMIPEXOLE DIHYDROCHLORIDE 0.12 MILLIGRAM(S): 1 TABLET ORAL at 16:09

## 2025-06-25 RX ADMIN — Medication 40 MILLIGRAM(S): at 05:34

## 2025-06-25 RX ADMIN — MAGNESIUM HYDROXIDE 30 MILLILITER(S): 400 SUSPENSION ORAL at 17:59

## 2025-06-25 RX ADMIN — METHOCARBAMOL 500 MILLIGRAM(S): 500 TABLET, FILM COATED ORAL at 17:03

## 2025-06-25 RX ADMIN — Medication 1 TABLET(S): at 14:47

## 2025-06-25 RX ADMIN — ROSUVASTATIN CALCIUM 10 MILLIGRAM(S): 5 TABLET, FILM COATED ORAL at 21:39

## 2025-06-25 RX ADMIN — Medication 4 MILLIGRAM(S): at 12:16

## 2025-06-25 RX ADMIN — TRAMADOL HYDROCHLORIDE 50 MILLIGRAM(S): 50 TABLET, FILM COATED ORAL at 18:21

## 2025-06-25 RX ADMIN — INSULIN LISPRO 2: 100 INJECTION, SOLUTION INTRAVENOUS; SUBCUTANEOUS at 17:59

## 2025-06-25 RX ADMIN — Medication 4 MILLIGRAM(S): at 05:34

## 2025-06-25 RX ADMIN — Medication 400 MILLIGRAM(S): at 23:20

## 2025-06-25 RX ADMIN — Medication 1000 MILLIGRAM(S): at 05:34

## 2025-06-25 RX ADMIN — POLYETHYLENE GLYCOL 3350 17 GRAM(S): 17 POWDER, FOR SOLUTION ORAL at 17:59

## 2025-06-25 NOTE — PROGRESS NOTE ADULT - SUBJECTIVE AND OBJECTIVE BOX
Ortho Note    Pt comfortable without complaints, pain controlled  Denies CP, SOB, N/V, numbness/tingling     Vital Signs Last 24 Hrs  T(C): 36.7 (06-25-25 @ 05:23), Max: 36.7 (06-25-25 @ 05:23)  T(F): 98 (06-25-25 @ 05:23), Max: 98 (06-25-25 @ 05:23)  HR: 68 (06-25-25 @ 05:23) (66 - 68)  BP: 122/60 (06-25-25 @ 05:23) (122/60 - 135/63)  BP(mean): --  RR: 20 (06-25-25 @ 05:23) (19 - 20)  SpO2: 98% (06-25-25 @ 05:23) (98% - 99%)  I&O's Summary    24 Jun 2025 07:01  -  25 Jun 2025 07:00  --------------------------------------------------------  IN: 2200 mL / OUT: 1857.5 mL / NET: 342.5 mL        General: lying in bed, moaning from pain, groggy but arousable   Dressing check deferred 2/2 patient lying on back in pain; per RN has gauze/paper tape dressing. Hemovac x 2 holding suction   Motor: exam somewhat difficult 2/2 patient pain/grogginess from anesthesia, with encouragement EHL/FHL/TA/GS 5/5, /biceps/triceps 5/5                         10.1   6.99  )-----------( 115      ( 25 Jun 2025 06:05 )             33.0     06-25    140  |  108  |  11  ----------------------------<  131[H]  4.6   |  20[L]  |  0.60    Ca    8.8      25 Jun 2025 06:05        A/P: 80yFemale POD#2 s/p extension of fusion T10-L2 with cement augmentation bilateral T10, proximal tether and osteotomy on 6/23    - Stable  - Pain Control  - DVT ppx: SCDs  - Post op abx: ancef   - Monitor drain output  - PT, WBS: WBAT, pending PT evaluation       Ortho Pager [phone]

## 2025-06-25 NOTE — PROGRESS NOTE ADULT - SUBJECTIVE AND OBJECTIVE BOX
Orthopaedic Surgery Progress Note    Patient seen and examined.   Patient extremely anxious about her pain level and not mobilizing a lot with PT.   Pain medicine doses scaled back yesterday 2/2 sedation, much more alert this morning but having pain.     Vital Signs Last 24 Hrs  T(C): 36.3 (06-25-25 @ 08:42), Max: 36.3 (06-25-25 @ 08:42)  T(F): 97.4 (06-25-25 @ 08:42), Max: 97.4 (06-25-25 @ 08:42)  HR: 62 (06-25-25 @ 08:42) (62 - 62)  BP: 148/67 (06-25-25 @ 08:42) (148/67 - 148/67)  BP(mean): --  RR: 18 (06-25-25 @ 08:42) (18 - 18)  SpO2: 94% (06-25-25 @ 08:42) (94% - 94%)  AVSS    PE:  General: Patient alert and oriented, NAD  Dressing check deferred 2/2 patient lying on back  Patient extremely anxious/hesistant to participate in motor exam 2/2 to pain  Freely moves arms/legs but essentially refuses to participate in any motor exam against resistance                             10.1   6.99  )-----------( 115      ( 25 Jun 2025 06:05 )             33.0   06-25    140  |  108  |  11  ----------------------------<  131[H]  4.6   |  20[L]  |  0.60    Ca    8.8      25 Jun 2025 06:05        A/P: 80yFemale s/p   -Pain control as needed  -DVT prophylaxis:  -PT/weight-bearing status:  -Appreciate medicine co-managamenet recommendations  -Dispo:        Orthopaedic Surgery Progress Note    Patient seen and examined.   Patient extremely anxious about her pain level and not mobilizing a lot with PT.   Pain medicine doses scaled back yesterday 2/2 sedation, much more alert this morning but having pain.     Vital Signs Last 24 Hrs  T(C): 36.3 (06-25-25 @ 08:42), Max: 36.3 (06-25-25 @ 08:42)  T(F): 97.4 (06-25-25 @ 08:42), Max: 97.4 (06-25-25 @ 08:42)  HR: 62 (06-25-25 @ 08:42) (62 - 62)  BP: 148/67 (06-25-25 @ 08:42) (148/67 - 148/67)  BP(mean): --  RR: 18 (06-25-25 @ 08:42) (18 - 18)  SpO2: 94% (06-25-25 @ 08:42) (94% - 94%)  AVSS    PE:  General: Patient alert and oriented, NAD  Dressing check deferred 2/2 patient lying on back  Patient extremely anxious/hesistant to participate in motor exam 2/2 to pain  Freely moves arms/legs but essentially refuses to participate in any motor exam against resistance                             10.1   6.99  )-----------( 115      ( 25 Jun 2025 06:05 )             33.0   06-25    140  |  108  |  11  ----------------------------<  131[H]  4.6   |  20[L]  |  0.60    Ca    8.8      25 Jun 2025 06:05        A/P: 80yFemale s/p extension of fusion T10-L2 with cement augmentation bilateral T10, proximal tether and osteotomy on 6/23  -Pain control as needed  -DVT prophylaxis: SCDs, lovenox to start evening 6/25 per Dr. Bahena  -PT/weight-bearing status: pending further PT evaluation, recommended for KANDIS. Has been limited with PT 2/2 pain.   -Appreciate medicine comanagement recommendations  -Pain management consulted to optimize pain regimen

## 2025-06-25 NOTE — CONSULT NOTE ADULT - SUBJECTIVE AND OBJECTIVE BOX
PAIN MANAGEMENT CONSULT NOTE    Chief Complaint:    HPI:  80F hx of HTN, HLD, DM, Hyperthyroidism, Parkinson's s/p L2-pelvis PSF 2023 c/b L1 compression fx s/p kyphoplasty in 2024 now presents with worsening LBP with RLE radiculopathy for the past 3 months. Denies falls/trauma/injuries to the back. No red flag symptoms. Sent in by Dr. Bahena for preop and OR tomorrow 6/23 for revision surgery (extension of prior fusion to T10 with cement and tethers). (22 Jun 2025 11:09)      PAST MEDICAL & SURGICAL HISTORY:  Hyperthyroidism  monitored  by endocrinologist      HTN (hypertension)      Hyperlipidemia      GERD (gastroesophageal reflux disease)      Parkinson disease      Spinal stenosis  pain relieved with steroid injection      MVP (mitral valve prolapse)  echo 2018      MR (mitral regurgitation)      Diabetes mellitus type II, non insulin dependent      Lumbar compression fracture      S/P foot surgery, left  5/2/2018      History of bowel resection  2012      S/P hernia repair  2013      S/P breast augmentation  2014 saline implants      H/O abdominal surgery  1999      S/P knee replacement  Left ( 1/2/2019 ), right TKR, 2019      Status post kyphoplasty          FAMILY HISTORY:  Family history of heart attack (Father)    Family history of hypertension (Mother)    Family history of diabetes mellitus (Sibling)        SOCIAL HISTORY:  [ ] Denies Smoking, Alcohol, or Drug Use    HOME MEDICATIONS:   Please refer to initial HNP    PAIN HOME MEDICATIONS:  iSTOP review     Allergies    No Known Allergies    Intolerances    OxyContin (Nausea)  Percocet 5/325 (Other; Nausea)  hydrocodone (Other; Nausea)      PAIN MEDICATIONS:  acetaminophen     Tablet .. 1000 milliGRAM(s) Oral every 8 hours  carbidopa/levodopa  25/100 1 Tablet(s) Oral three times a day  HYDROmorphone   Tablet 2 milliGRAM(s) Oral every 6 hours PRN  HYDROmorphone   Tablet 1 milliGRAM(s) Oral every 6 hours PRN  methocarbamol 500 milliGRAM(s) Oral <User Schedule> PRN  ondansetron   Disintegrating Tablet 4 milliGRAM(s) Oral every 6 hours  pramipexole 0.125 milliGRAM(s) Oral daily    Heme:  enoxaparin Injectable 40 milliGRAM(s) SubCutaneous every 24 hours    Antibiotics:    Cardiovascular:  metoprolol succinate ER 25 milliGRAM(s) Oral daily    GI:  pantoprazole    Tablet 40 milliGRAM(s) Oral before breakfast  polyethylene glycol 3350 17 Gram(s) Oral daily  senna 2 Tablet(s) Oral at bedtime    Endocrine:  dextrose 50% Injectable 25 Gram(s) IV Push once  dextrose 50% Injectable 12.5 Gram(s) IV Push once  dextrose 50% Injectable 25 Gram(s) IV Push once  dextrose Oral Gel 15 Gram(s) Oral once PRN  glucagon  Injectable 1 milliGRAM(s) IntraMuscular once  insulin lispro (ADMELOG) corrective regimen sliding scale   SubCutaneous Before meals and at bedtime  methIMAzole 5 milliGRAM(s) Oral daily  rosuvastatin 10 milliGRAM(s) Oral at bedtime    All Other Medications:  benzocaine/menthol Lozenge 1 Lozenge Oral three times a day PRN  BUPivacaine liposome 1.3% Injectable 20 milliLiter(s) Local Injection once  chlorhexidine 2% Cloths 1 Application(s) Topical <User Schedule>  dextrose 5%. 1000 milliLiter(s) IV Continuous <Continuous>  dextrose 5%. 1000 milliLiter(s) IV Continuous <Continuous>  folic acid 1 milliGRAM(s) Oral daily  lactated ringers. 1000 milliLiter(s) IV Continuous <Continuous>  lactated ringers. 1000 milliLiter(s) IV Continuous <Continuous>  lidocaine   4% Patch 1 Patch Transdermal daily  multivitamin 1 Tablet(s) Oral daily      Vital Signs Last 24 Hrs  T(C): 36.3 (25 Jun 2025 08:42), Max: 37.8 (24 Jun 2025 20:27)  T(F): 97.4 (25 Jun 2025 08:42), Max: 100.1 (24 Jun 2025 20:27)  HR: 62 (25 Jun 2025 08:42) (62 - 79)  BP: 148/67 (25 Jun 2025 08:42) (108/53 - 155/70)  BP(mean): --  RR: 18 (25 Jun 2025 08:42) (17 - 22)  SpO2: 94% (25 Jun 2025 08:42) (93% - 99%)    Parameters below as of 25 Jun 2025 08:42  Patient On (Oxygen Delivery Method): room air        LABS:                        10.1   6.99  )-----------( 115      ( 25 Jun 2025 06:05 )             33.0     06-25    140  |  108  |  11  ----------------------------<  131[H]  4.6   |  20[L]  |  0.60    Ca    8.8      25 Jun 2025 06:05        Urinalysis Basic - ( 25 Jun 2025 06:05 )    Color: x / Appearance: x / SG: x / pH: x  Gluc: 131 mg/dL / Ketone: x  / Bili: x / Urobili: x   Blood: x / Protein: x / Nitrite: x   Leuk Esterase: x / RBC: x / WBC x   Sq Epi: x / Non Sq Epi: x / Bacteria: x        RADIOLOGY:    Drug Screen:        REVIEW OF SYSTEMS:   CONSTITUTIONAL: Denies fever or fatigue    EYES: Denies eye pain, visual disturbances   HEENT: Denies difficulty hearing, throat/neck pain or stiffness   RESPIRATORY: Denies SOB, cough, wheezing   CARDIOVASCULAR: Denies chest pain, palpitations.    GASTROINTESTINAL: Endorses +flatus, BMs. Denies nausea, vomiting, abdominal or epigastric pain.    GENITOURINARY: Denies dysuria, frequency, or incontinence   NEUROLOGICAL: Denies numbness, tingling. Denies headaches, loss of strength, tremors, dizziness or lightheadedness with pain medications.    MUSCULOSKELETAL: Denies joint pain or swelling       FUNCTIONAL ASSESSMENT:  PAIN SCORE AT REST:         SCALE USED: (1-10 VNRS)  PAIN SCORE WITH ACTIVITY:         SCALE USED: (1-10 VNRS)        FOCUSED PHYSICAL EXAM   GENERAL: Laying in bed, NAD   NEURO: CN II-XII grossly intact, EOMI   PULM: unlabored   CV: Regular rate and rhythm   ABDOMEN: Soft, Nontender, Nondistended   EXTREMITIES:  2+ Peripheral Pulses, No clubbing, cyanosis, or edema   SKIN: No rashes or lesions       ASSESSMENT:          PAIN ASSESSMENT:    PLAN:      -          ***current recs are preliminary and not final pending discussion w/ Dr. Lora***  - monitor closely for oversedation, ensure narcan is ordered   - escalate bowel regimen as needed for bowel movement daily         - Bowel regimen: Senna     - Nausea ppx: Zofran as needed   - Functional Goals: Pt will get OOB with PT today. Pt will resume previous level of activity without impairment from surgery.    - Additional Consults: None recommended.    - Additional Labs/Imaging:  None recommended.      - Discharge Planning: per primary team   - Pain Management follow up plan: will continue to follow        Plan d/w Dr. Lora.        Verna Oliveira PA-C  Acute Pain Service    PAIN MANAGEMENT CONSULT NOTE    Chief Complaint:    HPI:  80F hx of HTN, HLD, DM, Hyperthyroidism, Parkinson's s/p L2-pelvis PSF 2023 c/b L1 compression fx s/p kyphoplasty in 2024 now presents with worsening LBP with RLE radiculopathy for the past 3 months. Denies falls/trauma/injuries to the back. No red flag symptoms. Sent in by Dr. Bahena for preop and OR tomorrow 6/23 for revision surgery (extension of prior fusion to T10 with cement and tethers). (22 Jun 2025 11:09). PM consulted for back pain. Pt evaluated sitting in hospital chair. She endorses severe pain in her low back characterized as sharp shooting at incision site as well achy achy muscle tightness. She reports that she was recently started on lyrica 75mg 2x day (filled 6/19/25 30q 15d per Dr. Song Arana), she says that other than this she avoids narotic pain medications because she wants to be able to drive her car. She reports taking tramadol 50mg q6h  in the past however denies taking it recently (filled 6/19/25 30q 8d per Dr. Song Arana). Per primary team was lethargic yesterday morning, robaxin switched to PRN at the time and dilauid PO decreased to 1/2mg q6h. She denies any SE to medication at this time, denies feeling sedated.  She overall expresses she feels anxious about her pain and being in pain.       PAST MEDICAL & SURGICAL HISTORY:  Hyperthyroidism  monitored  by endocrinologist      HTN (hypertension)      Hyperlipidemia      GERD (gastroesophageal reflux disease)      Parkinson disease      Spinal stenosis  pain relieved with steroid injection      MVP (mitral valve prolapse)  echo 2018      MR (mitral regurgitation)      Diabetes mellitus type II, non insulin dependent      Lumbar compression fracture      S/P foot surgery, left  5/2/2018      History of bowel resection  2012      S/P hernia repair  2013      S/P breast augmentation  2014 saline implants      H/O abdominal surgery  1999      S/P knee replacement  Left ( 1/2/2019 ), right TKR, 2019      Status post kyphoplasty          FAMILY HISTORY:  Family history of heart attack (Father)    Family history of hypertension (Mother)    Family history of diabetes mellitus (Sibling)        SOCIAL HISTORY:  [ ] Denies Smoking, Alcohol, or Drug Use    HOME MEDICATIONS:   Please refer to initial HNP    PAIN HOME MEDICATIONS:  iSTOP review    PDI	Current Rx	Drug Type	Rx Written	Rx Dispensed	Drug	Quantity	Days Supply	Prescriber Name	Prescriber JAYA #  A	N	O	05/06/2025	05/07/2025	tramadol hcl 50 mg tablet	21	7	NayelistuartAleidacullen	[id number]  [facility] Medicare  [id number]  A	N	O	03/21/2025	03/22/2025	hydrocodone-acetaminophen 5-325 mg tablet	28	7	Paul Florence	[id number]  [facility] Method Medicare  [id number]  B	Y	O	06/20/2025	06/20/2025	acetaminophen-cod #3 tablet	30	8	José Miguel Jansen MD	[id number]  Payment Method Cash  Dispenser [facility]  B	Y		06/19/2025	06/19/2025	pregabalin 75 mg capsule	30	15	Sumit Zuleta	[id number]  Payment Method Cash  Dis[facility]enser [facility]  B	Y	O	06/19/2025	06/19/2025	tramadol hcl 50 mg tablet	30	8	Sumit Zuleta	[id number]    Allergies    No Known Allergies    Intolerances    OxyContin (Nausea)  Percocet 5/325 (Other; Nausea)  hydrocodone (Other; Nausea)      PAIN MEDICATIONS:  acetaminophen     Tablet .. 1000 milliGRAM(s) Oral every 8 hours  carbidopa/levodopa  25/100 1 Tablet(s) Oral three times a day  HYDROmorphone   Tablet 2 milliGRAM(s) Oral every 6 hours PRN  HYDROmorphone   Tablet 1 milliGRAM(s) Oral every 6 hours PRN  methocarbamol 500 milliGRAM(s) Oral <User Schedule> PRN  ondansetron   Disintegrating Tablet 4 milliGRAM(s) Oral every 6 hours  pramipexole 0.125 milliGRAM(s) Oral daily    Heme:  enoxaparin Injectable 40 milliGRAM(s) SubCutaneous every 24 hours    Antibiotics:    Cardiovascular:  metoprolol succinate ER 25 milliGRAM(s) Oral daily    GI:  pantoprazole    Tablet 40 milliGRAM(s) Oral before breakfast  polyethylene glycol 3350 17 Gram(s) Oral daily  senna 2 Tablet(s) Oral at bedtime    Endocrine:  dextrose 50% Injectable 25 Gram(s) IV Push once  dextrose 50% Injectable 12.5 Gram(s) IV Push once  dextrose 50% Injectable 25 Gram(s) IV Push once  dextrose Oral Gel 15 Gram(s) Oral once PRN  glucagon  Injectable 1 milliGRAM(s) IntraMuscular once  insulin lispro (ADMELOG) corrective regimen sliding scale   SubCutaneous Before meals and at bedtime  methIMAzole 5 milliGRAM(s) Oral daily  rosuvastatin 10 milliGRAM(s) Oral at bedtime    All Other Medications:  benzocaine/menthol Lozenge 1 Lozenge Oral three times a day PRN  BUPivacaine liposome 1.3% Injectable 20 milliLiter(s) Local Injection once  chlorhexidine 2% Cloths 1 Application(s) Topical <User Schedule>  dextrose 5%. 1000 milliLiter(s) IV Continuous <Continuous>  dextrose 5%. 1000 milliLiter(s) IV Continuous <Continuous>  folic acid 1 milliGRAM(s) Oral daily  lactated ringers. 1000 milliLiter(s) IV Continuous <Continuous>  lactated ringers. 1000 milliLiter(s) IV Continuous <Continuous>  lidocaine   4% Patch 1 Patch Transdermal daily  multivitamin 1 Tablet(s) Oral daily      Vital Signs Last 24 Hrs  T(C): 36.3 (25 Jun 2025 08:42), Max: 37.8 (24 Jun 2025 20:27)  T(F): 97.4 (25 Jun 2025 08:42), Max: 100.1 (24 Jun 2025 20:27)  HR: 62 (25 Jun 2025 08:42) (62 - 79)  BP: 148/67 (25 Jun 2025 08:42) (108/53 - 155/70)  BP(mean): --  RR: 18 (25 Jun 2025 08:42) (17 - 22)  SpO2: 94% (25 Jun 2025 08:42) (93% - 99%)    Parameters below as of 25 Jun 2025 08:42  Patient On (Oxygen Delivery Method): room air        LABS:                        10.1   6.99  )-----------( 115      ( 25 Jun 2025 06:05 )             33.0     06-25    140  |  108  |  11  ----------------------------<  131[H]  4.6   |  20[L]  |  0.60    Ca    8.8      25 Jun 2025 06:05        Urinalysis Basic - ( 25 Jun 2025 06:05 )    Color: x / Appearance: x / SG: x / pH: x  Gluc: 131 mg/dL / Ketone: x  / Bili: x / Urobili: x   Blood: x / Protein: x / Nitrite: x   Leuk Esterase: x / RBC: x / WBC x   Sq Epi: x / Non Sq Epi: x / Bacteria: x        RADIOLOGY:    Drug Screen:        REVIEW OF SYSTEMS:   CONSTITUTIONAL: Denies fever or fatigue    EYES: Denies eye pain, visual disturbances   HEENT: Denies difficulty hearing, throat/neck pain or stiffness   RESPIRATORY: Denies SOB, cough, wheezing   CARDIOVASCULAR: Denies chest pain, palpitations.    GASTROINTESTINAL: Endorses +flatus, BMs. Denies nausea, vomiting, abdominal or epigastric pain.    GENITOURINARY: Denies dysuria, frequency, or incontinence   NEUROLOGICAL: Denies numbness, tingling. Denies headaches, loss of strength, tremors, dizziness or lightheadedness with pain medications.    MUSCULOSKELETAL: Endorses low bak pain. Denies joint pain or swelling       FUNCTIONAL ASSESSMENT:  PAIN SCORE AT REST:         SCALE USED: (1-10 VNRS)  PAIN SCORE WITH ACTIVITY:         SCALE USED: (1-10 VNRS)        FOCUSED PHYSICAL EXAM   GENERAL: Laying in bed, NAD   NEURO: CN II-XII grossly intact, EOMI   PULM: unlabored   CV: Regular rate and rhythm   ABDOMEN: Soft, Nontender, Nondistended   EXTREMITIES:  2+ Peripheral Pulses, No clubbing, cyanosis, or edema   SKIN: No rashes or lesions       ASSESSMENT:          PAIN ASSESSMENT:  -Endorses  9/10 pain in her low back while sitting in chair, characterized as sharp shooting at incision site as well achy achy muscle tightness, improved to 8/10 w/ medications.     PLAN:    -tylenol 1g q8, would switch to IV for efficacy   -dilaudid 1mg/2mg q6h PO PRN for moderate/severe pain, can switch to tramadol 25mg/50mg q6h PO PRN for moderate/severe pain   -continue daily lidocaine 4% patch to low back  -robaxin 500mg PRN q12, can adjust to standing at this time, monitor closely for sedation  -would avoid iv narcotics at this time       ***current recs are preliminary and not final pending discussion w/ Dr. Lora***  - monitor closely for oversedation, ensure narcan is ordered   - escalate bowel regimen as needed for bowel movement daily         - Bowel regimen: Senna     - Nausea ppx: Zofran as needed   - Functional Goals: Pt will get OOB with PT today. Pt will resume previous level of activity without impairment from surgery.    - Additional Consults: None recommended.    - Additional Labs/Imaging:  None recommended.      - Discharge Planning: per primary team   - Pain Management follow up plan: will continue to follow        Plan d/w Dr. Lora.        Verna Oliveira PA-C  Acute Pain Service    PAIN MANAGEMENT CONSULT NOTE    Chief Complaint:    HPI:  80F hx of HTN, HLD, DM, Hyperthyroidism, Parkinson's s/p L2-pelvis PSF 2023 c/b L1 compression fx s/p kyphoplasty in 2024 now presents with worsening LBP with RLE radiculopathy for the past 3 months. Denies falls/trauma/injuries to the back. No red flag symptoms. Sent in by Dr. Bahena for preop and OR tomorrow 6/23 for revision surgery (extension of prior fusion to T10 with cement and tethers). (22 Jun 2025 11:09). PM consulted for back pain. Pt evaluated sitting in hospital chair. She endorses severe pain in her low back characterized as sharp shooting at incision site as well achy achy muscle tightness. She reports that she was recently started on lyrica 75mg 2x day (filled 6/19/25 30q 15d per Dr. Song Arana), she says that other than this she avoids narotic pain medications because she wants to be able to drive her car. She reports taking tramadol 50mg q6h  in the past however denies taking it recently (filled 6/19/25 30q 8d per Dr. Song Arana). Per primary team was lethargic yesterday morning, robaxin switched to PRN at the time and dilauid PO decreased to 1/2mg q6h. She denies any SE to medication at this time, denies feeling sedated.  She overall expresses she feels anxious about being in pain.       PAST MEDICAL & SURGICAL HISTORY:  Hyperthyroidism  monitored  by endocrinologist      HTN (hypertension)      Hyperlipidemia      GERD (gastroesophageal reflux disease)      Parkinson disease      Spinal stenosis  pain relieved with steroid injection      MVP (mitral valve prolapse)  echo 2018      MR (mitral regurgitation)      Diabetes mellitus type II, non insulin dependent      Lumbar compression fracture      S/P foot surgery, left  5/2/2018      History of bowel resection  2012      S/P hernia repair  2013      S/P breast augmentation  2014 saline implants      H/O abdominal surgery  1999      S/P knee replacement  Left ( 1/2/2019 ), right TKR, 2019      Status post kyphoplasty          FAMILY HISTORY:  Family history of heart attack (Father)    Family history of hypertension (Mother)    Family history of diabetes mellitus (Sibling)        SOCIAL HISTORY:  [ ] Denies Smoking, Alcohol, or Drug Use    HOME MEDICATIONS:   Please refer to initial HNP    PAIN HOME MEDICATIONS:  iSTOP review    PDI	Current Rx	Drug Type	Rx Written	Rx Dispensed	Drug	Quantity	Days Supply	Prescriber Name	Prescriber JAYA #  A	N	O	05/06/2025	05/07/2025	tramadol hcl 50 mg tablet	21	7	MarceTammi tanner	[id number]  Payment Method Medicare [facility] #[id number]  A	N	O	03/21/2025	03/22/2025	hydrocodone-acetaminophen 5-325 mg tablet	28	7	Paul Florence	[id number]  Payment Method Medicare [facility] #[id number]  B	Y	O	06/20/2025	06/20/2025	acetaminophen-cod #3 tablet	30	8	José Miguel Jansen MD	[id number]  Payment Method Cash  Dispenser [facility]  B	Y		06/19/2025	06/19/2025	pregabalin 75 mg capsule	30	15	Sumit Zuleta	[id number]  Payment Method Cash  Dispenser [facility]  B	Y	O	06/19/2025	06/19/2025	tramadol hcl 50 mg tablet	30	8	Sumit Zuleta	[id number]    Allergies    No Known Allergies    Intolerances    OxyContin (Nausea)  Percocet 5/325 (Other; Nausea)  hydrocodone (Other; Nausea)      PAIN MEDICATIONS:  acetaminophen     Tablet .. 1000 milliGRAM(s) Oral every 8 hours  carbidopa/levodopa  25/100 1 Tablet(s) Oral three times a day  HYDROmorphone   Tablet 2 milliGRAM(s) Oral every 6 hours PRN  HYDROmorphone   Tablet 1 milliGRAM(s) Oral every 6 hours PRN  methocarbamol 500 milliGRAM(s) Oral <User Schedule> PRN  ondansetron   Disintegrating Tablet 4 milliGRAM(s) Oral every 6 hours  pramipexole 0.125 milliGRAM(s) Oral daily    Heme:  enoxaparin Injectable 40 milliGRAM(s) SubCutaneous every 24 hours    Antibiotics:    Cardiovascular:  metoprolol succinate ER 25 milliGRAM(s) Oral daily    GI:  pantoprazole    Tablet 40 milliGRAM(s) Oral before breakfast  polyethylene glycol 3350 17 Gram(s) Oral daily  senna 2 Tablet(s) Oral at bedtime    Endocrine:  dextrose 50% Injectable 25 Gram(s) IV Push once  dextrose 50% Injectable 12.5 Gram(s) IV Push once  dextrose 50% Injectable 25 Gram(s) IV Push once  dextrose Oral Gel 15 Gram(s) Oral once PRN  glucagon  Injectable 1 milliGRAM(s) IntraMuscular once  insulin lispro (ADMELOG) corrective regimen sliding scale   SubCutaneous Before meals and at bedtime  methIMAzole 5 milliGRAM(s) Oral daily  rosuvastatin 10 milliGRAM(s) Oral at bedtime    All Other Medications:  benzocaine/menthol Lozenge 1 Lozenge Oral three times a day PRN  BUPivacaine liposome 1.3% Injectable 20 milliLiter(s) Local Injection once  chlorhexidine 2% Cloths 1 Application(s) Topical <User Schedule>  dextrose 5%. 1000 milliLiter(s) IV Continuous <Continuous>  dextrose 5%. 1000 milliLiter(s) IV Continuous <Continuous>  folic acid 1 milliGRAM(s) Oral daily  lactated ringers. 1000 milliLiter(s) IV Continuous <Continuous>  lactated ringers. 1000 milliLiter(s) IV Continuous <Continuous>  lidocaine   4% Patch 1 Patch Transdermal daily  multivitamin 1 Tablet(s) Oral daily      Vital Signs Last 24 Hrs  T(C): 36.3 (25 Jun 2025 08:42), Max: 37.8 (24 Jun 2025 20:27)  T(F): 97.4 (25 Jun 2025 08:42), Max: 100.1 (24 Jun 2025 20:27)  HR: 62 (25 Jun 2025 08:42) (62 - 79)  BP: 148/67 (25 Jun 2025 08:42) (108/53 - 155/70)  BP(mean): --  RR: 18 (25 Jun 2025 08:42) (17 - 22)  SpO2: 94% (25 Jun 2025 08:42) (93% - 99%)    Parameters below as of 25 Jun 2025 08:42  Patient On (Oxygen Delivery Method): room air        LABS:                        10.1   6.99  )-----------( 115      ( 25 Jun 2025 06:05 )             33.0     06-25    140  |  108  |  11  ----------------------------<  131[H]  4.6   |  20[L]  |  0.60    Ca    8.8      25 Jun 2025 06:05        Urinalysis Basic - ( 25 Jun 2025 06:05 )    Color: x / Appearance: x / SG: x / pH: x  Gluc: 131 mg/dL / Ketone: x  / Bili: x / Urobili: x   Blood: x / Protein: x / Nitrite: x   Leuk Esterase: x / RBC: x / WBC x   Sq Epi: x / Non Sq Epi: x / Bacteria: x        RADIOLOGY:    Drug Screen:        REVIEW OF SYSTEMS:   CONSTITUTIONAL: Denies fever or fatigue    EYES: Denies eye pain, visual disturbances   HEENT: Denies difficulty hearing, throat/neck pain or stiffness   RESPIRATORY: Denies SOB, cough, wheezing   CARDIOVASCULAR: Denies chest pain, palpitations.    GASTROINTESTINAL: Endorses +flatus, BMs. Denies nausea, vomiting, abdominal or epigastric pain.    GENITOURINARY: Denies dysuria, frequency, or incontinence   NEUROLOGICAL: Denies numbness, tingling. Denies headaches, loss of strength, tremors, dizziness or lightheadedness with pain medications.    MUSCULOSKELETAL: Endorses low bak pain. Denies joint pain or swelling       FUNCTIONAL ASSESSMENT:  PAIN SCORE AT REST:         SCALE USED: (1-10 VNRS)  PAIN SCORE WITH ACTIVITY:         SCALE USED: (1-10 VNRS)        FOCUSED PHYSICAL EXAM   GENERAL: Laying in bed, NAD   NEURO: CN II-XII grossly intact, EOMI   PULM: unlabored   CV: Regular rate and rhythm   ABDOMEN: Soft, Nontender, Nondistended   EXTREMITIES:  2+ Peripheral Pulses, No clubbing, cyanosis, or edema   SKIN: No rashes or lesions       ASSESSMENT:    80y Female s/p prior L2-pelvis in 2023, L1 kyphoplasty for compression fx in 2024 now with worsening LBP with RLE radiculopathy on 06-22, s/p extension of fusion T10-L2 with cement augmentation bilateral T10, proximal tether and osteotomy on 6/23      PAIN ASSESSMENT:  -Endorses  9/10 pain in her low back while sitting in chair, characterized as sharp shooting at incision site as well achy achy muscle tightness, improved to 8/10 w/ medications.     PLAN:    -tylenol 1g q8, would switch to IV for efficacy   -dilaudid 1mg/2mg q6h PO PRN for moderate/severe pain, can switch to tramadol 25mg/50mg q6h PO PRN for moderate/severe pain   -continue daily lidocaine 4% patch to low back  -robaxin 500mg PRN q12, can adjust to q12h standing at this time, monitor closely for sedation  -would avoid iv narcotics at this time   - monitor closely for oversedation, ensure narcan is ordered   - escalate bowel regimen as needed for bowel movement daily         - Bowel regimen: Senna     - Nausea ppx: Zofran as needed   - Functional Goals: Pt will get OOB with PT today. Pt will resume previous level of activity without impairment from surgery.    - Additional Consults: None recommended.    - Additional Labs/Imaging:  None recommended.      - Discharge Planning: per primary team   - Pain Management follow up plan: will continue to follow        Plan d/w Dr. Lora.        Verna Oliveira PA-C  Acute Pain Service

## 2025-06-25 NOTE — PROGRESS NOTE ADULT - SUBJECTIVE AND OBJECTIVE BOX
Still having pain.  hasn't had a bowel movement in a number of days, but denies any abdominal discomfort.  No shortness of breath or chest pain. Does have pain at the surgical site.      OVERNIGHT EVENTS: NAEO    Remaining ROS negative       PHYSICAL EXAM:    General: sitting up in bed, in no acute distress  HEENT: NC/AT; MMM  Cardiovascular: +S1/S2, RRR  Respiratory: CTA B/L; no W/R/R  Gastrointestinal: soft, NT/ND; +BSx4  Extremities: WWP  Neurological: speech fluent, moves all extremities, no acute/focal deficits noted  Psychiatric: pleasant mood and affect    VITAL SIGNS:  Vital Signs Last 24 Hrs  T(C): 36.3 (25 Jun 2025 08:42), Max: 37.8 (24 Jun 2025 20:27)  T(F): 97.4 (25 Jun 2025 08:42), Max: 100.1 (24 Jun 2025 20:27)  HR: 62 (25 Jun 2025 08:42) (62 - 79)  BP: 148/67 (25 Jun 2025 08:42) (108/53 - 155/70)  BP(mean): --  RR: 18 (25 Jun 2025 08:42) (17 - 22)  SpO2: 94% (25 Jun 2025 08:42) (93% - 99%)    Parameters below as of 25 Jun 2025 08:42  Patient On (Oxygen Delivery Method): room air    MEDICATIONS:  MEDICATIONS  (STANDING):  acetaminophen     Tablet .. 1000 milliGRAM(s) Oral every 8 hours  BUPivacaine liposome 1.3% Injectable 20 milliLiter(s) Local Injection once  carbidopa/levodopa  25/100 1 Tablet(s) Oral three times a day  chlorhexidine 2% Cloths 1 Application(s) Topical <User Schedule>  dextrose 5%. 1000 milliLiter(s) (50 mL/Hr) IV Continuous <Continuous>  dextrose 5%. 1000 milliLiter(s) (100 mL/Hr) IV Continuous <Continuous>  dextrose 50% Injectable 25 Gram(s) IV Push once  dextrose 50% Injectable 12.5 Gram(s) IV Push once  dextrose 50% Injectable 25 Gram(s) IV Push once  enoxaparin Injectable 40 milliGRAM(s) SubCutaneous every 24 hours  folic acid 1 milliGRAM(s) Oral daily  glucagon  Injectable 1 milliGRAM(s) IntraMuscular once  insulin lispro (ADMELOG) corrective regimen sliding scale   SubCutaneous Before meals and at bedtime  lactated ringers. 1000 milliLiter(s) (80 mL/Hr) IV Continuous <Continuous>  lactated ringers. 1000 milliLiter(s) (100 mL/Hr) IV Continuous <Continuous>  lidocaine   4% Patch 1 Patch Transdermal daily  methIMAzole 5 milliGRAM(s) Oral daily  metoprolol succinate ER 25 milliGRAM(s) Oral daily  multivitamin 1 Tablet(s) Oral daily  ondansetron   Disintegrating Tablet 4 milliGRAM(s) Oral every 6 hours  pantoprazole    Tablet 40 milliGRAM(s) Oral before breakfast  polyethylene glycol 3350 17 Gram(s) Oral daily  pramipexole 0.125 milliGRAM(s) Oral daily  rosuvastatin 10 milliGRAM(s) Oral at bedtime  senna 2 Tablet(s) Oral at bedtime    MEDICATIONS  (PRN):  benzocaine/menthol Lozenge 1 Lozenge Oral three times a day PRN Sore Throat  dextrose Oral Gel 15 Gram(s) Oral once PRN Blood Glucose LESS THAN 70 milliGRAM(s)/deciliter  HYDROmorphone   Tablet 2 milliGRAM(s) Oral every 6 hours PRN Severe Pain (7 - 10)  HYDROmorphone   Tablet 1 milliGRAM(s) Oral every 6 hours PRN Moderate Pain (4 - 6)  methocarbamol 500 milliGRAM(s) Oral <User Schedule> PRN Muscle Spasm      ALLERGIES:  Allergies    No Known Allergies    Intolerances    OxyContin (Nausea)  Percocet 5/325 (Other; Nausea)  hydrocodone (Other; Nausea)      LABS:                        10.1   6.99  )-----------( 115      ( 25 Jun 2025 06:05 )             33.0     06-25    140  |  108  |  11  ----------------------------<  131[H]  4.6   |  20[L]  |  0.60    Ca    8.8      25 Jun 2025 06:05        Urinalysis Basic - ( 25 Jun 2025 06:05 )    Color: x / Appearance: x / SG: x / pH: x  Gluc: 131 mg/dL / Ketone: x  / Bili: x / Urobili: x   Blood: x / Protein: x / Nitrite: x   Leuk Esterase: x / RBC: x / WBC x   Sq Epi: x / Non Sq Epi: x / Bacteria: x      CAPILLARY BLOOD GLUCOSE      POCT Blood Glucose.: 188 mg/dL (25 Jun 2025 11:51)      RADIOLOGY & ADDITIONAL TESTS: Reviewed.

## 2025-06-26 ENCOUNTER — RESULT REVIEW (OUTPATIENT)
Age: 80
End: 2025-06-26

## 2025-06-26 LAB
ADD ON TEST-SPECIMEN IN LAB: SIGNIFICANT CHANGE UP
ANION GAP SERPL CALC-SCNC: 8 MMOL/L — SIGNIFICANT CHANGE UP (ref 5–17)
BUN SERPL-MCNC: 12 MG/DL — SIGNIFICANT CHANGE UP (ref 7–23)
CALCIUM SERPL-MCNC: 8.4 MG/DL — SIGNIFICANT CHANGE UP (ref 8.4–10.5)
CHLORIDE SERPL-SCNC: 107 MMOL/L — SIGNIFICANT CHANGE UP (ref 96–108)
CO2 SERPL-SCNC: 25 MMOL/L — SIGNIFICANT CHANGE UP (ref 22–31)
CREAT SERPL-MCNC: 0.65 MG/DL — SIGNIFICANT CHANGE UP (ref 0.5–1.3)
EGFR: 89 ML/MIN/1.73M2 — SIGNIFICANT CHANGE UP
EGFR: 89 ML/MIN/1.73M2 — SIGNIFICANT CHANGE UP
GLUCOSE SERPL-MCNC: 130 MG/DL — HIGH (ref 70–99)
HCT VFR BLD CALC: 31.6 % — LOW (ref 34.5–45)
HGB BLD-MCNC: 9.9 G/DL — LOW (ref 11.5–15.5)
MCHC RBC-ENTMCNC: 29.1 PG — SIGNIFICANT CHANGE UP (ref 27–34)
MCHC RBC-ENTMCNC: 31.3 G/DL — LOW (ref 32–36)
MCV RBC AUTO: 92.9 FL — SIGNIFICANT CHANGE UP (ref 80–100)
NRBC # BLD AUTO: 0 K/UL — SIGNIFICANT CHANGE UP (ref 0–0)
NRBC # FLD: 0 K/UL — SIGNIFICANT CHANGE UP (ref 0–0)
NRBC BLD AUTO-RTO: 0 /100 WBCS — SIGNIFICANT CHANGE UP (ref 0–0)
PLATELET # BLD AUTO: 142 K/UL — LOW (ref 150–400)
PMV BLD: 11.6 FL — SIGNIFICANT CHANGE UP (ref 7–13)
POTASSIUM SERPL-MCNC: 4.4 MMOL/L — SIGNIFICANT CHANGE UP (ref 3.5–5.3)
POTASSIUM SERPL-SCNC: 4.4 MMOL/L — SIGNIFICANT CHANGE UP (ref 3.5–5.3)
RBC # BLD: 3.4 M/UL — LOW (ref 3.8–5.2)
RBC # FLD: 13.8 % — SIGNIFICANT CHANGE UP (ref 10.3–14.5)
SODIUM SERPL-SCNC: 140 MMOL/L — SIGNIFICANT CHANGE UP (ref 135–145)
WBC # BLD: 5.7 K/UL — SIGNIFICANT CHANGE UP (ref 3.8–10.5)
WBC # FLD AUTO: 5.7 K/UL — SIGNIFICANT CHANGE UP (ref 3.8–10.5)

## 2025-06-26 PROCEDURE — 99232 SBSQ HOSP IP/OBS MODERATE 35: CPT

## 2025-06-26 PROCEDURE — 93306 TTE W/DOPPLER COMPLETE: CPT | Mod: 26

## 2025-06-26 PROCEDURE — 93010 ELECTROCARDIOGRAM REPORT: CPT

## 2025-06-26 PROCEDURE — 99221 1ST HOSP IP/OBS SF/LOW 40: CPT

## 2025-06-26 PROCEDURE — 99233 SBSQ HOSP IP/OBS HIGH 50: CPT

## 2025-06-26 RX ORDER — ACETAMINOPHEN 500 MG/5ML
1000 LIQUID (ML) ORAL ONCE
Refills: 0 | Status: COMPLETED | OUTPATIENT
Start: 2025-06-26 | End: 2025-06-26

## 2025-06-26 RX ORDER — SALINE 7; 19 G/118ML; G/118ML
1 ENEMA RECTAL ONCE
Refills: 0 | Status: COMPLETED | OUTPATIENT
Start: 2025-06-26 | End: 2025-06-26

## 2025-06-26 RX ORDER — ACETAMINOPHEN 500 MG/5ML
1000 LIQUID (ML) ORAL ONCE
Refills: 0 | Status: COMPLETED | OUTPATIENT
Start: 2025-06-27 | End: 2025-06-27

## 2025-06-26 RX ADMIN — Medication 400 MILLIGRAM(S): at 23:00

## 2025-06-26 RX ADMIN — ROSUVASTATIN CALCIUM 10 MILLIGRAM(S): 5 TABLET, FILM COATED ORAL at 21:55

## 2025-06-26 RX ADMIN — PRAMIPEXOLE DIHYDROCHLORIDE 0.12 MILLIGRAM(S): 1 TABLET ORAL at 11:33

## 2025-06-26 RX ADMIN — METHOCARBAMOL 500 MILLIGRAM(S): 500 TABLET, FILM COATED ORAL at 06:15

## 2025-06-26 RX ADMIN — METHOCARBAMOL 500 MILLIGRAM(S): 500 TABLET, FILM COATED ORAL at 17:17

## 2025-06-26 RX ADMIN — Medication 1 TABLET(S): at 11:33

## 2025-06-26 RX ADMIN — Medication 4 MILLIGRAM(S): at 06:15

## 2025-06-26 RX ADMIN — SALINE 1 ENEMA: 7; 19 ENEMA RECTAL at 06:40

## 2025-06-26 RX ADMIN — INSULIN LISPRO 2: 100 INJECTION, SOLUTION INTRAVENOUS; SUBCUTANEOUS at 22:26

## 2025-06-26 RX ADMIN — Medication 400 MILLIGRAM(S): at 16:00

## 2025-06-26 RX ADMIN — Medication 1 TABLET(S): at 21:55

## 2025-06-26 RX ADMIN — METOPROLOL SUCCINATE 25 MILLIGRAM(S): 50 TABLET, EXTENDED RELEASE ORAL at 06:15

## 2025-06-26 RX ADMIN — Medication 400 MILLIGRAM(S): at 07:30

## 2025-06-26 RX ADMIN — Medication 1000 MILLIGRAM(S): at 08:00

## 2025-06-26 RX ADMIN — Medication 1 TABLET(S): at 06:12

## 2025-06-26 RX ADMIN — Medication 1 TABLET(S): at 15:00

## 2025-06-26 RX ADMIN — FOLIC ACID 1 MILLIGRAM(S): 1 TABLET ORAL at 11:33

## 2025-06-26 RX ADMIN — TRAMADOL HYDROCHLORIDE 50 MILLIGRAM(S): 50 TABLET, FILM COATED ORAL at 07:30

## 2025-06-26 RX ADMIN — ENOXAPARIN SODIUM 40 MILLIGRAM(S): 100 INJECTION SUBCUTANEOUS at 19:24

## 2025-06-26 RX ADMIN — TRAMADOL HYDROCHLORIDE 50 MILLIGRAM(S): 50 TABLET, FILM COATED ORAL at 23:00

## 2025-06-26 RX ADMIN — POLYETHYLENE GLYCOL 3350 17 GRAM(S): 17 POWDER, FOR SOLUTION ORAL at 06:15

## 2025-06-26 RX ADMIN — Medication 40 MILLIGRAM(S): at 06:15

## 2025-06-26 RX ADMIN — TRAMADOL HYDROCHLORIDE 50 MILLIGRAM(S): 50 TABLET, FILM COATED ORAL at 06:15

## 2025-06-26 RX ADMIN — INSULIN LISPRO 2: 100 INJECTION, SOLUTION INTRAVENOUS; SUBCUTANEOUS at 11:33

## 2025-06-26 RX ADMIN — TRAMADOL HYDROCHLORIDE 50 MILLIGRAM(S): 50 TABLET, FILM COATED ORAL at 15:40

## 2025-06-26 RX ADMIN — TRAMADOL HYDROCHLORIDE 50 MILLIGRAM(S): 50 TABLET, FILM COATED ORAL at 22:00

## 2025-06-26 RX ADMIN — TRAMADOL HYDROCHLORIDE 50 MILLIGRAM(S): 50 TABLET, FILM COATED ORAL at 14:42

## 2025-06-26 RX ADMIN — Medication 4 MILLIGRAM(S): at 11:33

## 2025-06-26 NOTE — CONSULT NOTE ADULT - ASSESSMENT
80F HTN, HLD, DM, ? MVP, hyperthyroidism, Parkinson's s/p L2-pelvis PSF 2023 c/b L1 compression fx s/p kyphoplasty in 2024 p/w low back pain on 6/22, s/p revision surgery (extension of T10 fusion) on 6/23. Cardiology consulted for asymptomatic, frequent PACs and PVCs noted on telemetry post-operatively.     Review of Studies:   EKG 6/26/2025: NSR, HR 67 with PACs  EKG 6/24/2025: Significant baseline artifact limiting interpretation, afib vs sinus with PACs  EKG 6/22/2025: NSR, LAD, HR 68.     Home Medications: Rosuvastatin 10mg qhs, ASA 81mg qd, HCTZ 12.5 mg qd, Toprol 25 mg qd, Losartan 100mg qd    # Frequent Ectopy with PACs and PVCs  - Formal TTE    **INCOMPLETE 80F HTN, HLD, DM, ? MVP, hyperthyroidism, Parkinson's s/p L2-pelvis PSF 2023 c/b L1 compression fx s/p kyphoplasty in 2024 p/w low back pain on 6/22, s/p revision surgery (extension of T10 fusion) on 6/23. Cardiology consulted for asymptomatic, frequent PACs and PVCs noted on telemetry post-operatively.     Review of Studies:   EKG 6/26/2025: NSR, HR 67 with PACs  EKG 6/24/2025: Significant baseline artifact limiting interpretation, afib vs sinus with PACs  EKG 6/22/2025: NSR, LAD, HR 68.     Home Medications: Rosuvastatin 10mg qhs, ASA 81mg qd, HCTZ 12.5 mg qd, Toprol 25 mg qd, Losartan 100mg qd    # Frequent Ectopy with PACs and PVCs  Asymptomatic. Known history of PACs on home metoprolol succinate and has close outpatient follow up with her cardiologist.    # Moderate-Severe MR.     **INCOMPLETE 80F HTN, HLD, DM, ? MVP, hyperthyroidism, Parkinson's s/p L2-pelvis PSF 2023 c/b L1 compression fx s/p kyphoplasty in 2024 p/w low back pain on 6/22, s/p revision surgery (extension of T10 fusion) on 6/23. Cardiology consulted for asymptomatic, frequent PACs and PVCs noted on telemetry post-operatively.     Review of Studies:   EKG 6/26/2025: NSR, HR 67 with PACs  EKG 6/24/2025: Significant baseline artifact limiting interpretation, possible atrial flutter  EKG 6/22/2025: NSR, LAD, HR 68.     Home Medications: Rosuvastatin 10mg qhs, ASA 81mg qd, HCTZ 12.5 mg qd, Toprol 25 mg qd, Losartan 100mg qd    # Frequent Ectopy with PACs and PVCs  Asymptomatic. Known history of PACs on home metoprolol succinate and has close outpatient follow up with her cardiologist.  - Known hx PACs  - Possible flutter noted on one of the EKGs, would send on heart monitor     # Moderate-Severe MR.     **INCOMPLETE 80F HTN, HLD, DM, MVP and moderate-severe MR, takotsubo cardiomyopathy 2024, now recovered, hyperthyroidism, Parkinson's s/p L2-pelvis PSF 2023 c/b L1 compression fx s/p kyphoplasty in 2024 p/w low back pain on 6/22, s/p revision surgery (extension of T10 fusion) on 6/23. Cardiology consulted for asymptomatic, frequent PACs and PVCs noted on telemetry post-operatively.     Review of Studies:   Tele: Sinus with frequent PACs and PACs in bigeminy  EKG 6/26/2025: NSR, HR 67 with PACs  EKG 6/24/2025: Significant baseline artifact limiting interpretation, possible atrial flutter  EKG 6/22/2025: NSR, LAD, HR 68.   TTE (Outside report) 1/132025: Normal LV size and wall motion . Grade I/II diastlic dysfunction. LA severely dilated. RV normal size normal function. Mild MAC. Bileaflet MVP. Anterior leaflet swings further than posterior. Mod to severe MR directed to free wall of LA. MR jet 6 m/s.   TTE 6/26/2025: Mild LVH. LVEF 60%. No RWMA. Normal RV size and fnxn. Left atrium is severely dilated. Moderate to severe mitral regurgitation.     Mild to moderate tricuspid regurgitation. Estimated pulmonary artery systolic pressure is 37 mmHg.     No pericardial effusion seen. Normal RAP.    Cardiologist: Dr. Trejo   Home Medications: Rosuvastatin 10mg qhs, ASA 81mg qd, HCTZ 12.5 mg qd, Toprol 25 mg qd, Losartan 100mg qd    # Frequent Ectopy with PACs and PVCs  Asymptomatic. Known history of PACs on home metoprolol succinate and has close outpatient follow up with her cardiologist.  - Known hx PACs  - Check TSH level  - Possible flutter noted on one of the EKGs, would consult EP for discharge with remote cardiac monitor or patient can follow with her cardiologist Dr. Trejo at [place] for remote monitor for consideration of AC.   - Continue home Toprol 25 mg qd    # Moderate-Severe MR.   - Outpatient records obtained. Known  history of MVP and mod-severe MR noted on prior echo  - Euvolemic on exam.   - Outpatient follow up for consideration of intervention.    Please re consult cardiology with any questions.

## 2025-06-26 NOTE — PROGRESS NOTE ADULT - SUBJECTIVE AND OBJECTIVE BOX
Orthopaedic Surgery Progress Note    Patient seen and examined. Still has back pain but current pain regimen somewhat helping.     Vital Signs Last 24 Hrs  T(C): 36.3 (06-26-25 @ 12:37), Max: 36.3 (06-26-25 @ 12:37)  T(F): 97.4 (06-26-25 @ 12:37), Max: 97.4 (06-26-25 @ 12:37)  HR: 69 (06-26-25 @ 12:37) (69 - 75)  BP: 134/62 (06-26-25 @ 12:37) (115/65 - 134/62)  BP(mean): --  RR: 16 (06-26-25 @ 12:37) (16 - 18)  SpO2: 93% (06-26-25 @ 12:37) (93% - 95%)  AVSS    PE:  General: Patient alert and oriented, NAD  Dressing: Clean/dry/intact back gauze/paper tape, HVx2  Per Dr. Bahena ok to remove hemovac drains - Drain d/c'd using aseptic technique, area cleaned with chlorhexidine. Aquacel applied to incision, medipore to drain removal site                             9.9    5.70  )-----------( 142      ( 26 Jun 2025 06:45 )             31.6   06-26    140  |  107  |  12  ----------------------------<  130[H]  4.4   |  25  |  0.65    Ca    8.4      26 Jun 2025 06:45        A/P: 80yFemale s/p extension of fusion T10-L2 with cement augmentation bilateral T10, proximal tether and osteotomy on 6/23  -Pain control as needed  -DVT prophylaxis: lovenox   -PT/weight-bearing status: WBAT, recommended for rehab   -[facility] medicine comanagement recommendations; discussed with Dr. Kam - frequent PACs/PVCs seen on telemetry monitor, EKG done showing frequent PACs. Echo performed. Obtaining collateral from patients outpatient cardiologist at [facility] Dr. Trejo.  F/u further inpatient cardiology recommendations   -Dispo: discharge to rehab when optimized, possibly tomorrow        normal shape/ROM intact

## 2025-06-26 NOTE — CONSULT NOTE ADULT - SUBJECTIVE AND OBJECTIVE BOX
HPI:  80F HTN, HLD, DM, ? MVP,  hyperthyroidism, Parkinson's s/p L2-pelvis PSF 2023 c/b L1 compression fx s/p kyphoplasty in 2024 p/w low back pain on 6/22, s/p revision surgery (extension of T10 fusion) on 6/23. Consulted for asymptomatic, frequent PACs and PVCs noted on telemetry post-operatively.       ROS: A 10-point review of systems was otherwise negative.    PAST MEDICAL & SURGICAL HISTORY:  Hyperthyroidism  monitored  by endocrinologist  HTN (hypertension)  Hyperlipidemia  GERD (gastroesophageal reflux disease)  Parkinson disease  Spinal stenosis  pain relieved with steroid injection  MVP (mitral valve prolapse)  echo 2018  MR (mitral regurgitation)  Diabetes mellitus type II, non insulin dependent  Lumbar compression fracture  S/P foot surgery, left  5/2/2018  History of bowel resection  2012  S/P hernia repair  2013  S/P breast augmentation  2014 saline implants  H/O abdominal surgery  1999  S/P knee replacement  Left ( 1/2/2019 ), right TKR, 2019  Status post kyphoplasty      SOCIAL HISTORY:  FAMILY HISTORY:  Family history of heart attack (Father)  Family history of hypertension (Mother)  Family history of diabetes mellitus (Sibling)    ALLERGIES: 	  OxyContin (Nausea)  Percocet 5/325 (Other; Nausea)  hydrocodone (Other; Nausea)      MEDICATIONS:  acetaminophen   IVPB .. 1000 milliGRAM(s) IV Intermittent once  acetaminophen   IVPB .. 1000 milliGRAM(s) IV Intermittent once  benzocaine/menthol Lozenge 1 Lozenge Oral three times a day PRN  bisacodyl 5 milliGRAM(s) Oral every 12 hours PRN  carbidopa/levodopa  25/100 1 Tablet(s) Oral three times a day  dextrose 50% Injectable 25 Gram(s) IV Push once  dextrose 50% Injectable 12.5 Gram(s) IV Push once  dextrose 50% Injectable 25 Gram(s) IV Push once  dextrose Oral Gel 15 Gram(s) Oral once PRN  enoxaparin Injectable 40 milliGRAM(s) SubCutaneous every 24 hours  folic acid 1 milliGRAM(s) Oral daily  glucagon  Injectable 1 milliGRAM(s) IntraMuscular once  insulin lispro (ADMELOG) corrective regimen sliding scale   SubCutaneous Before meals and at bedtime  lidocaine   4% Patch 1 Patch Transdermal daily  magnesium hydroxide Suspension 30 milliLiter(s) Oral daily PRN  methIMAzole 5 milliGRAM(s) Oral daily  methocarbamol 500 milliGRAM(s) Oral every 12 hours  metoprolol succinate ER 25 milliGRAM(s) Oral daily  multivitamin 1 Tablet(s) Oral daily  ondansetron   Disintegrating Tablet 4 milliGRAM(s) Oral every 6 hours  pantoprazole    Tablet 40 milliGRAM(s) Oral before breakfast  polyethylene glycol 3350 17 Gram(s) Oral two times a day  pramipexole 0.125 milliGRAM(s) Oral daily  rosuvastatin 10 milliGRAM(s) Oral at bedtime  senna 2 Tablet(s) Oral at bedtime  traMADol 25 milliGRAM(s) Oral every 6 hours PRN  traMADol 50 milliGRAM(s) Oral every 6 hours PRN      HOME MEDICATIONS:  aspirin 81 mg oral tablet, chewable: 1 tab(s) orally once a day  carbidopa-levodopa 25 mg-100 mg oral tablet: 1 tab(s) orally 3 times a day  codeine-acetaminophen 30 mg-300 mg oral tablet: 1 tab(s) orally every 6 hours as needed for  mild pain  diclofenac sodium 75 mg oral delayed release tablet: 1 tab(s) orally 2 times a day as needed for  mild pain  folic acid 1 mg oral tablet: 1 tab(s) orally once a day  hydroCHLOROthiazide 12.5 mg oral capsule: 1 cap(s) orally once a day  lidocaine 4% topical film: 1 patch transdermal every 24 hours as needed for Neck pain  losartan 100 mg oral tablet: 1 tab(s) orally once a day  metFORMIN 1000 mg oral tablet, extended release: 1 tab(s) orally 2 times a day  methIMAzole 5 mg oral tablet: 1 tab(s) orally once a day  metoprolol succinate 25 mg oral tablet, extended release: 1 tab(s) orally once a day  Multiple Vitamins oral tablet: 1 tab(s) orally once a day  pantoprazole 40 mg oral delayed release tablet: 1 tab(s) orally once a day (before a meal)  pramipexole 0.125 mg oral tablet: 1 tab(s) orally once a day  pregabalin 75 mg oral capsule: 1 cap(s) orally 2 times a day  rosuvastatin 10 mg oral tablet: 1 tab(s) orally once a day (at bedtime)  traMADol 50 mg oral tablet: 1 tab(s) orally every 6 hours As needed for moderate pain  Trulicity Pen 0.75 mg/0.5 mL subcutaneous solution: 0.75 milligram(s) subcutaneously once a week      PHYSICAL EXAM:      I/O Summary 24H    IN: 585 mL / OUT: 257 mL / NET: 328 mL        T(F): 97.3 (06-26-25 @ 08:20), Max: 99.3 (06-25-25 @ 21:37)  HR: 66 (06-26-25 @ 08:20) (55 - 69)  BP: 139/60 (06-26-25 @ 08:20) (121/58 - 157/68)  BP(mean): 98 (06-26-25 @ 06:12) (87 - 98)  ABP: --  ABP(mean): --  RR: 18 (06-26-25 @ 08:20) (18 - 22)  SpO2: 94% (06-26-25 @ 08:20) (94% - 98%)  CVP(mm Hg): --    GEN: Awake, comfortable. NAD.   HEENT: NCAT, PERRL, EOMI. Mucosa moist. No JVD.   RESP: CTA b/l  CV: RRR, normal s1/s2. No m/r/g.  ABD: Soft, NTND. BS+  EXT: Warm. No edema, clubbing, or cyanosis.   NEURO: AAOx3. No focal deficits.      	  LABS:	 	    Cardiac Markers           CBC 06-26-25 @ 06:45                        9.9    5.70  )-----------( 142                   31.6     Hgb trend: 9.9 <-- , 10.1 <-- , 9.6 <--   WBC trend: 5.70 <-- , 6.99 <-- , 5.70 <--     CMP 06-26-25 @ 06:45    140  |  107  |  12  ----------------------------<  130[H]  4.4   |  25  |  0.65    Ca    8.4      06-26-25 @ 06:45      Serum Cr (eGFR) trend  06-26-25 @ 06:45: 0.65 (89)  06-25-25 @ 06:05: 0.60 (91)  06-24-25 @ 06:00: 0.65 (89)   HPI:  80F HTN, HLD, DM, ? MVP,  hyperthyroidism, Parkinson's s/p L2-pelvis PSF 2023 c/b L1 compression fx s/p kyphoplasty in 2024 p/w low back pain on 6/22, s/p revision surgery (extension of T10 fusion) on 6/23. Consulted for asymptomatic, frequent PACs and PVCs noted on telemetry post-operatively.     ROS: A 10-point review of systems was otherwise negative.    PAST MEDICAL & SURGICAL HISTORY:  Hyperthyroidism  monitored  by endocrinologist  HTN (hypertension)  Hyperlipidemia  GERD (gastroesophageal reflux disease)  Parkinson disease  Spinal stenosis  pain relieved with steroid injection  MVP (mitral valve prolapse)  echo 2018  MR (mitral regurgitation)  Diabetes mellitus type II, non insulin dependent  Lumbar compression fracture  S/P foot surgery, left  5/2/2018  History of bowel resection  2012  S/P hernia repair  2013  S/P breast augmentation  2014 saline implants  H/O abdominal surgery  1999  S/P knee replacement  Left ( 1/2/2019 ), right TKR, 2019  Status post kyphoplasty      SOCIAL HISTORY:  FAMILY HISTORY:  Family history of heart attack (Father)  Family history of hypertension (Mother)  Family history of diabetes mellitus (Sibling)    ALLERGIES: 	  OxyContin (Nausea)  Percocet 5/325 (Other; Nausea)  hydrocodone (Other; Nausea)      MEDICATIONS:  acetaminophen   IVPB .. 1000 milliGRAM(s) IV Intermittent once  acetaminophen   IVPB .. 1000 milliGRAM(s) IV Intermittent once  benzocaine/menthol Lozenge 1 Lozenge Oral three times a day PRN  bisacodyl 5 milliGRAM(s) Oral every 12 hours PRN  carbidopa/levodopa  25/100 1 Tablet(s) Oral three times a day  dextrose 50% Injectable 25 Gram(s) IV Push once  dextrose 50% Injectable 12.5 Gram(s) IV Push once  dextrose 50% Injectable 25 Gram(s) IV Push once  dextrose Oral Gel 15 Gram(s) Oral once PRN  enoxaparin Injectable 40 milliGRAM(s) SubCutaneous every 24 hours  folic acid 1 milliGRAM(s) Oral daily  glucagon  Injectable 1 milliGRAM(s) IntraMuscular once  insulin lispro (ADMELOG) corrective regimen sliding scale   SubCutaneous Before meals and at bedtime  lidocaine   4% Patch 1 Patch Transdermal daily  magnesium hydroxide Suspension 30 milliLiter(s) Oral daily PRN  methIMAzole 5 milliGRAM(s) Oral daily  methocarbamol 500 milliGRAM(s) Oral every 12 hours  metoprolol succinate ER 25 milliGRAM(s) Oral daily  multivitamin 1 Tablet(s) Oral daily  ondansetron   Disintegrating Tablet 4 milliGRAM(s) Oral every 6 hours  pantoprazole    Tablet 40 milliGRAM(s) Oral before breakfast  polyethylene glycol 3350 17 Gram(s) Oral two times a day  pramipexole 0.125 milliGRAM(s) Oral daily  rosuvastatin 10 milliGRAM(s) Oral at bedtime  senna 2 Tablet(s) Oral at bedtime  traMADol 25 milliGRAM(s) Oral every 6 hours PRN  traMADol 50 milliGRAM(s) Oral every 6 hours PRN      HOME MEDICATIONS:  aspirin 81 mg oral tablet, chewable: 1 tab(s) orally once a day  carbidopa-levodopa 25 mg-100 mg oral tablet: 1 tab(s) orally 3 times a day  codeine-acetaminophen 30 mg-300 mg oral tablet: 1 tab(s) orally every 6 hours as needed for  mild pain  diclofenac sodium 75 mg oral delayed release tablet: 1 tab(s) orally 2 times a day as needed for  mild pain  folic acid 1 mg oral tablet: 1 tab(s) orally once a day  hydroCHLOROthiazide 12.5 mg oral capsule: 1 cap(s) orally once a day  lidocaine 4% topical film: 1 patch transdermal every 24 hours as needed for Neck pain  losartan 100 mg oral tablet: 1 tab(s) orally once a day  metFORMIN 1000 mg oral tablet, extended release: 1 tab(s) orally 2 times a day  methIMAzole 5 mg oral tablet: 1 tab(s) orally once a day  metoprolol succinate 25 mg oral tablet, extended release: 1 tab(s) orally once a day  Multiple Vitamins oral tablet: 1 tab(s) orally once a day  pantoprazole 40 mg oral delayed release tablet: 1 tab(s) orally once a day (before a meal)  pramipexole 0.125 mg oral tablet: 1 tab(s) orally once a day  pregabalin 75 mg oral capsule: 1 cap(s) orally 2 times a day  rosuvastatin 10 mg oral tablet: 1 tab(s) orally once a day (at bedtime)  traMADol 50 mg oral tablet: 1 tab(s) orally every 6 hours As needed for moderate pain  Trulicity Pen 0.75 mg/0.5 mL subcutaneous solution: 0.75 milligram(s) subcutaneously once a week      PHYSICAL EXAM:      I/O Summary 24H    IN: 585 mL / OUT: 257 mL / NET: 328 mL        T(F): 97.3 (06-26-25 @ 08:20), Max: 99.3 (06-25-25 @ 21:37)  HR: 66 (06-26-25 @ 08:20) (55 - 69)  BP: 139/60 (06-26-25 @ 08:20) (121/58 - 157/68)  BP(mean): 98 (06-26-25 @ 06:12) (87 - 98)  ABP: --  ABP(mean): --  RR: 18 (06-26-25 @ 08:20) (18 - 22)  SpO2: 94% (06-26-25 @ 08:20) (94% - 98%)  CVP(mm Hg): --    GEN: Awake, comfortable. NAD.   HEENT: NCAT, PERRL, EOMI. Mucosa moist. No JVD.   RESP: CTA b/l  CV: RRR, normal s1/s2. No m/r/g.  ABD: Soft, NTND. BS+  EXT: Warm. No edema, clubbing, or cyanosis.   NEURO: AAOx3. No focal deficits.      	  LABS:	 	    Cardiac Markers           CBC 06-26-25 @ 06:45                        9.9    5.70  )-----------( 142                   31.6     Hgb trend: 9.9 <-- , 10.1 <-- , 9.6 <--   WBC trend: 5.70 <-- , 6.99 <-- , 5.70 <--     CMP 06-26-25 @ 06:45    140  |  107  |  12  ----------------------------<  130[H]  4.4   |  25  |  0.65    Ca    8.4      06-26-25 @ 06:45      Serum Cr (eGFR) trend  06-26-25 @ 06:45: 0.65 (89)  06-25-25 @ 06:05: 0.60 (91)  06-24-25 @ 06:00: 0.65 (89)   HPI:  80F HTN, HLD, DM, ? MVP,  hyperthyroidism, Parkinson's s/p L2-pelvis PSF 2023 c/b L1 compression fx s/p kyphoplasty in 2024 p/w low back pain on 6/22, s/p revision surgery (extension of T10 fusion) on 6/23. Consulted for asymptomatic, frequent PACs and PVCs noted on telemetry post-operatively.     ROS: A 10-point review of systems was otherwise negative.    PAST MEDICAL & SURGICAL HISTORY:  Hyperthyroidism  monitored  by endocrinologist  HTN (hypertension)  Hyperlipidemia  GERD (gastroesophageal reflux disease)  Parkinson disease  Spinal stenosis  pain relieved with steroid injection  MVP (mitral valve prolapse)  echo 2018  MR (mitral regurgitation)  Diabetes mellitus type II, non insulin dependent  Lumbar compression fracture  S/P foot surgery, left  5/2/2018  History of bowel resection  2012  S/P hernia repair  2013  S/P breast augmentation  2014 saline implants  H/O abdominal surgery  1999  S/P knee replacement  Left ( 1/2/2019 ), right TKR, 2019  Status post kyphoplasty      SOCIAL HISTORY:  FAMILY HISTORY:  Family history of heart attack (Father)  Family history of hypertension (Mother)  Family history of diabetes mellitus (Sibling)    ALLERGIES: 	  OxyContin (Nausea)  Percocet 5/325 (Other; Nausea)  hydrocodone (Other; Nausea)      MEDICATIONS:  acetaminophen   IVPB .. 1000 milliGRAM(s) IV Intermittent once  acetaminophen   IVPB .. 1000 milliGRAM(s) IV Intermittent once  benzocaine/menthol Lozenge 1 Lozenge Oral three times a day PRN  bisacodyl 5 milliGRAM(s) Oral every 12 hours PRN  carbidopa/levodopa  25/100 1 Tablet(s) Oral three times a day  dextrose 50% Injectable 25 Gram(s) IV Push once  dextrose 50% Injectable 12.5 Gram(s) IV Push once  dextrose 50% Injectable 25 Gram(s) IV Push once  dextrose Oral Gel 15 Gram(s) Oral once PRN  enoxaparin Injectable 40 milliGRAM(s) SubCutaneous every 24 hours  folic acid 1 milliGRAM(s) Oral daily  glucagon  Injectable 1 milliGRAM(s) IntraMuscular once  insulin lispro (ADMELOG) corrective regimen sliding scale   SubCutaneous Before meals and at bedtime  lidocaine   4% Patch 1 Patch Transdermal daily  magnesium hydroxide Suspension 30 milliLiter(s) Oral daily PRN  methIMAzole 5 milliGRAM(s) Oral daily  methocarbamol 500 milliGRAM(s) Oral every 12 hours  metoprolol succinate ER 25 milliGRAM(s) Oral daily  multivitamin 1 Tablet(s) Oral daily  ondansetron   Disintegrating Tablet 4 milliGRAM(s) Oral every 6 hours  pantoprazole    Tablet 40 milliGRAM(s) Oral before breakfast  polyethylene glycol 3350 17 Gram(s) Oral two times a day  pramipexole 0.125 milliGRAM(s) Oral daily  rosuvastatin 10 milliGRAM(s) Oral at bedtime  senna 2 Tablet(s) Oral at bedtime  traMADol 25 milliGRAM(s) Oral every 6 hours PRN  traMADol 50 milliGRAM(s) Oral every 6 hours PRN      HOME MEDICATIONS:  aspirin 81 mg oral tablet, chewable: 1 tab(s) orally once a day  carbidopa-levodopa 25 mg-100 mg oral tablet: 1 tab(s) orally 3 times a day  codeine-acetaminophen 30 mg-300 mg oral tablet: 1 tab(s) orally every 6 hours as needed for  mild pain  diclofenac sodium 75 mg oral delayed release tablet: 1 tab(s) orally 2 times a day as needed for  mild pain  folic acid 1 mg oral tablet: 1 tab(s) orally once a day  hydroCHLOROthiazide 12.5 mg oral capsule: 1 cap(s) orally once a day  lidocaine 4% topical film: 1 patch transdermal every 24 hours as needed for Neck pain  losartan 100 mg oral tablet: 1 tab(s) orally once a day  metFORMIN 1000 mg oral tablet, extended release: 1 tab(s) orally 2 times a day  methIMAzole 5 mg oral tablet: 1 tab(s) orally once a day  metoprolol succinate 25 mg oral tablet, extended release: 1 tab(s) orally once a day  Multiple Vitamins oral tablet: 1 tab(s) orally once a day  pantoprazole 40 mg oral delayed release tablet: 1 tab(s) orally once a day (before a meal)  pramipexole 0.125 mg oral tablet: 1 tab(s) orally once a day  pregabalin 75 mg oral capsule: 1 cap(s) orally 2 times a day  rosuvastatin 10 mg oral tablet: 1 tab(s) orally once a day (at bedtime)  traMADol 50 mg oral tablet: 1 tab(s) orally every 6 hours As needed for moderate pain  Trulicity Pen 0.75 mg/0.5 mL subcutaneous solution: 0.75 milligram(s) subcutaneously once a week      PHYSICAL EXAM:      I/O Summary 24H    IN: 585 mL / OUT: 257 mL / NET: 328 mL        T(F): 97.3 (06-26-25 @ 08:20), Max: 99.3 (06-25-25 @ 21:37)  HR: 66 (06-26-25 @ 08:20) (55 - 69)  BP: 139/60 (06-26-25 @ 08:20) (121/58 - 157/68)  BP(mean): 98 (06-26-25 @ 06:12) (87 - 98)  ABP: --  ABP(mean): --  RR: 18 (06-26-25 @ 08:20) (18 - 22)  SpO2: 94% (06-26-25 @ 08:20) (94% - 98%)  CVP(mm Hg): --    GEN: Awake, comfortable. NAD.   HEENT: NCAT, PERRL, EOMI. Mucosa moist. No JVD.   RESP: CTA b/l  CV: Irregular rhythm. normal s1/s2. Systolic murmur noted at mitral area.   ABD: Soft, NTND. BS+  EXT: Warm. No edema, clubbing, or cyanosis.   NEURO: AAOx3. No focal deficits.      	  LABS:	 	    Cardiac Markers           CBC 06-26-25 @ 06:45                        9.9    5.70  )-----------( 142                   31.6     Hgb trend: 9.9 <-- , 10.1 <-- , 9.6 <--   WBC trend: 5.70 <-- , 6.99 <-- , 5.70 <--     CMP 06-26-25 @ 06:45    140  |  107  |  12  ----------------------------<  130[H]  4.4   |  25  |  0.65    Ca    8.4      06-26-25 @ 06:45      Serum Cr (eGFR) trend  06-26-25 @ 06:45: 0.65 (89)  06-25-25 @ 06:05: 0.60 (91)  06-24-25 @ 06:00: 0.65 (89)

## 2025-06-26 NOTE — PROGRESS NOTE ADULT - SUBJECTIVE AND OBJECTIVE BOX
PAIN MANAGEMENT CONSULT NOTE    Interval Events:  -No acute events overnight, patient reports she had a rough night because she was constipated and felt that it was exacerbating her pain. She had a BM this morning and reports feeling much better. She expresses that she feels her pain is controlled at this time with tramadol.       Since yesterday 6am, pt has required:  -tylenol 1g x3  -tramadol 50mg x2  -robaxin 500mg x2    HOME MEDICATIONS:   Please refer to initial HNP    Allergies    No Known Allergies    Intolerances    OxyContin (Nausea)  Percocet 5/325 (Other; Nausea)  hydrocodone (Other; Nausea)      PAIN MEDICATIONS:  acetaminophen   IVPB .. 1000 milliGRAM(s) IV Intermittent once  acetaminophen   IVPB .. 1000 milliGRAM(s) IV Intermittent once  carbidopa/levodopa  25/100 1 Tablet(s) Oral three times a day  methocarbamol 500 milliGRAM(s) Oral every 12 hours  ondansetron   Disintegrating Tablet 4 milliGRAM(s) Oral every 6 hours  pramipexole 0.125 milliGRAM(s) Oral daily  traMADol 25 milliGRAM(s) Oral every 6 hours PRN  traMADol 50 milliGRAM(s) Oral every 6 hours PRN    Heme:  enoxaparin Injectable 40 milliGRAM(s) SubCutaneous every 24 hours    Antibiotics:    Cardiovascular:  metoprolol succinate ER 25 milliGRAM(s) Oral daily    GI:  bisacodyl 5 milliGRAM(s) Oral every 12 hours PRN  magnesium hydroxide Suspension 30 milliLiter(s) Oral daily PRN  pantoprazole    Tablet 40 milliGRAM(s) Oral before breakfast  polyethylene glycol 3350 17 Gram(s) Oral two times a day  senna 2 Tablet(s) Oral at bedtime    Endocrine:  dextrose 50% Injectable 25 Gram(s) IV Push once  dextrose 50% Injectable 12.5 Gram(s) IV Push once  dextrose 50% Injectable 25 Gram(s) IV Push once  dextrose Oral Gel 15 Gram(s) Oral once PRN  glucagon  Injectable 1 milliGRAM(s) IntraMuscular once  insulin lispro (ADMELOG) corrective regimen sliding scale   SubCutaneous Before meals and at bedtime  methIMAzole 5 milliGRAM(s) Oral daily  rosuvastatin 10 milliGRAM(s) Oral at bedtime    All Other Medications:  benzocaine/menthol Lozenge 1 Lozenge Oral three times a day PRN  folic acid 1 milliGRAM(s) Oral daily  lidocaine   4% Patch 1 Patch Transdermal daily  multivitamin 1 Tablet(s) Oral daily      Vital Signs Last 24 Hrs  T(C): 36.3 (26 Jun 2025 08:20), Max: 37.4 (25 Jun 2025 21:37)  T(F): 97.3 (26 Jun 2025 08:20), Max: 99.3 (25 Jun 2025 21:37)  HR: 66 (26 Jun 2025 08:20) (55 - 69)  BP: 139/60 (26 Jun 2025 08:20) (121/58 - 157/68)  BP(mean): 98 (26 Jun 2025 06:12) (87 - 98)  RR: 18 (26 Jun 2025 08:20) (18 - 22)  SpO2: 94% (26 Jun 2025 08:20) (94% - 98%)    Parameters below as of 26 Jun 2025 08:20  Patient On (Oxygen Delivery Method): room air        LABS:                        9.9    5.70  )-----------( 142      ( 26 Jun 2025 06:45 )             31.6     06-26    140  |  107  |  12  ----------------------------<  130[H]  4.4   |  25  |  0.65    Ca    8.4      26 Jun 2025 06:45        Urinalysis Basic - ( 26 Jun 2025 06:45 )    Color: x / Appearance: x / SG: x / pH: x  Gluc: 130 mg/dL / Ketone: x  / Bili: x / Urobili: x   Blood: x / Protein: x / Nitrite: x   Leuk Esterase: x / RBC: x / WBC x   Sq Epi: x / Non Sq Epi: x / Bacteria: x        RADIOLOGY:    Drug Screen:        REVIEW OF SYSTEMS:  CONSTITUTIONAL: Denies fever or fatigue   EYES: Denies eye pain, visual disturbances  HEENT: Denies difficulty hearing, throat/neck pain or stiffness  RESPIRATORY: Denies SOB, cough, wheezing  CARDIOVASCULAR: Denies chest pain, palpitations.   GASTROINTESTINAL: Endorses +flatus, BMs. Denies nausea, vomiting, abdominal or epigastric pain.   GENITOURINARY: Denies dysuria, frequency, or incontinence  NEUROLOGICAL: Denies numbness, tingling. Denies headaches, loss of strength, tremors, dizziness or lightheadedness with pain medications.   MUSCULOSKELETAL: Endorses low bak pain. Denies joint pain or swelling      FUNCTIONAL ASSESSMENT:  PAIN SCORE AT REST:         SCALE USED: (1-10 VNRS)  PAIN SCORE WITH ACTIVITY:         SCALE USED: (1-10 VNRS)      FOCUSED PHYSICAL EXAM  GENERAL: Laying in bed, NAD  NEURO: CN II-XII grossly intact, EOMI  PULM: unlabored  CV: Regular rate and rhythm  ABDOMEN: Soft, Nontender, Nondistended  EXTREMITIES:  2+ Peripheral Pulses, No clubbing, cyanosis, or edema  SKIN: No rashes or lesions      ASSESSMENT:   80y Female s/p prior L2-pelvis in 2023, L1 kyphoplasty for compression fx in 2024 now with worsening LBP with RLE radiculopathy on 06-22, s/p extension of fusion T10-L2 with cement augmentation bilateral T10, proximal tether and osteotomy on 6/23.        PAIN ASSESSMENT:  -Endorses  9/10 pain in her low back while sitting in chair, characterized as sharp shooting at incision site as well achy achy muscle tightness, improved to 8/10 w/ medications.     PLAN:   -tylenol 1g q8, would switch to IV for efficacy   - tramadol 25mg/50mg q6h PO PRN for moderate/severe pain   -continue daily lidocaine 4% patch to low back  -robaxin 500mg q12h standing, monitor for sedation   -would avoid iv narcotics at this time   - monitor closely for oversedation, ensure narcan is ordered  - escalate bowel regimen as needed for bowel movement daily    - Bowel regimen: Senna    - Nausea ppx: Zofran as needed  - Functional Goals: Pt will get OOB with PT today. Pt will resume previous level of activity without impairment from surgery.   - Additional Consults: None recommended.   - Additional Labs/Imaging:  None recommended.     - Discharge Planning: per primary team  - Pain Management follow up plan: will continue to follow    Plan d/w Dr. Lora.     Tee Gillespie PA-C  Acute Pain Service

## 2025-06-26 NOTE — PROGRESS NOTE ADULT - SUBJECTIVE AND OBJECTIVE BOX
Feeling much better today.  Had a large bowel movement after having an enema.  No chest pain, shortness of breath or other new ROS.     OVERNIGHT EVENTS: NAEO    Remaining ROS negative     PHYSICAL EXAM:    General: sitting up in bed, alert, no acute distress  HEENT: NC/AT; MMM  Neck: supple  Cardiovascular: +S1/S2, RRR, + systolic murmur  Respiratory: CTA B/L; no W/R/R  Gastrointestinal: soft, NT/ND; +BSx4  Extremities: WWP; no edema  Neurological: speech fluent, no facial asymmetry, moves extremities, no focal deficits noted  Psychiatric: pleasant mood and affect    VITAL SIGNS:  Vital Signs Last 24 Hrs  T(C): 36.2 (26 Jun 2025 11:43), Max: 37.4 (25 Jun 2025 21:37)  T(F): 97.2 (26 Jun 2025 11:43), Max: 99.3 (25 Jun 2025 21:37)  HR: 75 (26 Jun 2025 11:43) (55 - 75)  BP: 115/65 (26 Jun 2025 11:43) (115/65 - 157/68)  BP(mean): 98 (26 Jun 2025 06:12) (87 - 98)  RR: 18 (26 Jun 2025 11:43) (18 - 22)  SpO2: 95% (26 Jun 2025 11:43) (94% - 98%)    Parameters below as of 26 Jun 2025 11:43  Patient On (Oxygen Delivery Method): room air      MEDICATIONS:  MEDICATIONS  (STANDING):  acetaminophen   IVPB .. 1000 milliGRAM(s) IV Intermittent once  acetaminophen   IVPB .. 1000 milliGRAM(s) IV Intermittent once  carbidopa/levodopa  25/100 1 Tablet(s) Oral three times a day  dextrose 50% Injectable 25 Gram(s) IV Push once  dextrose 50% Injectable 12.5 Gram(s) IV Push once  dextrose 50% Injectable 25 Gram(s) IV Push once  enoxaparin Injectable 40 milliGRAM(s) SubCutaneous every 24 hours  folic acid 1 milliGRAM(s) Oral daily  glucagon  Injectable 1 milliGRAM(s) IntraMuscular once  insulin lispro (ADMELOG) corrective regimen sliding scale   SubCutaneous Before meals and at bedtime  lidocaine   4% Patch 1 Patch Transdermal daily  methIMAzole 5 milliGRAM(s) Oral daily  methocarbamol 500 milliGRAM(s) Oral every 12 hours  metoprolol succinate ER 25 milliGRAM(s) Oral daily  multivitamin 1 Tablet(s) Oral daily  ondansetron   Disintegrating Tablet 4 milliGRAM(s) Oral every 6 hours  pantoprazole    Tablet 40 milliGRAM(s) Oral before breakfast  polyethylene glycol 3350 17 Gram(s) Oral two times a day  pramipexole 0.125 milliGRAM(s) Oral daily  rosuvastatin 10 milliGRAM(s) Oral at bedtime  senna 2 Tablet(s) Oral at bedtime    MEDICATIONS  (PRN):  benzocaine/menthol Lozenge 1 Lozenge Oral three times a day PRN Sore Throat  bisacodyl 5 milliGRAM(s) Oral every 12 hours PRN Constipation  dextrose Oral Gel 15 Gram(s) Oral once PRN Blood Glucose LESS THAN 70 milliGRAM(s)/deciliter  magnesium hydroxide Suspension 30 milliLiter(s) Oral daily PRN Constipation  traMADol 25 milliGRAM(s) Oral every 6 hours PRN Moderate Pain (4 - 6)  traMADol 50 milliGRAM(s) Oral every 6 hours PRN Severe Pain (7 - 10)      ALLERGIES:  Allergies    No Known Allergies    Intolerances    OxyContin (Nausea)  Percocet 5/325 (Other; Nausea)  hydrocodone (Other; Nausea)      LABS:                        9.9    5.70  )-----------( 142      ( 26 Jun 2025 06:45 )             31.6     06-26    140  |  107  |  12  ----------------------------<  130[H]  4.4   |  25  |  0.65    Ca    8.4      26 Jun 2025 06:45        Urinalysis Basic - ( 26 Jun 2025 06:45 )    Color: x / Appearance: x / SG: x / pH: x  Gluc: 130 mg/dL / Ketone: x  / Bili: x / Urobili: x   Blood: x / Protein: x / Nitrite: x   Leuk Esterase: x / RBC: x / WBC x   Sq Epi: x / Non Sq Epi: x / Bacteria: x      CAPILLARY BLOOD GLUCOSE      POCT Blood Glucose.: 182 mg/dL (26 Jun 2025 11:24)      RADIOLOGY & ADDITIONAL TESTS: Reviewed.

## 2025-06-26 NOTE — PROGRESS NOTE ADULT - SUBJECTIVE AND OBJECTIVE BOX
Ortho Note    Pt seen and examined, endorsing continued pain despite change in pain regimen  Denies CP, SOB, N/V, numbness/tingling     Vital Signs Last 24 Hrs  T(C): 36.3 (06-26-25 @ 01:02), Max: 36.3 (06-26-25 @ 01:02)  T(F): 97.4 (06-26-25 @ 01:02), Max: 97.4 (06-26-25 @ 01:02)  HR: 55 (06-26-25 @ 01:02) (55 - 55)  BP: 143/64 (06-26-25 @ 01:02) (143/64 - 143/64)  BP(mean): 92 (06-26-25 @ 01:02) (92 - 92)  RR: 22 (06-26-25 @ 01:02) (22 - 22)  SpO2: 95% (06-26-25 @ 01:02) (95% - 95%)  I&O's Summary    24 Jun 2025 07:01  -  25 Jun 2025 07:00  --------------------------------------------------------  IN: 2200 mL / OUT: 1857.5 mL / NET: 342.5 mL    25 Jun 2025 07:01  -  26 Jun 2025 06:14  --------------------------------------------------------  IN: 510 mL / OUT: 212 mL / NET: 298 mL        General: lying in bed, somewhat somnolent  Hemovac x 2 holding suction   Motor: exam somewhat difficult 2/2 patient pain; with encouragement EHL/FHL/TA/GS 5/5, /biceps/triceps 5/5                            10.1   6.99  )-----------( 115      ( 25 Jun 2025 06:05 )             33.0     06-25    140  |  108  |  11  ----------------------------<  131[H]  4.6   |  20[L]  |  0.60    Ca    8.8      25 Jun 2025 06:05        A/P: 80yFemale POD#3 s/p extension of fusion T10-L2 with cement augmentation bilateral T10, proximal tether and osteotomy on 6/23    - Stable  - Pain Control, appreciate pain recs  - DVT ppx: SCDs  - Post op abx: ancef   - Monitor drain output  - Dispo: [facility]    Ortho Pager [phone]

## 2025-06-27 ENCOUNTER — TRANSCRIPTION ENCOUNTER (OUTPATIENT)
Age: 80
End: 2025-06-27

## 2025-06-27 VITALS
DIASTOLIC BLOOD PRESSURE: 70 MMHG | HEART RATE: 65 BPM | TEMPERATURE: 98 F | OXYGEN SATURATION: 93 % | RESPIRATION RATE: 18 BRPM | SYSTOLIC BLOOD PRESSURE: 130 MMHG

## 2025-06-27 LAB
ANION GAP SERPL CALC-SCNC: 8 MMOL/L — SIGNIFICANT CHANGE UP (ref 5–17)
BUN SERPL-MCNC: 12 MG/DL — SIGNIFICANT CHANGE UP (ref 7–23)
CALCIUM SERPL-MCNC: 8.5 MG/DL — SIGNIFICANT CHANGE UP (ref 8.4–10.5)
CHLORIDE SERPL-SCNC: 106 MMOL/L — SIGNIFICANT CHANGE UP (ref 96–108)
CO2 SERPL-SCNC: 24 MMOL/L — SIGNIFICANT CHANGE UP (ref 22–31)
CREAT SERPL-MCNC: 0.6 MG/DL — SIGNIFICANT CHANGE UP (ref 0.5–1.3)
EGFR: 91 ML/MIN/1.73M2 — SIGNIFICANT CHANGE UP
EGFR: 91 ML/MIN/1.73M2 — SIGNIFICANT CHANGE UP
GLUCOSE SERPL-MCNC: 126 MG/DL — HIGH (ref 70–99)
HCT VFR BLD CALC: 31.6 % — LOW (ref 34.5–45)
HGB BLD-MCNC: 9.8 G/DL — LOW (ref 11.5–15.5)
MCHC RBC-ENTMCNC: 28.9 PG — SIGNIFICANT CHANGE UP (ref 27–34)
MCHC RBC-ENTMCNC: 31 G/DL — LOW (ref 32–36)
MCV RBC AUTO: 93.2 FL — SIGNIFICANT CHANGE UP (ref 80–100)
NRBC # BLD AUTO: 0 K/UL — SIGNIFICANT CHANGE UP (ref 0–0)
NRBC # FLD: 0 K/UL — SIGNIFICANT CHANGE UP (ref 0–0)
NRBC BLD AUTO-RTO: 0 /100 WBCS — SIGNIFICANT CHANGE UP (ref 0–0)
PLATELET # BLD AUTO: 147 K/UL — LOW (ref 150–400)
PMV BLD: 11.4 FL — SIGNIFICANT CHANGE UP (ref 7–13)
POTASSIUM SERPL-MCNC: 4.4 MMOL/L — SIGNIFICANT CHANGE UP (ref 3.5–5.3)
POTASSIUM SERPL-SCNC: 4.4 MMOL/L — SIGNIFICANT CHANGE UP (ref 3.5–5.3)
RBC # BLD: 3.39 M/UL — LOW (ref 3.8–5.2)
RBC # FLD: 13.8 % — SIGNIFICANT CHANGE UP (ref 10.3–14.5)
SODIUM SERPL-SCNC: 138 MMOL/L — SIGNIFICANT CHANGE UP (ref 135–145)
WBC # BLD: 5.42 K/UL — SIGNIFICANT CHANGE UP (ref 3.8–10.5)
WBC # FLD AUTO: 5.42 K/UL — SIGNIFICANT CHANGE UP (ref 3.8–10.5)

## 2025-06-27 PROCEDURE — C1713: CPT

## 2025-06-27 PROCEDURE — 36415 COLL VENOUS BLD VENIPUNCTURE: CPT

## 2025-06-27 PROCEDURE — 99239 HOSP IP/OBS DSCHRG MGMT >30: CPT

## 2025-06-27 PROCEDURE — 93306 TTE W/DOPPLER COMPLETE: CPT

## 2025-06-27 PROCEDURE — 84443 ASSAY THYROID STIM HORMONE: CPT

## 2025-06-27 PROCEDURE — 93005 ELECTROCARDIOGRAM TRACING: CPT

## 2025-06-27 PROCEDURE — 97161 PT EVAL LOW COMPLEX 20 MIN: CPT

## 2025-06-27 PROCEDURE — 86850 RBC ANTIBODY SCREEN: CPT

## 2025-06-27 PROCEDURE — 71046 X-RAY EXAM CHEST 2 VIEWS: CPT

## 2025-06-27 PROCEDURE — 97165 OT EVAL LOW COMPLEX 30 MIN: CPT

## 2025-06-27 PROCEDURE — 99233 SBSQ HOSP IP/OBS HIGH 50: CPT

## 2025-06-27 PROCEDURE — 99232 SBSQ HOSP IP/OBS MODERATE 35: CPT

## 2025-06-27 PROCEDURE — 80048 BASIC METABOLIC PNL TOTAL CA: CPT

## 2025-06-27 PROCEDURE — 86901 BLOOD TYPING SEROLOGIC RH(D): CPT

## 2025-06-27 PROCEDURE — 82962 GLUCOSE BLOOD TEST: CPT

## 2025-06-27 PROCEDURE — 81003 URINALYSIS AUTO W/O SCOPE: CPT

## 2025-06-27 PROCEDURE — 85730 THROMBOPLASTIN TIME PARTIAL: CPT

## 2025-06-27 PROCEDURE — 85025 COMPLETE CBC W/AUTO DIFF WBC: CPT

## 2025-06-27 PROCEDURE — 85610 PROTHROMBIN TIME: CPT

## 2025-06-27 PROCEDURE — 86900 BLOOD TYPING SEROLOGIC ABO: CPT

## 2025-06-27 PROCEDURE — 85027 COMPLETE CBC AUTOMATED: CPT

## 2025-06-27 PROCEDURE — 76000 FLUOROSCOPY <1 HR PHYS/QHP: CPT

## 2025-06-27 PROCEDURE — 97116 GAIT TRAINING THERAPY: CPT

## 2025-06-27 PROCEDURE — C1889: CPT

## 2025-06-27 RX ORDER — LIDOCAINE HYDROCHLORIDE 20 MG/ML
1 JELLY TOPICAL
Qty: 0 | Refills: 0 | DISCHARGE
Start: 2025-06-27

## 2025-06-27 RX ORDER — TRAMADOL HYDROCHLORIDE 50 MG/1
1 TABLET, FILM COATED ORAL
Qty: 0 | Refills: 0 | DISCHARGE
Start: 2025-06-27

## 2025-06-27 RX ORDER — TRAMADOL HYDROCHLORIDE AND ACETAMINOPHEN 37.5; 325 MG/1; MG/1
1 TABLET ORAL
Refills: 0 | DISCHARGE

## 2025-06-27 RX ORDER — ENOXAPARIN SODIUM 100 MG/ML
40 INJECTION SUBCUTANEOUS
Qty: 0 | Refills: 0 | DISCHARGE
Start: 2025-06-27

## 2025-06-27 RX ORDER — TRAMADOL HYDROCHLORIDE 50 MG/1
0.5 TABLET, FILM COATED ORAL
Qty: 0 | Refills: 0 | DISCHARGE
Start: 2025-06-27

## 2025-06-27 RX ORDER — DICLOFENAC SODIUM 75 MG/1
1 TABLET, DELAYED RELEASE ORAL
Refills: 0 | DISCHARGE

## 2025-06-27 RX ORDER — SENNA 187 MG
2 TABLET ORAL
Qty: 0 | Refills: 0 | DISCHARGE
Start: 2025-06-27

## 2025-06-27 RX ORDER — METHOCARBAMOL 500 MG/1
1 TABLET, FILM COATED ORAL
Qty: 0 | Refills: 0 | DISCHARGE
Start: 2025-06-27

## 2025-06-27 RX ORDER — POLYETHYLENE GLYCOL 3350 17 G/17G
17 POWDER, FOR SOLUTION ORAL
Qty: 0 | Refills: 0 | DISCHARGE
Start: 2025-06-27

## 2025-06-27 RX ADMIN — METHOCARBAMOL 500 MILLIGRAM(S): 500 TABLET, FILM COATED ORAL at 06:25

## 2025-06-27 RX ADMIN — Medication 1 TABLET(S): at 14:13

## 2025-06-27 RX ADMIN — TRAMADOL HYDROCHLORIDE 50 MILLIGRAM(S): 50 TABLET, FILM COATED ORAL at 07:10

## 2025-06-27 RX ADMIN — TRAMADOL HYDROCHLORIDE 50 MILLIGRAM(S): 50 TABLET, FILM COATED ORAL at 13:00

## 2025-06-27 RX ADMIN — TRAMADOL HYDROCHLORIDE 50 MILLIGRAM(S): 50 TABLET, FILM COATED ORAL at 06:25

## 2025-06-27 RX ADMIN — Medication 40 MILLIGRAM(S): at 06:25

## 2025-06-27 RX ADMIN — PRAMIPEXOLE DIHYDROCHLORIDE 0.12 MILLIGRAM(S): 1 TABLET ORAL at 12:03

## 2025-06-27 RX ADMIN — Medication 1 TABLET(S): at 12:03

## 2025-06-27 RX ADMIN — TRAMADOL HYDROCHLORIDE 50 MILLIGRAM(S): 50 TABLET, FILM COATED ORAL at 12:16

## 2025-06-27 RX ADMIN — Medication 4 MILLIGRAM(S): at 12:02

## 2025-06-27 RX ADMIN — Medication 1 TABLET(S): at 06:25

## 2025-06-27 RX ADMIN — Medication 1000 MILLIGRAM(S): at 07:40

## 2025-06-27 RX ADMIN — Medication 400 MILLIGRAM(S): at 07:10

## 2025-06-27 RX ADMIN — POLYETHYLENE GLYCOL 3350 17 GRAM(S): 17 POWDER, FOR SOLUTION ORAL at 06:25

## 2025-06-27 RX ADMIN — LIDOCAINE HYDROCHLORIDE 1 PATCH: 20 JELLY TOPICAL at 12:04

## 2025-06-27 RX ADMIN — Medication 4 MILLIGRAM(S): at 06:25

## 2025-06-27 RX ADMIN — FOLIC ACID 1 MILLIGRAM(S): 1 TABLET ORAL at 12:02

## 2025-06-27 RX ADMIN — INSULIN LISPRO 2: 100 INJECTION, SOLUTION INTRAVENOUS; SUBCUTANEOUS at 12:06

## 2025-06-27 NOTE — DISCHARGE NOTE NURSING/CASE MANAGEMENT/SOCIAL WORK - PATIENT PORTAL LINK FT
You can access the FollowMyHealth Patient Portal offered by [facility] by registering at the following website: [email or web address]. By joining [facility]’s FollowMyHealth portal, you will also be able to view your health information using other applications (apps) compatible with our system.

## 2025-06-27 NOTE — PROGRESS NOTE ADULT - REASON FOR ADMISSION
How Severe Are Your Spot(S)?: mild
Revision spine surgery
What Is The Reason For Today's Visit?: Full Body Skin Examination
Revision spine surgery
What Is The Reason For Today's Visit? (Being Monitored For X): the development of new lesions
Revision spine surgery

## 2025-06-27 NOTE — CHART NOTE - NSCHARTNOTEFT_GEN_A_CORE
-Patient has acceptance to [facility] rehab, per SW can go at 1PM.   -Discussed with medicine attending Dr. Kam - optimized from medical standpoint for discharge.  -Per cardiology, no inpatient intervention, can f/u outpatient with her cardiologist at [facility] Dr. Trejo for remote monitor for consideration of a/c

## 2025-06-27 NOTE — PROGRESS NOTE ADULT - ASSESSMENT
79 yo lady PMHx of stress induced CM HF recovered EF, mod-severe MR in setting of MVP, HTN, HLD, DM2    Assessment  1. Post op PAC and PVC in setting of MVP w/ mod-severe MR and mild-mod TR  2. Stress induced CM HF recovered EF  3. HTN, HLD, DM2    Plan  1. Do not recommend inpatient intervention planning for mod-severe MR in immediate post op period from lumbar surgery, needs close f/u as outpatient to review intervention option for mod-severe MR  2. Metoprolol succinate 25mg PO QD  3. Recommend outpatient ECG monitor given high risk of Afib/flutter    During non face-to-face time, I reviewed relevant portions of the patient’s medical record. During face-to-face time, I took a relevant history and examined the patient. I also explained differential diagnoses, relevant cardiac diagnoses, workup, and management plan, which required a moderate level of medical decision making. I answered all questions related to the patient's medical conditions.     Guillermina Juares M.D.  Attending Cardiologist  [facility]  
80F hx of HTN, HLD, DM, Hyperthyroidism, Parkinson's s/p L2-pelvis PSF 2023 c/b L1 compression fx s/p kyphoplasty in 2024 now presents with worsening LBP with RLE radiculopathy for the past 3 months. Denies falls/trauma/injuries to the back. No red flag symptoms. Sent in by Dr. Bahena for preop and OR tomorrow 6/23 for revision surgery (extension of prior fusion to T10 with cement and tethers). Medicine consulted for pre-op     #Post-operative state  - cleared for OR by previous attending, improved awareness today - fully conversant and no longer groggy, follows commands and moves extremities  - plan per orthopedic team  - pain management and dvt ppx per primary team - monitor for oversedation with medications  - ensure on bowel regimen while on narcotics    #HTN   PT w hx of HTN takes metoprolol 25mg ER and hydrochlorthiazide 12.5mg qd   - hold HCTZ day of procedure - can likely resume if BPs more consistently >140 - has significant post-operative anemia at this time, so will continue to closely monitor hemodynamics   - c/w BB     hyperthyroidism   - continue home dose of methimazole 5mg daily  #DM   PT w hx of DM takes 1000mg of metformin BID and trulicty 0.75mg weekly   - sliding scale while inpatient   - q6 finger sticks   - blood glucose remains within goal 140-180s    Parkinson   PT w hx of Parkinson's takes Carbidopa-levodopa 25-100mg TID and 0.125mg Mirapex  - c/w home meds     #Acute blood loss anemia post-operative  #thrombocytopenia  - likely all from surgery, continue to monitor and maintain active type and screen  - monitor daily labs    50 minutes spent on this encounter, including face to face with patient, review of chart, care coordination and documentation.   Plan discussed with orthopedic team.     Time spent on the encounter excludes teaching and separately reported services  
80F hx of HTN, HLD, DM, Hyperthyroidism, Parkinson's s/p L2-pelvis PSF 2023 c/b L1 compression fx s/p kyphoplasty in 2024 now presents with worsening LBP with RLE radiculopathy for the past 3 months. Denies falls/trauma/injuries to the back. No red flag symptoms. Sent in by Dr. Bahena for preop and OR tomorrow 6/23 for revision surgery (extension of prior fusion to T10 with cement and tethers). Medicine consulted for pre-op     #Post-operative state  - cleared for OR by previous attending, improved awareness today - fully conversant and no longer groggy, follows commands and moves extremities  - plan per orthopedic team  - pain management and dvt ppx per primary team - monitor for oversedation with medications  - ensure on bowel regimen while on narcotics - has been having bowel movement regularly  - PT- recommended for [facility]  - appreciate input from pain management    #Constipation  - successful, large bowel movement after enema today    #HTN   PT w hx of HTN takes metoprolol 25mg ER and hydrochlorthiazide 12.5mg qd   - hold HCTZ day of procedure - can likely resume if BPs more consistently >140 - has significant post-operative anemia at this time, so will continue to closely monitor hemodynamics.  can resume on discharge     - c/w BB     hyperthyroidism   - continue home dose of methimazole 5mg daily  #DM   PT w hx of DM takes 1000mg of metformin BID and trulicty 0.75mg weekly   - sliding scale while inpatient   - q6 finger sticks   - blood glucose remains within goal 140-180s  - can resume home meds on discharge, or DM can be managed per accepting rehab facility    Parkinson   PT w hx of Parkinson's takes Carbidopa-levodopa 25-100mg TID and 0.125mg Mirapex  - c/w home meds     #Acute blood loss anemia post-operative  #thrombocytopenia  - likely all from surgery, continue to monitor and maintain active type and screen  - monitor daily labs    #Ectopy, frequent PACs/PVCs  - admission EKG NSR, EKG from 6/24 with significant artifact, ?PACs, reviewed telemetry  - consult cardiology - should follow up with her outpatient cardiologist for outpatient rhythm monitoring  - TTE - no acute issues, see cardiology notes for further details  -     DVT ppx per primary team    50 minutes spent on this encounter, including face to face with patient, review of chart, care coordination and documentation.   Plan discussed with orthopedic team.     Time spent on the encounter excludes teaching and separately reported services  
80F hx of HTN, HLD, DM, Hyperthyroidism, Parkinson's s/p L2-pelvis PSF 2023 c/b L1 compression fx s/p kyphoplasty in 2024 now presents with worsening LBP with RLE radiculopathy for the past 3 months. Denies falls/trauma/injuries to the back. No red flag symptoms. Sent in by Dr. Bahena for preop and OR tomorrow 6/23 for revision surgery (extension of prior fusion to T10 with cement and tethers). Medicine consulted for pre-op     #Post-operative state  - cleared for OR by previous attending, improved awareness today - fully conversant and no longer groggy, follows commands and moves extremities  - plan per orthopedic team  - pain management and dvt ppx per primary team - monitor for oversedation with medications  - ensure on bowel regimen while on narcotics  - PT- recommended for [facility]  - appreciate input from pain management    #Constipation  - successful, large bowel movement after enema today    #HTN   PT w hx of HTN takes metoprolol 25mg ER and hydrochlorthiazide 12.5mg qd   - hold HCTZ day of procedure - can likely resume if BPs more consistently >140 - has significant post-operative anemia at this time, so will continue to closely monitor hemodynamics   - c/w BB     hyperthyroidism   - continue home dose of methimazole 5mg daily  #DM   PT w hx of DM takes 1000mg of metformin BID and trulicty 0.75mg weekly   - sliding scale while inpatient   - q6 finger sticks   - blood glucose remains within goal 140-180s    Parkinson   PT w hx of Parkinson's takes Carbidopa-levodopa 25-100mg TID and 0.125mg Mirapex  - c/w home meds     #Acute blood loss anemia post-operative  #thrombocytopenia  - likely all from surgery, continue to monitor and maintain active type and screen  - monitor daily labs    #Ectopy, frequent PACs/PVCs  - admission EKG NSR, EKG from 6/24 with significant artifact, ?PACs, reviewed telemetry  - consult cardiology today  - TTE    DVT ppx per primary team    50 minutes spent on this encounter, including face to face with patient, review of chart, care coordination and documentation.   Plan discussed with orthopedic team.     Time spent on the encounter excludes teaching and separately reported services  
80F hx of HTN, HLD, DM, Hyperthyroidism, Parkinson's s/p L2-pelvis PSF 2023 c/b L1 compression fx s/p kyphoplasty in 2024 now presents with worsening LBP with RLE radiculopathy for the past 3 months. Denies falls/trauma/injuries to the back. No red flag symptoms. Sent in by Dr. Bahena for preop and OR tomorrow 6/23 for revision surgery (extension of prior fusion to T10 with cement and tethers). Medicine consulted for pre-op     #Post-operative state  - cleared for OR by previous attending, improved awareness today - fully conversant and no longer groggy, follows commands and moves extremities  - plan per orthopedic team  - pain management and dvt ppx per primary team - monitor for oversedation with medications  - ensure on bowel regimen while on narcotics  - pain management consult today    #Constipation  - escalate bowel regimen - can trial suppository or enema today    #HTN   PT w hx of HTN takes metoprolol 25mg ER and hydrochlorthiazide 12.5mg qd   - hold HCTZ day of procedure - can likely resume if BPs more consistently >140 - has significant post-operative anemia at this time, so will continue to closely monitor hemodynamics   - c/w BB     hyperthyroidism   - continue home dose of methimazole 5mg daily  #DM   PT w hx of DM takes 1000mg of metformin BID and trulicty 0.75mg weekly   - sliding scale while inpatient   - q6 finger sticks   - blood glucose remains within goal 140-180s    Parkinson   PT w hx of Parkinson's takes Carbidopa-levodopa 25-100mg TID and 0.125mg Mirapex  - c/w home meds     #Acute blood loss anemia post-operative  #thrombocytopenia  - likely all from surgery, continue to monitor and maintain active type and screen  - monitor daily labs    DVT ppx per primary team    50 minutes spent on this encounter, including face to face with patient, review of chart, care coordination and documentation.   Plan discussed with orthopedic team.     Time spent on the encounter excludes teaching and separately reported services  
80F hx of HTN, HLD, DM, Hyperthyroidism, Parkinson's s/p L2-pelvis PSF 2023 c/b L1 compression fx s/p kyphoplasty in 2024 now presents with worsening LBP with RLE radiculopathy for the past 3 months. Denies falls/trauma/injuries to the back. No red flag symptoms. Sent in by Dr. Bahena for preop and OR tomorrow 6/23 for revision surgery (extension of prior fusion to T10 with cement and tethers). Medicine consulted for pre-op     #Post-operative state  - cleared for OR by previous attending, just returned from the PACU, very groggy at the moment  - plan per orthopedic team  - pain management and dvt ppx per primary team  - ensure on bowel regimen while on narcotics  #HTN   PT w hx of HTN takes metoprolol 25mg ER and hydrochlorthiazide 12.5mg qd   - would hold HCTZ day of procedure - can likely resume tomorrow   - c/w BB     hyperthyroidism   - continue home dose of methimazole 5mg daily  #DM   PT w hx of DM takes 1000mg of metformin BID and trulicty 0.75mg weekly   - would start basal insulin at this time given elevated BG readings w 10U Lantus qhs   - sliding scale while inpatient   - q6 finger sticks     Parkinson   PT w hx of Parkinson's takes Carbidopa-levodopa 25-100mg TID and 0.125mg Mirapex  - c/w home meds     moderate level of medical decision making required for this case, including the presence of two stable chronic medical issues and discussion of plan with the orthopedic team.     Time spent on the encounter excludes teaching and separately reported services

## 2025-06-27 NOTE — DISCHARGE NOTE NURSING/CASE MANAGEMENT/SOCIAL WORK - FINANCIAL ASSISTANCE
[facility] provides services at a reduced cost to those who are determined to be eligible through [facility]’s financial assistance program. Information regarding [facility]’s financial assistance program can be found by going to [email or web address] or by calling [phone].

## 2025-06-27 NOTE — PROGRESS NOTE ADULT - SUBJECTIVE AND OBJECTIVE BOX
CARDIOLOGY ATTENDING PROGRESS NOTE      Subjective:    No acute events overnight.   ROS negative.     PMHx/PSHx  FMHx/Social hx:    Current Medications:   benzocaine/menthol Lozenge 1 Lozenge Oral three times a day PRN  bisacodyl 5 milliGRAM(s) Oral every 12 hours PRN  carbidopa/levodopa  25/100 1 Tablet(s) Oral three times a day  dextrose 50% Injectable 25 Gram(s) IV Push once  dextrose 50% Injectable 12.5 Gram(s) IV Push once  dextrose 50% Injectable 25 Gram(s) IV Push once  dextrose Oral Gel 15 Gram(s) Oral once PRN  enoxaparin Injectable 40 milliGRAM(s) SubCutaneous every 24 hours  folic acid 1 milliGRAM(s) Oral daily  glucagon  Injectable 1 milliGRAM(s) IntraMuscular once  insulin lispro (ADMELOG) corrective regimen sliding scale   SubCutaneous Before meals and at bedtime  lidocaine   4% Patch 1 Patch Transdermal daily  magnesium hydroxide Suspension 30 milliLiter(s) Oral daily PRN  methIMAzole 5 milliGRAM(s) Oral daily  methocarbamol 500 milliGRAM(s) Oral every 12 hours  metoprolol succinate ER 25 milliGRAM(s) Oral daily  multivitamin 1 Tablet(s) Oral daily  ondansetron   Disintegrating Tablet 4 milliGRAM(s) Oral every 6 hours  pantoprazole    Tablet 40 milliGRAM(s) Oral before breakfast  polyethylene glycol 3350 17 Gram(s) Oral two times a day  pramipexole 0.125 milliGRAM(s) Oral daily  rosuvastatin 10 milliGRAM(s) Oral at bedtime  senna 2 Tablet(s) Oral at bedtime  traMADol 25 milliGRAM(s) Oral every 6 hours PRN  traMADol 50 milliGRAM(s) Oral every 6 hours PRN      REVIEW OF SYSTEMS:  CONSTITUTIONAL: No weakness, fevers or chills  EYES/ENT: No visual changes;  No dysphagia  NECK: No pain or stiffness  RESPIRATORY: No cough, wheezing, hemoptysis; No shortness of breath  CARDIOVASCULAR: No chest pain or palpitations; No lower extremity edema  GASTROINTESTINAL: No abdominal or epigastric pain. No nausea, vomiting, or hematemesis; No diarrhea or constipation. No melena or hematochezia.  BACK: No back pain  GENITOURINARY: No dysuria, frequency or hematuria  NEUROLOGICAL: No numbness or weakness  SKIN: No itching, burning, rashes, or lesions   All other review of systems is negative unless indicated above.    Physical Exam:  T(F): 98 (06-27), Max: 98.4 (06-26)  HR: 65 (06-27) (61 - 66)  BP: 130/70 (06-27) (125/59 - 144/75)  BP(mean): 96 (06-27) (90 - 100)  ABP: --  ABP(mean): --  RR: 18 (06-27)  SpO2: 93% (06-27)  GENERAL: No acute distress, well-developed  HEAD:  Atraumatic, Normocephalic  ENT: EOMI, PERRLA, conjunctiva and sclera clear, Neck supple, No JVD, moist mucosa  CHEST/LUNG: Clear to auscultation bilaterally; No wheeze, equal breath sounds bilaterally   BACK: No spinal tenderness  HEART: Regular rate and rhythm; No murmurs, rubs, or gallops  ABDOMEN: Soft, Nontender, Nondistended; Bowel sounds present  EXTREMITIES:  No clubbing, cyanosis, or edema  PSYCH: Nl behavior, nl affect  NEUROLOGY: AAOx3, non-focal, cranial nerves intact  SKIN: Normal color, No rashes or lesions  LINES:    Cardiovascular Diagnostic Testing: personally reviewed    CXR: Personally reviewed    Labs: Personally reviewed                        9.8    5.42  )-----------( 147      ( 27 Jun 2025 05:30 )             31.6     06-27    138  |  106  |  12  ----------------------------<  126[H]  4.4   |  24  |  0.60    Ca    8.5      27 Jun 2025 05:30                      Thyroid Stimulating Hormone, Serum: 0.311 uIU/mL (06-26 @ 06:45)

## 2025-06-27 NOTE — DISCHARGE NOTE NURSING/CASE MANAGEMENT/SOCIAL WORK - NSDCPEFALRISK_GEN_ALL_CORE
For information on Fall & Injury Prevention, visit: [email or web address] OR  [email or web address] OR  https://www.cdc.gov/steadi/patient.html

## 2025-06-27 NOTE — PROGRESS NOTE ADULT - SUBJECTIVE AND OBJECTIVE BOX
Feeling well, ready to go to rehab today. Wondering what time she will be going.  Had another bowel movement, and has been passing gas.  Feeling much better after that bowel movement.     OVERNIGHT EVENTS: NAEO    Remaining ROS negative     PHYSICAL EXAM:    General: sitting up in bed, alert, no acute distress  HEENT: NC/AT; MMM  Neck: supple  Cardiovascular: +S1/S2, RRR, + systolic murmur  Respiratory: CTA B/L; no W/R/R  Gastrointestinal: soft, NT/ND; +BSx4  Extremities: WWP; no edema  Neurological: speech fluent, no facial asymmetry, moves extremities, no focal deficits noted  Psychiatric: pleasant mood and affect    VITAL SIGNS:  Vital Signs Last 24 Hrs  T(C): 36.4 (27 Jun 2025 05:57), Max: 36.9 (26 Jun 2025 21:39)  T(F): 97.5 (27 Jun 2025 05:57), Max: 98.4 (26 Jun 2025 21:39)  HR: 66 (27 Jun 2025 09:00) (61 - 69)  BP: 125/59 (27 Jun 2025 09:00) (125/59 - 149/64)  BP(mean): 96 (27 Jun 2025 05:57) (90 - 100)  RR: 20 (27 Jun 2025 09:00) (16 - 20)  SpO2: 99% (27 Jun 2025 09:00) (93% - 99%)    Parameters below as of 27 Jun 2025 09:00  Patient On (Oxygen Delivery Method): room air          MEDICATIONS:  MEDICATIONS  (STANDING):  carbidopa/levodopa  25/100 1 Tablet(s) Oral three times a day  dextrose 50% Injectable 25 Gram(s) IV Push once  dextrose 50% Injectable 12.5 Gram(s) IV Push once  dextrose 50% Injectable 25 Gram(s) IV Push once  enoxaparin Injectable 40 milliGRAM(s) SubCutaneous every 24 hours  folic acid 1 milliGRAM(s) Oral daily  glucagon  Injectable 1 milliGRAM(s) IntraMuscular once  insulin lispro (ADMELOG) corrective regimen sliding scale   SubCutaneous Before meals and at bedtime  lidocaine   4% Patch 1 Patch Transdermal daily  methIMAzole 5 milliGRAM(s) Oral daily  methocarbamol 500 milliGRAM(s) Oral every 12 hours  metoprolol succinate ER 25 milliGRAM(s) Oral daily  multivitamin 1 Tablet(s) Oral daily  ondansetron   Disintegrating Tablet 4 milliGRAM(s) Oral every 6 hours  pantoprazole    Tablet 40 milliGRAM(s) Oral before breakfast  polyethylene glycol 3350 17 Gram(s) Oral two times a day  pramipexole 0.125 milliGRAM(s) Oral daily  rosuvastatin 10 milliGRAM(s) Oral at bedtime  senna 2 Tablet(s) Oral at bedtime    MEDICATIONS  (PRN):  benzocaine/menthol Lozenge 1 Lozenge Oral three times a day PRN Sore Throat  bisacodyl 5 milliGRAM(s) Oral every 12 hours PRN Constipation  dextrose Oral Gel 15 Gram(s) Oral once PRN Blood Glucose LESS THAN 70 milliGRAM(s)/deciliter  magnesium hydroxide Suspension 30 milliLiter(s) Oral daily PRN Constipation  traMADol 25 milliGRAM(s) Oral every 6 hours PRN Moderate Pain (4 - 6)  traMADol 50 milliGRAM(s) Oral every 6 hours PRN Severe Pain (7 - 10)      ALLERGIES:  Allergies    No Known Allergies    Intolerances    OxyContin (Nausea)  Percocet 5/325 (Other; Nausea)  hydrocodone (Other; Nausea)      LABS:                        9.8    5.42  )-----------( 147      ( 27 Jun 2025 05:30 )             31.6     06-27    138  |  106  |  12  ----------------------------<  126[H]  4.4   |  24  |  0.60    Ca    8.5      27 Jun 2025 05:30        Urinalysis Basic - ( 27 Jun 2025 05:30 )    Color: x / Appearance: x / SG: x / pH: x  Gluc: 126 mg/dL / Ketone: x  / Bili: x / Urobili: x   Blood: x / Protein: x / Nitrite: x   Leuk Esterase: x / RBC: x / WBC x   Sq Epi: x / Non Sq Epi: x / Bacteria: x      CAPILLARY BLOOD GLUCOSE      POCT Blood Glucose.: 162 mg/dL (27 Jun 2025 12:01)      RADIOLOGY & ADDITIONAL TESTS: Reviewed.

## 2025-06-27 NOTE — PROGRESS NOTE ADULT - SUBJECTIVE AND OBJECTIVE BOX
PAIN MANAGEMENT CONSULT NOTE    Interval Events:  - No acute events overnight, pain controlled. Pending possible dc to rehab today pending bed availability.      Since yesterday 6am, pt has required:  -tylenol 1g x5  -tramadol 50mg x4  -robaxin 500mg x2       HOME MEDICATIONS:   Please refer to initial HNP    Allergies    No Known Allergies    Intolerances    OxyContin (Nausea)  Percocet 5/325 (Other; Nausea)  hydrocodone (Other; Nausea)      PAIN MEDICATIONS:  carbidopa/levodopa  25/100 1 Tablet(s) Oral three times a day  methocarbamol 500 milliGRAM(s) Oral every 12 hours  ondansetron   Disintegrating Tablet 4 milliGRAM(s) Oral every 6 hours  pramipexole 0.125 milliGRAM(s) Oral daily  traMADol 25 milliGRAM(s) Oral every 6 hours PRN  traMADol 50 milliGRAM(s) Oral every 6 hours PRN    Heme:  enoxaparin Injectable 40 milliGRAM(s) SubCutaneous every 24 hours    Antibiotics:    Cardiovascular:  metoprolol succinate ER 25 milliGRAM(s) Oral daily    GI:  bisacodyl 5 milliGRAM(s) Oral every 12 hours PRN  magnesium hydroxide Suspension 30 milliLiter(s) Oral daily PRN  pantoprazole    Tablet 40 milliGRAM(s) Oral before breakfast  polyethylene glycol 3350 17 Gram(s) Oral two times a day  senna 2 Tablet(s) Oral at bedtime    Endocrine:  dextrose 50% Injectable 25 Gram(s) IV Push once  dextrose 50% Injectable 12.5 Gram(s) IV Push once  dextrose 50% Injectable 25 Gram(s) IV Push once  dextrose Oral Gel 15 Gram(s) Oral once PRN  glucagon  Injectable 1 milliGRAM(s) IntraMuscular once  insulin lispro (ADMELOG) corrective regimen sliding scale   SubCutaneous Before meals and at bedtime  methIMAzole 5 milliGRAM(s) Oral daily  rosuvastatin 10 milliGRAM(s) Oral at bedtime    All Other Medications:  benzocaine/menthol Lozenge 1 Lozenge Oral three times a day PRN  folic acid 1 milliGRAM(s) Oral daily  lidocaine   4% Patch 1 Patch Transdermal daily  multivitamin 1 Tablet(s) Oral daily      Vital Signs Last 24 Hrs  T(C): 36.4 (27 Jun 2025 05:57), Max: 36.9 (26 Jun 2025 21:39)  T(F): 97.5 (27 Jun 2025 05:57), Max: 98.4 (26 Jun 2025 21:39)  HR: 66 (27 Jun 2025 09:00) (61 - 75)  BP: 125/59 (27 Jun 2025 09:00) (115/65 - 149/64)  BP(mean): 96 (27 Jun 2025 05:57) (90 - 100)  RR: 20 (27 Jun 2025 09:00) (16 - 20)  SpO2: 99% (27 Jun 2025 09:00) (93% - 99%)    Parameters below as of 27 Jun 2025 09:00  Patient On (Oxygen Delivery Method): room air        LABS:                        9.8    5.42  )-----------( 147      ( 27 Jun 2025 05:30 )             31.6     06-27    138  |  106  |  12  ----------------------------<  126[H]  4.4   |  24  |  0.60    Ca    8.5      27 Jun 2025 05:30        Urinalysis Basic - ( 27 Jun 2025 05:30 )    Color: x / Appearance: x / SG: x / pH: x  Gluc: 126 mg/dL / Ketone: x  / Bili: x / Urobili: x   Blood: x / Protein: x / Nitrite: x   Leuk Esterase: x / RBC: x / WBC x   Sq Epi: x / Non Sq Epi: x / Bacteria: x        RADIOLOGY:    Drug Screen:        REVIEW OF SYSTEMS:  CONSTITUTIONAL: Denies fever or fatigue   EYES: Denies eye pain, visual disturbances  HEENT: Denies difficulty hearing, throat/neck pain or stiffness  RESPIRATORY: Denies SOB, cough, wheezing  CARDIOVASCULAR: Denies chest pain, palpitations.   GASTROINTESTINAL: Endorses +flatus, BMs. Denies nausea, vomiting, abdominal or epigastric pain.   GENITOURINARY: Denies dysuria, frequency, or incontinence  NEUROLOGICAL: Denies numbness, tingling. Denies headaches, loss of strength, tremors, dizziness or lightheadedness with pain medications.   MUSCULOSKELETAL: Endorses low bak pain.Denies joint pain or swelling      FUNCTIONAL ASSESSMENT:  PAIN SCORE AT REST:         SCALE USED: (1-10 VNRS)  PAIN SCORE WITH ACTIVITY:         SCALE USED: (1-10 VNRS)      FOCUSED PHYSICAL EXAM  GENERAL: Laying in bed, NAD  NEURO: CN II-XII grossly intact, EOMI  PULM: unlabored  CV: Regular rate and rhythm  ABDOMEN: Soft, Nontender, Nondistended  EXTREMITIES:  2+ Peripheral Pulses, No clubbing, cyanosis, or edema  SKIN: No rashes or lesions      ASSESSMENT:   80y Female s/p prior L2-pelvis in 2023, L1 kyphoplasty for compression fx in 2024 now with worsening LBP with RLE radiculopathy on 06-22, s/p extension of fusion T10-L2 with cement augmentation bilateral T10, proximal tether and osteotomy on 6/23.      PAIN ASSESSMENT:  -Endorses  moderate pain in her low back characterized as sharp shooting at incision site as well achy achy muscle tightness, improved w/ PO tramadol.    PLAN:   -tylenol 1g q8h  - tramadol 25mg/50mg q6h PO PRN for moderate/severe pain   -continue daily lidocaine 4% patch to low back  -robaxin 500mg q12h standing  -DC recs: continue regimen as above upon discharge to rehab  - monitor closely for oversedation, ensure narcan is ordered  - escalate bowel regimen as needed for bowel movement daily    - Bowel regimen: Senna    - Nausea ppx: Zofran as needed  - Functional Goals: Pt will get OOB with PT today. Pt will resume previous level of activity without impairment from surgery.   - Additional Consults: None recommended.   - Additional Labs/Imaging:  None recommended.     - Discharge Planning: per primary team  - Pain Management follow up plan: will continue to follow    Plan d/w Dr. Lora.     Tee Gillespie PA-C  Acute Pain Service PAIN MANAGEMENT CONSULT NOTE    Interval Events:  - No acute events overnight, pain controlled. Pending possible dc to rehab today pending bed availability.      Since yesterday 6am, pt has required:  -tylenol 1g x5  -tramadol 50mg x4  -robaxin 500mg x2       HOME MEDICATIONS:   Please refer to initial HNP    Allergies    No Known Allergies    Intolerances    OxyContin (Nausea)  Percocet 5/325 (Other; Nausea)  hydrocodone (Other; Nausea)      PAIN MEDICATIONS:  carbidopa/levodopa  25/100 1 Tablet(s) Oral three times a day  methocarbamol 500 milliGRAM(s) Oral every 12 hours  ondansetron   Disintegrating Tablet 4 milliGRAM(s) Oral every 6 hours  pramipexole 0.125 milliGRAM(s) Oral daily  traMADol 25 milliGRAM(s) Oral every 6 hours PRN  traMADol 50 milliGRAM(s) Oral every 6 hours PRN    Heme:  enoxaparin Injectable 40 milliGRAM(s) SubCutaneous every 24 hours    Antibiotics:    Cardiovascular:  metoprolol succinate ER 25 milliGRAM(s) Oral daily    GI:  bisacodyl 5 milliGRAM(s) Oral every 12 hours PRN  magnesium hydroxide Suspension 30 milliLiter(s) Oral daily PRN  pantoprazole    Tablet 40 milliGRAM(s) Oral before breakfast  polyethylene glycol 3350 17 Gram(s) Oral two times a day  senna 2 Tablet(s) Oral at bedtime    Endocrine:  dextrose 50% Injectable 25 Gram(s) IV Push once  dextrose 50% Injectable 12.5 Gram(s) IV Push once  dextrose 50% Injectable 25 Gram(s) IV Push once  dextrose Oral Gel 15 Gram(s) Oral once PRN  glucagon  Injectable 1 milliGRAM(s) IntraMuscular once  insulin lispro (ADMELOG) corrective regimen sliding scale   SubCutaneous Before meals and at bedtime  methIMAzole 5 milliGRAM(s) Oral daily  rosuvastatin 10 milliGRAM(s) Oral at bedtime    All Other Medications:  benzocaine/menthol Lozenge 1 Lozenge Oral three times a day PRN  folic acid 1 milliGRAM(s) Oral daily  lidocaine   4% Patch 1 Patch Transdermal daily  multivitamin 1 Tablet(s) Oral daily      Vital Signs Last 24 Hrs  T(C): 36.4 (27 Jun 2025 05:57), Max: 36.9 (26 Jun 2025 21:39)  T(F): 97.5 (27 Jun 2025 05:57), Max: 98.4 (26 Jun 2025 21:39)  HR: 66 (27 Jun 2025 09:00) (61 - 75)  BP: 125/59 (27 Jun 2025 09:00) (115/65 - 149/64)  BP(mean): 96 (27 Jun 2025 05:57) (90 - 100)  RR: 20 (27 Jun 2025 09:00) (16 - 20)  SpO2: 99% (27 Jun 2025 09:00) (93% - 99%)    Parameters below as of 27 Jun 2025 09:00  Patient On (Oxygen Delivery Method): room air        LABS:                        9.8    5.42  )-----------( 147      ( 27 Jun 2025 05:30 )             31.6     06-27    138  |  106  |  12  ----------------------------<  126[H]  4.4   |  24  |  0.60    Ca    8.5      27 Jun 2025 05:30        Urinalysis Basic - ( 27 Jun 2025 05:30 )    Color: x / Appearance: x / SG: x / pH: x  Gluc: 126 mg/dL / Ketone: x  / Bili: x / Urobili: x   Blood: x / Protein: x / Nitrite: x   Leuk Esterase: x / RBC: x / WBC x   Sq Epi: x / Non Sq Epi: x / Bacteria: x        RADIOLOGY:    Drug Screen:        REVIEW OF SYSTEMS:  CONSTITUTIONAL: Denies fever or fatigue   EYES: Denies eye pain, visual disturbances  HEENT: Denies difficulty hearing, throat/neck pain or stiffness  RESPIRATORY: Denies SOB, cough, wheezing  CARDIOVASCULAR: Denies chest pain, palpitations.   GASTROINTESTINAL: Endorses +flatus, BMs. Denies nausea, vomiting, abdominal or epigastric pain.   GENITOURINARY: Denies dysuria, frequency, or incontinence  NEUROLOGICAL: Denies numbness, tingling. Denies headaches, loss of strength, tremors, dizziness or lightheadedness with pain medications.   MUSCULOSKELETAL: Endorses low bak pain.Denies joint pain or swelling      FUNCTIONAL ASSESSMENT:  PAIN SCORE AT REST:         SCALE USED: (1-10 VNRS)  PAIN SCORE WITH ACTIVITY:         SCALE USED: (1-10 VNRS)      FOCUSED PHYSICAL EXAM  GENERAL: Laying in bed, NAD  NEURO: CN II-XII grossly intact, EOMI  PULM: unlabored  CV: Regular rate and rhythm  ABDOMEN: Soft, Nontender, Nondistended  EXTREMITIES:  2+ Peripheral Pulses, No clubbing, cyanosis, or edema  SKIN: No rashes or lesions      ASSESSMENT:   80y Female s/p prior L2-pelvis in 2023, L1 kyphoplasty for compression fx in 2024 now with worsening LBP with RLE radiculopathy on 06-22, s/p extension of fusion T10-L2 with cement augmentation bilateral T10, proximal tether and osteotomy on 6/23.      PAIN ASSESSMENT:  -Endorses  moderate pain in her low back characterized as sharp shooting at incision site as well achy achy muscle tightness, improved w/ PO tramadol.    PLAN:   -tylenol 1g q8h  - tramadol 25mg/50mg q6h PO PRN for moderate/severe pain   -continue daily lidocaine 4% patch to low back  -robaxin 500mg q12h standing  -DC recs: continue regimen as above upon discharge to rehab  - monitor closely for oversedation, ensure narcan is ordered  - escalate bowel regimen as needed for bowel movement daily    - Bowel regimen: Senna    - Nausea ppx: Zofran as needed  - Functional Goals: Pt will get OOB with PT today. Pt will resume previous level of activity without impairment from surgery.   - Additional Consults: None recommended.   - Additional Labs/Imaging:  None recommended.     - Discharge Planning: per primary team  - Pain Management follow up plan: signing off at this time, reconsult as needed    Plan d/w Dr. Lora.     Tee Gillespie PA-C  Acute Pain Service

## 2025-06-27 NOTE — PROGRESS NOTE ADULT - PROVIDER SPECIALTY LIST ADULT
Hospitalist
Orthopedics
Pain Medicine
Hospitalist
Orthopedics
Pain Medicine
Cardiology
Hospitalist
Hospitalist
Orthopedics
Orthopedics
Hospitalist

## 2025-06-27 NOTE — DISCHARGE NOTE NURSING/CASE MANAGEMENT/SOCIAL WORK - NSDPFAC_GEN_ALL_CORE
[facility] for [facility]/ [address] [place] [id number]/ Phone: [phone]

## 2025-06-27 NOTE — PROGRESS NOTE ADULT - SUBJECTIVE AND OBJECTIVE BOX
Ortho Note    Pt comfortable without complaints, pain controlled  Denies CP, SOB, N/V, numbness/tingling     Vital Signs Last 24 Hrs  T(C): 36.4 (06-27-25 @ 05:57), Max: 36.7 (06-27-25 @ 01:31)  T(F): 97.5 (06-27-25 @ 05:57), Max: 98.1 (06-27-25 @ 01:31)  HR: 62 (06-27-25 @ 05:57) (62 - 62)  BP: 144/75 (06-27-25 @ 05:57) (137/74 - 144/75)  BP(mean): 96 (06-27-25 @ 05:57) (96 - 100)  RR: 16 (06-27-25 @ 05:57) (16 - 20)  SpO2: 96% (06-27-25 @ 05:57) (95% - 96%)  I&O's Summary    26 Jun 2025 07:01  -  27 Jun 2025 07:00  --------------------------------------------------------  IN: 0 mL / OUT: 750 mL / NET: -750 mL        General: NAD  Dressing  - c/d/i  Bilat LE    Sensation: SILT L2-S1     Motor:    L2 (hip flexion)  5/5    L3 (knee extension)  5/5    L4 (ankle dorsiflexion)  5/5    L5 (long toe extension) 5/5    S1 (ankle plantar flexion) 5/5                           9.9    5.70  )-----------( 142      ( 26 Jun 2025 06:45 )             31.6     06-26    140  |  107  |  12  ----------------------------<  130[H]  4.4   |  25  |  0.65    Ca    8.4      26 Jun 2025 06:45        A/P: 80yFemale s/p extension of fusion T10-L2 with cement augmentation bilateral T10, proximal tether and osteotomy on 6/23    - Stable  - Pain Control, appreciate pain recs  - Appreciate med recs  - DVT ppx: SCDs  - Post op abx: ancef   - Drains d/c'd  - Dispo: [facility]    Ortho Pager [phone]

## 2025-06-30 ENCOUNTER — OUTPATIENT (OUTPATIENT)
Dept: OUTPATIENT SERVICES | Facility: HOSPITAL | Age: 80
LOS: 1 days | End: 2025-06-30
Payer: COMMERCIAL

## 2025-06-30 DIAGNOSIS — Z98.890 OTHER SPECIFIED POSTPROCEDURAL STATES: Chronic | ICD-10-CM

## 2025-06-30 DIAGNOSIS — I82.401 ACUTE EMBOLISM AND THROMBOSIS OF UNSPECIFIED DEEP VEINS OF RIGHT LOWER EXTREMITY: ICD-10-CM

## 2025-06-30 DIAGNOSIS — Z96.659 PRESENCE OF UNSPECIFIED ARTIFICIAL KNEE JOINT: Chronic | ICD-10-CM

## 2025-06-30 DIAGNOSIS — Z98.82 BREAST IMPLANT STATUS: Chronic | ICD-10-CM

## 2025-06-30 PROCEDURE — 93971 EXTREMITY STUDY: CPT | Mod: 26,RT

## 2025-07-08 ENCOUNTER — APPOINTMENT (OUTPATIENT)
Dept: ORTHOPEDIC SURGERY | Facility: CLINIC | Age: 80
End: 2025-07-08
Payer: MEDICARE

## 2025-07-08 VITALS
OXYGEN SATURATION: 98 % | WEIGHT: 130 LBS | BODY MASS INDEX: 23.92 KG/M2 | DIASTOLIC BLOOD PRESSURE: 66 MMHG | TEMPERATURE: 97 F | SYSTOLIC BLOOD PRESSURE: 114 MMHG | HEIGHT: 62 IN | HEART RATE: 91 BPM

## 2025-07-08 PROCEDURE — 72080 X-RAY EXAM THORACOLMB 2/> VW: CPT

## 2025-07-08 PROCEDURE — 99024 POSTOP FOLLOW-UP VISIT: CPT

## 2025-07-08 RX ORDER — HYDROMORPHONE HYDROCHLORIDE 2 MG/1
2 TABLET ORAL
Qty: 60 | Refills: 0 | Status: ACTIVE | COMMUNITY
Start: 2025-07-08 | End: 1900-01-01

## 2025-07-09 DIAGNOSIS — I11.0 HYPERTENSIVE HEART DISEASE WITH HEART FAILURE: ICD-10-CM

## 2025-07-09 DIAGNOSIS — K59.00 CONSTIPATION, UNSPECIFIED: ICD-10-CM

## 2025-07-09 DIAGNOSIS — Z98.82 BREAST IMPLANT STATUS: ICD-10-CM

## 2025-07-09 DIAGNOSIS — M40.295 OTHER KYPHOSIS, THORACOLUMBAR REGION: ICD-10-CM

## 2025-07-09 DIAGNOSIS — Z79.85 LONG-TERM (CURRENT) USE OF INJECTABLE NON-INSULIN ANTIDIABETIC DRUGS: ICD-10-CM

## 2025-07-09 DIAGNOSIS — E05.90 THYROTOXICOSIS, UNSPECIFIED WITHOUT THYROTOXIC CRISIS OR STORM: ICD-10-CM

## 2025-07-09 DIAGNOSIS — M54.50 LOW BACK PAIN, UNSPECIFIED: ICD-10-CM

## 2025-07-09 DIAGNOSIS — M54.16 RADICULOPATHY, LUMBAR REGION: ICD-10-CM

## 2025-07-09 DIAGNOSIS — Z79.84 LONG TERM (CURRENT) USE OF ORAL HYPOGLYCEMIC DRUGS: ICD-10-CM

## 2025-07-09 DIAGNOSIS — I34.0 NONRHEUMATIC MITRAL (VALVE) INSUFFICIENCY: ICD-10-CM

## 2025-07-09 DIAGNOSIS — E78.5 HYPERLIPIDEMIA, UNSPECIFIED: ICD-10-CM

## 2025-07-09 DIAGNOSIS — D69.6 THROMBOCYTOPENIA, UNSPECIFIED: ICD-10-CM

## 2025-07-09 DIAGNOSIS — I48.92 UNSPECIFIED ATRIAL FLUTTER: ICD-10-CM
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
## 2025-07-09 DIAGNOSIS — I49.1 ATRIAL PREMATURE DEPOLARIZATION: ICD-10-CM

## 2025-07-09 DIAGNOSIS — G20.A1 PARKINSON'S DISEASE WITHOUT DYSKINESIA, WITHOUT MENTION OF FLUCTUATIONS: ICD-10-CM

## 2025-07-09 DIAGNOSIS — D62 ACUTE POSTHEMORRHAGIC ANEMIA: ICD-10-CM

## 2025-07-09 DIAGNOSIS — Z98.1 ARTHRODESIS STATUS: ICD-10-CM

## 2025-07-09 DIAGNOSIS — M48.56XA COLLAPSED VERTEBRA, NOT ELSEWHERE CLASSIFIED, LUMBAR REGION, INITIAL ENCOUNTER FOR FRACTURE: ICD-10-CM

## 2025-07-09 DIAGNOSIS — E11.9 TYPE 2 DIABETES MELLITUS WITHOUT COMPLICATIONS: ICD-10-CM

## 2025-07-09 DIAGNOSIS — T84.418A BREAKDOWN (MECHANICAL) OF OTHER INTERNAL ORTHOPEDIC DEVICES, IMPLANTS AND GRAFTS, INITIAL ENCOUNTER: ICD-10-CM

## 2025-07-09 DIAGNOSIS — I50.30 UNSPECIFIED DIASTOLIC (CONGESTIVE) HEART FAILURE: ICD-10-CM
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
## 2025-07-09 DIAGNOSIS — Z79.82 LONG TERM (CURRENT) USE OF ASPIRIN: ICD-10-CM

## 2025-07-09 DIAGNOSIS — M81.0 AGE-RELATED OSTEOPOROSIS WITHOUT CURRENT PATHOLOGICAL FRACTURE: ICD-10-CM

## 2025-07-09 DIAGNOSIS — Z96.653 PRESENCE OF ARTIFICIAL KNEE JOINT, BILATERAL: ICD-10-CM

## 2025-07-09 DIAGNOSIS — M48.061 SPINAL STENOSIS, LUMBAR REGION WITHOUT NEUROGENIC CLAUDICATION: ICD-10-CM

## 2025-08-12 ENCOUNTER — APPOINTMENT (OUTPATIENT)
Dept: ORTHOPEDIC SURGERY | Facility: CLINIC | Age: 80
End: 2025-08-12
Payer: MEDICARE

## 2025-08-12 VITALS
DIASTOLIC BLOOD PRESSURE: 77 MMHG | HEART RATE: 71 BPM | HEIGHT: 62 IN | SYSTOLIC BLOOD PRESSURE: 150 MMHG | OXYGEN SATURATION: 99 % | WEIGHT: 132 LBS | BODY MASS INDEX: 24.29 KG/M2

## 2025-08-12 DIAGNOSIS — Z98.1 ARTHRODESIS STATUS: ICD-10-CM

## 2025-08-12 DIAGNOSIS — S32.000A WEDGE COMPRESSION FRACTURE OF UNSPECIFIED LUMBAR VERTEBRA, INITIAL ENCOUNTER FOR CLOSED FRACTURE: ICD-10-CM

## 2025-08-12 DIAGNOSIS — Z00.00 ENCOUNTER FOR GENERAL ADULT MEDICAL EXAMINATION W/OUT ABNORMAL FINDINGS: ICD-10-CM

## 2025-08-12 PROCEDURE — 72082 X-RAY EXAM ENTIRE SPI 2/3 VW: CPT

## 2025-08-12 PROCEDURE — 99024 POSTOP FOLLOW-UP VISIT: CPT

## 2025-08-12 RX ORDER — ACETAMINOPHEN AND CODEINE PHOSPHATE 300; 30 MG/1; MG/1
300-30 TABLET ORAL 3 TIMES DAILY
Qty: 30 | Refills: 0 | Status: ACTIVE | COMMUNITY
Start: 2025-08-12 | End: 1900-01-01

## 2025-08-13 DIAGNOSIS — R29.898 OTHER SYMPTOMS AND SIGNS INVOLVING THE MUSCULOSKELETAL SYSTEM: ICD-10-CM

## 2025-09-02 ENCOUNTER — APPOINTMENT (OUTPATIENT)
Dept: ORTHOPEDIC SURGERY | Facility: CLINIC | Age: 80
End: 2025-09-02
Payer: MEDICARE

## 2025-09-02 VITALS
WEIGHT: 134 LBS | SYSTOLIC BLOOD PRESSURE: 157 MMHG | HEIGHT: 62 IN | HEART RATE: 77 BPM | TEMPERATURE: 98.1 F | BODY MASS INDEX: 24.66 KG/M2 | RESPIRATION RATE: 17 BRPM | DIASTOLIC BLOOD PRESSURE: 81 MMHG | OXYGEN SATURATION: 91 %

## 2025-09-02 DIAGNOSIS — R29.898 OTHER SYMPTOMS AND SIGNS INVOLVING THE MUSCULOSKELETAL SYSTEM: ICD-10-CM

## 2025-09-02 DIAGNOSIS — S32.000A WEDGE COMPRESSION FRACTURE OF UNSPECIFIED LUMBAR VERTEBRA, INITIAL ENCOUNTER FOR CLOSED FRACTURE: ICD-10-CM

## 2025-09-02 DIAGNOSIS — Z98.1 ARTHRODESIS STATUS: ICD-10-CM

## 2025-09-02 DIAGNOSIS — R26.89 OTHER ABNORMALITIES OF GAIT AND MOBILITY: ICD-10-CM

## 2025-09-02 DIAGNOSIS — M54.50 LOW BACK PAIN, UNSPECIFIED: ICD-10-CM

## 2025-09-02 PROCEDURE — 99024 POSTOP FOLLOW-UP VISIT: CPT

## 2025-09-02 PROCEDURE — 72082 X-RAY EXAM ENTIRE SPI 2/3 VW: CPT

## 2025-09-05 ENCOUNTER — EMERGENCY (EMERGENCY)
Facility: HOSPITAL | Age: 80
LOS: 0 days | Discharge: HOME | End: 2025-09-06
Attending: STUDENT IN AN ORGANIZED HEALTH CARE EDUCATION/TRAINING PROGRAM
Payer: MEDICARE

## 2025-09-05 VITALS
HEART RATE: 74 BPM | HEIGHT: 62 IN | WEIGHT: 132.06 LBS | TEMPERATURE: 98 F | SYSTOLIC BLOOD PRESSURE: 126 MMHG | OXYGEN SATURATION: 97 % | RESPIRATION RATE: 18 BRPM | DIASTOLIC BLOOD PRESSURE: 77 MMHG

## 2025-09-05 DIAGNOSIS — Z87.19 PERSONAL HISTORY OF OTHER DISEASES OF THE DIGESTIVE SYSTEM: ICD-10-CM

## 2025-09-05 DIAGNOSIS — I10 ESSENTIAL (PRIMARY) HYPERTENSION: ICD-10-CM

## 2025-09-05 DIAGNOSIS — R11.0 NAUSEA: ICD-10-CM

## 2025-09-05 DIAGNOSIS — Z98.890 OTHER SPECIFIED POSTPROCEDURAL STATES: Chronic | ICD-10-CM

## 2025-09-05 DIAGNOSIS — Z96.659 PRESENCE OF UNSPECIFIED ARTIFICIAL KNEE JOINT: Chronic | ICD-10-CM

## 2025-09-05 DIAGNOSIS — Z98.82 BREAST IMPLANT STATUS: Chronic | ICD-10-CM

## 2025-09-05 DIAGNOSIS — Z98.890 OTHER SPECIFIED POSTPROCEDURAL STATES: ICD-10-CM

## 2025-09-05 PROCEDURE — 99285 EMERGENCY DEPT VISIT HI MDM: CPT

## 2025-09-06 VITALS
RESPIRATION RATE: 18 BRPM | TEMPERATURE: 98 F | HEART RATE: 69 BPM | SYSTOLIC BLOOD PRESSURE: 128 MMHG | DIASTOLIC BLOOD PRESSURE: 71 MMHG | OXYGEN SATURATION: 98 %

## 2025-09-06 LAB
ALBUMIN SERPL ELPH-MCNC: 3.2 G/DL — LOW (ref 3.3–5)
ALP SERPL-CCNC: 100 U/L — SIGNIFICANT CHANGE UP (ref 40–120)
ALT FLD-CCNC: 16 U/L — SIGNIFICANT CHANGE UP (ref 12–78)
ANION GAP SERPL CALC-SCNC: 6 MMOL/L — SIGNIFICANT CHANGE UP (ref 5–17)
APPEARANCE UR: CLEAR — SIGNIFICANT CHANGE UP
AST SERPL-CCNC: 12 U/L — LOW (ref 15–37)
BACTERIA # UR AUTO: ABNORMAL /HPF
BASOPHILS # BLD AUTO: 0.01 K/UL — SIGNIFICANT CHANGE UP (ref 0–0.2)
BASOPHILS NFR BLD AUTO: 0.2 % — SIGNIFICANT CHANGE UP (ref 0–2)
BILIRUB SERPL-MCNC: 0.4 MG/DL — SIGNIFICANT CHANGE UP (ref 0.2–1.2)
BILIRUB UR-MCNC: NEGATIVE — SIGNIFICANT CHANGE UP
BUN SERPL-MCNC: 31 MG/DL — HIGH (ref 7–23)
CALCIUM SERPL-MCNC: 9.7 MG/DL — SIGNIFICANT CHANGE UP (ref 8.5–10.1)
CHLORIDE SERPL-SCNC: 106 MMOL/L — SIGNIFICANT CHANGE UP (ref 96–108)
CO2 SERPL-SCNC: 29 MMOL/L — SIGNIFICANT CHANGE UP (ref 22–31)
COLOR SPEC: YELLOW — SIGNIFICANT CHANGE UP
CREAT SERPL-MCNC: 1.02 MG/DL — SIGNIFICANT CHANGE UP (ref 0.5–1.3)
DIFF PNL FLD: NEGATIVE — SIGNIFICANT CHANGE UP
EGFR: 56 ML/MIN/1.73M2 — LOW
EGFR: 56 ML/MIN/1.73M2 — LOW
EOSINOPHIL # BLD AUTO: 0.07 K/UL — SIGNIFICANT CHANGE UP (ref 0–0.5)
EOSINOPHIL NFR BLD AUTO: 1.3 % — SIGNIFICANT CHANGE UP (ref 0–6)
EPI CELLS # UR: PRESENT
GLUCOSE SERPL-MCNC: 112 MG/DL — HIGH (ref 70–99)
GLUCOSE UR QL: NEGATIVE MG/DL — SIGNIFICANT CHANGE UP
HCT VFR BLD CALC: 32.1 % — LOW (ref 34.5–45)
HGB BLD-MCNC: 10 G/DL — LOW (ref 11.5–15.5)
IMM GRANULOCYTES NFR BLD AUTO: 0 % — SIGNIFICANT CHANGE UP (ref 0–0.9)
KETONES UR QL: NEGATIVE MG/DL — SIGNIFICANT CHANGE UP
LACTATE SERPL-SCNC: 1 MMOL/L — SIGNIFICANT CHANGE UP (ref 0.7–2)
LEUKOCYTE ESTERASE UR-ACNC: ABNORMAL
LIDOCAIN IGE QN: 56 U/L — SIGNIFICANT CHANGE UP (ref 13–75)
LYMPHOCYTES # BLD AUTO: 1.69 K/UL — SIGNIFICANT CHANGE UP (ref 1–3.3)
LYMPHOCYTES # BLD AUTO: 32.4 % — SIGNIFICANT CHANGE UP (ref 13–44)
MCHC RBC-ENTMCNC: 27.5 PG — SIGNIFICANT CHANGE UP (ref 27–34)
MCHC RBC-ENTMCNC: 31.2 G/DL — LOW (ref 32–36)
MCV RBC AUTO: 88.4 FL — SIGNIFICANT CHANGE UP (ref 80–100)
MONOCYTES # BLD AUTO: 0.56 K/UL — SIGNIFICANT CHANGE UP (ref 0–0.9)
MONOCYTES NFR BLD AUTO: 10.7 % — SIGNIFICANT CHANGE UP (ref 2–14)
NEUTROPHILS # BLD AUTO: 2.89 K/UL — SIGNIFICANT CHANGE UP (ref 1.8–7.4)
NEUTROPHILS NFR BLD AUTO: 55.4 % — SIGNIFICANT CHANGE UP (ref 43–77)
NITRITE UR-MCNC: NEGATIVE — SIGNIFICANT CHANGE UP
NRBC BLD AUTO-RTO: 0 /100 WBCS — SIGNIFICANT CHANGE UP (ref 0–0)
PH UR: 7.5 — SIGNIFICANT CHANGE UP (ref 5–8)
PLATELET # BLD AUTO: 181 K/UL — SIGNIFICANT CHANGE UP (ref 150–400)
POTASSIUM SERPL-MCNC: 4.4 MMOL/L — SIGNIFICANT CHANGE UP (ref 3.5–5.3)
POTASSIUM SERPL-SCNC: 4.4 MMOL/L — SIGNIFICANT CHANGE UP (ref 3.5–5.3)
PROT SERPL-MCNC: 6.5 GM/DL — SIGNIFICANT CHANGE UP (ref 6–8.3)
PROT UR-MCNC: NEGATIVE MG/DL — SIGNIFICANT CHANGE UP
RBC # BLD: 3.63 M/UL — LOW (ref 3.8–5.2)
RBC # FLD: 15.1 % — HIGH (ref 10.3–14.5)
RBC CASTS # UR COMP ASSIST: 0 /HPF — SIGNIFICANT CHANGE UP (ref 0–4)
SODIUM SERPL-SCNC: 141 MMOL/L — SIGNIFICANT CHANGE UP (ref 135–145)
SP GR SPEC: 1 — SIGNIFICANT CHANGE UP (ref 1–1.03)
UROBILINOGEN FLD QL: 0.2 MG/DL — SIGNIFICANT CHANGE UP (ref 0.2–1)
WBC # BLD: 5.22 K/UL — SIGNIFICANT CHANGE UP (ref 3.8–10.5)
WBC # FLD AUTO: 5.22 K/UL — SIGNIFICANT CHANGE UP (ref 3.8–10.5)
WBC UR QL: 1 /HPF — SIGNIFICANT CHANGE UP (ref 0–5)

## 2025-09-06 PROCEDURE — 74177 CT ABD & PELVIS W/CONTRAST: CPT | Mod: 26

## 2025-09-06 RX ORDER — POLYETHYLENE GLYCOL 3350 17 G/17G
17 POWDER, FOR SOLUTION ORAL ONCE
Refills: 0 | Status: COMPLETED | OUTPATIENT
Start: 2025-09-06 | End: 2025-09-06

## 2025-09-06 RX ORDER — KETOROLAC TROMETHAMINE 30 MG/ML
15 INJECTION, SOLUTION INTRAMUSCULAR; INTRAVENOUS ONCE
Refills: 0 | Status: DISCONTINUED | OUTPATIENT
Start: 2025-09-06 | End: 2025-09-06

## 2025-09-06 RX ORDER — SENNA 187 MG
1 TABLET ORAL ONCE
Refills: 0 | Status: COMPLETED | OUTPATIENT
Start: 2025-09-06 | End: 2025-09-06

## 2025-09-06 RX ADMIN — KETOROLAC TROMETHAMINE 15 MILLIGRAM(S): 30 INJECTION, SOLUTION INTRAMUSCULAR; INTRAVENOUS at 05:28

## 2025-09-06 RX ADMIN — Medication 1000 MILLILITER(S): at 01:40

## 2025-09-13 ENCOUNTER — TRANSCRIPTION ENCOUNTER (OUTPATIENT)
Age: 80
End: 2025-09-13

## 2025-09-16 ENCOUNTER — TRANSCRIPTION ENCOUNTER (OUTPATIENT)
Age: 80
End: 2025-09-16

## 2025-09-16 ENCOUNTER — APPOINTMENT (OUTPATIENT)
Dept: ORTHOPEDIC SURGERY | Facility: CLINIC | Age: 80
End: 2025-09-16